# Patient Record
Sex: MALE | Race: WHITE | NOT HISPANIC OR LATINO | Employment: FULL TIME | URBAN - METROPOLITAN AREA
[De-identification: names, ages, dates, MRNs, and addresses within clinical notes are randomized per-mention and may not be internally consistent; named-entity substitution may affect disease eponyms.]

---

## 2018-08-15 DIAGNOSIS — D86.9 SARCOID: Primary | ICD-10-CM

## 2022-09-23 ENCOUNTER — TELEPHONE (OUTPATIENT)
Dept: TRANSPLANT | Facility: CLINIC | Age: 47
End: 2022-09-23
Payer: COMMERCIAL

## 2022-09-23 NOTE — TELEPHONE ENCOUNTER
Referral received from Dr. 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    Referral completed and forwarded to Transplant Financial Services.          Insurance:        Contact #  ----- Message from Nura Liu sent at 9/23/2022 10:42 AM CDT -----    Hepatology referral received and scanned into media; pt chart sent to referral nurse for medical review.       Referring Provider: Alan Chinchilla MD   Phone: [phone]   Fax: [phone]        .

## 2022-09-23 NOTE — LETTER
September 23, 2022    Alan Chinchilla  [address] [place] [address]  Phone: [phone]  Fax: [phone]             Dear Dr. Alan Chinchilla:    Patient: Rosalio Mccarty   MR Number: [id number]   YOB: 1975     Thank you for the referral of Rosalio Mccarty to our transplant program. Your patients demographic and insurance information has been forwarded to our financial counselor for insurance clearance.  Once the insurance company has approved a transplant evaluation for your patient he will be contacted by the transplant coordinator.  An initial appointment will then be scheduled for your patient with one of our transplant hepatologists.      Thank you again for your trust in our program.  If there is anything we can do for you or your staff, please feel free to contact us.      Sincerely,        [facility]   [address] [place] [id number]  [phone]

## 2022-09-23 NOTE — TELEPHONE ENCOUNTER
----- Message from Nura Liu sent at 9/23/2022 10:42 AM CDT -----    Hepatology referral received and scanned into media; pt chart sent to referral nurse for medical review.       Referring Provider: Alan Chinchilla MD   Phone: [phone]   Fax: [phone]        .

## 2022-10-11 ENCOUNTER — TELEPHONE (OUTPATIENT)
Dept: TRANSPLANT | Facility: CLINIC | Age: 47
End: 2022-10-11
Payer: COMMERCIAL

## 2022-10-11 NOTE — TELEPHONE ENCOUNTER
----- Message from Nura Liu sent at 10/11/2022  1:21 PM CDT -----    Called pt to [facility] an appt, but there was no answer. LVM for them to call back to [facility] an appt.  .

## 2022-10-13 ENCOUNTER — TELEPHONE (OUTPATIENT)
Dept: TRANSPLANT | Facility: CLINIC | Age: 47
End: 2022-10-13
Payer: COMMERCIAL

## 2022-10-13 NOTE — TELEPHONE ENCOUNTER
----- Message from Nura Liu sent at 10/13/2022  1:49 PM CDT -----    Called pt to [facility] an appt, but there was no answer. LVM for them to call back to [facility] an appt.  .

## 2022-10-26 ENCOUNTER — TELEPHONE (OUTPATIENT)
Dept: TRANSPLANT | Facility: CLINIC | Age: 47
End: 2022-10-26
Payer: COMMERCIAL

## 2022-10-26 NOTE — TELEPHONE ENCOUNTER
----- Message from Nura Liu sent at 10/26/2022  4:48 PM CDT -----    Called and sp to pt; appt kristian'ed for 11/7.  .

## 2022-11-16 DIAGNOSIS — Z76.82 ORGAN TRANSPLANT CANDIDATE: ICD-10-CM

## 2022-11-16 DIAGNOSIS — K74.60 HEPATIC CIRRHOSIS, UNSPECIFIED HEPATIC CIRRHOSIS TYPE, UNSPECIFIED WHETHER ASCITES PRESENT: Primary | ICD-10-CM

## 2022-11-16 RX ORDER — METRONIDAZOLE 500 MG/1
500 TABLET ORAL 2 TIMES DAILY
COMMUNITY
Start: 2022-08-20 | End: 2022-11-17

## 2022-11-16 RX ORDER — TRAMADOL HYDROCHLORIDE 50 MG/1
50 TABLET ORAL EVERY 6 HOURS PRN
COMMUNITY
Start: 2022-10-01 | End: 2022-11-17

## 2022-11-16 RX ORDER — MONTELUKAST SODIUM 4 MG/1
1 TABLET, CHEWABLE ORAL 2 TIMES DAILY PRN
Status: ON HOLD | COMMUNITY
Start: 2022-11-01 | End: 2023-01-11 | Stop reason: HOSPADM

## 2022-11-16 RX ORDER — FLASH GLUCOSE SENSOR
KIT MISCELLANEOUS
Status: ON HOLD | COMMUNITY
Start: 2022-10-31 | End: 2024-02-09 | Stop reason: HOSPADM

## 2022-11-16 RX ORDER — SEMAGLUTIDE 2.68 MG/ML
2 INJECTION, SOLUTION SUBCUTANEOUS
Status: ON HOLD | COMMUNITY
Start: 2022-10-08 | End: 2023-01-11 | Stop reason: HOSPADM

## 2022-11-16 RX ORDER — INSULIN DEGLUDEC 100 U/ML
40 INJECTION, SOLUTION SUBCUTANEOUS
Status: ON HOLD | COMMUNITY
Start: 2022-10-02 | End: 2023-01-11 | Stop reason: HOSPADM

## 2022-11-16 RX ORDER — OXYCODONE HYDROCHLORIDE 5 MG/1
TABLET ORAL
COMMUNITY
Start: 2022-10-01 | End: 2022-11-17

## 2022-11-16 RX ORDER — HYDROCODONE BITARTRATE AND ACETAMINOPHEN 7.5; 325 MG/1; MG/1
1 TABLET ORAL DAILY
COMMUNITY
Start: 2022-11-13 | End: 2022-12-08 | Stop reason: ALTCHOICE

## 2022-11-16 RX ORDER — ARIPIPRAZOLE 2 MG/1
2 TABLET ORAL DAILY
Status: ON HOLD | COMMUNITY
Start: 2022-09-01 | End: 2023-01-11 | Stop reason: HOSPADM

## 2022-11-16 RX ORDER — CARVEDILOL 6.25 MG/1
6.25 TABLET ORAL 2 TIMES DAILY
Status: ON HOLD | COMMUNITY
Start: 2022-10-31 | End: 2023-01-11 | Stop reason: HOSPADM

## 2022-11-16 RX ORDER — ESCITALOPRAM OXALATE 20 MG/1
20 TABLET ORAL DAILY
Status: ON HOLD | COMMUNITY
Start: 2022-09-23 | End: 2023-01-11 | Stop reason: SDUPTHER

## 2022-11-16 RX ORDER — VANCOMYCIN HYDROCHLORIDE 125 MG/1
125 CAPSULE ORAL EVERY 6 HOURS
COMMUNITY
Start: 2022-09-22 | End: 2022-11-17

## 2022-11-16 RX ORDER — SACUBITRIL AND VALSARTAN 49; 51 MG/1; MG/1
1 TABLET, FILM COATED ORAL 2 TIMES DAILY
Status: ON HOLD | COMMUNITY
Start: 2022-09-23 | End: 2023-01-11 | Stop reason: SDUPTHER

## 2022-11-16 RX ORDER — SULFAMETHOXAZOLE AND TRIMETHOPRIM 800; 160 MG/1; MG/1
TABLET ORAL
COMMUNITY
Start: 2022-10-01 | End: 2022-11-17

## 2022-11-16 RX ORDER — CYCLOBENZAPRINE HCL 10 MG
10 TABLET ORAL 3 TIMES DAILY PRN
COMMUNITY
Start: 2022-10-04 | End: 2022-12-08 | Stop reason: ALTCHOICE

## 2022-11-16 RX ORDER — ADALIMUMAB 40MG/0.8ML
KIT SUBCUTANEOUS
Status: ON HOLD | COMMUNITY
Start: 2022-11-09 | End: 2023-07-28 | Stop reason: HOSPADM

## 2022-11-16 RX ORDER — TRAZODONE HYDROCHLORIDE 50 MG/1
150 TABLET ORAL NIGHTLY
Status: ON HOLD | COMMUNITY
Start: 2022-11-01 | End: 2023-01-11 | Stop reason: HOSPADM

## 2022-11-16 RX ORDER — HYDROXYCHLOROQUINE SULFATE 200 MG/1
200 TABLET, FILM COATED ORAL 2 TIMES DAILY
COMMUNITY
Start: 2022-10-31

## 2022-11-16 RX ORDER — INSULIN LISPRO 100 [IU]/ML
INJECTION, SOLUTION INTRAVENOUS; SUBCUTANEOUS
Status: ON HOLD | COMMUNITY
Start: 2022-11-10 | End: 2023-01-11 | Stop reason: HOSPADM

## 2022-11-16 RX ORDER — MULTIVITAMIN
1 TABLET ORAL DAILY
Status: ON HOLD | COMMUNITY
End: 2023-07-28 | Stop reason: HOSPADM

## 2022-11-17 ENCOUNTER — OFFICE VISIT (OUTPATIENT)
Dept: TRANSPLANT | Facility: CLINIC | Age: 47
End: 2022-11-17
Attending: INTERNAL MEDICINE
Payer: COMMERCIAL

## 2022-11-17 ENCOUNTER — LAB VISIT (OUTPATIENT)
Dept: LAB | Facility: HOSPITAL | Age: 47
End: 2022-11-17
Payer: COMMERCIAL

## 2022-11-17 VITALS
HEIGHT: 68 IN | SYSTOLIC BLOOD PRESSURE: 116 MMHG | HEART RATE: 74 BPM | TEMPERATURE: 97 F | OXYGEN SATURATION: 100 % | RESPIRATION RATE: 16 BRPM | DIASTOLIC BLOOD PRESSURE: 68 MMHG | WEIGHT: 229.5 LBS | BODY MASS INDEX: 34.78 KG/M2

## 2022-11-17 DIAGNOSIS — Z76.82 ORGAN TRANSPLANT CANDIDATE: ICD-10-CM

## 2022-11-17 DIAGNOSIS — I85.10 SECONDARY ESOPHAGEAL VARICES WITHOUT BLEEDING: Chronic | ICD-10-CM

## 2022-11-17 DIAGNOSIS — K74.60 HEPATIC CIRRHOSIS, UNSPECIFIED HEPATIC CIRRHOSIS TYPE, UNSPECIFIED WHETHER ASCITES PRESENT: ICD-10-CM

## 2022-11-17 DIAGNOSIS — R18.8 CIRRHOSIS OF LIVER WITH ASCITES, UNSPECIFIED HEPATIC CIRRHOSIS TYPE: ICD-10-CM

## 2022-11-17 DIAGNOSIS — K74.60 CIRRHOSIS OF LIVER WITH ASCITES, UNSPECIFIED HEPATIC CIRRHOSIS TYPE: ICD-10-CM

## 2022-11-17 DIAGNOSIS — D86.89 HEPATIC GRANULOMA ASSOCIATED WITH SARCOIDOSIS: Chronic | ICD-10-CM

## 2022-11-17 LAB
ABO + RH BLD: NORMAL
AFP SERPL-MCNC: 3 NG/ML (ref 0–8.4)
ALBUMIN SERPL BCP-MCNC: 3.3 G/DL (ref 3.5–5.2)
ALP SERPL-CCNC: 178 U/L (ref 55–135)
ALT SERPL W/O P-5'-P-CCNC: 71 U/L (ref 10–44)
AMPHET+METHAMPHET UR QL: NEGATIVE
ANION GAP SERPL CALC-SCNC: 8 MMOL/L (ref 8–16)
AST SERPL-CCNC: 48 U/L (ref 10–40)
BACTERIA #/AREA URNS AUTO: NORMAL /HPF
BARBITURATES UR QL SCN>200 NG/ML: NEGATIVE
BASOPHILS # BLD AUTO: ABNORMAL K/UL (ref 0–0.2)
BASOPHILS NFR BLD: 0 % (ref 0–1.9)
BENZODIAZ UR QL SCN>200 NG/ML: NEGATIVE
BILIRUB DIRECT SERPL-MCNC: 0.8 MG/DL (ref 0.1–0.3)
BILIRUB SERPL-MCNC: 1.5 MG/DL (ref 0.1–1)
BILIRUB UR QL STRIP: NEGATIVE
BLD GP AB SCN CELLS X3 SERPL QL: NORMAL
BUN SERPL-MCNC: 24 MG/DL (ref 6–20)
BZE UR QL SCN: NEGATIVE
CALCIUM SERPL-MCNC: 9.5 MG/DL (ref 8.7–10.5)
CANNABINOIDS UR QL SCN: ABNORMAL
CHLORIDE SERPL-SCNC: 110 MMOL/L (ref 95–110)
CLARITY UR REFRACT.AUTO: CLEAR
CO2 SERPL-SCNC: 20 MMOL/L (ref 23–29)
COLOR UR AUTO: YELLOW
CREAT SERPL-MCNC: 1.4 MG/DL (ref 0.5–1.4)
CREAT UR-MCNC: 66 MG/DL (ref 23–375)
DIFFERENTIAL METHOD: ABNORMAL
EOSINOPHIL # BLD AUTO: ABNORMAL K/UL (ref 0–0.5)
EOSINOPHIL NFR BLD: 2 % (ref 0–8)
ERYTHROCYTE [DISTWIDTH] IN BLOOD BY AUTOMATED COUNT: 16.3 % (ref 11.5–14.5)
EST. GFR  (NO RACE VARIABLE): >60 ML/MIN/1.73 M^2
ETHANOL UR-MCNC: <10 MG/DL
GGT SERPL-CCNC: 315 U/L (ref 8–55)
GLUCOSE SERPL-MCNC: 226 MG/DL (ref 70–110)
GLUCOSE UR QL STRIP: ABNORMAL
HAV IGG SER QL IA: REACTIVE
HBSAG CONFIRMATION: NORMAL
HBV CORE AB SERPL QL IA: NORMAL
HBV SURFACE AB SER-ACNC: 14.49 MIU/ML
HBV SURFACE AB SER-ACNC: REACTIVE M[IU]/ML
HBV SURFACE AG SERPL QL IA: REACTIVE
HCT VFR BLD AUTO: 34.4 % (ref 40–54)
HCV AB SERPL QL IA: NORMAL
HGB BLD-MCNC: 10.8 G/DL (ref 14–18)
HGB UR QL STRIP: NEGATIVE
HYPOCHROMIA BLD QL SMEAR: ABNORMAL
IMM GRANULOCYTES # BLD AUTO: ABNORMAL K/UL (ref 0–0.04)
IMM GRANULOCYTES NFR BLD AUTO: ABNORMAL % (ref 0–0.5)
INR PPP: 1.3 (ref 0.8–1.2)
KETONES UR QL STRIP: NEGATIVE
LEUKOCYTE ESTERASE UR QL STRIP: NEGATIVE
LYMPHOCYTES # BLD AUTO: ABNORMAL K/UL (ref 1–4.8)
LYMPHOCYTES NFR BLD: 11 % (ref 18–48)
MCH RBC QN AUTO: 28.1 PG (ref 27–31)
MCHC RBC AUTO-ENTMCNC: 31.4 G/DL (ref 32–36)
MCV RBC AUTO: 89 FL (ref 82–98)
METAMYELOCYTES NFR BLD MANUAL: 2 %
METHADONE UR QL SCN>300 NG/ML: NEGATIVE
MICROSCOPIC COMMENT: NORMAL
MONOCYTES # BLD AUTO: ABNORMAL K/UL (ref 0.3–1)
MONOCYTES NFR BLD: 4 % (ref 4–15)
NEUTROPHILS # BLD AUTO: ABNORMAL K/UL (ref 1.8–7.7)
NEUTROPHILS NFR BLD: 81 % (ref 38–73)
NITRITE UR QL STRIP: NEGATIVE
NRBC BLD-RTO: 0 /100 WBC
OPIATES UR QL SCN: NEGATIVE
PCP UR QL SCN>25 NG/ML: NEGATIVE
PH UR STRIP: 5 [PH] (ref 5–8)
PLATELET # BLD AUTO: 48 K/UL (ref 150–450)
PLATELET BLD QL SMEAR: ABNORMAL
PMV BLD AUTO: 12.6 FL (ref 9.2–12.9)
POIKILOCYTOSIS BLD QL SMEAR: SLIGHT
POTASSIUM SERPL-SCNC: 4.5 MMOL/L (ref 3.5–5.1)
PROT SERPL-MCNC: 7.5 G/DL (ref 6–8.4)
PROT UR QL STRIP: NEGATIVE
PROTHROMBIN TIME: 13.1 SEC (ref 9–12.5)
RBC # BLD AUTO: 3.85 M/UL (ref 4.6–6.2)
SODIUM SERPL-SCNC: 138 MMOL/L (ref 136–145)
SP GR UR STRIP: 1.03 (ref 1–1.03)
TOXICOLOGY INFORMATION: ABNORMAL
URN SPEC COLLECT METH UR: ABNORMAL
WBC # BLD AUTO: 1.47 K/UL (ref 3.9–12.7)
WBC #/AREA URNS AUTO: 0 /HPF (ref 0–5)
YEAST UR QL AUTO: NORMAL

## 2022-11-17 PROCEDURE — 86803 HEPATITIS C AB TEST: CPT | Mod: TXP | Performed by: INTERNAL MEDICINE

## 2022-11-17 PROCEDURE — 86790 VIRUS ANTIBODY NOS: CPT | Mod: TXP | Performed by: INTERNAL MEDICINE

## 2022-11-17 PROCEDURE — 82977 ASSAY OF GGT: CPT | Mod: TXP | Performed by: INTERNAL MEDICINE

## 2022-11-17 PROCEDURE — 87340 HEPATITIS B SURFACE AG IA: CPT | Mod: TXP | Performed by: INTERNAL MEDICINE

## 2022-11-17 PROCEDURE — 86706 HEP B SURFACE ANTIBODY: CPT | Mod: 91,TXP | Performed by: INTERNAL MEDICINE

## 2022-11-17 PROCEDURE — 81001 URINALYSIS AUTO W/SCOPE: CPT | Mod: TXP | Performed by: INTERNAL MEDICINE

## 2022-11-17 PROCEDURE — 80053 COMPREHEN METABOLIC PANEL: CPT | Mod: TXP | Performed by: INTERNAL MEDICINE

## 2022-11-17 PROCEDURE — 99999 PR PBB SHADOW E&M-EST. PATIENT-LVL V: ICD-10-PCS | Mod: PBBFAC,TXP,, | Performed by: INTERNAL MEDICINE

## 2022-11-17 PROCEDURE — 99204 OFFICE O/P NEW MOD 45 MIN: CPT | Mod: S$GLB,TXP,, | Performed by: INTERNAL MEDICINE

## 2022-11-17 PROCEDURE — 99204 PR OFFICE/OUTPT VISIT, NEW, LEVL IV, 45-59 MIN: ICD-10-PCS | Mod: S$GLB,TXP,, | Performed by: INTERNAL MEDICINE

## 2022-11-17 PROCEDURE — 86704 HEP B CORE ANTIBODY TOTAL: CPT | Mod: TXP | Performed by: INTERNAL MEDICINE

## 2022-11-17 PROCEDURE — 80321 ALCOHOLS BIOMARKERS 1OR 2: CPT | Mod: TXP | Performed by: INTERNAL MEDICINE

## 2022-11-17 PROCEDURE — 85610 PROTHROMBIN TIME: CPT | Mod: TXP | Performed by: INTERNAL MEDICINE

## 2022-11-17 PROCEDURE — 85027 COMPLETE CBC AUTOMATED: CPT | Mod: TXP | Performed by: INTERNAL MEDICINE

## 2022-11-17 PROCEDURE — 86850 RBC ANTIBODY SCREEN: CPT | Mod: TXP | Performed by: INTERNAL MEDICINE

## 2022-11-17 PROCEDURE — 80307 DRUG TEST PRSMV CHEM ANLYZR: CPT | Mod: TXP | Performed by: INTERNAL MEDICINE

## 2022-11-17 PROCEDURE — 86382 NEUTRALIZATION TEST VIRAL: CPT | Mod: TXP | Performed by: INTERNAL MEDICINE

## 2022-11-17 PROCEDURE — 82248 BILIRUBIN DIRECT: CPT | Mod: TXP | Performed by: INTERNAL MEDICINE

## 2022-11-17 PROCEDURE — 82105 ALPHA-FETOPROTEIN SERUM: CPT | Mod: TXP | Performed by: INTERNAL MEDICINE

## 2022-11-17 PROCEDURE — 99999 PR PBB SHADOW E&M-EST. PATIENT-LVL V: CPT | Mod: PBBFAC,TXP,, | Performed by: INTERNAL MEDICINE

## 2022-11-17 PROCEDURE — 85007 BL SMEAR W/DIFF WBC COUNT: CPT | Mod: TXP | Performed by: INTERNAL MEDICINE

## 2022-11-17 NOTE — PROGRESS NOTES
Transplant Hepatology  Liver Transplant Recipient Evaluation      Consultation started: 11/17/2022 at 9:44 AM     Original Referring Provider: Alan Chinchilla  Current Corresponding Physician: Alan [facility] Liver Diagnosis: Cirrhosis: Other, Specify - granulomatous hepatitis    Reason for Visit: evaluation for liver transplant     Subjective:     Rosalio Mccarty is a 47 y.o. 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  He was diagnosed in 2019.  He is gone on to develop features of portal hypertension including moderate ascites, lower extremity edema and nonbleeding esophageal varices.  There is no history of hepatic encephalopathy.  He does have type 2 diabetes.  There is no history of coronary artery disease but he did undergo a coronary angiogram to rule out pulmonary hypertension a year ago.  He is had no previous upper abdominal surgery.  He works actively as a neurophysiologist.  His most recent MELD scores have been between 13 and 15.  Review of Systems   Constitutional:  Negative for activity change, appetite change, chills, fatigue and unexpected weight change.   HENT:  Negative for congestion, facial swelling and tinnitus.    Eyes:  Negative for visual disturbance.   Respiratory:  Negative for cough, shortness of breath and wheezing.    Cardiovascular:  Negative for chest pain and palpitations.   Gastrointestinal:  Negative for abdominal distention.   Genitourinary:  Negative for dysuria.   Musculoskeletal:  Negative for arthralgias, joint swelling and myalgias.   Neurological:  Negative for syncope and headaches.   Hematological:  Does not bruise/bleed easily.   Psychiatric/Behavioral:  Negative for confusion.    Past Surgical History:   Procedure Laterality Date    ADENOIDECTOMY      CARDIAC CATHETERIZATION Right     COLONOSCOPY  07/11/2018    HIP SURGERY       RIght removed ruptured cyst    UPPER GASTROINTESTINAL ENDOSCOPY  10/09/2019     Current Outpatient Medications   Medication Sig    ARIPiprazole (ABILIFY) 2 MG Tab Take 2 mg by mouth.    carvediloL (COREG) 6.25 MG tablet Take 6.25 mg by mouth 2 (two) times daily.    colestipoL (COLESTID) 1 gram Tab Take 1 g by mouth 2 (two) times daily as needed.    cyclobenzaprine (FLEXERIL) 10 MG tablet Take 10 mg by mouth 3 (three) times daily as needed.    empagliflozin (JARDIANCE) 25 mg tablet Take 25 mg by mouth.    ENTRESTO 49-51 mg per tablet Take 1 tablet by mouth 2 (two) times daily.    EScitalopram oxalate (LEXAPRO) 20 MG tablet Take 20 mg by mouth once daily.    FREESTYLE LUZMA 14 DAY SENSOR Kit Apply topically.    HUMALOG KWIKPEN INSULIN 100 unit/mL pen Sliding Scale    HYDROcodone-acetaminophen (NORCO) 7.5-325 mg per tablet Take 1 tablet by mouth Daily. PRN    hydrOXYchloroQUINE (PLAQUENIL) 200 mg tablet Take 200 mg by mouth 2 (two) times daily.    multivitamin (THERAGRAN) per tablet Take 1 tablet by mouth once daily.    OZEMPIC 2 mg/dose (8 mg/3 mL) PnIj Inject 2 mg into the skin every 7 days.    traZODone (DESYREL) 50 MG tablet Take 50 mg by mouth every evening.    TRESIBA FLEXTOUCH U-100 100 unit/mL (3 mL) insulin pen 40 Units. BID    HUMIRA PEN PnKt injection Inject into the skin.     No current facility-administered medications for this visit.       Objective:   Physical Exam  Constitutional:       Appearance: He is well-developed.   Eyes:      General: No scleral icterus.  Cardiovascular:      Rate and Rhythm: Normal rate and regular rhythm.      Heart sounds: Normal heart sounds.   Pulmonary:      Effort: Pulmonary effort is normal. No respiratory distress.      Breath sounds: Normal breath sounds. No wheezing.   Abdominal:      General: Bowel sounds are normal. There is no distension.      Palpations: Abdomen is soft. There is shifting dullness, fluid wave and splenomegaly. There is no mass.      Tenderness:  There is no abdominal tenderness. There is no rebound.   Musculoskeletal:         General: Normal range of motion.   Lymphadenopathy:      Cervical: No cervical adenopathy.   Skin:     General: Skin is warm and dry.   Neurological:      Mental Status: He is alert and oriented to person, place, and time.     MELD-Na score: 15 at 9/9/2022 10:37 AM  MELD score: 13 at 9/9/2022 10:37 AM  Calculated from:  Serum Creatinine: 1.12 mg/dL at 9/9/2022 10:37 AM  Serum Sodium: 135 mmol/L at 9/9/2022 10:37 AM  Total Bilirubin: 1.7 mg/dL at 9/9/2022 10:37 AM  INR(ratio): 1.36 INR at 9/9/2022 10:37 AM  Age: 47 years  Lab Results   Component Value Date    BUN 21 11/01/2022    CREATININE 1.30 11/01/2022    CALCIUM 9.9 11/01/2022     11/01/2022    K 4.5 11/01/2022     11/01/2022    CO2 25 11/01/2022    WBC 1.8 11/01/2022    WBC 1.8 (LL) 11/01/2022    RBC 4.19 11/01/2022    HGB 11.9 11/01/2022    HCT 37.4 11/01/2022    PLT 34 (LL) 11/01/2022     Lab Results   Component Value Date    BNP 10.9 09/30/2022    CHOL 158 11/01/2022    TRIG 115 11/01/2022    HDL 40 11/01/2022    ALBUMIN 3.9 11/01/2022    AST 66 (H) 11/01/2022    ALT 82 (H) 11/01/2022    ALKPHOS 204 (H) 11/01/2022    LABPROT 16.6 (H) 09/09/2022    INR 1.36 (H) 09/09/2022       Diagnostics:     1. Hepatic cirrhosis, unspecified hepatic cirrhosis type, unspecified whether ascites present    2. Organ transplant candidate    3. Hepatic granuloma associated with sarcoidosis    4. Cirrhosis of liver with ascites, unspecified hepatic cirrhosis type    5. Secondary esophageal varices without bleeding        Transplant Hepatology - Candidacy   Assessment/Plan:     Transplant Candidacy: Rosalio Mccarty is a 47 y.o. male with ESLD secondary to hepatic sarcoid here for evaluation for possible OLT.  In my opinion, he is a good candidate for liver transplant.  He will be presented to selection committee after all tests and evaluations are complete.    Patient advised that it is  recommended that all transplant candidates, and their close contacts and household members receive Covid vaccination.    Ranjith Aiken MD         UNOS Patient Status  Functional Status: 80% - Normal activity with effort: some symptoms of disease  Physical Capacity: No Limitations    Patient on life support: No  Diabetes: Type II  Any previous malignancy: No  Neoadjuvant Therapy: no  Has patient ever had a dx of HCC: no  Previous Abdominal Surgery: no  Spontaneous Bacterial Peritonitis: no  History of Portal Vein Thrombosis: no  Transjugular Intrahepatic Portosystemic Shunt: no

## 2022-11-17 NOTE — LETTER
November 17, 2022        Alan Chinchilla  [address] [place] [address]  Phone: [phone]  Fax: [phone]             [facility]  [address]  [facility] [address]  Phone: [phone]   Patient: Rosalio Mccarty   MR Number: [id number]   YOB: 1975   Date of Visit: 11/17/2022       Dear Dr. Alan Chinchilla    Thank you for referring Rosalio Mccarty to me for evaluation. Attached you will find relevant portions of my assessment and plan of care.    If you have questions, please do not hesitate to call me. I look forward to following Rosalio Mccarty along with you.    Sincerely,    Ranjith Aiken MD    Enclosure    If you would like to receive this communication electronically, please contact [email or web address] or [phone] to request [employer] Link access.    [employer] Link is a tool which provides read-only access to select patient information with whom you have a relationship. Its easy to use and provides real time access to review your patients record including encounter summaries, notes, results, and demographic information.    If you feel you have received this communication in error or would no longer like to receive these types of communications, please e-mail [email or web address]

## 2022-11-23 LAB
CLINICAL BIOCHEMIST REVIEW: NORMAL
PETH 16:0/18.1 (POPETH): <10 NG/ML
PETH 16:0/18.2 (PLPETH): <10 NG/ML

## 2022-11-28 ENCOUNTER — PATIENT MESSAGE (OUTPATIENT)
Dept: TRANSPLANT | Facility: CLINIC | Age: 47
End: 2022-11-28
Payer: COMMERCIAL

## 2022-11-29 DIAGNOSIS — K74.60 CIRRHOSIS OF LIVER WITH ASCITES, UNSPECIFIED HEPATIC CIRRHOSIS TYPE: ICD-10-CM

## 2022-11-29 DIAGNOSIS — R18.8 CIRRHOSIS OF LIVER WITH ASCITES, UNSPECIFIED HEPATIC CIRRHOSIS TYPE: ICD-10-CM

## 2022-11-29 DIAGNOSIS — D86.89 HEPATIC GRANULOMA ASSOCIATED WITH SARCOIDOSIS: Primary | Chronic | ICD-10-CM

## 2022-11-29 DIAGNOSIS — Z76.82 ORGAN TRANSPLANT CANDIDATE: ICD-10-CM

## 2022-11-30 ENCOUNTER — PATIENT MESSAGE (OUTPATIENT)
Dept: TRANSPLANT | Facility: CLINIC | Age: 47
End: 2022-11-30
Payer: COMMERCIAL

## 2022-11-30 ENCOUNTER — TELEPHONE (OUTPATIENT)
Dept: TRANSPLANT | Facility: CLINIC | Age: 47
End: 2022-11-30
Payer: COMMERCIAL

## 2022-11-30 NOTE — TELEPHONE ENCOUNTER
Called pt to discuss the liver transplant evaluation.  Informed patient that evaluation will take approx 2 to 3 days to complete.  Informed him that all testing will be done outpatient.  Patient voiced understanding of the need to have his caregiver present for the  and surgeon consult, as well as for the patient education and reports that his wife and/ or mother will accompany him.  All questions/ concerns regarding liver transplant evaluation answered/ addressed.  Patient requesting to schedule Fast Pass evaluation for first available.   Orders for liver transplant evaluation entered and submitted to  for scheduling.  Per patient's request, will schedule appts 12/8 and 12/9.   Patient reports that he has scheduled BMD locally and has an appt w/ GI MD to schedule repeat EGD/ colonoscopy.  Patient also reports that his wife is interested in being a living donor.  He voiced understanding that his wife cannot be his living donor and his caregiver.  Contact info for live donor provided.

## 2022-12-05 ENCOUNTER — HOSPITAL ENCOUNTER (OUTPATIENT)
Dept: RADIOLOGY | Facility: HOSPITAL | Age: 47
Discharge: HOME | End: 2022-12-05
Attending: INTERNAL MEDICINE
Payer: COMMERCIAL

## 2022-12-05 DIAGNOSIS — R18.8 CIRRHOSIS OF LIVER WITH ASCITES, UNSPECIFIED HEPATIC CIRRHOSIS TYPE: ICD-10-CM

## 2022-12-05 DIAGNOSIS — D86.89 HEPATIC GRANULOMA ASSOCIATED WITH SARCOIDOSIS: ICD-10-CM

## 2022-12-05 DIAGNOSIS — K74.60 CIRRHOSIS OF LIVER WITH ASCITES, UNSPECIFIED HEPATIC CIRRHOSIS TYPE: ICD-10-CM

## 2022-12-05 DIAGNOSIS — Z76.82 ORGAN TRANSPLANT CANDIDATE: ICD-10-CM

## 2022-12-05 PROCEDURE — 77080 DEXA BONE DENSITY SPINE HIP: ICD-10-PCS | Mod: 26,TXP,, | Performed by: RADIOLOGY

## 2022-12-05 PROCEDURE — 77080 DXA BONE DENSITY AXIAL: CPT | Mod: 26,TXP,, | Performed by: RADIOLOGY

## 2022-12-05 PROCEDURE — 77080 DXA BONE DENSITY AXIAL: CPT | Mod: TC,TXP

## 2022-12-06 DIAGNOSIS — Z76.82 ORGAN TRANSPLANT CANDIDATE: Primary | ICD-10-CM

## 2022-12-07 ENCOUNTER — TELEPHONE (OUTPATIENT)
Dept: TRANSPLANT | Facility: CLINIC | Age: 47
End: 2022-12-07
Payer: COMMERCIAL

## 2022-12-07 NOTE — TELEPHONE ENCOUNTER
Patient called to confirm that they will be attending the scheduled appointments for Liver Transplant Fast Pass Evaluation scheduled to start 12/8/2022  at 0730.  Patient confirms that caregivers will be present for the scheduled appointments.  Patient appointments reviewed along with location and special instructions.  Patient questions answered at this time and number provided to call the office if there is any problem.

## 2022-12-08 ENCOUNTER — CLINICAL SUPPORT (OUTPATIENT)
Dept: INFECTIOUS DISEASES | Facility: CLINIC | Age: 47
End: 2022-12-08
Payer: COMMERCIAL

## 2022-12-08 ENCOUNTER — HOSPITAL ENCOUNTER (OUTPATIENT)
Dept: CARDIOLOGY | Facility: HOSPITAL | Age: 47
Discharge: HOME | End: 2022-12-08
Attending: INTERNAL MEDICINE
Payer: COMMERCIAL

## 2022-12-08 ENCOUNTER — OFFICE VISIT (OUTPATIENT)
Dept: INFECTIOUS DISEASES | Facility: CLINIC | Age: 47
End: 2022-12-08
Attending: INTERNAL MEDICINE
Payer: COMMERCIAL

## 2022-12-08 ENCOUNTER — CLINICAL SUPPORT (OUTPATIENT)
Dept: TRANSPLANT | Facility: CLINIC | Age: 47
End: 2022-12-08
Payer: COMMERCIAL

## 2022-12-08 VITALS
SYSTOLIC BLOOD PRESSURE: 121 MMHG | RESPIRATION RATE: 16 BRPM | OXYGEN SATURATION: 99 % | HEART RATE: 82 BPM | OXYGEN SATURATION: 99 % | TEMPERATURE: 97 F | BODY MASS INDEX: 36.02 KG/M2 | BODY MASS INDEX: 36.02 KG/M2 | DIASTOLIC BLOOD PRESSURE: 80 MMHG | HEIGHT: 68 IN | WEIGHT: 237.63 LBS | RESPIRATION RATE: 16 BRPM | HEIGHT: 68 IN | SYSTOLIC BLOOD PRESSURE: 121 MMHG | HEART RATE: 82 BPM | TEMPERATURE: 97 F | WEIGHT: 237.63 LBS | DIASTOLIC BLOOD PRESSURE: 80 MMHG

## 2022-12-08 VITALS
WEIGHT: 237.63 LBS | TEMPERATURE: 99 F | HEART RATE: 79 BPM | DIASTOLIC BLOOD PRESSURE: 73 MMHG | BODY MASS INDEX: 36.02 KG/M2 | SYSTOLIC BLOOD PRESSURE: 104 MMHG | HEIGHT: 68 IN

## 2022-12-08 VITALS
HEART RATE: 84 BPM | HEIGHT: 68 IN | SYSTOLIC BLOOD PRESSURE: 112 MMHG | DIASTOLIC BLOOD PRESSURE: 72 MMHG | BODY MASS INDEX: 35.92 KG/M2 | RESPIRATION RATE: 18 BRPM | WEIGHT: 237 LBS

## 2022-12-08 DIAGNOSIS — D86.89 HEPATIC GRANULOMA ASSOCIATED WITH SARCOIDOSIS: Chronic | ICD-10-CM

## 2022-12-08 DIAGNOSIS — Z23 IMMUNIZATION DUE: ICD-10-CM

## 2022-12-08 DIAGNOSIS — Z76.82 ORGAN TRANSPLANT CANDIDATE: ICD-10-CM

## 2022-12-08 DIAGNOSIS — K74.60 CIRRHOSIS OF LIVER WITH ASCITES, UNSPECIFIED HEPATIC CIRRHOSIS TYPE: ICD-10-CM

## 2022-12-08 DIAGNOSIS — R18.8 CIRRHOSIS OF LIVER WITH ASCITES, UNSPECIFIED HEPATIC CIRRHOSIS TYPE: ICD-10-CM

## 2022-12-08 DIAGNOSIS — D86.89 HEPATIC GRANULOMA ASSOCIATED WITH SARCOIDOSIS: ICD-10-CM

## 2022-12-08 DIAGNOSIS — Z23 IMMUNIZATION DUE: Primary | ICD-10-CM

## 2022-12-08 LAB
AMPHET+METHAMPHET UR QL: NEGATIVE
ASCENDING AORTA: 3.45 CM
AV INDEX (PROSTH): 0.74
AV MEAN GRADIENT: 4 MMHG
AV PEAK GRADIENT: 7 MMHG
AV VALVE AREA: 3.15 CM2
AV VELOCITY RATIO: 0.76
BACTERIA #/AREA URNS AUTO: ABNORMAL /HPF
BARBITURATES UR QL SCN>200 NG/ML: NEGATIVE
BENZODIAZ UR QL SCN>200 NG/ML: NEGATIVE
BILIRUB UR QL STRIP: NEGATIVE
BSA FOR ECHO PROCEDURE: 2.27 M2
BZE UR QL SCN: NEGATIVE
CANNABINOIDS UR QL SCN: NEGATIVE
CLARITY UR REFRACT.AUTO: CLEAR
COLOR UR AUTO: YELLOW
CREAT UR-MCNC: 108 MG/DL (ref 23–375)
CV ECHO LV RWT: 0.37 CM
CV STRESS BASE HR: 72 BPM
DIASTOLIC BLOOD PRESSURE: 64 MMHG
DOP CALC AO PEAK VEL: 1.3 M/S
DOP CALC AO VTI: 23.26 CM
DOP CALC LVOT AREA: 4.3 CM2
DOP CALC LVOT DIAMETER: 2.33 CM
DOP CALC LVOT PEAK VEL: 0.99 M/S
DOP CALC LVOT STROKE VOLUME: 73.22 CM3
DOP CALCLVOT PEAK VEL VTI: 17.18 CM
E WAVE DECELERATION TIME: 192.18 MSEC
E/A RATIO: 1.34
E/E' RATIO: 7.2 M/S
ECHO LV POSTERIOR WALL: 0.82 CM (ref 0.6–1.1)
EJECTION FRACTION: 65 %
ETHANOL UR-MCNC: <10 MG/DL
FRACTIONAL SHORTENING: 35 % (ref 28–44)
GLUCOSE UR QL STRIP: ABNORMAL
HGB UR QL STRIP: ABNORMAL
INTERVENTRICULAR SEPTUM: 0.76 CM (ref 0.6–1.1)
KETONES UR QL STRIP: NEGATIVE
LA MAJOR: 4.75 CM
LA MINOR: 4.36 CM
LA WIDTH: 3.5 CM
LEFT ATRIUM SIZE: 4.45 CM
LEFT ATRIUM VOLUME INDEX MOD: 19.9 ML/M2
LEFT ATRIUM VOLUME INDEX: 27.4 ML/M2
LEFT ATRIUM VOLUME MOD: 43.74 CM3
LEFT ATRIUM VOLUME: 60.19 CM3
LEFT INTERNAL DIMENSION IN SYSTOLE: 2.87 CM (ref 2.1–4)
LEFT VENTRICLE DIASTOLIC VOLUME INDEX: 40.68 ML/M2
LEFT VENTRICLE DIASTOLIC VOLUME: 89.49 ML
LEFT VENTRICLE MASS INDEX: 50 G/M2
LEFT VENTRICLE SYSTOLIC VOLUME INDEX: 14.2 ML/M2
LEFT VENTRICLE SYSTOLIC VOLUME: 31.27 ML
LEFT VENTRICULAR INTERNAL DIMENSION IN DIASTOLE: 4.44 CM (ref 3.5–6)
LEFT VENTRICULAR MASS: 109.29 G
LEUKOCYTE ESTERASE UR QL STRIP: NEGATIVE
LV LATERAL E/E' RATIO: 5.63 M/S
LV SEPTAL E/E' RATIO: 10 M/S
METHADONE UR QL SCN>300 NG/ML: NEGATIVE
MICROSCOPIC COMMENT: ABNORMAL
MV A" WAVE DURATION": 10.85 MSEC
MV PEAK A VEL: 0.67 M/S
MV PEAK E VEL: 0.9 M/S
MV STENOSIS PRESSURE HALF TIME: 55.73 MS
MV VALVE AREA P 1/2 METHOD: 3.95 CM2
NITRITE UR QL STRIP: NEGATIVE
OHS CV CPX 1 MINUTE RECOVERY HEART RATE: 146 BPM
OHS CV CPX 85 PERCENT MAX PREDICTED HEART RATE MALE: 147
OHS CV CPX MAX PREDICTED HEART RATE: 173
OHS CV CPX PATIENT IS FEMALE: 0
OHS CV CPX PATIENT IS MALE: 1
OHS CV CPX PEAK DIASTOLIC BLOOD PRESSURE: 78 MMHG
OHS CV CPX PEAK HEAR RATE: 148 BPM
OHS CV CPX PEAK RATE PRESSURE PRODUCT: NORMAL
OHS CV CPX PEAK SYSTOLIC BLOOD PRESSURE: 152 MMHG
OHS CV CPX PERCENT MAX PREDICTED HEART RATE ACHIEVED: 86
OHS CV CPX RATE PRESSURE PRODUCT PRESENTING: 8496
OPIATES UR QL SCN: NEGATIVE
PCP UR QL SCN>25 NG/ML: NEGATIVE
PH UR STRIP: 6 [PH] (ref 5–8)
PROT UR QL STRIP: ABNORMAL
PULM VEIN S/D RATIO: 0.98
PV PEAK D VEL: 0.4 M/S
PV PEAK S VEL: 0.39 M/S
RA MAJOR: 4.08 CM
RA PRESSURE: 3 MMHG
RA WIDTH: 2.93 CM
RBC #/AREA URNS AUTO: 10 /HPF (ref 0–4)
RIGHT VENTRICULAR END-DIASTOLIC DIMENSION: 3.23 CM
RV TISSUE DOPPLER FREE WALL SYSTOLIC VELOCITY 1 (APICAL 4 CHAMBER VIEW): 15.99 CM/S
SINUS: 3.08 CM
SP GR UR STRIP: 1.02 (ref 1–1.03)
STJ: 3.1 CM
SYSTOLIC BLOOD PRESSURE: 118 MMHG
TDI LATERAL: 0.16 M/S
TDI SEPTAL: 0.09 M/S
TDI: 0.13 M/S
TOXICOLOGY INFORMATION: NORMAL
TRICUSPID ANNULAR PLANE SYSTOLIC EXCURSION: 2.34 CM
URN SPEC COLLECT METH UR: ABNORMAL
WBC #/AREA URNS AUTO: 4 /HPF (ref 0–5)
YEAST UR QL AUTO: ABNORMAL

## 2022-12-08 PROCEDURE — 93320 DOPPLER ECHO COMPLETE: CPT | Mod: 26,TXP,, | Performed by: INTERNAL MEDICINE

## 2022-12-08 PROCEDURE — 93351 STRESS TTE COMPLETE: CPT | Mod: 26,TXP,, | Performed by: INTERNAL MEDICINE

## 2022-12-08 PROCEDURE — 90677 PNEUMOCOCCAL CONJUGATE VACCINE 20-VALENT: ICD-10-PCS | Mod: S$GLB,TXP,, | Performed by: INTERNAL MEDICINE

## 2022-12-08 PROCEDURE — 63600150 PHARM REV CODE 636: Mod: TXP | Performed by: INTERNAL MEDICINE

## 2022-12-08 PROCEDURE — 99999 PR PBB SHADOW E&M-EST. PATIENT-LVL V: CPT | Mod: PBBFAC,TXP,, | Performed by: INTERNAL MEDICINE

## 2022-12-08 PROCEDURE — 99999 PR PBB SHADOW E&M-EST. PATIENT-LVL I: CPT | Mod: PBBFAC,TXP,,

## 2022-12-08 PROCEDURE — 93325 DOPPLER ECHO COLOR FLOW MAPG: CPT | Mod: 26,TXP,, | Performed by: INTERNAL MEDICINE

## 2022-12-08 PROCEDURE — 99999 PR PBB SHADOW E&M-EST. PATIENT-LVL V: ICD-10-PCS | Mod: PBBFAC,TXP,, | Performed by: INTERNAL MEDICINE

## 2022-12-08 PROCEDURE — 93320 STRESS ECHO (CUPID ONLY): ICD-10-PCS | Mod: 26,TXP,, | Performed by: INTERNAL MEDICINE

## 2022-12-08 PROCEDURE — 90471 IMMUNIZATION ADMIN: CPT | Mod: S$GLB,TXP,, | Performed by: INTERNAL MEDICINE

## 2022-12-08 PROCEDURE — 99999 PR PBB SHADOW E&M-EST. PATIENT-LVL I: ICD-10-PCS | Mod: PBBFAC,TXP,,

## 2022-12-08 PROCEDURE — 99204 OFFICE O/P NEW MOD 45 MIN: CPT | Mod: 25,S$GLB,TXP, | Performed by: INTERNAL MEDICINE

## 2022-12-08 PROCEDURE — 99999 PR PBB SHADOW E&M-EST. PATIENT-LVL III: CPT | Mod: PBBFAC,TXP,,

## 2022-12-08 PROCEDURE — 93320 DOPPLER ECHO COMPLETE: CPT | Mod: TXP

## 2022-12-08 PROCEDURE — 99999 PR PBB SHADOW E&M-EST. PATIENT-LVL III: ICD-10-PCS | Mod: PBBFAC,TXP,,

## 2022-12-08 PROCEDURE — 93351 STRESS ECHO (CUPID ONLY): ICD-10-PCS | Mod: 26,TXP,, | Performed by: INTERNAL MEDICINE

## 2022-12-08 PROCEDURE — 90471 PNEUMOCOCCAL CONJUGATE VACCINE 20-VALENT: ICD-10-PCS | Mod: S$GLB,TXP,, | Performed by: INTERNAL MEDICINE

## 2022-12-08 PROCEDURE — 63600175 PHARM REV CODE 636 W HCPCS: Mod: TXP | Performed by: INTERNAL MEDICINE

## 2022-12-08 PROCEDURE — 90677 PCV20 VACCINE IM: CPT | Mod: S$GLB,TXP,, | Performed by: INTERNAL MEDICINE

## 2022-12-08 PROCEDURE — 99999 PR PBB SHADOW E&M-EST. PATIENT-LVL IV: CPT | Mod: PBBFAC,TXP,,

## 2022-12-08 PROCEDURE — 81001 URINALYSIS AUTO W/SCOPE: CPT | Mod: TXP | Performed by: INTERNAL MEDICINE

## 2022-12-08 PROCEDURE — 99204 PR OFFICE/OUTPT VISIT, NEW, LEVL IV, 45-59 MIN: ICD-10-PCS | Mod: 25,S$GLB,TXP, | Performed by: INTERNAL MEDICINE

## 2022-12-08 PROCEDURE — 99999 PR PBB SHADOW E&M-EST. PATIENT-LVL IV: ICD-10-PCS | Mod: PBBFAC,TXP,,

## 2022-12-08 PROCEDURE — 80307 DRUG TEST PRSMV CHEM ANLYZR: CPT | Mod: TXP | Performed by: INTERNAL MEDICINE

## 2022-12-08 PROCEDURE — 93325 STRESS ECHO (CUPID ONLY): ICD-10-PCS | Mod: 26,TXP,, | Performed by: INTERNAL MEDICINE

## 2022-12-08 RX ORDER — DOBUTAMINE HYDROCHLORIDE 100 MG/100ML
50 INJECTION INTRAVENOUS
Status: COMPLETED | OUTPATIENT
Start: 2022-12-08 | End: 2022-12-08

## 2022-12-08 RX ORDER — ATROPINE SULFATE 0.1 MG/ML
1.5 INJECTION INTRAVENOUS
Status: COMPLETED | OUTPATIENT
Start: 2022-12-08 | End: 2022-12-08

## 2022-12-08 RX ADMIN — ATROPINE SULFATE 1.5 MG: 0.1 INJECTION INTRAVENOUS at 04:12

## 2022-12-08 RX ADMIN — DOBUTAMINE IN DEXTROSE 50 MCG/KG/MIN: 100 INJECTION, SOLUTION INTRAVENOUS at 04:12

## 2022-12-08 NOTE — PROGRESS NOTES
Rosalio was  seen in clinic for Fast Pass evaluation.  Handbook on pre-liver transplant information (see outline below) was given to the patient.  Patient's wife , accompanied him to scheduled appointments.   Patient viewed pre-liver transplant education slides via desktop in transplant clinic.  Informed consent signed and written information given on selection criteria.    LIVER TRANSPLANT WORK-UP EDUCATION   I. UNDERSTANDING THE TRANSPLANT PROCESS     A. Transplant team      B. MELD score      C. Balancing urgency and outcome     D. Liver Transplant Options         1.  Donor         2. Living Donor--rationale, benefits     E. Transplant Work-up         1. Medical         2. Psychological and Social--lifetime commitment, life changes, personal plan/ goal         3. Financial--fundraising     F.  Completed work-up and Next Steps    G. Wait Time         1.  Can be listed at more than one center         2.  Can transfer wait time     H. The Call       I. Possible donor options         1. DCD         2. Hep B Core and Hep C Positive         3. Increased Risk     J.  Liver Transplant Surgery         1. Length         2. Transfusions, cell saver         3. Surgical risks         4. What to expect after sugery--Central lines, drains, Banuelos catheter, incision, endotracheal              tube, NG tube, length of stay in ICU/ TSU  II.  HOW TO BEST CARE FOR YOURSELF (Take Five To Thrive)  III. UNDERSTANDING LIFE AFTER TRANSPLANT  A. Medicines after transplant      1. Immunosuppression--infection and rejection  B. Labs   IV. ADULT LIVER SURVIVAL RATES

## 2022-12-08 NOTE — NURSING
Patient verified by 2 identifiers and allergies reviewed.  22g IV placed to Rt AC.  DSE explained to patient, consent obtained & testing completed.  Pt tolerated testing well. IV removed post testing.  Post study discharge instructions reviewed with patient, patient verbalized understanding.  Patient discharged to home in stable condition.

## 2022-12-08 NOTE — PROGRESS NOTES
PHARM.D. PRE-TRANSPLANT NOTE:    This patient's medication therapy was evaluated as part of his pre-transplant evaluation.      The following general pharmacologic concerns were noted: Humira--will need to discuss dosing frequency at time of transplant. May not need with induction immunosuppression perio     The following concerns for post-operative pain management were noted: none    The following pharmacologic concerns related to HCV therapy were noted: none      This patient's medication profile was reviewed for considerations for DAA Hepatitis C therapy:    [x]  No current inducers of CYP 3A4 or PGP  [x]  No amiodarone on this patient's EMR profile in the last 24 months  [x]  No past or current atrial fibrillation on this patient's EMR profile       Current Outpatient Medications   Medication Sig Dispense Refill    ARIPiprazole (ABILIFY) 2 MG Tab Take 2 mg by mouth once daily.      carvediloL (COREG) 6.25 MG tablet Take 6.25 mg by mouth 2 (two) times daily.      colestipoL (COLESTID) 1 gram Tab Take 1 g by mouth 2 (two) times daily as needed.      empagliflozin (JARDIANCE) 25 mg tablet Take 25 mg by mouth once daily.      ENTRESTO 49-51 mg per tablet Take 1 tablet by mouth 2 (two) times daily.      EScitalopram oxalate (LEXAPRO) 20 MG tablet Take 20 mg by mouth once daily.      FREESTYLE LUZMA 14 DAY SENSOR Kit Apply topically.      HUMALOG KWIKPEN INSULIN 100 unit/mL pen Sliding Scale      HUMIRA PEN PnKt injection Inject into the skin.      hydrOXYchloroQUINE (PLAQUENIL) 200 mg tablet Take 200 mg by mouth 2 (two) times daily.      multivitamin (THERAGRAN) per tablet Take 1 tablet by mouth once daily.      OZEMPIC 2 mg/dose (8 mg/3 mL) PnIj Inject 2 mg into the skin every 7 days.      traZODone (DESYREL) 50 MG tablet Take 150 mg by mouth every evening.      TRESIBA FLEXTOUCH U-100 100 unit/mL (3 mL) insulin pen 40 Units. BID       No current facility-administered medications for this visit.           I am  available for consultation and can be contacted, as needed by the other members of the Transplant team.

## 2022-12-09 ENCOUNTER — HOSPITAL ENCOUNTER (OUTPATIENT)
Dept: RADIOLOGY | Facility: HOSPITAL | Age: 47
Discharge: HOME | End: 2022-12-09
Attending: INTERNAL MEDICINE
Payer: COMMERCIAL

## 2022-12-09 DIAGNOSIS — K74.60 CIRRHOSIS OF LIVER WITH ASCITES, UNSPECIFIED HEPATIC CIRRHOSIS TYPE: ICD-10-CM

## 2022-12-09 DIAGNOSIS — D86.89 HEPATIC GRANULOMA ASSOCIATED WITH SARCOIDOSIS: ICD-10-CM

## 2022-12-09 DIAGNOSIS — Z76.82 ORGAN TRANSPLANT CANDIDATE: ICD-10-CM

## 2022-12-09 DIAGNOSIS — R18.8 CIRRHOSIS OF LIVER WITH ASCITES, UNSPECIFIED HEPATIC CIRRHOSIS TYPE: ICD-10-CM

## 2022-12-09 DIAGNOSIS — D72.819 LEUKOPENIA, UNSPECIFIED TYPE: Primary | ICD-10-CM

## 2022-12-09 PROCEDURE — 71046 XR CHEST PA AND LATERAL: ICD-10-PCS | Mod: 26,TXP,, | Performed by: RADIOLOGY

## 2022-12-09 PROCEDURE — 76700 US ABDOMEN COMP WITH DOPPLER (XPD): ICD-10-PCS | Mod: 26,XS,TXP, | Performed by: STUDENT IN AN ORGANIZED HEALTH CARE EDUCATION/TRAINING PROGRAM

## 2022-12-09 PROCEDURE — 71046 X-RAY EXAM CHEST 2 VIEWS: CPT | Mod: 26,TXP,, | Performed by: RADIOLOGY

## 2022-12-09 PROCEDURE — 93975 US ABDOMEN COMP WITH DOPPLER (XPD): ICD-10-PCS | Mod: 26,TXP,, | Performed by: STUDENT IN AN ORGANIZED HEALTH CARE EDUCATION/TRAINING PROGRAM

## 2022-12-09 PROCEDURE — 71046 X-RAY EXAM CHEST 2 VIEWS: CPT | Mod: TC,TXP

## 2022-12-09 PROCEDURE — 76700 US EXAM ABDOM COMPLETE: CPT | Mod: 26,XS,TXP, | Performed by: STUDENT IN AN ORGANIZED HEALTH CARE EDUCATION/TRAINING PROGRAM

## 2022-12-09 PROCEDURE — 93975 VASCULAR STUDY: CPT | Mod: TC,TXP

## 2022-12-09 PROCEDURE — 93975 VASCULAR STUDY: CPT | Mod: 26,TXP,, | Performed by: STUDENT IN AN ORGANIZED HEALTH CARE EDUCATION/TRAINING PROGRAM

## 2022-12-12 ENCOUNTER — DOCUMENTATION ONLY (OUTPATIENT)
Dept: TRANSPLANT | Facility: CLINIC | Age: 47
End: 2022-12-12
Payer: COMMERCIAL

## 2022-12-12 ENCOUNTER — TELEPHONE (OUTPATIENT)
Dept: TRANSPLANT | Facility: CLINIC | Age: 47
End: 2022-12-12
Payer: COMMERCIAL

## 2022-12-12 NOTE — TELEPHONE ENCOUNTER
Phone call returned to [place].  Clarified dates of CTs requested.  Jazzy agrees to [employer].    ================================================    ----- Message from Luan Youngblood MA sent at 12/12/2022  1:53 PM CST -----  Regarding: CT Scan reports  [facility] states that a nurse was requested to send a particular date, but that isn't available (August) but one for April is available and wanting to know if it's okay to send that one? Please call and advise.    Jazzy [phone]

## 2022-12-13 NOTE — PROGRESS NOTES
Pre-liver transplant education provided via slide show in exam room of transplant clinic.  E-consents obtained by Ree Ames LPN and can be found under the Media tab.  DAVID and HIV consent submitted to CYNTHIA Aguilera MA for scanning.

## 2022-12-14 ENCOUNTER — COMMITTEE REVIEW (OUTPATIENT)
Dept: TRANSPLANT | Facility: CLINIC | Age: 47
End: 2022-12-14

## 2022-12-14 ENCOUNTER — PATIENT MESSAGE (OUTPATIENT)
Dept: TRANSPLANT | Facility: CLINIC | Age: 47
End: 2022-12-14
Payer: COMMERCIAL

## 2022-12-14 NOTE — COMMITTEE REVIEW
Rosalio Mccarty's case presented to selection committee.  Patient has been accepted for liver transplant due to Cirrhosis: Other, Specify - granulomatous hepatitis and complications of end stage liver disease including ascites, coagulopathy, encephalopathy, esophageal varices, hyperbilirubinemia, hypoalbuminemia, pancytopenia, portal hypertension, splenomegaly, thrombocytopenia, and edema, fatigue and muscle wasting  with a MELD score of 16.  Patient has no absolute contraindications for liver transplant.  Patient will be listed pending financial approval. MD will discuss living donor with patient.     Patient will accept HBcAb positive livers.  Patient will accept HCVAB positive livers.  Patient will accept DCD livers.  Patient will accept HCV ASAF positive livers  Patient will accept HBV ASAF positive livers  I was present at the committee meeting and attest to the decision of the committee.    Atif Fisherondjarrod  12/16/2022

## 2022-12-19 ENCOUNTER — TELEPHONE (OUTPATIENT)
Dept: TRANSPLANT | Facility: CLINIC | Age: 47
End: 2022-12-19
Payer: COMMERCIAL

## 2022-12-19 ENCOUNTER — PATIENT MESSAGE (OUTPATIENT)
Dept: TRANSPLANT | Facility: CLINIC | Age: 47
End: 2022-12-19
Payer: COMMERCIAL

## 2022-12-19 DIAGNOSIS — D86.89 HEPATIC GRANULOMA ASSOCIATED WITH SARCOIDOSIS: Primary | Chronic | ICD-10-CM

## 2022-12-19 DIAGNOSIS — K74.60 CIRRHOSIS OF LIVER WITH ASCITES, UNSPECIFIED HEPATIC CIRRHOSIS TYPE: ICD-10-CM

## 2022-12-19 DIAGNOSIS — R18.8 CIRRHOSIS OF LIVER WITH ASCITES, UNSPECIFIED HEPATIC CIRRHOSIS TYPE: ICD-10-CM

## 2022-12-19 NOTE — TELEPHONE ENCOUNTER
"  LIVER WAIT LISTING NOTE    **NOTE:   IF ANY EXTERNAL LABS ARE USED FOR LISTING THE VALUES AND DATES MUST BE ENTERED IN EPIC TO GENERATE THIS NOTE**    Date of Financial clearance to list: 2022    [facility]:        Organ: Liver    Last Name: Lul  First Name: Rosalio     : 1975      Gender:     male         MRN#: [id number]                                   State of Permanent Residence:  [address] [place] [address] [place] [id number]    Ethnicity: Not  or /a   Race:      White    CLINICAL INFORMATION       ABO  ABO Blood Group:   A POS     ABO Confirmation: (THESE DATES MUST BE PRIOR TO THE LIST DATE AND SUPPORTED BY SEPARATE LAB REPORTS)    Internal Results    Lab Results   Component Value Date    GROUPTRH A POS 2022    GROUPTRH A POS 2022     No results found for: ABO    External Results    ABO Date 1:  ABO Result 1:  ABO Date 2:  ABO Result 2:     Are either of these ABO results based on External Labs? no  (If Yes, STOP and go to source document in Media Tab for verification).    VITALS  Height:    Ht Readings from Last 1 Encounters:   22 5' 8" (1.727 m)     Weight:    Wt Readings from Last 1 Encounters:   22 107.5 kg (237 lb)       LIVER ORGAN INFORMATION  Candidate Medical Urgency Status: MELD/PELD    Number of Previous Transplants: 0    MELD/PELD Data Collection:  Had dialysis twice, or 24 hours of CVVHD, within 1 week prior to the serum creatinine test: No  Encephalopathy: none Date: 2022  Ascites: slight Date: 2022          MELD Score:  MELD-Na score: 16 at 2022  9:25 AM  MELD score: 14 at 2022  9:25 AM  Calculated from:  Serum Creatinine: 1.5 mg/dL at 2022  9:25 AM  Serum Sodium: 135 mmol/L at 2022  8:45 AM  Total Bilirubin: 1.2 mg/dL at 2022  8:45 AM  INR(ratio): 1.3 at 2022  8:45 AM  Age: 47 years  Lab Results   Component Value Date    ALBUMIN 3.2 (L) 2022     No results found for: EXTINR, EXTCREATININ, " "EXTSODIUM, EXTBILITOTAL, EXTALBUMIN    Additional Organs: none  Kidney: No    If Kidney is "Yes" above, check Diagnosis and enter the medical eligibility below for a simultaneous liver/kidney:    Diagnosis: Chronic kidney disease (CKD) with measured or calculated GFR less than or equal to 60 ml/min for greater than 90 consecutive days. At least one of the following must qualify for CKD:  Date Begun Dialysis CrCl (ml/min)  Must be < or = 30 eGFR (ml/min) Must be < or = 30    Yes/no:                    Diagnosis: Sustained acute kidney injury (must be confirmed at least once every 7 days). Please select at least one of the following criteria:  Date of test or treatment Dialysis received CrCl (ml/min) Must be < or = 30 eGFR (ml/min) Must be < or = 25 Number of days since previous test or treatment (must be less than or equal to 7 days)    Yes/No:                  Diagnosis: Metabolic disease, Check all diagnosis that apply:     Hyperoxaluria    Atypical hemolytic uremic syndrome (HUS) from mutations in factor H or factor I    Familial non-neuropathic systemic amyloidosis    Methylmalonic aciduria     Transplant nephrologist confirming candidate's most recent diagnosis for SLK: N/A               ## Please submit a separate Kidney Listing note for combined Liver/Kidney patients. ##    Will Recipient Accept?   Accept HBcAB Positive Organ:  yes  Accept HBV ASAF Positive Organ:  yes  Accept HCV Antibody Positive Organ: yes   Accept HCV ASAF Positive Organ:  yes                        Local: No                           Import: No  Accept DCD Organ:    yes  Minimum acceptable donor age:  5 years  Maximum acceptable donor age:  99 years  Minimum acceptable donor weight:  40 lbs    Maximum acceptable donor weight:  440 lb  Maximum miles the organ or  Recovery team will travel:   5000 miles    TCR Information    Citizenship: [place] Citizen   Country of permanent residence:   Year of entry to the [place]:   Highest education level: " Post-College Graduate Degree    Patient on Life Support: No  Functional Status: 80% - normal activity with effort: some symptoms of disease   Working for income: yes  If yes, working activity level: Working Full Time  Previous [facility] - No  Source of payment: Private Insurance  Diabetes: Type II  Any previous malignancy: No  Neoadjuvant Therapy: No  Has candidate ever had a diagnosis of HCC: No    Liver Medical Factors  Previous abdominal surgery: No  Spontaneous Bacterial Peritonitis: No  History of Portal Vein Thrombosis: No  Transjugular Intrahepatic Portosystemic Shunt: No    Blood Type x2 was verified by myself and Guadalupe Law RN.  Blood type determination and reporting was completed according to the programs protocols and OPTN requirements.

## 2022-12-20 ENCOUNTER — TELEPHONE (OUTPATIENT)
Dept: TRANSPLANT | Facility: CLINIC | Age: 47
End: 2022-12-20
Payer: COMMERCIAL

## 2022-12-20 ENCOUNTER — PATIENT MESSAGE (OUTPATIENT)
Dept: TRANSPLANT | Facility: CLINIC | Age: 47
End: 2022-12-20

## 2022-12-20 ENCOUNTER — LAB VISIT (OUTPATIENT)
Dept: TRANSPLANT | Facility: CLINIC | Age: 47
End: 2022-12-20
Payer: COMMERCIAL

## 2022-12-20 DIAGNOSIS — Z76.82 ORGAN TRANSPLANT CANDIDATE: ICD-10-CM

## 2022-12-20 DIAGNOSIS — Z76.82 ORGAN TRANSPLANT CANDIDATE: Primary | ICD-10-CM

## 2022-12-20 LAB — SARS-COV-2 RDRP RESP QL NAA+PROBE: NEGATIVE

## 2022-12-20 PROCEDURE — U0002 COVID-19 LAB TEST NON-CDC: HCPCS | Mod: TXP | Performed by: SURGERY

## 2022-12-20 NOTE — TELEPHONE ENCOUNTER
Donor time previously set for 2100 tonight, but times changes to 0900 tomorrow. Pt has been updated & will now be NPO starting at 0300 in the morning. He has been set up with a room in the [facility] in the meantime to avoid having to drive back home & return. Understanding expressed. Pt to eat now so that he may take his meds.    BACK-UP ORGAN OFFER NOTE - [id number]    Notified by Casey Díaz, , of backup liver offer with donor information.  Donor and recipient information read back and verified.  Spoke with Rosalio Mccarty and identified no acute medical issues in telephone assessment.  Patient instructed NPO @ 0250. Patient verbalized understanding that he has been called as a backup.    Patient was asked if they have had a positive COVID-19 test or if they have any signs or symptoms. Informed patient that they will be tested for COVID-19 upon arrival to the hospital, unless have a previous positive result. If tested and result is positive, the transplant will not be able to occur, they will be inactivated on the wait list for 28 days per protocol and required to quarantine.

## 2022-12-20 NOTE — TELEPHONE ENCOUNTER
BACK-UP ORGAN OFFER NOTE - [id number]    Notified by Casey Díaz, , of backup liver offer with donor information.  Donor and recipient information read back and verified.  Spoke with Rosalio Mccarty and identified no acute medical issues in telephone assessment.  Patient instructed NPO for previous case. Patient verbalized understanding that he has been called as a backup.    Patient was asked if they have had a positive COVID-19 test or if they have any signs or symptoms. Informed patient that they will be tested for COVID-19 upon arrival to the hospital, unless have a previous positive result. If tested and result is positive, the transplant will not be able to occur, they will be inactivated on the wait list for 28 days per protocol and required to quarantine.

## 2022-12-21 ENCOUNTER — TELEPHONE (OUTPATIENT)
Dept: TRANSPLANT | Facility: CLINIC | Age: 47
End: 2022-12-21
Payer: COMMERCIAL

## 2022-12-21 NOTE — TELEPHONE ENCOUNTER
Update for case [id number] - Case times have been set. Pt has been updated to be NPO after midnight & await further instruction from on call RN. Understanding expressed. No other questions at this time.    Update for case [id number] - Case times have been rescinded. This case is ON HOLD until further notice. Patient updated, but will follow NPO timeline as stated above & will get updated for this case when new times are set.

## 2022-12-21 NOTE — TELEPHONE ENCOUNTER
Informed pt he will be called in a few hours to an update on the first case he's back up to, Pt is also back up to a case at 1700. Informed pt depending on the time if he is released from case will try to see if he can eat something before fasting for the case at 1700. Pt is in [facility]

## 2022-12-21 NOTE — TELEPHONE ENCOUNTER
Notified by procurement Casey Díaz, to release patient from backup. Pt to remain in [facility] for case later this evening. Pt notified of release, pt instructed to grab something to eat nothing too heavy, and nothing to eat after. Pt aware he may not have any other updates until after the case starts tonight maybe after 10pm. Pt is aware. Asked pt to call transplant with any concerns. Understanding stated.

## 2022-12-22 NOTE — TELEPHONE ENCOUNTER
12/21/22 5:25 PM On Call Note:     Notified by Carmelo (procurement); Liver transplant case being shut down. Patient may be released as back up.    Patient notified and instructed : he is released as back up for this case. He may now eat and resume routine plan of care. He was able to voice understanding.

## 2022-12-30 ENCOUNTER — TELEPHONE (OUTPATIENT)
Dept: TRANSPLANT | Facility: CLINIC | Age: 47
End: 2022-12-30
Payer: COMMERCIAL

## 2022-12-30 ENCOUNTER — EPISODE CHANGES (OUTPATIENT)
Dept: TRANSPLANT | Facility: CLINIC | Age: 47
End: 2022-12-30

## 2022-12-30 DIAGNOSIS — Z76.82 ORGAN TRANSPLANT CANDIDATE: Primary | ICD-10-CM

## 2022-12-30 NOTE — TELEPHONE ENCOUNTER
BACK-UP ORGAN OFFER NOTE  ([id number])    Notified by Casey Díaz, , of backup liver offer with donor information.  Donor and recipient information read back and verified.  Spoke with Rosalio Mccarty and identified no acute medical issues in telephone assessment.  Patient verbalized understanding that he has been called as a backup.  We do not have times for this case.  Patient has been advised to remain home; eat and drink per usual.  Advised that he will need to have a covid test done upon arrival if he becomes primary.     Patient was asked if they have had a positive COVID-19 test or if they have any signs or symptoms. Informed patient that they will be tested for COVID-19 upon arrival to the hospital, unless have a previous positive result. If tested and result is positive, the transplant will not be able to occur, they will be inactivated on the wait list for 28 days per protocol and required to quarantine.

## 2022-12-31 ENCOUNTER — PATIENT MESSAGE (OUTPATIENT)
Dept: TRANSPLANT | Facility: CLINIC | Age: 47
End: 2022-12-31
Payer: COMMERCIAL

## 2022-12-31 ENCOUNTER — TELEPHONE (OUTPATIENT)
Dept: TRANSPLANT | Facility: CLINIC | Age: 47
End: 2022-12-31
Payer: COMMERCIAL

## 2022-12-31 DIAGNOSIS — Z76.82 ORGAN TRANSPLANT CANDIDATE: Primary | ICD-10-CM

## 2022-12-31 NOTE — TELEPHONE ENCOUNTER
BACK-UP ORGAN OFFER NOTE  ([id number])    Notified by Casey Díaz, , of backup liver offer with donor information.  Donor and recipient information read back and verified.  Spoke with Rosalio Mccarty and identified no acute medical issues in telephone assessment.  Patient instructed NPO after 5am on 12/31/22.  Patient verbalized understanding that he has been called as a backup.    Patient instructed to go to [facility] lab at 12:30pm to have CMV drawn.  Then to U waiting area for Covid test.    Patient was asked if they have had a positive COVID-19 test or if they have any signs or symptoms. Informed patient that they will be tested for COVID-19 upon arrival to the hospital, unless have a previous positive result. If tested and result is positive, the transplant will not be able to occur, they will be inactivated on the wait list for 28 days per protocol and required to quarantine.       10:50pm  Patient was released as back up for this case.  He was instructed to remain home.  He can eat/drink per usual.

## 2022-12-31 NOTE — TELEPHONE ENCOUNTER
ON CALL NOTE    @12noon  Informed patient that he remains back-up for liver transplant.  Since no times, ok to continue diet and medications.  Understanding expressed.    @4pm  Requested update from .  Still no times.  Patient notified.    ---------------------------------------------  Received times from SATISH Juarez, .  Donor OR times scheduled for 10am.  Informed patient.  Discussed diabetic medications.  Patient aware of the need to hold.  Instructed him to start fasting at 2 am, report to [facility] 11th floor for covid testing at 7 am and, remain in the [facility] waiting room and available by phone for further instructions.  He voiced understanding and denies any questions/ concerns at this time.  Appt for covid test scheduled.  Will notify [facility] charge nurse.

## 2023-01-01 ENCOUNTER — APPOINTMENT (OUTPATIENT)
Dept: LAB | Facility: HOSPITAL | Age: 48
End: 2023-01-01
Attending: SURGERY
Payer: COMMERCIAL

## 2023-01-01 ENCOUNTER — TELEPHONE (OUTPATIENT)
Dept: TRANSPLANT | Facility: CLINIC | Age: 48
End: 2023-01-01
Payer: COMMERCIAL

## 2023-01-01 DIAGNOSIS — Z76.82 AWAITING ORGAN TRANSPLANT STATUS: Primary | ICD-10-CM

## 2023-01-01 LAB — SARS-COV-2 RDRP RESP QL NAA+PROBE: NEGATIVE

## 2023-01-01 PROCEDURE — U0002 COVID-19 LAB TEST NON-CDC: HCPCS | Mod: TXP | Performed by: SURGERY

## 2023-01-01 NOTE — TELEPHONE ENCOUNTER
Call to patient after notification from , Casey Díaz, notified that patient no longer needed as a back up.   Patient instructed released as back up. Resume eating and drinking and medications.   Denied further questions.

## 2023-01-01 NOTE — TELEPHONE ENCOUNTER
"DONOR IS HEPATITIS C POSITIVE BUT NOT PHS RISK CRITERIA    Notified by Franklin Sinha, , of liver offer with donor information.  Donor and recipient information read back and verified.  Spoke with Rosalio Mccarty and identified no acute medical issues during telephone assessment.      Patient verbalized understanding that he has been called as backup recipient.    The donor is hepatitis c antibody positive and ASAF positive.    The risk of clinical illness from any transmitted infection is much less than this due to the availability of highly effective oral drugs that can cure Hepatitis C. While the patient has the right to decline any organ for any reason, available scientific data do not support turning down an organ based on Hepatitis C exposure or infection as the risks associated with waiting longer for the transplant are greater. Informed patient that Dr. Travis thinks that this is a good liver for the patient.     Patient was informed that if he turned down the offer A transplant doctor will call you after we hang up."  Patient was also informed that if he turned down this donor, he would not be punished or removed from the transplant list, that he  would remain on the list at his current status/MELD score. However, by waiting on the list longer rather than receiving this transplant, he will face a greater risk of death than any risk from the slight possibility of transmitted infection.    Patient was informed that he would have the opportunity to see and talk to the surgeon when he got to the hospital and before he went down to the operating room.    Patient understands risk and agrees to proceed with transplant.  "

## 2023-01-03 ENCOUNTER — TELEPHONE (OUTPATIENT)
Dept: TRANSPLANT | Facility: CLINIC | Age: 48
End: 2023-01-03
Payer: COMMERCIAL

## 2023-01-03 NOTE — TELEPHONE ENCOUNTER
"DONOR IS HEPATITIS C POSITIVE BUT NOT PHS RISK CRITERIA    Notified by Casey Díaz, , of liver offer with donor information.  Donor and recipient information read back and verified.  Spoke with Rosalio Mccarty and identified no acute medical issues during telephone assessment.  Patient instructed NPO.    Patient verbalized understanding that he/she has been called as backup recipient.    The donor is hepatitis c antibody positive and ASAF positive.    The risk of clinical illness from any transmitted infection is much less than this due to the availability of highly effective oral drugs that can cure Hepatitis C. While the patient has the right to decline any organ for any reason, available scientific data do not support turning down an organ based on Hepatitis C exposure or infection as the risks associated with waiting longer for the transplant are greater. Informed patient that Dr. Foreman thinks that this is a good liver for the patient.     Patient was informed that if he turned down the offer A transplant doctor will call you after we hang up."  Patient was also informed that if he turned down this donor, he would not be punished or removed from the transplant list, that he  would remain on the list at his current status/MELD score. However, by waiting on the list longer rather than receiving this transplant, he will face a greater risk of death than any risk from the slight possibility of transmitted infection.    Patient was informed that he would have the opportunity to see and talk to the surgeon when he got to the hospital and before he went down to the operating room.    Patient understands risk and agrees to be backup.   "

## 2023-01-03 NOTE — TELEPHONE ENCOUNTER
"DONOR IS HEPATITIS C POSITIVE BUT NOT PHS RISK CRITERIA    Notified by Casey Díaz, , of liver offer with donor information.  Donor and recipient information read back and verified.  Spoke with Rosalio Mccarty and identified no acute medical issues during telephone assessment.  Patient instructed NPO.    Patient verbalized understanding that he/she has been called as backup recipient.    The donor is hepatitis c antibody positive and ASAF negative.    The risk of clinical illness from any transmitted infection is much less than this due to the availability of highly effective oral drugs that can cure Hepatitis C. While the patient has the right to decline any organ for any reason, available scientific data do not support turning down an organ based on Hepatitis C exposure or infection as the risks associated with waiting longer for the transplant are greater. Informed patient that Dr. Foreman thinks that this is a good liver for the patient.     Patient was informed that if he turned down the offer A transplant doctor will call you after we hang up."  Patient was also informed that if he turned down this donor, he would not be punished or removed from the transplant list, that he  would remain on the list at his current status/MELD score. However, by waiting on the list longer rather than receiving this transplant, he will face a greater risk of death than any risk from the slight possibility of transmitted infection.    Patient was informed that he would have the opportunity to see and talk to the surgeon when he got to the hospital and before he went down to the operating room.    Patient understands risk and agrees to be backup.   "

## 2023-01-03 NOTE — TELEPHONE ENCOUNTER
"Update: Notified by procurement that OPO is stating that patient is PHS increased risk donor.     The donor's reported social history includes PHS risk criteria.       IF DONOR IS DEFINITELY DETERMINED TO BE HEPATITIS C ANTIBODY POSITIVE / ASAF NEGATIVE OR POSITIVE IN ADDITION TO THE PHS RISK CRITERIA:    This donor is hepatitis c antibody positive and ASAF negative.          The risk of getting HIV or other hepatitis viruses from this donor is very small compared to the risk of dying while waiting for another liver. The risk of clinical illness from any transmitted infection is also small due to the availability of highly effective oral drugs for all three of these viruses. The risk of clinical illness from any transmitted infection is much less than this due to the availability of highly effective oral drugs for all three of these viruses. While the patient has the right to decline any organ for any reason, available scientific data do not support turning down an organ based on Hepatitis C or behavioral risk factors as the risks associated with waiting longer for the transplant are many times greater. Informed patient that Dr. Foreman thinks that this is a good liver for the patient.    Patient was informed that if he turned down the offer A transplant doctor will call you after we hang up".  Patient was also informed that if he turned down this donor, he would not be punished or removed from the transplant list, that he would remain on the list at his current status/MELD score. However, by waiting on the list longer rather than receiving this transplant, he will face a greater risk of death than any risk from the slight possibility of transmitted infection.    Patient was also informed that he would have the opportunity to see and talk to the surgeon when he got to the hospital and before he went down to the operating room.    Patient understands risk and agrees to be backup.    "

## 2023-01-04 ENCOUNTER — TELEPHONE (OUTPATIENT)
Dept: TRANSPLANT | Facility: CLINIC | Age: 48
End: 2023-01-04
Payer: COMMERCIAL

## 2023-01-04 ENCOUNTER — LAB VISIT (OUTPATIENT)
Dept: TRANSPLANT | Facility: CLINIC | Age: 48
End: 2023-01-04
Payer: COMMERCIAL

## 2023-01-04 ENCOUNTER — HOSPITAL ENCOUNTER (OUTPATIENT)
Facility: HOSPITAL | Age: 48
LOS: 1 days | Discharge: HOME | End: 2023-01-04
Attending: TRANSPLANT SURGERY | Admitting: TRANSPLANT SURGERY
Payer: COMMERCIAL

## 2023-01-04 ENCOUNTER — TELEPHONE (OUTPATIENT)
Dept: TRANSPLANT | Facility: CLINIC | Age: 48
End: 2023-01-04

## 2023-01-04 ENCOUNTER — TELEPHONE (OUTPATIENT)
Dept: HEMATOLOGY/ONCOLOGY | Facility: CLINIC | Age: 48
End: 2023-01-04
Payer: COMMERCIAL

## 2023-01-04 VITALS
BODY MASS INDEX: 35.18 KG/M2 | WEIGHT: 232.13 LBS | HEIGHT: 68 IN | HEART RATE: 76 BPM | DIASTOLIC BLOOD PRESSURE: 75 MMHG | TEMPERATURE: 98 F | RESPIRATION RATE: 18 BRPM | OXYGEN SATURATION: 98 % | SYSTOLIC BLOOD PRESSURE: 121 MMHG

## 2023-01-04 DIAGNOSIS — Z01.818 PRE-OP EVALUATION: ICD-10-CM

## 2023-01-04 DIAGNOSIS — D86.89 HEPATIC GRANULOMA ASSOCIATED WITH SARCOIDOSIS: Primary | Chronic | ICD-10-CM

## 2023-01-04 DIAGNOSIS — K72.10 END STAGE LIVER DISEASE: ICD-10-CM

## 2023-01-04 DIAGNOSIS — Z01.818 PRE-OP TESTING: Primary | ICD-10-CM

## 2023-01-04 LAB
ABO + RH BLD: NORMAL
ALBUMIN SERPL BCP-MCNC: 3 G/DL (ref 3.5–5.2)
ALP SERPL-CCNC: 157 U/L (ref 55–135)
ALT SERPL W/O P-5'-P-CCNC: 31 U/L (ref 10–44)
ANION GAP SERPL CALC-SCNC: 9 MMOL/L (ref 8–16)
ANISOCYTOSIS BLD QL SMEAR: SLIGHT
APTT BLDCRRT: 28 SEC (ref 21–32)
AST SERPL-CCNC: 31 U/L (ref 10–40)
BACTERIA #/AREA URNS AUTO: NORMAL /HPF
BASOPHILS NFR BLD: 3 % (ref 0–1.9)
BILIRUB DIRECT SERPL-MCNC: 0.7 MG/DL (ref 0.1–0.3)
BILIRUB SERPL-MCNC: 1.6 MG/DL (ref 0.1–1)
BILIRUB UR QL STRIP: NEGATIVE
BLD GP AB SCN CELLS X3 SERPL QL: NORMAL
BLOOD BANK HEPATITIS FREEZE AND HOLD: NORMAL
BUN SERPL-MCNC: 13 MG/DL (ref 6–20)
CALCIUM SERPL-MCNC: 9.2 MG/DL (ref 8.7–10.5)
CHLORIDE SERPL-SCNC: 106 MMOL/L (ref 95–110)
CLARITY UR REFRACT.AUTO: CLEAR
CO2 SERPL-SCNC: 22 MMOL/L (ref 23–29)
COLOR UR AUTO: YELLOW
CREAT SERPL-MCNC: 1.1 MG/DL (ref 0.5–1.4)
DIFFERENTIAL METHOD: ABNORMAL
EOSINOPHIL NFR BLD: 2 % (ref 0–8)
ERYTHROCYTE [DISTWIDTH] IN BLOOD BY AUTOMATED COUNT: 15 % (ref 11.5–14.5)
EST. GFR  (NO RACE VARIABLE): >60 ML/MIN/1.73 M^2
ESTIMATED AVG GLUCOSE: 126 MG/DL (ref 68–131)
FIBRINOGEN PPP-MCNC: 224 MG/DL (ref 182–400)
GLUCOSE SERPL-MCNC: 100 MG/DL (ref 70–110)
GLUCOSE UR QL STRIP: ABNORMAL
HBA1C MFR BLD: 6 % (ref 4–5.6)
HBV CORE AB SERPL QL IA: NORMAL
HBV SURFACE AB SER-ACNC: 50.96 MIU/ML
HBV SURFACE AB SER-ACNC: REACTIVE M[IU]/ML
HBV SURFACE AG SERPL QL IA: NORMAL
HCT VFR BLD AUTO: 31.8 % (ref 40–54)
HCV AB SERPL QL IA: NORMAL
HGB BLD-MCNC: 9.8 G/DL (ref 14–18)
HGB UR QL STRIP: NEGATIVE
HIV 1+2 AB+HIV1 P24 AG SERPL QL IA: NORMAL
IMM GRANULOCYTES # BLD AUTO: ABNORMAL K/UL (ref 0–0.04)
IMM GRANULOCYTES NFR BLD AUTO: ABNORMAL % (ref 0–0.5)
INR PPP: 1.4 (ref 0.8–1.2)
KETONES UR QL STRIP: NEGATIVE
LEUKOCYTE ESTERASE UR QL STRIP: NEGATIVE
LYMPHOCYTES NFR BLD: 20 % (ref 18–48)
MAGNESIUM SERPL-MCNC: 1.8 MG/DL (ref 1.6–2.6)
MCH RBC QN AUTO: 25.3 PG (ref 27–31)
MCHC RBC AUTO-ENTMCNC: 30.8 G/DL (ref 32–36)
MCV RBC AUTO: 82 FL (ref 82–98)
MICROSCOPIC COMMENT: NORMAL
MONOCYTES NFR BLD: 6 % (ref 4–15)
NEUTROPHILS NFR BLD: 69 % (ref 38–73)
NITRITE UR QL STRIP: NEGATIVE
NRBC BLD-RTO: 0 /100 WBC
PH UR STRIP: 6 [PH] (ref 5–8)
PLATELET # BLD AUTO: 59 K/UL (ref 150–450)
PLATELET BLD QL SMEAR: ABNORMAL
PMV BLD AUTO: 11 FL (ref 9.2–12.9)
POTASSIUM SERPL-SCNC: 4 MMOL/L (ref 3.5–5.1)
PROT SERPL-MCNC: 7.6 G/DL (ref 6–8.4)
PROT UR QL STRIP: ABNORMAL
PROTHROMBIN TIME: 14.1 SEC (ref 9–12.5)
RBC # BLD AUTO: 3.87 M/UL (ref 4.6–6.2)
RBC #/AREA URNS AUTO: 1 /HPF (ref 0–4)
SARS-COV-2 RDRP RESP QL NAA+PROBE: NEGATIVE
SODIUM SERPL-SCNC: 137 MMOL/L (ref 136–145)
SP GR UR STRIP: 1.03 (ref 1–1.03)
URN SPEC COLLECT METH UR: ABNORMAL
WBC # BLD AUTO: 1.71 K/UL (ref 3.9–12.7)
WBC #/AREA URNS AUTO: 2 /HPF (ref 0–5)
YEAST UR QL AUTO: NORMAL

## 2023-01-04 PROCEDURE — 99222 PR INITIAL HOSPITAL CARE,LEVL II: ICD-10-PCS | Mod: 57,NTX,, | Performed by: TRANSPLANT SURGERY

## 2023-01-04 PROCEDURE — 86704 HEP B CORE ANTIBODY TOTAL: CPT | Mod: NTX | Performed by: NURSE PRACTITIONER

## 2023-01-04 PROCEDURE — 99214 PR OFFICE/OUTPT VISIT, EST, LEVL IV, 30-39 MIN: ICD-10-PCS | Mod: 57,NTX,, | Performed by: PHYSICIAN ASSISTANT

## 2023-01-04 PROCEDURE — 87389 HIV-1 AG W/HIV-1&-2 AB AG IA: CPT | Mod: NTX | Performed by: NURSE PRACTITIONER

## 2023-01-04 PROCEDURE — 87340 HEPATITIS B SURFACE AG IA: CPT | Mod: NTX | Performed by: NURSE PRACTITIONER

## 2023-01-04 PROCEDURE — 93010 EKG 12-LEAD: ICD-10-PCS | Mod: NTX,,, | Performed by: INTERNAL MEDICINE

## 2023-01-04 PROCEDURE — 99214 OFFICE O/P EST MOD 30 MIN: CPT | Mod: 57,NTX,, | Performed by: PHYSICIAN ASSISTANT

## 2023-01-04 PROCEDURE — 93010 ELECTROCARDIOGRAM REPORT: CPT | Mod: NTX,,, | Performed by: INTERNAL MEDICINE

## 2023-01-04 PROCEDURE — 83036 HEMOGLOBIN GLYCOSYLATED A1C: CPT | Mod: NTX | Performed by: NURSE PRACTITIONER

## 2023-01-04 PROCEDURE — 93005 ELECTROCARDIOGRAM TRACING: CPT | Mod: NTX

## 2023-01-04 PROCEDURE — 99000 SPECIMEN HANDLING OFFICE-LAB: CPT | Mod: NTX | Performed by: NURSE PRACTITIONER

## 2023-01-04 PROCEDURE — 99222 1ST HOSP IP/OBS MODERATE 55: CPT | Mod: 57,NTX,, | Performed by: TRANSPLANT SURGERY

## 2023-01-04 PROCEDURE — 25000003 PHARM REV CODE 250: Mod: NTX | Performed by: PHYSICIAN ASSISTANT

## 2023-01-04 PROCEDURE — 86706 HEP B SURFACE ANTIBODY: CPT | Mod: 91,NTX | Performed by: NURSE PRACTITIONER

## 2023-01-04 PROCEDURE — U0002 COVID-19 LAB TEST NON-CDC: HCPCS | Mod: TXP | Performed by: SURGERY

## 2023-01-04 PROCEDURE — 85007 BL SMEAR W/DIFF WBC COUNT: CPT | Mod: NTX | Performed by: NURSE PRACTITIONER

## 2023-01-04 PROCEDURE — 86644 CMV ANTIBODY: CPT | Mod: NTX | Performed by: NURSE PRACTITIONER

## 2023-01-04 PROCEDURE — 85384 FIBRINOGEN ACTIVITY: CPT | Mod: NTX | Performed by: NURSE PRACTITIONER

## 2023-01-04 PROCEDURE — 85730 THROMBOPLASTIN TIME PARTIAL: CPT | Mod: NTX | Performed by: NURSE PRACTITIONER

## 2023-01-04 PROCEDURE — 85027 COMPLETE CBC AUTOMATED: CPT | Mod: NTX | Performed by: NURSE PRACTITIONER

## 2023-01-04 PROCEDURE — 82248 BILIRUBIN DIRECT: CPT | Mod: NTX | Performed by: NURSE PRACTITIONER

## 2023-01-04 PROCEDURE — G0378 HOSPITAL OBSERVATION PER HR: HCPCS | Mod: NTX

## 2023-01-04 PROCEDURE — 36415 COLL VENOUS BLD VENIPUNCTURE: CPT | Mod: NTX | Performed by: NURSE PRACTITIONER

## 2023-01-04 PROCEDURE — 86900 BLOOD TYPING SEROLOGIC ABO: CPT | Mod: NTX | Performed by: NURSE PRACTITIONER

## 2023-01-04 PROCEDURE — 85610 PROTHROMBIN TIME: CPT | Mod: NTX | Performed by: NURSE PRACTITIONER

## 2023-01-04 PROCEDURE — 81001 URINALYSIS AUTO W/SCOPE: CPT | Mod: NTX | Performed by: NURSE PRACTITIONER

## 2023-01-04 PROCEDURE — 86803 HEPATITIS C AB TEST: CPT | Mod: NTX | Performed by: NURSE PRACTITIONER

## 2023-01-04 PROCEDURE — 80053 COMPREHEN METABOLIC PANEL: CPT | Mod: NTX | Performed by: NURSE PRACTITIONER

## 2023-01-04 PROCEDURE — 83735 ASSAY OF MAGNESIUM: CPT | Mod: NTX | Performed by: NURSE PRACTITIONER

## 2023-01-04 PROCEDURE — 87522 HEPATITIS C REVRS TRNSCRPJ: CPT | Mod: NTX | Performed by: NURSE PRACTITIONER

## 2023-01-04 RX ORDER — METHYLPREDNISOLONE SODIUM SUCCINATE 500 MG/8ML
500 INJECTION INTRAMUSCULAR; INTRAVENOUS
Status: DISCONTINUED | OUTPATIENT
Start: 2023-01-04 | End: 2023-01-05 | Stop reason: HOSPADM

## 2023-01-04 RX ORDER — MUPIROCIN 20 MG/G
OINTMENT TOPICAL
Status: DISCONTINUED | OUTPATIENT
Start: 2023-01-04 | End: 2023-01-05 | Stop reason: HOSPADM

## 2023-01-04 RX ORDER — IBUPROFEN 200 MG
16 TABLET ORAL ONCE
Status: COMPLETED | OUTPATIENT
Start: 2023-01-04 | End: 2023-01-04

## 2023-01-04 RX ADMIN — Medication 16 G: at 08:01

## 2023-01-04 NOTE — H&P
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 ESLD c/b ascites, BLE edema, and nonbleeding esophageal varices. He is listed for a liver transplant with MELD 16. He is being admitted as a back up for liver transplant surgery. He feels well in his usual state of health. He denies any recent illnesses or hospitalizations. Primary OR time 4:30pm with Cut time 6pm with Dr. Foreman as primary surgeon.       Past Medical History:   Diagnosis Date    C. difficile diarrhea     Diabetes mellitus, type II, insulin dependent     Granulomatous colitis     Hepatic granuloma associated with sarcoidosis     Hepatic granuloma associated with sarcoidosis 11/17/2022    Personal history of colonic polyps     Sarcoidosis, unspecified     Thrombocytopenia, unspecified     Unspecified cirrhosis of liver        Past Surgical History:   Procedure Laterality Date    ADENOIDECTOMY      CARDIAC CATHETERIZATION Right     COLONOSCOPY  07/11/2018    HIP SURGERY      RIght removed ruptured cyst    UPPER GASTROINTESTINAL ENDOSCOPY  10/09/2019       Review of patient's allergies indicates:   Allergen Reactions    Adhesive Other (See Comments)     Skin irritation  Paper tape okay to use    Pcn [penicillins] Rash     Happened as a baby       Family History       Problem Relation (Age of Onset)    Cancer Father, Maternal Grandfather    Diabetes Maternal Grandmother, Maternal Grandfather, Paternal Grandmother    Heart disease Father    Hypertension Mother, Father    Rectal cancer Father    Thyroid disease Father          Tobacco Use    Smoking status: Never    Smokeless tobacco: Never   Substance and Sexual Activity     Alcohol use: Never    Drug use: Not on file    Sexual activity: Not on file       PTA Medications   Medication Sig    ARIPiprazole (ABILIFY) 2 MG Tab Take 2 mg by mouth once daily.    carvediloL (COREG) 6.25 MG tablet Take 6.25 mg by mouth 2 (two) times daily.    empagliflozin (JARDIANCE) 25 mg tablet Take 25 mg by mouth once daily.    ENTRESTO 49-51 mg per tablet Take 1 tablet by mouth 2 (two) times daily.    EScitalopram oxalate (LEXAPRO) 20 MG tablet Take 20 mg by mouth once daily.    FREESTYLE LUZMA 14 DAY SENSOR Kit Apply topically.    HUMALOG KWIKPEN INSULIN 100 unit/mL pen Sliding Scale    HUMIRA PEN PnKt injection Inject into the skin.    hydrOXYchloroQUINE (PLAQUENIL) 200 mg tablet Take 200 mg by mouth 2 (two) times daily.    OZEMPIC 2 mg/dose (8 mg/3 mL) PnIj Inject 2 mg into the skin every 7 days.    traZODone (DESYREL) 50 MG tablet Take 150 mg by mouth every evening.    TRESIBA FLEXTOUCH U-100 100 unit/mL (3 mL) insulin pen 40 Units. BID    colestipoL (COLESTID) 1 gram Tab Take 1 g by mouth 2 (two) times daily as needed.    multivitamin (THERAGRAN) per tablet Take 1 tablet by mouth once daily.       Review of Systems   Constitutional:  Positive for fatigue. Negative for appetite change, chills and fever.   Respiratory:  Negative for cough, chest tightness and shortness of breath.    Cardiovascular:  Negative for chest pain, palpitations and leg swelling.   Gastrointestinal:  Negative for abdominal distention, abdominal pain, constipation, diarrhea, nausea and vomiting.   Genitourinary:  Negative for difficulty urinating, dysuria, frequency and urgency.   Musculoskeletal:  Negative for back pain and neck pain.   Skin:  Negative for color change, pallor, rash and wound.   Neurological:  Negative for dizziness, weakness and headaches.   Psychiatric/Behavioral:  Negative for confusion and sleep disturbance.    Objective:     Vital Signs (Most Recent):  Temp: 98.3 °F (36.8 °C) (01/04/23  1443)  Pulse: 80 (01/04/23 1443)  Resp: 18 (01/04/23 1443)  BP: 116/72 (01/04/23 1443)  SpO2: 100 % (01/04/23 1443) Vital Signs (24h Range):  Temp:  [98.3 °F (36.8 °C)] 98.3 °F (36.8 °C)  Pulse:  [80] 80  Resp:  [18] 18  SpO2:  [100 %] 100 %  BP: (116)/(72) 116/72     Weight: 105.3 kg (232 lb 2.3 oz)  Body mass index is 35.3 kg/m².    No intake or output data in the 24 hours ending 01/04/23 1609    Physical Exam  Vitals and nursing note reviewed.   Constitutional:       General: He is awake.      Appearance: Normal appearance.   Eyes:      General: No scleral icterus.  Cardiovascular:      Rate and Rhythm: Normal rate and regular rhythm.      Pulses: Normal pulses.   Pulmonary:      Effort: Pulmonary effort is normal. No respiratory distress.      Breath sounds: Normal breath sounds. No decreased breath sounds, wheezing or rales.   Abdominal:      General: Bowel sounds are normal. There is no distension.      Palpations: Abdomen is soft.      Tenderness: There is no abdominal tenderness. There is no guarding or rebound.   Musculoskeletal:         General: No tenderness. Normal range of motion.   Skin:     General: Skin is warm and dry.      Coloration: Skin is not pale.      Findings: No erythema or rash.   Neurological:      Mental Status: He is alert and oriented to person, place, and time.   Psychiatric:         Speech: Speech normal.         Behavior: Behavior normal. Behavior is cooperative.       Laboratory/Diagnostic Studies: pending    Assessment/Plan:     Hepatic granuloma associated with sarcoidosis  - ESLD 2/2 hepatic sarcoidosis  - Admit as back up for liver transplant surgery  - Primary OR time 4:30pm with Cut time 6pm  - Pre op labs and diagnostic studies pending, to be reviewed before surgery        The patient presents for liver transplant.  There are no apparent contraindications to proceeding with the planned transplant.  The patient understands that the transplant could potentially be cancelled  pending detailed assessment of the donor organ.    MELD-Na score: 16 at 12/9/2022  9:25 AM  MELD score: 14 at 12/9/2022  9:25 AM  Calculated from:  Serum Creatinine: 1.5 mg/dL at 12/9/2022  9:25 AM  Serum Sodium: 135 mmol/L at 12/8/2022  8:45 AM  Total Bilirubin: 1.2 mg/dL at 12/8/2022  8:45 AM  INR(ratio): 1.3 at 12/8/2022  8:45 AM  Age: 47 years    He will receive IV steroids pulse induction.    Covid test pending. If positive, patient instructed that surgery will be canceled and patient will be discharged home to quarantine. Patient would remain inactive on txp list for 28 days and will be assess at that time to be placed back on waitlist.       A complete discussion of the transplant procedure, including risks, complications, and alternatives, as well as any donor-specific risk factors requiring specific disclosure, will be carried out by the responsible staff surgeon prior to the procedure.     Sola Espinoza, INDIRA  Liver Transplant  [facility] - [facility] ([place])

## 2023-01-04 NOTE — ASSESSMENT & PLAN NOTE
- ESLD 2/2 hepatic sarcoidosis  - Admit as back up for liver transplant surgery  - Primary OR time 4:30pm with Cut time 6pm  - Pre op labs and diagnostic studies pending, to be reviewed before surgery

## 2023-01-04 NOTE — PROGRESS NOTES
Patient to clinic for Covid test. Denies symptoms.   Specimen collected, patient tolerated well.   Tubed to lab for processing.

## 2023-01-04 NOTE — HPI
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 ESLD c/b ascites, BLE edema, and nonbleeding esophageal varices. He is listed for a liver transplant with MELD 16. He is being admitted as a back up for liver transplant surgery. He feels well in his usual state of health. He denies any recent illnesses or hospitalizations. Primary OR time 4:30pm with Cut time 6pm with Dr. Foreman as primary surgeon.

## 2023-01-04 NOTE — SUBJECTIVE & OBJECTIVE
Past Medical History:   Diagnosis Date    C. difficile diarrhea     Diabetes mellitus, type II, insulin dependent     Granulomatous colitis     Hepatic granuloma associated with sarcoidosis     Hepatic granuloma associated with sarcoidosis 11/17/2022    Personal history of colonic polyps     Sarcoidosis, unspecified     Thrombocytopenia, unspecified     Unspecified cirrhosis of liver        Past Surgical History:   Procedure Laterality Date    ADENOIDECTOMY      CARDIAC CATHETERIZATION Right     COLONOSCOPY  07/11/2018    HIP SURGERY      RIght removed ruptured cyst    UPPER GASTROINTESTINAL ENDOSCOPY  10/09/2019       Review of patient's allergies indicates:   Allergen Reactions    Adhesive Other (See Comments)     Skin irritation  Paper tape okay to use    Pcn [penicillins] Rash     Happened as a baby       Family History       Problem Relation (Age of Onset)    Cancer Father, Maternal Grandfather    Diabetes Maternal Grandmother, Maternal Grandfather, Paternal Grandmother    Heart disease Father    Hypertension Mother, Father    Rectal cancer Father    Thyroid disease Father          Tobacco Use    Smoking status: Never    Smokeless tobacco: Never   Substance and Sexual Activity    Alcohol use: Never    Drug use: Not on file    Sexual activity: Not on file       PTA Medications   Medication Sig    ARIPiprazole (ABILIFY) 2 MG Tab Take 2 mg by mouth once daily.    carvediloL (COREG) 6.25 MG tablet Take 6.25 mg by mouth 2 (two) times daily.    empagliflozin (JARDIANCE) 25 mg tablet Take 25 mg by mouth once daily.    ENTRESTO 49-51 mg per tablet Take 1 tablet by mouth 2 (two) times daily.    EScitalopram oxalate (LEXAPRO) 20 MG tablet Take 20 mg by mouth once daily.    FREESTYLE LUZMA 14 DAY SENSOR Kit Apply topically.    HUMALOG KWIKPEN INSULIN 100 unit/mL pen Sliding Scale    HUMIRA PEN PnKt injection Inject into the skin.    hydrOXYchloroQUINE (PLAQUENIL) 200 mg tablet Take 200 mg by mouth 2 (two) times daily.     OZEMPIC 2 mg/dose (8 mg/3 mL) PnIj Inject 2 mg into the skin every 7 days.    traZODone (DESYREL) 50 MG tablet Take 150 mg by mouth every evening.    TRESIBA FLEXTOUCH U-100 100 unit/mL (3 mL) insulin pen 40 Units. BID    colestipoL (COLESTID) 1 gram Tab Take 1 g by mouth 2 (two) times daily as needed.    multivitamin (THERAGRAN) per tablet Take 1 tablet by mouth once daily.       Review of Systems   Constitutional:  Positive for fatigue. Negative for appetite change, chills and fever.   Respiratory:  Negative for cough, chest tightness and shortness of breath.    Cardiovascular:  Negative for chest pain, palpitations and leg swelling.   Gastrointestinal:  Negative for abdominal distention, abdominal pain, constipation, diarrhea, nausea and vomiting.   Genitourinary:  Negative for difficulty urinating, dysuria, frequency and urgency.   Musculoskeletal:  Negative for back pain and neck pain.   Skin:  Negative for color change, pallor, rash and wound.   Neurological:  Negative for dizziness, weakness and headaches.   Psychiatric/Behavioral:  Negative for confusion and sleep disturbance.    Objective:     Vital Signs (Most Recent):  Temp: 98.3 °F (36.8 °C) (01/04/23 1443)  Pulse: 80 (01/04/23 1443)  Resp: 18 (01/04/23 1443)  BP: 116/72 (01/04/23 1443)  SpO2: 100 % (01/04/23 1443) Vital Signs (24h Range):  Temp:  [98.3 °F (36.8 °C)] 98.3 °F (36.8 °C)  Pulse:  [80] 80  Resp:  [18] 18  SpO2:  [100 %] 100 %  BP: (116)/(72) 116/72     Weight: 105.3 kg (232 lb 2.3 oz)  Body mass index is 35.3 kg/m².    No intake or output data in the 24 hours ending 01/04/23 1609    Physical Exam  Vitals and nursing note reviewed.   Constitutional:       General: He is awake.      Appearance: Normal appearance.   Eyes:      General: No scleral icterus.  Cardiovascular:      Rate and Rhythm: Normal rate and regular rhythm.      Pulses: Normal pulses.   Pulmonary:      Effort: Pulmonary effort is normal. No respiratory distress.      Breath  sounds: Normal breath sounds. No decreased breath sounds, wheezing or rales.   Abdominal:      General: Bowel sounds are normal. There is no distension.      Palpations: Abdomen is soft.      Tenderness: There is no abdominal tenderness. There is no guarding or rebound.   Musculoskeletal:         General: No tenderness. Normal range of motion.   Skin:     General: Skin is warm and dry.      Coloration: Skin is not pale.      Findings: No erythema or rash.   Neurological:      Mental Status: He is alert and oriented to person, place, and time.   Psychiatric:         Speech: Speech normal.         Behavior: Behavior normal. Behavior is cooperative.       Laboratory/Diagnostic Studies: pending

## 2023-01-04 NOTE — TELEPHONE ENCOUNTER
----- Message from Ewelina Kelley RN sent at 1/4/2023 10:39 AM CST -----    ----- Message -----  From: Radha Roman  Sent: 1/4/2023  10:34 AM CST  To: [facility] Staff    Pt will be on campus today and available for 230 if we can change his appt; please call to inform patient either way

## 2023-01-04 NOTE — TELEPHONE ENCOUNTER
Patient informed still back up for 2 liver offers. Need to come in for 1:30 [facility] to get tested for Covid. Can go to [facility] to wait. Need to fast after 11 am for 1st backup offer.

## 2023-01-05 ENCOUNTER — TELEPHONE (OUTPATIENT)
Dept: TRANSPLANT | Facility: CLINIC | Age: 48
End: 2023-01-05
Payer: COMMERCIAL

## 2023-01-05 ENCOUNTER — ANESTHESIA (OUTPATIENT)
Dept: SURGERY | Facility: HOSPITAL | Age: 48
DRG: 006 | End: 2023-01-05
Payer: COMMERCIAL

## 2023-01-05 ENCOUNTER — ANESTHESIA EVENT (OUTPATIENT)
Dept: SURGERY | Facility: HOSPITAL | Age: 48
DRG: 006 | End: 2023-01-05
Payer: COMMERCIAL

## 2023-01-05 ENCOUNTER — HOSPITAL ENCOUNTER (INPATIENT)
Facility: HOSPITAL | Age: 48
LOS: 6 days | Discharge: HOME | DRG: 006 | End: 2023-01-11
Attending: TRANSPLANT SURGERY | Admitting: TRANSPLANT SURGERY
Payer: COMMERCIAL

## 2023-01-05 DIAGNOSIS — I50.22 CHRONIC SYSTOLIC HEART FAILURE: ICD-10-CM

## 2023-01-05 DIAGNOSIS — Z94.4 LIVER TRANSPLANTED: ICD-10-CM

## 2023-01-05 DIAGNOSIS — D62 ACUTE BLOOD LOSS ANEMIA: ICD-10-CM

## 2023-01-05 DIAGNOSIS — K74.60 CIRRHOSIS OF LIVER WITH ASCITES, UNSPECIFIED HEPATIC CIRRHOSIS TYPE: ICD-10-CM

## 2023-01-05 DIAGNOSIS — E11.65 TYPE 2 DIABETES MELLITUS WITH HYPERGLYCEMIA, UNSPECIFIED WHETHER LONG TERM INSULIN USE: ICD-10-CM

## 2023-01-05 DIAGNOSIS — Z91.89 AT RISK FOR OPPORTUNISTIC INFECTIONS: ICD-10-CM

## 2023-01-05 DIAGNOSIS — Z29.89 PROPHYLACTIC IMMUNOTHERAPY: ICD-10-CM

## 2023-01-05 DIAGNOSIS — Z79.60 LONG-TERM USE OF IMMUNOSUPPRESSANT MEDICATION: ICD-10-CM

## 2023-01-05 DIAGNOSIS — K72.90 DECOMPENSATED HEPATIC CIRRHOSIS: ICD-10-CM

## 2023-01-05 DIAGNOSIS — R18.8 CIRRHOSIS OF LIVER WITH ASCITES, UNSPECIFIED HEPATIC CIRRHOSIS TYPE: ICD-10-CM

## 2023-01-05 DIAGNOSIS — D68.4 ACQUIRED COAGULATION FACTOR DEFICIENCY: ICD-10-CM

## 2023-01-05 DIAGNOSIS — Z94.4 S/P LIVER TRANSPLANT: Primary | ICD-10-CM

## 2023-01-05 DIAGNOSIS — D86.89 HEPATIC GRANULOMA ASSOCIATED WITH SARCOIDOSIS: Primary | ICD-10-CM

## 2023-01-05 DIAGNOSIS — Z99.11 ENCOUNTER FOR WEANING FROM VENTILATOR: ICD-10-CM

## 2023-01-05 DIAGNOSIS — K74.60 DECOMPENSATED HEPATIC CIRRHOSIS: ICD-10-CM

## 2023-01-05 DIAGNOSIS — E44.1 MILD MALNUTRITION: ICD-10-CM

## 2023-01-05 DIAGNOSIS — Z01.818 PRE-OP EVALUATION: ICD-10-CM

## 2023-01-05 DIAGNOSIS — D63.8 ANEMIA OF CHRONIC DISEASE: ICD-10-CM

## 2023-01-05 DIAGNOSIS — R73.9 STEROID-INDUCED HYPERGLYCEMIA: ICD-10-CM

## 2023-01-05 DIAGNOSIS — D69.6 THROMBOCYTOPENIA, UNSPECIFIED: ICD-10-CM

## 2023-01-05 DIAGNOSIS — D86.89 HEPATIC GRANULOMA ASSOCIATED WITH SARCOIDOSIS: Chronic | ICD-10-CM

## 2023-01-05 DIAGNOSIS — T38.0X5A STEROID-INDUCED HYPERGLYCEMIA: ICD-10-CM

## 2023-01-05 LAB
ABO + RH BLD: NORMAL
ABO + RH BLD: NORMAL
ALBUMIN SERPL BCP-MCNC: 2.6 G/DL (ref 3.5–5.2)
ALBUMIN SERPL BCP-MCNC: 3.2 G/DL (ref 3.5–5.2)
ALBUMIN SERPL BCP-MCNC: 3.9 G/DL (ref 3.5–5.2)
ALLENS TEST: ABNORMAL
ALP SERPL-CCNC: 80 U/L (ref 55–135)
ALT SERPL W/O P-5'-P-CCNC: 170 U/L (ref 10–44)
ALT SERPL W/O P-5'-P-CCNC: 175 U/L (ref 10–44)
AMYLASE SERPL-CCNC: 31 U/L (ref 20–110)
ANION GAP SERPL CALC-SCNC: 14 MMOL/L (ref 8–16)
ANION GAP SERPL CALC-SCNC: 14 MMOL/L (ref 8–16)
APPEARANCE FLD: NORMAL
APTT BLDCRRT: 29.3 SEC (ref 21–32)
APTT BLDCRRT: 36.9 SEC (ref 21–32)
APTT BLDCRRT: 74.7 SEC (ref 21–32)
AST SERPL-CCNC: 263 U/L (ref 10–40)
AST SERPL-CCNC: 269 U/L (ref 10–40)
AST SERPL-CCNC: 348 U/L (ref 10–40)
BASOPHILS # BLD AUTO: 0.01 K/UL (ref 0–0.2)
BASOPHILS NFR BLD: 0.1 % (ref 0–1.9)
BILIRUB SERPL-MCNC: 5.4 MG/DL (ref 0.1–1)
BLD GP AB SCN CELLS X3 SERPL QL: NORMAL
BLD GP AB SCN CELLS X3 SERPL QL: NORMAL
BLD PROD TYP BPU: NORMAL
BLOOD UNIT EXPIRATION DATE: NORMAL
BLOOD UNIT TYPE CODE: 5100
BLOOD UNIT TYPE CODE: 5100
BLOOD UNIT TYPE CODE: 6200
BLOOD UNIT TYPE CODE: 6200
BLOOD UNIT TYPE: NORMAL
BODY FLD TYPE: NORMAL
BUN SERPL-MCNC: 13 MG/DL (ref 6–20)
BUN SERPL-MCNC: 13 MG/DL (ref 6–20)
CA-I BLDV-SCNC: 1.11 MMOL/L (ref 1.06–1.42)
CA-I BLDV-SCNC: 1.17 MMOL/L (ref 1.06–1.42)
CALCIUM SERPL-MCNC: 8.4 MG/DL (ref 8.7–10.5)
CALCIUM SERPL-MCNC: 8.9 MG/DL (ref 8.7–10.5)
CHLORIDE SERPL-SCNC: 105 MMOL/L (ref 95–110)
CHLORIDE SERPL-SCNC: 106 MMOL/L (ref 95–110)
CO2 SERPL-SCNC: 23 MMOL/L (ref 23–29)
CO2 SERPL-SCNC: 25 MMOL/L (ref 23–29)
CODING SYSTEM: NORMAL
COLOR FLD: YELLOW
CREAT SERPL-MCNC: 1.1 MG/DL (ref 0.5–1.4)
CREAT SERPL-MCNC: 1.1 MG/DL (ref 0.5–1.4)
DELSYS: ABNORMAL
DIFFERENTIAL METHOD: ABNORMAL
DISPENSE STATUS: NORMAL
EOSINOPHIL # BLD AUTO: 0 K/UL (ref 0–0.5)
EOSINOPHIL NFR BLD: 0 % (ref 0–8)
EOSINOPHIL NFR BLD: 0.1 % (ref 0–8)
EOSINOPHIL NFR BLD: 0.1 % (ref 0–8)
ERYTHROCYTE [DISTWIDTH] IN BLOOD BY AUTOMATED COUNT: 14.6 % (ref 11.5–14.5)
ERYTHROCYTE [DISTWIDTH] IN BLOOD BY AUTOMATED COUNT: 14.7 % (ref 11.5–14.5)
ERYTHROCYTE [DISTWIDTH] IN BLOOD BY AUTOMATED COUNT: 14.9 % (ref 11.5–14.5)
ERYTHROCYTE [SEDIMENTATION RATE] IN BLOOD BY WESTERGREN METHOD: 20 MM/H
EST. GFR  (NO RACE VARIABLE): >60 ML/MIN/1.73 M^2
EST. GFR  (NO RACE VARIABLE): >60 ML/MIN/1.73 M^2
FIBRINOGEN PPP-MCNC: 132 MG/DL (ref 182–400)
FIBRINOGEN PPP-MCNC: 195 MG/DL (ref 182–400)
FIO2: 100
FIO2: 100
FIO2: 80
FIO2: 80
GLUCOSE SERPL-MCNC: 132 MG/DL (ref 70–110)
GLUCOSE SERPL-MCNC: 177 MG/DL (ref 70–110)
GLUCOSE SERPL-MCNC: 201 MG/DL (ref 70–110)
GLUCOSE SERPL-MCNC: 202 MG/DL (ref 70–110)
GRAM STN SPEC: NORMAL
GRAM STN SPEC: NORMAL
HCO3 UR-SCNC: 26.1 MMOL/L (ref 24–28)
HCO3 UR-SCNC: 26.9 MMOL/L (ref 24–28)
HCO3 UR-SCNC: 27 MMOL/L (ref 24–28)
HCT VFR BLD AUTO: 28.4 % (ref 40–54)
HCT VFR BLD AUTO: 30 % (ref 40–54)
HCT VFR BLD AUTO: 31 % (ref 40–54)
HCT VFR BLD AUTO: 31.6 % (ref 40–54)
HCT VFR BLD AUTO: 32.4 % (ref 40–54)
HCT VFR BLD CALC: 33 %PCV (ref 36–54)
HCV RNA SERPL QL NAA+PROBE: NOT DETECTED
HCV RNA SPEC NAA+PROBE-ACNC: <12 IU/ML
HGB BLD-MCNC: 10 G/DL (ref 14–18)
HGB BLD-MCNC: 10.1 G/DL (ref 14–18)
HGB BLD-MCNC: 10.7 G/DL (ref 14–18)
HGB BLD-MCNC: 9 G/DL (ref 14–18)
HGB BLD-MCNC: 9.7 G/DL (ref 14–18)
IMM GRANULOCYTES # BLD AUTO: 0.02 K/UL (ref 0–0.04)
IMM GRANULOCYTES # BLD AUTO: 0.04 K/UL (ref 0–0.04)
IMM GRANULOCYTES # BLD AUTO: 0.04 K/UL (ref 0–0.04)
IMM GRANULOCYTES NFR BLD AUTO: 0.2 % (ref 0–0.5)
IMM GRANULOCYTES NFR BLD AUTO: 0.3 % (ref 0–0.5)
IMM GRANULOCYTES NFR BLD AUTO: 0.4 % (ref 0–0.5)
INR PPP: 1.4 (ref 0.8–1.2)
INR PPP: 1.8 (ref 0.8–1.2)
LDH SERPL L TO P-CCNC: 1136 U/L (ref 110–260)
LDH SERPL L TO P-CCNC: 2.63 MMOL/L (ref 0.36–1.25)
LYMPHOCYTES # BLD AUTO: 0.3 K/UL (ref 1–4.8)
LYMPHOCYTES # BLD AUTO: 0.4 K/UL (ref 1–4.8)
LYMPHOCYTES # BLD AUTO: 0.4 K/UL (ref 1–4.8)
LYMPHOCYTES NFR BLD: 3.2 % (ref 18–48)
LYMPHOCYTES NFR BLD: 3.7 % (ref 18–48)
LYMPHOCYTES NFR BLD: 3.8 % (ref 18–48)
LYMPHOCYTES NFR FLD MANUAL: 67 %
MAGNESIUM SERPL-MCNC: 1.7 MG/DL (ref 1.6–2.6)
MAGNESIUM SERPL-MCNC: 1.8 MG/DL (ref 1.6–2.6)
MAGNESIUM SERPL-MCNC: 1.8 MG/DL (ref 1.6–2.6)
MCH RBC QN AUTO: 25.1 PG (ref 27–31)
MCH RBC QN AUTO: 25.6 PG (ref 27–31)
MCH RBC QN AUTO: 25.8 PG (ref 27–31)
MCHC RBC AUTO-ENTMCNC: 31.6 G/DL (ref 32–36)
MCHC RBC AUTO-ENTMCNC: 32.6 G/DL (ref 32–36)
MCHC RBC AUTO-ENTMCNC: 33 G/DL (ref 32–36)
MCV RBC AUTO: 78 FL (ref 82–98)
MCV RBC AUTO: 79 FL (ref 82–98)
MCV RBC AUTO: 79 FL (ref 82–98)
MESOTHL CELL NFR FLD MANUAL: 4 %
MIN VOL: 9.31
MIN VOL: 9.31
MODE: ABNORMAL
MONOCYTES # BLD AUTO: 0.4 K/UL (ref 0.3–1)
MONOCYTES # BLD AUTO: 0.8 K/UL (ref 0.3–1)
MONOCYTES # BLD AUTO: 1.3 K/UL (ref 0.3–1)
MONOCYTES NFR BLD: 11.5 % (ref 4–15)
MONOCYTES NFR BLD: 4.3 % (ref 4–15)
MONOCYTES NFR BLD: 7.1 % (ref 4–15)
MONOS+MACROS NFR FLD MANUAL: 24 %
NEUTROPHILS # BLD AUTO: 10.3 K/UL (ref 1.8–7.7)
NEUTROPHILS # BLD AUTO: 9.2 K/UL (ref 1.8–7.7)
NEUTROPHILS # BLD AUTO: 9.4 K/UL (ref 1.8–7.7)
NEUTROPHILS NFR BLD: 84.1 % (ref 38–73)
NEUTROPHILS NFR BLD: 88.8 % (ref 38–73)
NEUTROPHILS NFR BLD: 92.1 % (ref 38–73)
NEUTROPHILS NFR FLD MANUAL: 5 %
NRBC BLD-RTO: 0 /100 WBC
PCO2 BLDA: 40.7 MMHG (ref 35–45)
PCO2 BLDA: 45.5 MMHG (ref 35–45)
PCO2 BLDA: 47.6 MMHG (ref 35–45)
PEEP: 5
PH SMN: 7.36 [PH] (ref 7.35–7.45)
PH SMN: 7.38 [PH] (ref 7.35–7.45)
PH SMN: 7.41 [PH] (ref 7.35–7.45)
PHOSPHATE SERPL-MCNC: 2 MG/DL (ref 2.7–4.5)
PIP: 26
PIP: 26
PLATELET # BLD AUTO: 106 K/UL (ref 150–450)
PLATELET # BLD AUTO: 110 K/UL (ref 150–450)
PLATELET # BLD AUTO: 41 K/UL (ref 150–450)
PLATELET # BLD AUTO: 80 K/UL (ref 150–450)
PLATELET # BLD AUTO: 83 K/UL (ref 150–450)
PLATELET BLD QL SMEAR: ABNORMAL
PMV BLD AUTO: 10.2 FL (ref 9.2–12.9)
PMV BLD AUTO: 10.2 FL (ref 9.2–12.9)
PMV BLD AUTO: 10.3 FL (ref 9.2–12.9)
PMV BLD AUTO: 10.8 FL (ref 9.2–12.9)
PMV BLD AUTO: 9.8 FL (ref 9.2–12.9)
PO2 BLDA: 135 MMHG (ref 80–100)
PO2 BLDA: 39 MMHG (ref 40–60)
PO2 BLDA: 68 MMHG (ref 80–100)
POC BE: 1 MMOL/L
POC BE: 2 MMOL/L
POC BE: 2 MMOL/L
POC IONIZED CALCIUM: 1.14 MMOL/L (ref 1.06–1.42)
POC SATURATED O2: 70 % (ref 95–100)
POC SATURATED O2: 93 % (ref 95–100)
POC SATURATED O2: 99 % (ref 95–100)
POC SVO2: 70 %
POC TCO2: 27 MMOL/L (ref 23–27)
POC TCO2: 28 MMOL/L (ref 23–27)
POC TCO2: 28 MMOL/L (ref 24–29)
POCT GLUCOSE: 170 MG/DL (ref 70–110)
POCT GLUCOSE: 194 MG/DL (ref 70–110)
POCT GLUCOSE: 219 MG/DL (ref 70–110)
POCT GLUCOSE: 283 MG/DL (ref 70–110)
POCT GLUCOSE: 286 MG/DL (ref 70–110)
POCT GLUCOSE: 289 MG/DL (ref 70–110)
POCT GLUCOSE: 292 MG/DL (ref 70–110)
POOLED CRYOPPT GVN BPU: NORMAL
POOLED CRYOPPT GVN BPU: NORMAL
POTASSIUM BLD-SCNC: 3.3 MMOL/L (ref 3.5–5.1)
POTASSIUM SERPL-SCNC: 3.4 MMOL/L (ref 3.5–5.1)
POTASSIUM SERPL-SCNC: 3.4 MMOL/L (ref 3.5–5.1)
POTASSIUM SERPL-SCNC: 3.7 MMOL/L (ref 3.5–5.1)
POTASSIUM SERPL-SCNC: 4.4 MMOL/L (ref 3.5–5.1)
PROT SERPL-MCNC: 5.5 G/DL (ref 6–8.4)
PROTHROMBIN TIME: 14.6 SEC (ref 9–12.5)
PROTHROMBIN TIME: 14.7 SEC (ref 9–12.5)
PROTHROMBIN TIME: 14.7 SEC (ref 9–12.5)
PROTHROMBIN TIME: 18 SEC (ref 9–12.5)
RBC # BLD AUTO: 3.94 M/UL (ref 4.6–6.2)
RBC # BLD AUTO: 3.98 M/UL (ref 4.6–6.2)
RBC # BLD AUTO: 4.15 M/UL (ref 4.6–6.2)
SAMPLE: ABNORMAL
SITE: ABNORMAL
SODIUM BLD-SCNC: 143 MMOL/L (ref 136–145)
SODIUM SERPL-SCNC: 136 MMOL/L (ref 136–145)
SODIUM SERPL-SCNC: 139 MMOL/L (ref 136–145)
SODIUM SERPL-SCNC: 143 MMOL/L (ref 136–145)
SODIUM SERPL-SCNC: 144 MMOL/L (ref 136–145)
SP02: 99
SP02: 99
UNIT NUMBER: NORMAL
UNIT NUMBER: NORMAL
VT: 450
WBC # BLD AUTO: 10.22 K/UL (ref 3.9–12.7)
WBC # BLD AUTO: 10.93 K/UL (ref 3.9–12.7)
WBC # BLD AUTO: 11.58 K/UL (ref 3.9–12.7)
WBC # FLD: 302 /CU MM

## 2023-01-05 PROCEDURE — 88313 SPECIAL STAINS GROUP 2: CPT | Performed by: PATHOLOGY

## 2023-01-05 PROCEDURE — 47147 PR TRANSPLANT,PREP DONOR LIVER/ARTERIAL: ICD-10-PCS | Mod: 51,,, | Performed by: TRANSPLANT SURGERY

## 2023-01-05 PROCEDURE — 88313 SPECIAL STAINS GROUP 2: CPT | Mod: 26,,, | Performed by: PATHOLOGY

## 2023-01-05 PROCEDURE — 83605 ASSAY OF LACTIC ACID: CPT

## 2023-01-05 PROCEDURE — 25000003 PHARM REV CODE 250: Performed by: STUDENT IN AN ORGANIZED HEALTH CARE EDUCATION/TRAINING PROGRAM

## 2023-01-05 PROCEDURE — 87070 CULTURE OTHR SPECIMN AEROBIC: CPT | Performed by: TRANSPLANT SURGERY

## 2023-01-05 PROCEDURE — 37000008 HC ANESTHESIA 1ST 15 MINUTES: Performed by: TRANSPLANT SURGERY

## 2023-01-05 PROCEDURE — P9016 RBC LEUKOCYTES REDUCED: HCPCS | Performed by: NURSE PRACTITIONER

## 2023-01-05 PROCEDURE — 88309 TISSUE EXAM BY PATHOLOGIST: CPT | Mod: 26,,, | Performed by: PATHOLOGY

## 2023-01-05 PROCEDURE — D9220A PRA ANESTHESIA: ICD-10-PCS | Mod: ANES,,, | Performed by: ANESTHESIOLOGY

## 2023-01-05 PROCEDURE — 82962 GLUCOSE BLOOD TEST: CPT | Performed by: TRANSPLANT SURGERY

## 2023-01-05 PROCEDURE — 84460 ALANINE AMINO (ALT) (SGPT): CPT | Performed by: TRANSPLANT SURGERY

## 2023-01-05 PROCEDURE — 88313 PR  SPECIAL STAINS,GROUP II: ICD-10-PCS | Mod: 26,,, | Performed by: PATHOLOGY

## 2023-01-05 PROCEDURE — 84450 TRANSFERASE (AST) (SGOT): CPT | Mod: 91 | Performed by: STUDENT IN AN ORGANIZED HEALTH CARE EDUCATION/TRAINING PROGRAM

## 2023-01-05 PROCEDURE — 85014 HEMATOCRIT: CPT

## 2023-01-05 PROCEDURE — 63600175 PHARM REV CODE 636 W HCPCS: Mod: JG

## 2023-01-05 PROCEDURE — 88307 PR  SURG PATH,LEVEL V: ICD-10-PCS | Mod: 26,,, | Performed by: PATHOLOGY

## 2023-01-05 PROCEDURE — 25000242 PHARM REV CODE 250 ALT 637 W/ HCPCS: Mod: NTX | Performed by: ANESTHESIOLOGY

## 2023-01-05 PROCEDURE — 83615 LACTATE (LD) (LDH) ENZYME: CPT | Performed by: STUDENT IN AN ORGANIZED HEALTH CARE EDUCATION/TRAINING PROGRAM

## 2023-01-05 PROCEDURE — 25000003 PHARM REV CODE 250: Performed by: NURSE ANESTHETIST, CERTIFIED REGISTERED

## 2023-01-05 PROCEDURE — 99222 1ST HOSP IP/OBS MODERATE 55: CPT | Mod: 57,NTX,, | Performed by: TRANSPLANT SURGERY

## 2023-01-05 PROCEDURE — 82040 ASSAY OF SERUM ALBUMIN: CPT | Mod: 91 | Performed by: TRANSPLANT SURGERY

## 2023-01-05 PROCEDURE — 99900035 HC TECH TIME PER 15 MIN (STAT)

## 2023-01-05 PROCEDURE — 84295 ASSAY OF SERUM SODIUM: CPT

## 2023-01-05 PROCEDURE — 87102 FUNGUS ISOLATION CULTURE: CPT | Performed by: TRANSPLANT SURGERY

## 2023-01-05 PROCEDURE — 47135 TRANSPLANTATION OF LIVER: CPT | Mod: 82,,, | Performed by: TRANSPLANT SURGERY

## 2023-01-05 PROCEDURE — 94761 N-INVAS EAR/PLS OXIMETRY MLT: CPT

## 2023-01-05 PROCEDURE — P9035 PLATELET PHERES LEUKOREDUCED: HCPCS | Performed by: ANESTHESIOLOGY

## 2023-01-05 PROCEDURE — 47135 TRANSPLANTATION OF LIVER: CPT | Mod: ,,, | Performed by: TRANSPLANT SURGERY

## 2023-01-05 PROCEDURE — 81300000 HC LIVER ACQUISITION CHARGE

## 2023-01-05 PROCEDURE — 84450 TRANSFERASE (AST) (SGOT): CPT | Performed by: TRANSPLANT SURGERY

## 2023-01-05 PROCEDURE — 99222 PR INITIAL HOSPITAL CARE,LEVL II: ICD-10-PCS | Mod: 57,NTX,, | Performed by: TRANSPLANT SURGERY

## 2023-01-05 PROCEDURE — 85730 THROMBOPLASTIN TIME PARTIAL: CPT | Mod: 91 | Performed by: STUDENT IN AN ORGANIZED HEALTH CARE EDUCATION/TRAINING PROGRAM

## 2023-01-05 PROCEDURE — 83735 ASSAY OF MAGNESIUM: CPT | Mod: 91 | Performed by: STUDENT IN AN ORGANIZED HEALTH CARE EDUCATION/TRAINING PROGRAM

## 2023-01-05 PROCEDURE — 47147 PREP DONOR LIVER/ARTERIAL: CPT | Mod: 51,,, | Performed by: TRANSPLANT SURGERY

## 2023-01-05 PROCEDURE — 37000009 HC ANESTHESIA EA ADD 15 MINS: Performed by: TRANSPLANT SURGERY

## 2023-01-05 PROCEDURE — 88307 TISSUE EXAM BY PATHOLOGIST: CPT | Performed by: PATHOLOGY

## 2023-01-05 PROCEDURE — 86965 POOLING BLOOD PLATELETS: CPT | Performed by: ANESTHESIOLOGY

## 2023-01-05 PROCEDURE — 81300005 HC LIVER TRANSPORT, GROUND 4-5 HOURS

## 2023-01-05 PROCEDURE — 85610 PROTHROMBIN TIME: CPT | Mod: 91 | Performed by: STUDENT IN AN ORGANIZED HEALTH CARE EDUCATION/TRAINING PROGRAM

## 2023-01-05 PROCEDURE — 85520 HEPARIN ASSAY: CPT

## 2023-01-05 PROCEDURE — 27800506 HC CATH, RAPID INFUSION (7.5&8.5): Mod: NTX | Performed by: ANESTHESIOLOGY

## 2023-01-05 PROCEDURE — 63600175 PHARM REV CODE 636 W HCPCS: Performed by: NURSE ANESTHETIST, CERTIFIED REGISTERED

## 2023-01-05 PROCEDURE — 27000191 HC C-V MONITORING

## 2023-01-05 PROCEDURE — P9045 ALBUMIN (HUMAN), 5%, 250 ML: HCPCS | Mod: JG

## 2023-01-05 PROCEDURE — P9045 ALBUMIN (HUMAN), 5%, 250 ML: HCPCS | Mod: JG | Performed by: NURSE ANESTHETIST, CERTIFIED REGISTERED

## 2023-01-05 PROCEDURE — 27200678 HC TRANSDUCER MONITOR KIT TRIPLE: Mod: NTX | Performed by: ANESTHESIOLOGY

## 2023-01-05 PROCEDURE — 27201423 OPTIME MED/SURG SUP & DEVICES STERILE SUPPLY: Performed by: TRANSPLANT SURGERY

## 2023-01-05 PROCEDURE — 27200656 HC CATH, FEMORAL ARTERY: Mod: NTX | Performed by: ANESTHESIOLOGY

## 2023-01-05 PROCEDURE — 80053 COMPREHEN METABOLIC PANEL: CPT | Performed by: STUDENT IN AN ORGANIZED HEALTH CARE EDUCATION/TRAINING PROGRAM

## 2023-01-05 PROCEDURE — 87075 CULTR BACTERIA EXCEPT BLOOD: CPT | Performed by: TRANSPLANT SURGERY

## 2023-01-05 PROCEDURE — 27800505 HC CATH, RADIAL ARTERY KIT: Mod: NTX | Performed by: ANESTHESIOLOGY

## 2023-01-05 PROCEDURE — 85610 PROTHROMBIN TIME: CPT | Performed by: TRANSPLANT SURGERY

## 2023-01-05 PROCEDURE — D9220A PRA ANESTHESIA: Mod: ANES,,, | Performed by: ANESTHESIOLOGY

## 2023-01-05 PROCEDURE — 86900 BLOOD TYPING SEROLOGIC ABO: CPT | Performed by: NURSE PRACTITIONER

## 2023-01-05 PROCEDURE — 82947 ASSAY GLUCOSE BLOOD QUANT: CPT | Performed by: TRANSPLANT SURGERY

## 2023-01-05 PROCEDURE — P9045 ALBUMIN (HUMAN), 5%, 250 ML: HCPCS | Mod: JG | Performed by: TRANSPLANT SURGERY

## 2023-01-05 PROCEDURE — 84100 ASSAY OF PHOSPHORUS: CPT | Mod: 91 | Performed by: TRANSPLANT SURGERY

## 2023-01-05 PROCEDURE — 85025 COMPLETE CBC W/AUTO DIFF WBC: CPT | Mod: 91 | Performed by: TRANSPLANT SURGERY

## 2023-01-05 PROCEDURE — 93503 INSERT/PLACE HEART CATHETER: CPT | Mod: 59,,, | Performed by: ANESTHESIOLOGY

## 2023-01-05 PROCEDURE — 47135 PR TRANSPLANT LIVER,ALLOTRANSPLANT: ICD-10-PCS | Mod: 82,,, | Performed by: TRANSPLANT SURGERY

## 2023-01-05 PROCEDURE — D9220A PRA ANESTHESIA: ICD-10-PCS | Mod: CRNA,,, | Performed by: NURSE ANESTHETIST, CERTIFIED REGISTERED

## 2023-01-05 PROCEDURE — 27000221 HC OXYGEN, UP TO 24 HOURS

## 2023-01-05 PROCEDURE — 25000242 PHARM REV CODE 250 ALT 637 W/ HCPCS: Performed by: NURSE ANESTHETIST, CERTIFIED REGISTERED

## 2023-01-05 PROCEDURE — 36620 INSERTION CATHETER ARTERY: CPT | Mod: 59,,, | Performed by: ANESTHESIOLOGY

## 2023-01-05 PROCEDURE — 36620 ARTERIAL: ICD-10-PCS | Mod: 59,,, | Performed by: ANESTHESIOLOGY

## 2023-01-05 PROCEDURE — 88307 TISSUE EXAM BY PATHOLOGIST: CPT | Mod: 26,,, | Performed by: PATHOLOGY

## 2023-01-05 PROCEDURE — 36415 COLL VENOUS BLD VENIPUNCTURE: CPT | Performed by: TRANSPLANT SURGERY

## 2023-01-05 PROCEDURE — 94002 VENT MGMT INPAT INIT DAY: CPT

## 2023-01-05 PROCEDURE — 47135 PR TRANSPLANT LIVER,ALLOTRANSPLANT: ICD-10-PCS | Mod: ,,, | Performed by: TRANSPLANT SURGERY

## 2023-01-05 PROCEDURE — 93503 SWAN GANZ LINE: ICD-10-PCS | Mod: 59,,, | Performed by: ANESTHESIOLOGY

## 2023-01-05 PROCEDURE — 36000931 HC OR TIME LEV VII EA ADD 15 MIN: Performed by: TRANSPLANT SURGERY

## 2023-01-05 PROCEDURE — 85730 THROMBOPLASTIN TIME PARTIAL: CPT | Mod: 91 | Performed by: TRANSPLANT SURGERY

## 2023-01-05 PROCEDURE — C1729 CATH, DRAINAGE: HCPCS | Performed by: TRANSPLANT SURGERY

## 2023-01-05 PROCEDURE — 85014 HEMATOCRIT: CPT | Performed by: TRANSPLANT SURGERY

## 2023-01-05 PROCEDURE — 82803 BLOOD GASES ANY COMBINATION: CPT

## 2023-01-05 PROCEDURE — 84100 ASSAY OF PHOSPHORUS: CPT | Performed by: STUDENT IN AN ORGANIZED HEALTH CARE EDUCATION/TRAINING PROGRAM

## 2023-01-05 PROCEDURE — 27201041 HC RESERVOIR, CARDIOTOMY

## 2023-01-05 PROCEDURE — 87205 SMEAR GRAM STAIN: CPT | Performed by: TRANSPLANT SURGERY

## 2023-01-05 PROCEDURE — 87116 MYCOBACTERIA CULTURE: CPT | Performed by: TRANSPLANT SURGERY

## 2023-01-05 PROCEDURE — 87206 SMEAR FLUORESCENT/ACID STAI: CPT | Performed by: TRANSPLANT SURGERY

## 2023-01-05 PROCEDURE — 85018 HEMOGLOBIN: CPT | Performed by: TRANSPLANT SURGERY

## 2023-01-05 PROCEDURE — C1751 CATH, INF, PER/CENT/MIDLINE: HCPCS | Mod: NTX | Performed by: ANESTHESIOLOGY

## 2023-01-05 PROCEDURE — P9047 ALBUMIN (HUMAN), 25%, 50ML: HCPCS | Mod: JG | Performed by: NURSE ANESTHETIST, CERTIFIED REGISTERED

## 2023-01-05 PROCEDURE — 85049 AUTOMATED PLATELET COUNT: CPT | Mod: 91 | Performed by: TRANSPLANT SURGERY

## 2023-01-05 PROCEDURE — 63600175 PHARM REV CODE 636 W HCPCS: Performed by: STUDENT IN AN ORGANIZED HEALTH CARE EDUCATION/TRAINING PROGRAM

## 2023-01-05 PROCEDURE — 83735 ASSAY OF MAGNESIUM: CPT | Mod: 91 | Performed by: TRANSPLANT SURGERY

## 2023-01-05 PROCEDURE — 25000003 PHARM REV CODE 250

## 2023-01-05 PROCEDURE — P9017 PLASMA 1 DONOR FRZ W/IN 8 HR: HCPCS | Performed by: NURSE PRACTITIONER

## 2023-01-05 PROCEDURE — 63600175 PHARM REV CODE 636 W HCPCS: Performed by: TRANSPLANT SURGERY

## 2023-01-05 PROCEDURE — 85025 COMPLETE CBC W/AUTO DIFF WBC: CPT | Performed by: STUDENT IN AN ORGANIZED HEALTH CARE EDUCATION/TRAINING PROGRAM

## 2023-01-05 PROCEDURE — 86900 BLOOD TYPING SEROLOGIC ABO: CPT | Mod: 91 | Performed by: STUDENT IN AN ORGANIZED HEALTH CARE EDUCATION/TRAINING PROGRAM

## 2023-01-05 PROCEDURE — 88309 TISSUE EXAM BY PATHOLOGIST: CPT | Performed by: PATHOLOGY

## 2023-01-05 PROCEDURE — 94640 AIRWAY INHALATION TREATMENT: CPT | Mod: NTX

## 2023-01-05 PROCEDURE — 20000000 HC ICU ROOM

## 2023-01-05 PROCEDURE — 36000930 HC OR TIME LEV VII 1ST 15 MIN: Performed by: TRANSPLANT SURGERY

## 2023-01-05 PROCEDURE — P9012 CRYOPRECIPITATE EACH UNIT: HCPCS | Performed by: ANESTHESIOLOGY

## 2023-01-05 PROCEDURE — 84132 ASSAY OF SERUM POTASSIUM: CPT

## 2023-01-05 PROCEDURE — 80048 BASIC METABOLIC PNL TOTAL CA: CPT | Mod: XB | Performed by: STUDENT IN AN ORGANIZED HEALTH CARE EDUCATION/TRAINING PROGRAM

## 2023-01-05 PROCEDURE — 88309 PR  SURG PATH,LEVEL VI: ICD-10-PCS | Mod: 26,,, | Performed by: PATHOLOGY

## 2023-01-05 PROCEDURE — 27100021 HC MULTIPORT INFUSION MANIFOLD: Mod: NTX | Performed by: ANESTHESIOLOGY

## 2023-01-05 PROCEDURE — 89051 BODY FLUID CELL COUNT: CPT | Performed by: TRANSPLANT SURGERY

## 2023-01-05 PROCEDURE — 84295 ASSAY OF SERUM SODIUM: CPT | Mod: 91 | Performed by: TRANSPLANT SURGERY

## 2023-01-05 PROCEDURE — 27100025 HC TUBING, SET FLUID WARMER: Mod: NTX | Performed by: ANESTHESIOLOGY

## 2023-01-05 PROCEDURE — 63600175 PHARM REV CODE 636 W HCPCS: Mod: NTX | Performed by: NURSE PRACTITIONER

## 2023-01-05 PROCEDURE — 85002 BLEEDING TIME TEST: CPT

## 2023-01-05 PROCEDURE — 82330 ASSAY OF CALCIUM: CPT

## 2023-01-05 PROCEDURE — 99900026 HC AIRWAY MAINTENANCE (STAT)

## 2023-01-05 PROCEDURE — 86920 COMPATIBILITY TEST SPIN: CPT | Performed by: NURSE PRACTITIONER

## 2023-01-05 PROCEDURE — 82150 ASSAY OF AMYLASE: CPT | Performed by: STUDENT IN AN ORGANIZED HEALTH CARE EDUCATION/TRAINING PROGRAM

## 2023-01-05 PROCEDURE — 84132 ASSAY OF SERUM POTASSIUM: CPT | Performed by: TRANSPLANT SURGERY

## 2023-01-05 PROCEDURE — 85384 FIBRINOGEN ACTIVITY: CPT | Mod: 91 | Performed by: TRANSPLANT SURGERY

## 2023-01-05 PROCEDURE — 27100088 HC CELL SAVER

## 2023-01-05 PROCEDURE — 82330 ASSAY OF CALCIUM: CPT | Performed by: TRANSPLANT SURGERY

## 2023-01-05 PROCEDURE — D9220A PRA ANESTHESIA: Mod: CRNA,,, | Performed by: NURSE ANESTHETIST, CERTIFIED REGISTERED

## 2023-01-05 PROCEDURE — 25000003 PHARM REV CODE 250: Mod: NTX | Performed by: NURSE PRACTITIONER

## 2023-01-05 PROCEDURE — 37799 UNLISTED PX VASCULAR SURGERY: CPT

## 2023-01-05 PROCEDURE — 47143 PR TRANSPLANT,PREP DONOR LIVER, WHOLE: ICD-10-PCS | Mod: 51,,, | Performed by: TRANSPLANT SURGERY

## 2023-01-05 RX ORDER — MUPIROCIN 20 MG/G
1 OINTMENT TOPICAL 2 TIMES DAILY
Status: DISPENSED | OUTPATIENT
Start: 2023-01-05 | End: 2023-01-10

## 2023-01-05 RX ORDER — HALOPERIDOL 5 MG/ML
INJECTION INTRAMUSCULAR
Status: DISCONTINUED | OUTPATIENT
Start: 2023-01-05 | End: 2023-01-05

## 2023-01-05 RX ORDER — CALCIUM GLUCONATE 20 MG/ML
3 INJECTION, SOLUTION INTRAVENOUS
Status: DISCONTINUED | OUTPATIENT
Start: 2023-01-05 | End: 2023-01-08

## 2023-01-05 RX ORDER — KETAMINE HCL IN 0.9 % NACL 50 MG/5 ML
SYRINGE (ML) INTRAVENOUS
Status: DISCONTINUED | OUTPATIENT
Start: 2023-01-05 | End: 2023-01-05

## 2023-01-05 RX ORDER — PROPOFOL 10 MG/ML
VIAL (ML) INTRAVENOUS
Status: DISCONTINUED | OUTPATIENT
Start: 2023-01-05 | End: 2023-01-05

## 2023-01-05 RX ORDER — PROPOFOL 10 MG/ML
0-50 INJECTION, EMULSION INTRAVENOUS CONTINUOUS
Status: DISCONTINUED | OUTPATIENT
Start: 2023-01-05 | End: 2023-01-06

## 2023-01-05 RX ORDER — NOREPINEPHRINE BITARTRATE/D5W 4MG/250ML
0-3 PLASTIC BAG, INJECTION (ML) INTRAVENOUS CONTINUOUS
Status: DISCONTINUED | OUTPATIENT
Start: 2023-01-05 | End: 2023-01-06

## 2023-01-05 RX ORDER — SODIUM CHLORIDE 0.9 % (FLUSH) 0.9 %
10 SYRINGE (ML) INJECTION
Status: DISCONTINUED | OUTPATIENT
Start: 2023-01-05 | End: 2023-01-11 | Stop reason: HOSPADM

## 2023-01-05 RX ORDER — MYCOPHENOLATE MOFETIL 200 MG/ML
1000 POWDER, FOR SUSPENSION ORAL 2 TIMES DAILY
Status: DISCONTINUED | OUTPATIENT
Start: 2023-01-05 | End: 2023-01-07

## 2023-01-05 RX ORDER — ALBUMIN HUMAN 50 G/1000ML
25 SOLUTION INTRAVENOUS ONCE
Status: COMPLETED | OUTPATIENT
Start: 2023-01-05 | End: 2023-01-05

## 2023-01-05 RX ORDER — MAGNESIUM SULFATE HEPTAHYDRATE 40 MG/ML
2 INJECTION, SOLUTION INTRAVENOUS
Status: DISCONTINUED | OUTPATIENT
Start: 2023-01-05 | End: 2023-01-08

## 2023-01-05 RX ORDER — ALBUMIN HUMAN 50 G/1000ML
SOLUTION INTRAVENOUS CONTINUOUS PRN
Status: DISCONTINUED | OUTPATIENT
Start: 2023-01-05 | End: 2023-01-05

## 2023-01-05 RX ORDER — NOREPINEPHRINE BITARTRATE 1 MG/ML
INJECTION, SOLUTION INTRAVENOUS
Status: DISCONTINUED | OUTPATIENT
Start: 2023-01-05 | End: 2023-01-05

## 2023-01-05 RX ORDER — CARVEDILOL 3.12 MG/1
6.25 TABLET ORAL 2 TIMES DAILY
Status: DISCONTINUED | OUTPATIENT
Start: 2023-01-05 | End: 2023-01-06

## 2023-01-05 RX ORDER — FAMOTIDINE 10 MG/ML
INJECTION INTRAVENOUS
Status: DISCONTINUED | OUTPATIENT
Start: 2023-01-05 | End: 2023-01-05

## 2023-01-05 RX ORDER — PREDNISONE 20 MG/1
20 TABLET ORAL DAILY
Status: DISCONTINUED | OUTPATIENT
Start: 2023-01-11 | End: 2023-01-11 | Stop reason: HOSPADM

## 2023-01-05 RX ORDER — FAMOTIDINE 10 MG/ML
20 INJECTION INTRAVENOUS EVERY 12 HOURS
Status: DISCONTINUED | OUTPATIENT
Start: 2023-01-05 | End: 2023-01-07

## 2023-01-05 RX ORDER — POTASSIUM CHLORIDE 29.8 MG/ML
40 INJECTION INTRAVENOUS
Status: DISCONTINUED | OUTPATIENT
Start: 2023-01-05 | End: 2023-01-08

## 2023-01-05 RX ORDER — ALBUMIN HUMAN 250 G/1000ML
SOLUTION INTRAVENOUS CONTINUOUS PRN
Status: DISCONTINUED | OUTPATIENT
Start: 2023-01-05 | End: 2023-01-05

## 2023-01-05 RX ORDER — ONDANSETRON 2 MG/ML
INJECTION INTRAMUSCULAR; INTRAVENOUS
Status: DISCONTINUED | OUTPATIENT
Start: 2023-01-05 | End: 2023-01-05

## 2023-01-05 RX ORDER — METHYLPREDNISOLONE SOD SUCC 125 MG
200 VIAL (EA) INJECTION DAILY
Status: COMPLETED | OUTPATIENT
Start: 2023-01-06 | End: 2023-01-06

## 2023-01-05 RX ORDER — ALBUMIN HUMAN 50 G/1000ML
SOLUTION INTRAVENOUS
Status: COMPLETED
Start: 2023-01-05 | End: 2023-01-05

## 2023-01-05 RX ORDER — ALBUTEROL SULFATE 90 UG/1
AEROSOL, METERED RESPIRATORY (INHALATION)
Status: DISCONTINUED | OUTPATIENT
Start: 2023-01-05 | End: 2023-01-05

## 2023-01-05 RX ORDER — VALGANCICLOVIR 450 MG/1
450 TABLET, FILM COATED ORAL DAILY
Status: DISCONTINUED | OUTPATIENT
Start: 2023-01-15 | End: 2023-01-11 | Stop reason: HOSPADM

## 2023-01-05 RX ORDER — SULFAMETHOXAZOLE AND TRIMETHOPRIM 400; 80 MG/1; MG/1
1 TABLET ORAL EVERY MORNING
Status: DISCONTINUED | OUTPATIENT
Start: 2023-01-12 | End: 2023-01-11 | Stop reason: HOSPADM

## 2023-01-05 RX ORDER — FUROSEMIDE 10 MG/ML
INJECTION INTRAMUSCULAR; INTRAVENOUS
Status: DISCONTINUED | OUTPATIENT
Start: 2023-01-05 | End: 2023-01-05

## 2023-01-05 RX ORDER — HEPARIN SODIUM 5000 [USP'U]/ML
5000 INJECTION, SOLUTION INTRAVENOUS; SUBCUTANEOUS EVERY 8 HOURS
Status: DISCONTINUED | OUTPATIENT
Start: 2023-01-05 | End: 2023-01-11 | Stop reason: HOSPADM

## 2023-01-05 RX ORDER — MIDAZOLAM HYDROCHLORIDE 1 MG/ML
INJECTION INTRAMUSCULAR; INTRAVENOUS
Status: DISCONTINUED | OUTPATIENT
Start: 2023-01-05 | End: 2023-01-05

## 2023-01-05 RX ORDER — NYSTATIN 100000 [USP'U]/ML
500000 SUSPENSION ORAL
Status: DISCONTINUED | OUTPATIENT
Start: 2023-01-05 | End: 2023-01-11 | Stop reason: HOSPADM

## 2023-01-05 RX ORDER — METHYLPREDNISOLONE SODIUM SUCCINATE 500 MG/8ML
500 INJECTION INTRAMUSCULAR; INTRAVENOUS
Status: DISCONTINUED | OUTPATIENT
Start: 2023-01-05 | End: 2023-01-05 | Stop reason: HOSPADM

## 2023-01-05 RX ORDER — CALCIUM GLUCONATE 20 MG/ML
2 INJECTION, SOLUTION INTRAVENOUS
Status: DISCONTINUED | OUTPATIENT
Start: 2023-01-05 | End: 2023-01-08

## 2023-01-05 RX ORDER — NOREPINEPHRINE BITARTRATE/D5W 4MG/250ML
PLASTIC BAG, INJECTION (ML) INTRAVENOUS
Status: DISPENSED
Start: 2023-01-05 | End: 2023-01-06

## 2023-01-05 RX ORDER — ALBUMIN HUMAN 50 G/1000ML
SOLUTION INTRAVENOUS
Status: COMPLETED | OUTPATIENT
Start: 2023-01-05 | End: 2023-01-05

## 2023-01-05 RX ORDER — POTASSIUM CHLORIDE 29.8 MG/ML
80 INJECTION INTRAVENOUS
Status: DISCONTINUED | OUTPATIENT
Start: 2023-01-05 | End: 2023-01-08

## 2023-01-05 RX ORDER — MANNITOL 250 MG/ML
INJECTION, SOLUTION INTRAVENOUS
Status: DISCONTINUED | OUTPATIENT
Start: 2023-01-05 | End: 2023-01-05

## 2023-01-05 RX ORDER — INDOMETHACIN 25 MG/1
CAPSULE ORAL
Status: DISCONTINUED | OUTPATIENT
Start: 2023-01-05 | End: 2023-01-05

## 2023-01-05 RX ORDER — IPRATROPIUM BROMIDE AND ALBUTEROL SULFATE 2.5; .5 MG/3ML; MG/3ML
3 SOLUTION RESPIRATORY (INHALATION) ONCE
Status: COMPLETED | OUTPATIENT
Start: 2023-01-05 | End: 2023-01-05

## 2023-01-05 RX ORDER — MUPIROCIN 20 MG/G
OINTMENT TOPICAL
Status: DISCONTINUED | OUTPATIENT
Start: 2023-01-05 | End: 2023-01-05 | Stop reason: HOSPADM

## 2023-01-05 RX ORDER — PHENYLEPHRINE HYDROCHLORIDE 10 MG/ML
INJECTION INTRAVENOUS
Status: DISCONTINUED | OUTPATIENT
Start: 2023-01-05 | End: 2023-01-05

## 2023-01-05 RX ORDER — POTASSIUM CHLORIDE 20 MEQ/1
40 TABLET, EXTENDED RELEASE ORAL ONCE
Status: DISCONTINUED | OUTPATIENT
Start: 2023-01-05 | End: 2023-01-05

## 2023-01-05 RX ORDER — SODIUM CHLORIDE 9 MG/ML
INJECTION, SOLUTION INTRAVENOUS CONTINUOUS
Status: DISCONTINUED | OUTPATIENT
Start: 2023-01-05 | End: 2023-01-11 | Stop reason: HOSPADM

## 2023-01-05 RX ORDER — VASOPRESSIN 20 [USP'U]/ML
INJECTION, SOLUTION INTRAMUSCULAR; SUBCUTANEOUS
Status: DISCONTINUED | OUTPATIENT
Start: 2023-01-05 | End: 2023-01-05

## 2023-01-05 RX ORDER — DEXTROSE MONOHYDRATE AND SODIUM CHLORIDE 5; .9 G/100ML; G/100ML
INJECTION, SOLUTION INTRAVENOUS CONTINUOUS
Status: ACTIVE | OUTPATIENT
Start: 2023-01-05 | End: 2023-01-07

## 2023-01-05 RX ORDER — ALBUMIN HUMAN 50 G/1000ML
50 SOLUTION INTRAVENOUS ONCE
Status: COMPLETED | OUTPATIENT
Start: 2023-01-05 | End: 2023-01-05

## 2023-01-05 RX ORDER — ROCURONIUM BROMIDE 10 MG/ML
INJECTION, SOLUTION INTRAVENOUS
Status: DISCONTINUED | OUTPATIENT
Start: 2023-01-05 | End: 2023-01-05

## 2023-01-05 RX ORDER — METHYLPREDNISOLONE SOD SUCC 125 MG
120 VIAL (EA) INJECTION DAILY
Status: COMPLETED | OUTPATIENT
Start: 2023-01-08 | End: 2023-01-08

## 2023-01-05 RX ORDER — HYDROMORPHONE HYDROCHLORIDE 1 MG/ML
0.2 INJECTION, SOLUTION INTRAMUSCULAR; INTRAVENOUS; SUBCUTANEOUS
Status: DISCONTINUED | OUTPATIENT
Start: 2023-01-05 | End: 2023-01-06

## 2023-01-05 RX ORDER — CALCIUM GLUCONATE 20 MG/ML
1 INJECTION, SOLUTION INTRAVENOUS
Status: DISCONTINUED | OUTPATIENT
Start: 2023-01-05 | End: 2023-01-08

## 2023-01-05 RX ORDER — DEXMEDETOMIDINE HYDROCHLORIDE 100 UG/ML
INJECTION, SOLUTION INTRAVENOUS
Status: DISCONTINUED | OUTPATIENT
Start: 2023-01-05 | End: 2023-01-05

## 2023-01-05 RX ORDER — CIPROFLOXACIN 2 MG/ML
400 INJECTION, SOLUTION INTRAVENOUS
Status: COMPLETED | OUTPATIENT
Start: 2023-01-05 | End: 2023-01-05

## 2023-01-05 RX ORDER — SODIUM,POTASSIUM PHOSPHATES 280-250MG
2 POWDER IN PACKET (EA) ORAL ONCE
Status: COMPLETED | OUTPATIENT
Start: 2023-01-05 | End: 2023-01-05

## 2023-01-05 RX ORDER — FENTANYL CITRATE 50 UG/ML
INJECTION, SOLUTION INTRAMUSCULAR; INTRAVENOUS
Status: DISCONTINUED | OUTPATIENT
Start: 2023-01-05 | End: 2023-01-05

## 2023-01-05 RX ORDER — METHYLPREDNISOLONE SOD SUCC 125 MG
160 VIAL (EA) INJECTION DAILY
Status: COMPLETED | OUTPATIENT
Start: 2023-01-07 | End: 2023-01-07

## 2023-01-05 RX ORDER — HEPARIN SODIUM 1000 [USP'U]/ML
INJECTION, SOLUTION INTRAVENOUS; SUBCUTANEOUS
Status: DISCONTINUED | OUTPATIENT
Start: 2023-01-05 | End: 2023-01-05

## 2023-01-05 RX ORDER — POTASSIUM CHLORIDE 14.9 MG/ML
60 INJECTION INTRAVENOUS
Status: DISCONTINUED | OUTPATIENT
Start: 2023-01-05 | End: 2023-01-08

## 2023-01-05 RX ORDER — MAGNESIUM SULFATE HEPTAHYDRATE 40 MG/ML
4 INJECTION, SOLUTION INTRAVENOUS
Status: DISCONTINUED | OUTPATIENT
Start: 2023-01-05 | End: 2023-01-08

## 2023-01-05 RX ORDER — DEXTROSE 50 % IN WATER (D50W) INTRAVENOUS SYRINGE
CONTINUOUS PRN
Status: DISCONTINUED | OUTPATIENT
Start: 2023-01-05 | End: 2023-01-05

## 2023-01-05 RX ORDER — LIDOCAINE HCL/PF 100 MG/5ML
SYRINGE (ML) INTRAVENOUS
Status: DISCONTINUED | OUTPATIENT
Start: 2023-01-05 | End: 2023-01-05

## 2023-01-05 RX ORDER — HEPARIN SODIUM 1000 [USP'U]/ML
INJECTION, SOLUTION INTRAVENOUS; SUBCUTANEOUS
Status: DISCONTINUED | OUTPATIENT
Start: 2023-01-05 | End: 2023-01-05 | Stop reason: HOSPADM

## 2023-01-05 RX ADMIN — NOREPINEPHRINE BITARTRATE 16 MCG: 1 INJECTION, SOLUTION, CONCENTRATE INTRAVENOUS at 02:01

## 2023-01-05 RX ADMIN — Medication 10 MG: at 02:01

## 2023-01-05 RX ADMIN — VANCOMYCIN HYDROCHLORIDE 1500 MG: 1.5 INJECTION, POWDER, LYOPHILIZED, FOR SOLUTION INTRAVENOUS at 08:01

## 2023-01-05 RX ADMIN — SODIUM CHLORIDE 3 UNITS/HR: 9 INJECTION, SOLUTION INTRAVENOUS at 04:01

## 2023-01-05 RX ADMIN — MIDAZOLAM HYDROCHLORIDE 2 MG: 1 INJECTION, SOLUTION INTRAMUSCULAR; INTRAVENOUS at 11:01

## 2023-01-05 RX ADMIN — PHENYLEPHRINE HYDROCHLORIDE 100 MCG: 10 INJECTION INTRAVENOUS at 12:01

## 2023-01-05 RX ADMIN — VASOPRESSIN 0.04 UNITS/MIN: 20 INJECTION INTRAVENOUS at 01:01

## 2023-01-05 RX ADMIN — AMPICILLIN SODIUM AND SULBACTAM SODIUM 3 G: 2; 1 INJECTION, POWDER, FOR SOLUTION INTRAMUSCULAR; INTRAVENOUS at 12:01

## 2023-01-05 RX ADMIN — ALBUMIN HUMAN 50 G: 50 SOLUTION INTRAVENOUS at 09:01

## 2023-01-05 RX ADMIN — NOREPINEPHRINE BITARTRATE 16 MCG: 1 INJECTION, SOLUTION, CONCENTRATE INTRAVENOUS at 04:01

## 2023-01-05 RX ADMIN — SODIUM BICARBONATE 50 ML: 84 INJECTION, SOLUTION INTRAVENOUS at 02:01

## 2023-01-05 RX ADMIN — ALBUMIN HUMAN 25 G: 50 SOLUTION INTRAVENOUS at 06:01

## 2023-01-05 RX ADMIN — ALBUMIN (HUMAN) 25 G: 12.5 SOLUTION INTRAVENOUS at 06:01

## 2023-01-05 RX ADMIN — NOREPINEPHRINE BITARTRATE 32 MCG: 1 INJECTION, SOLUTION, CONCENTRATE INTRAVENOUS at 03:01

## 2023-01-05 RX ADMIN — MUPIROCIN: 20 OINTMENT TOPICAL at 11:01

## 2023-01-05 RX ADMIN — ALBUMIN (HUMAN): 12.5 SOLUTION INTRAVENOUS at 05:01

## 2023-01-05 RX ADMIN — ROCURONIUM BROMIDE 25 MG: 10 INJECTION INTRAVENOUS at 04:01

## 2023-01-05 RX ADMIN — FENTANYL CITRATE 50 MCG: 50 INJECTION, SOLUTION INTRAMUSCULAR; INTRAVENOUS at 11:01

## 2023-01-05 RX ADMIN — POTASSIUM & SODIUM PHOSPHATES POWDER PACK 280-160-250 MG 2 PACKET: 280-160-250 PACK at 10:01

## 2023-01-05 RX ADMIN — INSULIN HUMAN 10 UNITS: 100 INJECTION, SOLUTION PARENTERAL at 02:01

## 2023-01-05 RX ADMIN — VASOPRESSIN 2 UNITS: 20 INJECTION INTRAVENOUS at 01:01

## 2023-01-05 RX ADMIN — SODIUM BICARBONATE 50 ML: 84 INJECTION, SOLUTION INTRAVENOUS at 03:01

## 2023-01-05 RX ADMIN — Medication 10 MG: at 01:01

## 2023-01-05 RX ADMIN — SODIUM BICARBONATE 50 MEQ: 84 INJECTION, SOLUTION INTRAVENOUS at 03:01

## 2023-01-05 RX ADMIN — FENTANYL CITRATE 50 MCG: 50 INJECTION, SOLUTION INTRAMUSCULAR; INTRAVENOUS at 12:01

## 2023-01-05 RX ADMIN — AMPICILLIN SODIUM AND SULBACTAM SODIUM 3 G: 2; 1 INJECTION, POWDER, FOR SOLUTION INTRAMUSCULAR; INTRAVENOUS at 02:01

## 2023-01-05 RX ADMIN — ALBUMIN (HUMAN): 12.5 SOLUTION INTRAVENOUS at 12:01

## 2023-01-05 RX ADMIN — FAMOTIDINE 20 MG: 10 INJECTION INTRAVENOUS at 10:01

## 2023-01-05 RX ADMIN — FAMOTIDINE 20 MG: 10 INJECTION INTRAVENOUS at 01:01

## 2023-01-05 RX ADMIN — AMPICILLIN SODIUM AND SULBACTAM SODIUM 3 G: 2; 1 INJECTION, POWDER, FOR SOLUTION INTRAMUSCULAR; INTRAVENOUS at 04:01

## 2023-01-05 RX ADMIN — ROCURONIUM BROMIDE 30 MG: 10 INJECTION INTRAVENOUS at 01:01

## 2023-01-05 RX ADMIN — ROCURONIUM BROMIDE 50 MG: 10 INJECTION INTRAVENOUS at 02:01

## 2023-01-05 RX ADMIN — PROPOFOL 40 MCG/KG/MIN: 10 INJECTION, EMULSION INTRAVENOUS at 08:01

## 2023-01-05 RX ADMIN — Medication 30 MG: at 03:01

## 2023-01-05 RX ADMIN — FENTANYL CITRATE 50 MCG: 50 INJECTION, SOLUTION INTRAMUSCULAR; INTRAVENOUS at 04:01

## 2023-01-05 RX ADMIN — CIPROFLOXACIN 400 MG: 2 INJECTION, SOLUTION INTRAVENOUS at 08:01

## 2023-01-05 RX ADMIN — DEXTROSE 50 % IN WATER (D50W) INTRAVENOUS SYRINGE: at 02:01

## 2023-01-05 RX ADMIN — METHYLPREDNISOLONE SODIUM SUCCINATE 500 MG: 500 INJECTION, POWDER, FOR SOLUTION INTRAMUSCULAR; INTRAVENOUS at 12:01

## 2023-01-05 RX ADMIN — ALBUTEROL SULFATE 20 PUFF: 108 INHALANT RESPIRATORY (INHALATION) at 02:01

## 2023-01-05 RX ADMIN — ALBUMIN (HUMAN) 50 G: 12.5 SOLUTION INTRAVENOUS at 07:01

## 2023-01-05 RX ADMIN — PHENYLEPHRINE HYDROCHLORIDE 200 MCG: 10 INJECTION INTRAVENOUS at 12:01

## 2023-01-05 RX ADMIN — ROCURONIUM BROMIDE 10 MG: 10 INJECTION INTRAVENOUS at 01:01

## 2023-01-05 RX ADMIN — DEXMEDETOMIDINE HYDROCHLORIDE 12 MCG: 100 INJECTION, SOLUTION INTRAVENOUS at 04:01

## 2023-01-05 RX ADMIN — IPRATROPIUM BROMIDE AND ALBUTEROL SULFATE 3 ML: 2.5; .5 SOLUTION RESPIRATORY (INHALATION) at 11:01

## 2023-01-05 RX ADMIN — ROCURONIUM BROMIDE 10 MG: 10 INJECTION INTRAVENOUS at 12:01

## 2023-01-05 RX ADMIN — ALBUMIN (HUMAN) 50 G: 12.5 SOLUTION INTRAVENOUS at 09:01

## 2023-01-05 RX ADMIN — FUROSEMIDE 100 MG: 10 INJECTION, SOLUTION INTRAMUSCULAR; INTRAVENOUS at 12:01

## 2023-01-05 RX ADMIN — CALCIUM CHLORIDE 1000 MG: 100 INJECTION, SOLUTION INTRAVENOUS at 03:01

## 2023-01-05 RX ADMIN — VASOPRESSIN 1 UNITS: 20 INJECTION INTRAVENOUS at 01:01

## 2023-01-05 RX ADMIN — NOREPINEPHRINE BITARTRATE 32 MCG: 1 INJECTION, SOLUTION, CONCENTRATE INTRAVENOUS at 02:01

## 2023-01-05 RX ADMIN — MANNITOL 100 ML: 12.5 INJECTION, SOLUTION INTRAVENOUS at 12:01

## 2023-01-05 RX ADMIN — NOREPINEPHRINE BITARTRATE 8 MCG: 1 INJECTION, SOLUTION, CONCENTRATE INTRAVENOUS at 01:01

## 2023-01-05 RX ADMIN — MAGNESIUM SULFATE 2 G: 2 INJECTION INTRAVENOUS at 10:01

## 2023-01-05 RX ADMIN — MANNITOL 100 ML: 12.5 INJECTION, SOLUTION INTRAVENOUS at 03:01

## 2023-01-05 RX ADMIN — ALBUMIN (HUMAN): 12.5 SOLUTION INTRAVENOUS at 01:01

## 2023-01-05 RX ADMIN — TACROLIMUS 1 MG: 1 CAPSULE ORAL at 07:01

## 2023-01-05 RX ADMIN — FENTANYL CITRATE 100 MCG: 50 INJECTION, SOLUTION INTRAMUSCULAR; INTRAVENOUS at 11:01

## 2023-01-05 RX ADMIN — SODIUM CHLORIDE: 9 INJECTION, SOLUTION INTRAVENOUS at 11:01

## 2023-01-05 RX ADMIN — INSULIN HUMAN 15 UNITS: 100 INJECTION, SOLUTION PARENTERAL at 02:01

## 2023-01-05 RX ADMIN — MUPIROCIN 1 G: 20 OINTMENT TOPICAL at 10:01

## 2023-01-05 RX ADMIN — POTASSIUM BICARBONATE 40 MEQ: 391 TABLET, EFFERVESCENT ORAL at 10:01

## 2023-01-05 RX ADMIN — DEXTROSE AND SODIUM CHLORIDE: 5; 900 INJECTION, SOLUTION INTRAVENOUS at 07:01

## 2023-01-05 RX ADMIN — HEPARIN SODIUM 5000 UNITS: 5000 INJECTION INTRAVENOUS; SUBCUTANEOUS at 10:01

## 2023-01-05 RX ADMIN — POTASSIUM CHLORIDE 60 MEQ: 14.9 INJECTION, SOLUTION INTRAVENOUS at 08:01

## 2023-01-05 RX ADMIN — FUROSEMIDE 100 MG: 10 INJECTION, SOLUTION INTRAMUSCULAR; INTRAVENOUS at 03:01

## 2023-01-05 RX ADMIN — HEPARIN SODIUM 2000 UNITS: 1000 INJECTION, SOLUTION INTRAVENOUS; SUBCUTANEOUS at 02:01

## 2023-01-05 RX ADMIN — MYCOPHENOLATE MOFETIL 1000 MG: 200 POWDER, FOR SUSPENSION ORAL at 10:01

## 2023-01-05 RX ADMIN — HALOPERIDOL LACTATE 1 MG: 5 INJECTION, SOLUTION INTRAMUSCULAR at 01:01

## 2023-01-05 RX ADMIN — ALBUMIN (HUMAN): 12.5 SOLUTION INTRAVENOUS at 03:01

## 2023-01-05 RX ADMIN — ONDANSETRON 4 MG: 2 INJECTION INTRAMUSCULAR; INTRAVENOUS at 05:01

## 2023-01-05 RX ADMIN — SODIUM CHLORIDE: 9 INJECTION, SOLUTION INTRAVENOUS at 08:01

## 2023-01-05 RX ADMIN — ROCURONIUM BROMIDE 50 MG: 10 INJECTION INTRAVENOUS at 11:01

## 2023-01-05 RX ADMIN — NOREPINEPHRINE BITARTRATE 0.04 MCG/KG/MIN: 1 INJECTION, SOLUTION, CONCENTRATE INTRAVENOUS at 01:01

## 2023-01-05 RX ADMIN — PHYTONADIONE 10 MG: 10 INJECTION, EMULSION INTRAMUSCULAR; INTRAVENOUS; SUBCUTANEOUS at 10:01

## 2023-01-05 RX ADMIN — CALCIUM CHLORIDE 1000 MG: 100 INJECTION, SOLUTION INTRAVENOUS at 02:01

## 2023-01-05 RX ADMIN — PROPOFOL 150 MG: 10 INJECTION, EMULSION INTRAVENOUS at 11:01

## 2023-01-05 RX ADMIN — FENTANYL CITRATE 50 MCG: 50 INJECTION, SOLUTION INTRAMUSCULAR; INTRAVENOUS at 01:01

## 2023-01-05 RX ADMIN — ROCURONIUM BROMIDE 25 MG: 10 INJECTION INTRAVENOUS at 05:01

## 2023-01-05 RX ADMIN — LIDOCAINE HYDROCHLORIDE 100 MG: 20 INJECTION, SOLUTION INTRAVENOUS at 11:01

## 2023-01-05 NOTE — PROGRESS NOTES
Autotransfusion/Rapid Infusion Record:      01/05/2023  Autotransfusionist:  Jasvir Holley    Surgeon(s) and Role:     * Rosa Foreman MD - Primary     * Aldo Hdz Jr., MD - Assisting     * Pineda Ga MD - Fellow     * Fab Gentile MD - Fellow  Anesthesiologist:  Osorio Gramajo Jr., MD    Past Medical History:   Diagnosis Date    C. difficile diarrhea     Diabetes mellitus, type II, insulin dependent     Granulomatous colitis     Hepatic granuloma associated with sarcoidosis     Hepatic granuloma associated with sarcoidosis 11/17/2022    Personal history of colonic polyps     Sarcoidosis, unspecified     Thrombocytopenia, unspecified     Unspecified cirrhosis of liver        Procedure(s) (LRB):  TRANSPLANT, LIVER (N/A)     5:39 PM    Equipment:    Cell Saver     R.I.S.  : [employer] Model: CATSmart or CATSplus : Ever[employer]   Model: [id number]     Serial number: [id number]   Serial number:    Disposable lot #:    Disposable lot #:      Were extra cardiotomies used for cell saver?  yes   if yes, #:  1    Solutions:  Anticoagulant: ACD-A   Expiration date Oct/23 Volume used: 5000ml   Wash solution: 0.9% NaCl   Expiration date: 01/25 Volume used: [id number]     Cell saver checklist  Time completed:           []   Circuit assembled correctly     []   Cell saver powered and operational     []   Vacuum connected, functional, adjust to max -150mmHg     []   Anticoagulant drip rate adjusted     []   Transfer bag properly labeled with patient name, expiration time, volume,       anticoagulant, OR number, and initials     []   Cell saver disinfected after use (completed at end of case)       Cell Saver volumes:    Total volume processed:     18198 mL     Total volume pRBCs recovered     3195 mL     Volume pRBCs infused     3195 mL         RIS checklist   Time completed:  []   RIS circuit assembled correctly     []   RIS power and operational     []   RIS disinfected after use (completed at  end of case)       RIS volumes:    Total volume infused:    (see anesthesia record for blood       product information)    mL       Additional comments:

## 2023-01-05 NOTE — ANESTHESIA PROCEDURE NOTES
Arterial    Diagnosis: esld    Patient location during procedure: done in OR    Staffing  Authorizing Provider: Osorio Gramajo Jr., MD  Performing Provider: Osorio Gramjao Jr., MD    Anesthesiologist was present at the time of the procedure.    Preanesthetic Checklist  Completed: patient identified, IV checked, site marked, risks and benefits discussed, surgical consent, monitors and equipment checked, pre-op evaluation, timeout performed and anesthesia consent givenArterial  Skin Prep: chlorhexidine gluconate  Local Infiltration: none  Orientation: left  Location: femoral    Catheter Size: 4 Fr Cook  Catheter placement by Ultrasound guidance. Heme positive aspiration all ports.   Vessel Caliber: medium, patent, compressibility normal  Needle advanced into vessel with real time Ultrasound guidance.  Guidewire confirmed in vessel.  Sterile sheath used.Insertion Attempts: 2  Assessment  Dressing: secured with tape and tegaderm  Patient: Tolerated well

## 2023-01-05 NOTE — OP NOTE
Certification of Assistant at Surgery       Surgery Date: 1/5/2023     Participating Surgeons:  Surgeon(s) and Role:     * Rosa Foreman MD - Primary     * Aldo Hdz Jr., MD - Assisting     * Fab Gentile MD - Fellow    Procedures:  Procedure(s) (LRB):  TRANSPLANT, LIVER (N/A)    Assistant Surgeon's Certification of Necessity:  I understand that section 1842 (b) (6) (d) of the Social Security Act generally prohibits Medicare Part B reasonable charge payment for the services of assistants at surgery in teaching hospitals when qualified residents are available to furnish such services. I certify that the services for which payment is claimed were medically necessary, and that no qualified resident was available to perform the services. I further understand that these services are subject to post-payment review by the Medicare carrier.      Aldo Hdz Jr, MD    01/05/2023  4:00 PM

## 2023-01-05 NOTE — OP NOTE
Operative Report    Date of Procedure: 1/5/2023    Surgeon: Aldo Hdz Jr, MD  First Assistant: Pineda Ga MD    Pre-operative Diagnosis: Allograft liver for transplantation  Post-operative Diagnosis: Same    Procedure(s) Performed:   Back Table Preparation of Liver with needle biopsy and vascular reconstruction of replaced right hepatic artery.    Anesthesia: Not applicable  Estimated Blood Loss: Not applicable  Fluids Administered: Not applicable    Findings: Estimated steatosis greater than 50%, replaced right hepatic artery.  Drains: Not applicable  Specimens: Core biopsy of allograft liver      Preamble  Indications: This report describes only the backbench preparation of the liver prior to transplantation.  The transplant operation itself is described in a separate report.    ABO Confirmation: Immediately following arrival of the donor organ and prior to implantation, a formal ABO confirmation was done according to hospital and [facility] policies.  I confirmed the [facility] ID number of the donor organ and the donor and recipient ABO types, directly verifying these data by comparison with the [facility] report.  This confirmation was personally done by an attending surgeon and circulating nurse, and is officially documented elsewhere.    Time-Out: A complete time out was carried out prior to the procedure, with confirmation of patient identity, correct procedure, correct operative site, appropriate antibiotic prophylaxis, review of any known allergies, and presence of all needed equipment.    Procedure in Detail  The liver was recovered from the transport cooler and inspected for vascular anatomy and overall suitability for transplantation, with findings as noted above.  The remnant of the diaphragm was dissected away.  The phrenic veins and adrenal vein were identified and ligated or sutured as appropriate.  The portal vein and hepatic artery were mobilized toward the hilum of the liver, and extrahepatic  branches were ligated.  A replaced right hepatic artery was reconstructed by implantation to the gastroduodenal artery using 7-0 polypropylene.  The vessels were tested for leaks.  A needle biopsy was obtained for permanent section histology using a spring-loaded biopsy device. The liver was kept in ice temperature organ preservation solution until the time of implantation.

## 2023-01-05 NOTE — ANESTHESIA PROCEDURE NOTES
Intubation    Date/Time: 1/5/2023 12:03 PM  Performed by: Melodie Flynn CRNA  Authorized by: Osorio Graamjo Jr., MD     Intubation:     Induction:  Intravenous    Intubated:  Postinduction    Mask Ventilation:  Moderately difficult with oral airway    Attempts:  1    Attempted By:  CRNA    Method of Intubation:  Video laryngoscopy    Blade:  Perry 3    Laryngeal View Grade: Grade I - full view of cords      Difficult Airway Encountered?: No      Complications:  None    Airway Device:  Oral endotracheal tube    Airway Device Size:  7.5    Style/Cuff Inflation:  Cuffed (inflated to minimal occlusive pressure)    Tube secured:  22    Secured at:  The teeth    Placement Verified By:  Capnometry    Complicating Factors:  None    Findings Post-Intubation:  BS equal bilateral and atraumatic/condition of teeth unchanged

## 2023-01-05 NOTE — NURSING
Pt discharged to the [facility]. Pt departed [address] with personal belongings in hand. Discharge education provided, pt verbalized understanding. No acute issues present at this time.

## 2023-01-05 NOTE — ANESTHESIA PROCEDURE NOTES
[place]    Diagnosis: esld  Patient location during procedure: done in OR  Procedure start time: 1/5/2023 12:40 PM  Procedure end time: 1/5/2023 12:55 PM    Staffing  Authorizing Provider: Osorio Gramajo Jr., MD  Performing Provider: Osorio Gramajo Jr., MD    Anesthesiologist was present at the time of the procedure.  Preanesthetic Checklist  Completed: patient identified, IV checked, site marked, risks and benefits discussed, surgical consent, monitors and equipment checked, pre-op evaluation, timeout performed and anesthesia consent given  [place]  Skin Prep: chlorhexidine gluconate  Local Infiltration: none  Location: right,  internal jugular vein  Vessel Caliber: medium, patent, compressibility normal  Introducer: 14 Fr double lumen,   Device: CCO/Oximetric Catheter   Catheter Size: 8.5 Fr  Catheter placement by yes. Heme positive aspiration all ports. PAC floated with balloon up not wedgedSterile sheath used  Locked at: 42 cm.Insertion Attempts: 1  Indication: intravenous therapy, hemodynamic monitoring  Ultrasound Guidance  Needle advanced into vessel with real time Ultrasound guidance.  Guidewire confirmed in vessel.  Sterile sheath used.  Assessment  Central Line Bundle Protocol followed. Hand hygiene before procedure, surgical cap worn, surgical mask worn, sterile surgical gloves worn, large sterile drape used.  Verification: blood return  Dressing: secured with tape and tegaderm, sutured in place and taped and tegaderm  Patient: Tolerated Well

## 2023-01-05 NOTE — SUBJECTIVE & OBJECTIVE
Past Medical History:   Diagnosis Date    C. difficile diarrhea     Diabetes mellitus, type II, insulin dependent     Granulomatous colitis     Hepatic granuloma associated with sarcoidosis     Hepatic granuloma associated with sarcoidosis 11/17/2022    Personal history of colonic polyps     Sarcoidosis, unspecified     Thrombocytopenia, unspecified     Unspecified cirrhosis of liver        Past Surgical History:   Procedure Laterality Date    ADENOIDECTOMY      CARDIAC CATHETERIZATION Right     COLONOSCOPY  07/11/2018    HIP SURGERY      RIght removed ruptured cyst    UPPER GASTROINTESTINAL ENDOSCOPY  10/09/2019       Review of patient's allergies indicates:   Allergen Reactions    Adhesive Other (See Comments)     Skin irritation  Paper tape okay to use    Pcn [penicillins] Rash     Happened as a baby       Family History       Problem Relation (Age of Onset)    Cancer Father, Maternal Grandfather    Diabetes Maternal Grandmother, Maternal Grandfather, Paternal Grandmother    Heart disease Father    Hypertension Mother, Father    Rectal cancer Father    Thyroid disease Father          Tobacco Use    Smoking status: Never    Smokeless tobacco: Never   Substance and Sexual Activity    Alcohol use: Never    Drug use: Not on file    Sexual activity: Not on file       No medications prior to admission.       Review of Systems   Constitutional:  Positive for fatigue. Negative for appetite change, chills and fever.   Respiratory:  Negative for cough, chest tightness and shortness of breath.    Cardiovascular:  Negative for chest pain, palpitations and leg swelling.   Gastrointestinal:  Negative for abdominal distention, abdominal pain, constipation, diarrhea, nausea and vomiting.   Genitourinary:  Negative for difficulty urinating, dysuria, frequency and urgency.   Musculoskeletal:  Negative for back pain and neck pain.   Skin:  Negative for color change, pallor, rash and wound.   Neurological:  Negative for dizziness,  weakness and headaches.   Psychiatric/Behavioral:  Negative for confusion and sleep disturbance.    Objective:     Vital Signs (Most Recent):    Vital Signs (24h Range):  Temp:  [98.1 °F (36.7 °C)-98.3 °F (36.8 °C)] 98.1 °F (36.7 °C)  Pulse:  [76-80] 76  Resp:  [18] 18  SpO2:  [98 %-100 %] 98 %  BP: (116-121)/(72-75) 121/75        There is no height or weight on file to calculate BMI.    No intake or output data in the 24 hours ending 01/05/23 1036    Physical Exam  Vitals and nursing note reviewed.   Constitutional:       General: He is awake.      Appearance: Normal appearance.   Eyes:      General: No scleral icterus.  Cardiovascular:      Rate and Rhythm: Normal rate and regular rhythm.      Pulses: Normal pulses.   Pulmonary:      Effort: Pulmonary effort is normal. No respiratory distress.      Breath sounds: Normal breath sounds. No decreased breath sounds, wheezing or rales.   Abdominal:      General: Bowel sounds are normal. There is no distension.      Palpations: Abdomen is soft.      Tenderness: There is no abdominal tenderness. There is no guarding or rebound.   Musculoskeletal:         General: No tenderness. Normal range of motion.   Skin:     General: Skin is warm and dry.      Coloration: Skin is not pale.      Findings: No erythema or rash.   Neurological:      Mental Status: He is alert and oriented to person, place, and time.   Psychiatric:         Speech: Speech normal.         Behavior: Behavior normal. Behavior is cooperative.       Laboratory/Diagnostic Studies: pending

## 2023-01-05 NOTE — H&P
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 ESLD c/b ascites, BLE edema, and nonbleeding esophageal varices. He is listed for a liver transplant with MELD 16. He is being admitted for liver transplant surgery. He feels well in his usual state of health. He denies any recent illnesses or hospitalizations. Primary surgeon Dr. Foreman.       Past Medical History:   Diagnosis Date    C. difficile diarrhea     Diabetes mellitus, type II, insulin dependent     Granulomatous colitis     Hepatic granuloma associated with sarcoidosis     Hepatic granuloma associated with sarcoidosis 11/17/2022    Personal history of colonic polyps     Sarcoidosis, unspecified     Thrombocytopenia, unspecified     Unspecified cirrhosis of liver        Past Surgical History:   Procedure Laterality Date    ADENOIDECTOMY      CARDIAC CATHETERIZATION Right     COLONOSCOPY  07/11/2018    HIP SURGERY      RIght removed ruptured cyst    UPPER GASTROINTESTINAL ENDOSCOPY  10/09/2019       Review of patient's allergies indicates:   Allergen Reactions    Adhesive Other (See Comments)     Skin irritation  Paper tape okay to use    Pcn [penicillins] Rash     Happened as a baby       Family History       Problem Relation (Age of Onset)    Cancer Father, Maternal Grandfather    Diabetes Maternal Grandmother, Maternal Grandfather, Paternal Grandmother    Heart disease Father    Hypertension Mother, Father    Rectal cancer Father    Thyroid disease Father          Tobacco Use    Smoking status: Never    Smokeless tobacco: Never   Substance and Sexual Activity    Alcohol use: Never    Drug use: Not on file    Sexual activity: Not on  file       No medications prior to admission.       Review of Systems   Constitutional:  Positive for fatigue. Negative for appetite change, chills and fever.   Respiratory:  Negative for cough, chest tightness and shortness of breath.    Cardiovascular:  Negative for chest pain, palpitations and leg swelling.   Gastrointestinal:  Negative for abdominal distention, abdominal pain, constipation, diarrhea, nausea and vomiting.   Genitourinary:  Negative for difficulty urinating, dysuria, frequency and urgency.   Musculoskeletal:  Negative for back pain and neck pain.   Skin:  Negative for color change, pallor, rash and wound.   Neurological:  Negative for dizziness, weakness and headaches.   Psychiatric/Behavioral:  Negative for confusion and sleep disturbance.    Objective:     Vital Signs (Most Recent):    Vital Signs (24h Range):  Temp:  [98.1 °F (36.7 °C)-98.3 °F (36.8 °C)] 98.1 °F (36.7 °C)  Pulse:  [76-80] 76  Resp:  [18] 18  SpO2:  [98 %-100 %] 98 %  BP: (116-121)/(72-75) 121/75        There is no height or weight on file to calculate BMI.    No intake or output data in the 24 hours ending 01/05/23 1036    Physical Exam  Vitals and nursing note reviewed.   Constitutional:       General: He is awake.      Appearance: Normal appearance.   Eyes:      General: No scleral icterus.  Cardiovascular:      Rate and Rhythm: Normal rate and regular rhythm.      Pulses: Normal pulses.   Pulmonary:      Effort: Pulmonary effort is normal. No respiratory distress.      Breath sounds: Normal breath sounds. No decreased breath sounds, wheezing or rales.   Abdominal:      General: Bowel sounds are normal. There is no distension.      Palpations: Abdomen is soft.      Tenderness: There is no abdominal tenderness. There is no guarding or rebound.   Musculoskeletal:         General: No tenderness. Normal range of motion.   Skin:     General: Skin is warm and dry.      Coloration: Skin is not pale.      Findings: No erythema or  rash.   Neurological:      Mental Status: He is alert and oriented to person, place, and time.   Psychiatric:         Speech: Speech normal.         Behavior: Behavior normal. Behavior is cooperative.       Laboratory/Diagnostic Studies: pending    Assessment/Plan:     Hepatic granuloma associated with sarcoidosis  - ESLD 2/2 hepatic sarcoidosis  - Admit for liver transplant surgery  - Pre op labs and diagnostic studies pending, to be reviewed before surgery          The patient presents for liver transplant.  There are no apparent contraindications to proceeding with the planned transplant.  The patient understands that the transplant could potentially be cancelled pending detailed assessment of the donor organ.    MELD-Na score: 13 at 1/4/2023  4:07 PM  MELD score: 13 at 1/4/2023  4:07 PM  Calculated from:  Serum Creatinine: 1.1 mg/dL at 1/4/2023  4:07 PM  Serum Sodium: 137 mmol/L at 1/4/2023  4:07 PM  Total Bilirubin: 1.6 mg/dL at 1/4/2023  4:07 PM  INR(ratio): 1.4 at 1/4/2023  4:07 PM  Age: 47 years    He will receive IV steroids pulse induction.    Covid test pending. If positive, patient instructed that surgery will be canceled and patient will be discharged home to quarantine. Patient would remain inactive on txp list for 28 days and will be assess at that time to be placed back on waitlist.     A complete discussion of the transplant procedure, including risks, complications, and alternatives, as well as any donor-specific risk factors requiring specific disclosure, will be carried out by the responsible staff surgeon prior to the procedure.     Sola Espinoza, INDIRA  Liver Transplant  [facility] - Surgery ([facility])

## 2023-01-05 NOTE — HPI
Mr. 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 Mr. Mccarty presents to the SICU from the OR following orthotopic DBD liver transplant with end to end biliary anastomosis and replaced right hepatic artery reconstruction. In the OR he received 8000 Crystalloid and 3195 of Cell Saver input, EBL was 9600. Urine output during the case recorded as 4500L. 2 units of pRBCs, 4 FFP, 2 plt, 2 cryo, 2000 (5%) and 100 (25%) albumin were administered during the case. 4500 mL of ascites was removed. Mr. Mccarty arrives intubated and under mechanical ventilation. Medications on admission include Vaso @ 0.06, Levo @ 0.1, and an insulin gtt.     Upon admission labs were immediately drawn and sent off including CBC, CMP, Mg, Phos, and coags. An arterial blood gas was drawn showing satisfactory oxygenation and ventilation values. (His) ventilator was weaned to 80% FiO2 and 5 Peep. Goals are to monitor respiratory status w/ possible extubation tomorrow. Wean vasopressors as tolerated. -160.

## 2023-01-05 NOTE — PROGRESS NOTES
PreOp complete. Patient's wife, Triston, set up for text messaging. Type and screen sent to blood bank.

## 2023-01-05 NOTE — ANESTHESIA PREPROCEDURE EVALUATION
01/05/2023  Rosalio Mccarty is a 47 y.o., male.        Pre-operative evaluation for Procedure(s) (LRB):  TRANSPLANT, LIVER (N/A)    @[id number]@@    Encounter Diagnoses   Name Primary?    Decompensated hepatic cirrhosis     Pre-op evaluation        Review of patient's allergies indicates:   Allergen Reactions    Adhesive Other (See Comments)     Skin irritation  Paper tape okay to use    Pcn [penicillins] Rash     Happened as a baby       Medications Prior to Admission   Medication Sig Dispense Refill Last Dose    ARIPiprazole (ABILIFY) 2 MG Tab Take 2 mg by mouth once daily.       carvediloL (COREG) 6.25 MG tablet Take 6.25 mg by mouth 2 (two) times daily.       colestipoL (COLESTID) 1 gram Tab Take 1 g by mouth 2 (two) times daily as needed.       empagliflozin (JARDIANCE) 25 mg tablet Take 25 mg by mouth once daily.       ENTRESTO 49-51 mg per tablet Take 1 tablet by mouth 2 (two) times daily.       EScitalopram oxalate (LEXAPRO) 20 MG tablet Take 20 mg by mouth once daily.       FREESTYLE LUZMA 14 DAY SENSOR Kit Apply topically.       HUMALOG KWIKPEN INSULIN 100 unit/mL pen Sliding Scale       HUMIRA PEN PnKt injection Inject into the skin.       hydrOXYchloroQUINE (PLAQUENIL) 200 mg tablet Take 200 mg by mouth 2 (two) times daily.       multivitamin (THERAGRAN) per tablet Take 1 tablet by mouth once daily.       OZEMPIC 2 mg/dose (8 mg/3 mL) PnIj Inject 2 mg into the skin every 7 days.       traZODone (DESYREL) 50 MG tablet Take 150 mg by mouth every evening.       TRESIBA FLEXTOUCH U-100 100 unit/mL (3 mL) insulin pen 40 Units. BID               No current facility-administered medications on file prior to encounter.     Current Outpatient Medications on File Prior to Encounter   Medication Sig Dispense Refill    ARIPiprazole (ABILIFY) 2 MG Tab Take 2 mg by mouth once daily.       carvediloL (COREG) 6.25 MG tablet Take 6.25 mg by mouth 2 (two) times daily.      colestipoL (COLESTID) 1 gram Tab Take 1 g by mouth 2 (two) times daily as needed.      empagliflozin (JARDIANCE) 25 mg tablet Take 25 mg by mouth once daily.      ENTRESTO 49-51 mg per tablet Take 1 tablet by mouth 2 (two) times daily.      EScitalopram oxalate (LEXAPRO) 20 MG tablet Take 20 mg by mouth once daily.      FREESTYLE LUZMA 14 DAY SENSOR Kit Apply topically.      HUMALOG KWIKPEN INSULIN 100 unit/mL pen Sliding Scale      HUMIRA PEN PnKt injection Inject into the skin.      hydrOXYchloroQUINE (PLAQUENIL) 200 mg tablet Take 200 mg by mouth 2 (two) times daily.      multivitamin (THERAGRAN) per tablet Take 1 tablet by mouth once daily.      OZEMPIC 2 mg/dose (8 mg/3 mL) PnIj Inject 2 mg into the skin every 7 days.      traZODone (DESYREL) 50 MG tablet Take 150 mg by mouth every evening.      TRESIBA FLEXTOUCH U-100 100 unit/mL (3 mL) insulin pen 40 Units. BID         Past Medical History:  No date: C. difficile diarrhea  No date: Diabetes mellitus, type II, insulin dependent  No date: Granulomatous colitis  No date: Hepatic granuloma associated with sarcoidosis  11/17/2022: Hepatic granuloma associated with sarcoidosis  No date: Personal history of colonic polyps  No date: Sarcoidosis, unspecified  No date: Thrombocytopenia, unspecified  No date: Unspecified cirrhosis of liver    Past Surgical History:   Procedure Laterality Date    ADENOIDECTOMY      CARDIAC CATHETERIZATION Right     COLONOSCOPY  07/11/2018    HIP SURGERY      RIght removed ruptured cyst    UPPER GASTROINTESTINAL ENDOSCOPY  10/09/2019       Social History     Tobacco Use   Smoking Status Never   Smokeless Tobacco Never       Social History     Substance and Sexual Activity   Alcohol Use Never       Physical Activity: Not on file         Recent Labs     01/04/23  1607   HCT 31.8*     Recent Labs     01/04/23  1607   PLT 59*     Recent Labs      01/04/23  1607   K 4.0     Recent Labs     01/04/23  1607   CREATININE 1.1     Recent Labs     01/04/23  1607        No results for input(s): PT in the last 72 hours.                    Pre-op Assessment          Review of Systems  Anesthesia Hx:  No problems with previous Anesthesia    Hematology/Oncology:     Oncology Normal   Hematology Comments: Low platelets with liver failure   Cardiovascular:   Hypertension (low with anesthesia sometimes), well controlled Denies MI.    Denies Angina.    Pulmonary:   Denies COPD. Asthma (last inhaler use one year ago) mild  Denies Shortness of breath. Sleep Apnea, CPAP    Renal/:   Denies Chronic Renal Disease.     Hepatic/GI:   Liver Disease, (ESLD due to granulomas from sarcoidosis) Denies Hepatitis.    Neurological:   Denies TIA. Denies CVA. Denies Seizures.    Endocrine:   Diabetes, type 2, using insulin traciba 40u bid, none this am, 40 last night       Physical Exam  General: Well nourished, Cooperative, Alert and Oriented    Airway:  Mallampati: II   Mouth Opening: Normal  TM Distance: Normal  Tongue: Normal  Neck ROM: Normal ROM        Anesthesia Plan  Type of Anesthesia, risks & benefits discussed:    Anesthesia Type: Gen ETT  Intra-op Monitoring Plan: Standard ASA Monitors, Central Line, Art Line and PA  Post Op Pain Control Plan: multimodal analgesia and IV/PO Opioids PRN  Induction:  IV  Informed Consent: Informed consent signed with the Patient and all parties understand the risks and agree with anesthesia plan.  All questions answered.   ASA Score: 4  Day of Surgery Review of History & Physical: H&P Update referred to the surgeon/provider.    Ready For Surgery From Anesthesia Perspective.     .

## 2023-01-05 NOTE — TELEPHONE ENCOUNTER
PRIMARY ORGAN OFFER NOTE    Notified by Casey Díaz, , of liver offer with donor information.  Donor and recipient information read back and verified.  Spoke with Rosalio Mccarty and identified no acute medical issues during telephone assessment.  Patient instructed NPO. Patient currently at [facility].  Patient verbalized understanding and willingness to come in for transplant.  Asked patient to check in at [facility] desk. Patient being admitted directly to [facility].  ETA: 15 minutes.    Patient was asked if they have had a positive COVID-19 test or if they have any signs or symptoms. Informed patient that they will be tested for COVID-19 upon arrival to the hospital, unless have a previous positive result. If tested and result is positive, the transplant will not be able to occur, they will be inactivated on the wait list for 28 days per protocol and required to quarantine.     Patient was admitted for a back-up organ offer on 1/4/23 and serologies were drawn on that date. Serologies not repeated because it has been less than 24 hours.

## 2023-01-05 NOTE — CHAPLAIN
Received unit request to have House  visit pt before surgery. Contacted House  to complete request.  remains available.      Kai,   Spiritual Care  *[phone]

## 2023-01-05 NOTE — ASSESSMENT & PLAN NOTE
- ESLD 2/2 hepatic sarcoidosis  - Admit for liver transplant surgery  - Pre op labs and diagnostic studies pending, to be reviewed before surgery

## 2023-01-05 NOTE — ANESTHESIA PROCEDURE NOTES
Arterial    Diagnosis: ESLD    Patient location during procedure: done in OR    Staffing  Authorizing Provider: Osorio Gramajo Jr., MD  Performing Provider: Melodie Flynn CRNA    Anesthesiologist was present at the time of the procedure.    Preanesthetic Checklist  Completed: patient identified, IV checked, site marked, risks and benefits discussed, surgical consent, monitors and equipment checked, pre-op evaluation, timeout performed and anesthesia consent givenArterial  Skin Prep: chlorhexidine gluconate  Local Infiltration: none  Orientation: left  Location: radial    Catheter Size: 20 G Insertion Attempts: 1  Assessment  Dressing: secured with tape and tegaderm

## 2023-01-05 NOTE — DISCHARGE SUMMARY
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 ESLD c/b ascites, BLE edema, and nonbleeding esophageal varices. He is listed for a liver transplant with MELD 16. He is being admitted as a back up for liver transplant surgery. He feels well in his usual state of health. He denies any recent illnesses or hospitalizations. Primary OR time 4:30pm with Cut time 6pm with Dr. Foreman as primary surgeon.       Hospital Course:      Patient admitted as a backup for liver transplant, but he was released. He is stable to discharge to the [facility]. He is to resume home medications and follow up as previously scheduled. Patient is in agreement with discharge from the hospital and follow up plan.    Goals of Care Treatment Preferences:  Code Status: Full Code      Final Active Diagnoses:    Diagnosis Date Noted POA    PRINCIPAL PROBLEM:  Hepatic granuloma associated with sarcoidosis [D86.89] 11/17/2022 Yes     Chronic      Problems Resolved During this Admission:           Pending Diagnostic Studies:     Procedure Component Value Units Date/Time    Cytomegalovirus antibody, IgG [[id number]] Collected: 01/04/23 1607    Order Status: Sent Lab Status: In process Updated: 01/04/23 1625    Specimen: Blood     Hepatitis C RNA, quantitative, PCR [[id number]] Collected: 01/04/23 1607    Order Status: Sent Lab Status: In process Updated: 01/04/23 1625    Specimen: Blood     Urinalysis, Reflex to Urine Culture Urine, Clean Catch [[id number]] Collected: 01/04/23 1819    Order Status: Sent Lab Status: In process  Updated: 01/04/23 2026    Specimen: Urine         Significant Diagnostic Studies: Labs:   CMP   Recent Labs   Lab 01/04/23  1607      K 4.0      CO2 22*      BUN 13   CREATININE 1.1   CALCIUM 9.2   PROT 7.6   ALBUMIN 3.0*   BILITOT 1.6*   ALKPHOS 157*   AST 31   ALT 31   ANIONGAP 9    and CBC   Recent Labs   Lab 01/04/23  1607   WBC 1.71*   HGB 9.8*   HCT 31.8*   PLT 59*       The patients clinical status was discussed at multidisplinary rounds, involving transplant surgery, transplant medicine, pharmacy, nursing, nutrition, and social work    Discharged Condition: stable    Disposition: Home or Self Care    Follow Up: As scheduled    Patient Instructions:      Diet diabetic     Notify your health care provider if you experience any of the following:  increased confusion or weakness     Notify your health care provider if you experience any of the following:  persistent dizziness, light-headedness, or visual disturbances     Notify your health care provider if you experience any of the following:  worsening rash     Notify your health care provider if you experience any of the following:  severe persistent headache     Notify your health care provider if you experience any of the following:  difficulty breathing or increased cough     Notify your health care provider if you experience any of the following:  redness, tenderness, or signs of infection (pain, swelling, redness, odor or green/yellow discharge around incision site)     Notify your health care provider if you experience any of the following:  severe uncontrolled pain     Notify your health care provider if you experience any of the following:  persistent nausea and vomiting or diarrhea     Notify your health care provider if you experience any of the following:  temperature >100.4     Activity as tolerated     Medications:  Reconciled Home Medications:      Medication List      CONTINUE taking these medications    ARIPiprazole 2 MG  Tab  Commonly known as: ABILIFY  Take 2 mg by mouth once daily.     carvediloL 6.25 MG tablet  Commonly known as: COREG  Take 6.25 mg by mouth 2 (two) times daily.     colestipoL 1 gram Tab  Commonly known as: COLESTID  Take 1 g by mouth 2 (two) times daily as needed.     empagliflozin 25 mg tablet  Commonly known as: JARDIANCE  Take 25 mg by mouth once daily.     ENTRESTO 49-51 mg per tablet  Generic drug: sacubitriL-valsartan  Take 1 tablet by mouth 2 (two) times daily.     EScitalopram oxalate 20 MG tablet  Commonly known as: LEXAPRO  Take 20 mg by mouth once daily.     FREESTYLE LUZMA 14 DAY SENSOR Kit  Generic drug: flash glucose sensor  Apply topically.     HumaLOG KwikPen Insulin 100 unit/mL pen  Generic drug: insulin lispro  Sliding Scale     HUMIRA PEN Pnkt injection  Generic drug: adalimumab  Inject into the skin.     hydrOXYchloroQUINE 200 mg tablet  Commonly known as: PLAQUENIL  Take 200 mg by mouth 2 (two) times daily.     multivitamin per tablet  Commonly known as: THERAGRAN  Take 1 tablet by mouth once daily.     OZEMPIC 2 mg/dose (8 mg/3 mL) Pnij  Generic drug: semaglutide  Inject 2 mg into the skin every 7 days.     traZODone 50 MG tablet  Commonly known as: DESYREL  Take 150 mg by mouth every evening.     TRESIBA FLEXTOUCH U-100 100 unit/mL (3 mL) insulin pen  Generic drug: insulin degludec  40 Units. BID          Time spent caring for patient (Greater than 1/2 spent in direct face-to-face contact): > 30 minutes    Sheryl Wolf PA-C  Liver Transplant  [facility] ([place])

## 2023-01-05 NOTE — ANESTHESIA PROCEDURE NOTES
Central Line    Diagnosis: esld  Patient location during procedure: done in OR  Procedure end time: 1/5/2023 12:40 PM    Staffing  Authorizing Provider: Osorio Gramajo Jr., MD  Performing Provider: Osorio Gramajo Jr., MD    Staffing  Performed: anesthesiologist   Anesthesiologist: Osorio Gramajo Jr., MD  Anesthesiologist was present at the time of the procedure.  Preanesthetic Checklist  Completed: patient identified, IV checked, site marked, risks and benefits discussed, surgical consent, monitors and equipment checked, pre-op evaluation, timeout performed and anesthesia consent given  Indication   Indication: vascular access, med administration     Anesthesia   general anesthesia    Central Line   Skin Prep: skin prepped with ChloraPrep, skin prep agent completely dried prior to procedure  Sterile Barriers Followed: Yes    All five maximal barriers used- gloves, gown, cap, mask, and large sterile sheet    hand hygiene performed prior to central venous catheter insertion  Location: right internal jugular.   Catheter type: triple lumen  Catheter Size: 12 Fr  Inserted Catheter Length: 16 cm  Ultrasound: vascular probe with ultrasound   Vessel Caliber: medium, patent, compressibility normal  Needle advanced into vessel with real time Ultrasound guidance.  Guidewire confirmed in vessel.  sterile gel and probe cover used in ultrasound-guided central venous catheter insertion   Manometry: Venous cannualation confirmed by visual estimation of blood vessel pressure using manometry.  Insertion Attempts: 1   Securement:line sutured, chlorhexidine patch, sterile dressing applied and blood return through all ports    Post-Procedure    Adverse Events:none      Guidewire Guidewire removed intact.

## 2023-01-05 NOTE — PLAN OF CARE
Problem: Adult Inpatient Plan of Care  Goal: Optimal Comfort and Wellbeing  Outcome: Ongoing, Progressing  Goal: Readiness for Transition of Care  Outcome: Ongoing, Progressing   Admission completed. NPO. Free from pain. Bed locked and in lowest position. Call light in reach.

## 2023-01-05 NOTE — ASSESSMENT & PLAN NOTE
  Neuro/Psych:   -- Sedation: Propofol    -- Pain: PRN dilaudid             Cards:   -- Pressors: vaso 0.06, levo 0.1  -- CVP: 5 upon arrival, albumin bolus given  -- Wean pressors as tolerated      Pulm:   -- Goal O2 sat > 90%  -- Wean as able with plans for SBT tomorrow      Renal:  -- Keep kat for strict I/O  -- Trend BUN/Cr  -- BMP Q12H for 24 hours then daily      FEN / GI:   -- Replace lytes as needed  -- Nutrition: NPO, will re-evaluate once extubated  -- Anti-rejection methylpred, mycophenolate, tacro; daily tacro levels  -- AST Q4H for 24 hr  -- Liver US in AM      ID:   -- Tm: afebrile  -- Abx Amp, Bactrim, Valganciclovir, Nystatin suspension      Heme/Onc:   -- Post op H/H pending  -- Q4 Hr CBC and PT for 1 day, then daily  -- Vitamin K Q8H for 3 doses      Endo:   -- Gluc goal 140-180  -- Insulin gtt as needed; Endo consulted appreciate recommendations      PPx:   Feeding: NPO  Analgesia/Sedation: PRNs / Prop and Precedex  Thromboembolic prevention: SQH  HOB >30: Ordered  Stress Ulcer ppx: Famotidine Q12H  Glucose control: Critical care goal 140-180 g/dl, insulin gtt    Lines/Drains/Airway: RIJ CVC x2, A line x2 / BRITTANIE x2 / ETT / OGT / kat      Dispo/Code Status/Palliative:   -- SICU / Full Code

## 2023-01-05 NOTE — HPI
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 ESLD c/b ascites, BLE edema, and nonbleeding esophageal varices. He is listed for a liver transplant with MELD 16. He is being admitted for liver transplant surgery. He feels well in his usual state of health. He denies any recent illnesses or hospitalizations. Primary surgeon Dr. Foreman.

## 2023-01-05 NOTE — TELEPHONE ENCOUNTER
Notified by procurement Carmelo; patient may be released as back up on [facility] [id number] case.    Patient notified and instructed. He may eat now. And will be discharged to [facility] .  Notified DeepthiLuciano MARIAMA , who will discharge patient.     Patient remains a back up for [facility] [id number] case.  Patient instructed to be NPO after 4am on 1/5/23.  He will need to be available on [facility] premises for 10:30 AM on 1/5/23. He was able to repeat instructions and voiced understanding.

## 2023-01-05 NOTE — PROGRESS NOTES
Notified per Dr. Espinoza DNP that preOp labs, EKG, and chest xray do not need to be done today. Patient had labs done on yesterday.

## 2023-01-06 PROBLEM — R73.9 STEROID-INDUCED HYPERGLYCEMIA: Status: ACTIVE | Noted: 2023-01-06

## 2023-01-06 PROBLEM — T38.0X5A STEROID-INDUCED HYPERGLYCEMIA: Status: ACTIVE | Noted: 2023-01-06

## 2023-01-06 PROBLEM — E11.65 TYPE 2 DIABETES MELLITUS WITH HYPERGLYCEMIA: Status: ACTIVE | Noted: 2023-01-06

## 2023-01-06 LAB
ALBUMIN SERPL BCP-MCNC: 4.4 G/DL (ref 3.5–5.2)
ALLENS TEST: ABNORMAL
ALP SERPL-CCNC: 48 U/L (ref 55–135)
ALT SERPL W/O P-5'-P-CCNC: 78 U/L (ref 10–44)
ANION GAP SERPL CALC-SCNC: 10 MMOL/L (ref 8–16)
ANION GAP SERPL CALC-SCNC: 11 MMOL/L (ref 8–16)
ANION GAP SERPL CALC-SCNC: 11 MMOL/L (ref 8–16)
ANION GAP SERPL CALC-SCNC: 12 MMOL/L (ref 8–16)
ANISOCYTOSIS BLD QL SMEAR: SLIGHT
APTT BLDCRRT: 56.4 SEC (ref 21–32)
AST SERPL-CCNC: 112 U/L (ref 10–40)
AST SERPL-CCNC: 135 U/L (ref 10–40)
AST SERPL-CCNC: 136 U/L (ref 10–40)
AST SERPL-CCNC: 178 U/L (ref 10–40)
AST SERPL-CCNC: 78 U/L (ref 10–40)
AST SERPL-CCNC: 81 U/L (ref 10–40)
BASOPHILS # BLD AUTO: 0 K/UL (ref 0–0.2)
BASOPHILS # BLD AUTO: 0 K/UL (ref 0–0.2)
BASOPHILS # BLD AUTO: 0.01 K/UL (ref 0–0.2)
BASOPHILS # BLD AUTO: 0.02 K/UL (ref 0–0.2)
BASOPHILS NFR BLD: 0 % (ref 0–1.9)
BASOPHILS NFR BLD: 0 % (ref 0–1.9)
BASOPHILS NFR BLD: 0.1 % (ref 0–1.9)
BASOPHILS NFR BLD: 0.2 % (ref 0–1.9)
BILIRUB DIRECT SERPL-MCNC: 3.2 MG/DL (ref 0.1–0.3)
BILIRUB SERPL-MCNC: 8.8 MG/DL (ref 0.1–1)
BLD PROD TYP BPU: NORMAL
BLOOD UNIT EXPIRATION DATE: NORMAL
BLOOD UNIT TYPE CODE: 6200
BLOOD UNIT TYPE: NORMAL
BUN SERPL-MCNC: 14 MG/DL (ref 6–20)
BUN SERPL-MCNC: 16 MG/DL (ref 6–20)
BUN SERPL-MCNC: 17 MG/DL (ref 6–20)
BUN SERPL-MCNC: 19 MG/DL (ref 6–20)
CA-I BLDV-SCNC: 1.07 MMOL/L (ref 1.06–1.42)
CALCIUM SERPL-MCNC: 7.8 MG/DL (ref 8.7–10.5)
CALCIUM SERPL-MCNC: 8 MG/DL (ref 8.7–10.5)
CALCIUM SERPL-MCNC: 8 MG/DL (ref 8.7–10.5)
CALCIUM SERPL-MCNC: 8.1 MG/DL (ref 8.7–10.5)
CHLORIDE SERPL-SCNC: 104 MMOL/L (ref 95–110)
CHLORIDE SERPL-SCNC: 107 MMOL/L (ref 95–110)
CHLORIDE SERPL-SCNC: 109 MMOL/L (ref 95–110)
CHLORIDE SERPL-SCNC: 109 MMOL/L (ref 95–110)
CMV IGG SERPL QL IA: NORMAL
CO2 SERPL-SCNC: 20 MMOL/L (ref 23–29)
CO2 SERPL-SCNC: 20 MMOL/L (ref 23–29)
CO2 SERPL-SCNC: 23 MMOL/L (ref 23–29)
CO2 SERPL-SCNC: 23 MMOL/L (ref 23–29)
CODING SYSTEM: NORMAL
CREAT SERPL-MCNC: 1.2 MG/DL (ref 0.5–1.4)
CREAT SERPL-MCNC: 1.2 MG/DL (ref 0.5–1.4)
CREAT SERPL-MCNC: 1.3 MG/DL (ref 0.5–1.4)
CREAT SERPL-MCNC: 1.4 MG/DL (ref 0.5–1.4)
DELSYS: ABNORMAL
DIFFERENTIAL METHOD: ABNORMAL
DISPENSE STATUS: NORMAL
EOSINOPHIL # BLD AUTO: 0 K/UL (ref 0–0.5)
EOSINOPHIL NFR BLD: 0 % (ref 0–8)
EOSINOPHIL NFR BLD: 0.3 % (ref 0–8)
ERYTHROCYTE [DISTWIDTH] IN BLOOD BY AUTOMATED COUNT: 15.1 % (ref 11.5–14.5)
ERYTHROCYTE [DISTWIDTH] IN BLOOD BY AUTOMATED COUNT: 15.4 % (ref 11.5–14.5)
ERYTHROCYTE [DISTWIDTH] IN BLOOD BY AUTOMATED COUNT: 15.6 % (ref 11.5–14.5)
ERYTHROCYTE [DISTWIDTH] IN BLOOD BY AUTOMATED COUNT: 15.8 % (ref 11.5–14.5)
ERYTHROCYTE [SEDIMENTATION RATE] IN BLOOD BY WESTERGREN METHOD: 16 MM/H
ERYTHROCYTE [SEDIMENTATION RATE] IN BLOOD BY WESTERGREN METHOD: 20 MM/H
EST. GFR  (NO RACE VARIABLE): >60 ML/MIN/1.73 M^2
FIO2: 100
FIO2: 40
FIO2: 50
FIO2: 80
GLUCOSE SERPL-MCNC: 123 MG/DL (ref 70–110)
GLUCOSE SERPL-MCNC: 133 MG/DL (ref 70–110)
GLUCOSE SERPL-MCNC: 161 MG/DL (ref 70–110)
GLUCOSE SERPL-MCNC: 162 MG/DL (ref 70–110)
GLUCOSE SERPL-MCNC: 171 MG/DL (ref 70–110)
GLUCOSE SERPL-MCNC: 187 MG/DL (ref 70–110)
GLUCOSE SERPL-MCNC: 189 MG/DL (ref 70–110)
GLUCOSE SERPL-MCNC: 202 MG/DL (ref 70–110)
GLUCOSE SERPL-MCNC: 239 MG/DL (ref 70–110)
GLUCOSE SERPL-MCNC: 243 MG/DL (ref 70–110)
GLUCOSE SERPL-MCNC: 307 MG/DL (ref 70–110)
HCO3 UR-SCNC: 18.9 MMOL/L (ref 24–28)
HCO3 UR-SCNC: 19.1 MMOL/L (ref 24–28)
HCO3 UR-SCNC: 19.6 MMOL/L (ref 24–28)
HCO3 UR-SCNC: 20.2 MMOL/L (ref 24–28)
HCO3 UR-SCNC: 20.5 MMOL/L (ref 24–28)
HCO3 UR-SCNC: 22.8 MMOL/L (ref 24–28)
HCO3 UR-SCNC: 23.4 MMOL/L (ref 24–28)
HCO3 UR-SCNC: 24 MMOL/L (ref 24–28)
HCO3 UR-SCNC: 25.1 MMOL/L (ref 24–28)
HCO3 UR-SCNC: 25.2 MMOL/L (ref 24–28)
HCO3 UR-SCNC: 27 MMOL/L (ref 24–28)
HCT VFR BLD AUTO: 23.6 % (ref 40–54)
HCT VFR BLD AUTO: 25.5 % (ref 40–54)
HCT VFR BLD AUTO: 29.3 % (ref 40–54)
HCT VFR BLD AUTO: 29.4 % (ref 40–54)
HCT VFR BLD CALC: 23 %PCV (ref 36–54)
HCT VFR BLD CALC: 27 %PCV (ref 36–54)
HCT VFR BLD CALC: 27 %PCV (ref 36–54)
HCT VFR BLD CALC: 28 %PCV (ref 36–54)
HCT VFR BLD CALC: 29 %PCV (ref 36–54)
HCT VFR BLD CALC: 29 %PCV (ref 36–54)
HCT VFR BLD CALC: 30 %PCV (ref 36–54)
HGB BLD-MCNC: 7.9 G/DL (ref 14–18)
HGB BLD-MCNC: 8.3 G/DL (ref 14–18)
HGB BLD-MCNC: 9.6 G/DL (ref 14–18)
HGB BLD-MCNC: 9.7 G/DL (ref 14–18)
IMM GRANULOCYTES # BLD AUTO: 0.02 K/UL (ref 0–0.04)
IMM GRANULOCYTES # BLD AUTO: 0.05 K/UL (ref 0–0.04)
IMM GRANULOCYTES NFR BLD AUTO: 0.2 % (ref 0–0.5)
IMM GRANULOCYTES NFR BLD AUTO: 0.3 % (ref 0–0.5)
IMM GRANULOCYTES NFR BLD AUTO: 0.4 % (ref 0–0.5)
IMM GRANULOCYTES NFR BLD AUTO: 0.6 % (ref 0–0.5)
INR PPP: 1.4 (ref 0.8–1.2)
INR PPP: 1.5 (ref 0.8–1.2)
INR PPP: 1.5 (ref 0.8–1.2)
INR PPP: 1.6 (ref 0.8–1.2)
INR PPP: 1.7 (ref 0.8–1.2)
LYMPHOCYTES # BLD AUTO: 0.2 K/UL (ref 1–4.8)
LYMPHOCYTES # BLD AUTO: 0.3 K/UL (ref 1–4.8)
LYMPHOCYTES # BLD AUTO: 0.3 K/UL (ref 1–4.8)
LYMPHOCYTES # BLD AUTO: 0.4 K/UL (ref 1–4.8)
LYMPHOCYTES NFR BLD: 2.9 % (ref 18–48)
LYMPHOCYTES NFR BLD: 3.2 % (ref 18–48)
LYMPHOCYTES NFR BLD: 4.4 % (ref 18–48)
LYMPHOCYTES NFR BLD: 5.3 % (ref 18–48)
MAGNESIUM SERPL-MCNC: 1.9 MG/DL (ref 1.6–2.6)
MAGNESIUM SERPL-MCNC: 2 MG/DL (ref 1.6–2.6)
MCH RBC QN AUTO: 25.2 PG (ref 27–31)
MCH RBC QN AUTO: 25.2 PG (ref 27–31)
MCH RBC QN AUTO: 25.5 PG (ref 27–31)
MCH RBC QN AUTO: 25.7 PG (ref 27–31)
MCHC RBC AUTO-ENTMCNC: 32.5 G/DL (ref 32–36)
MCHC RBC AUTO-ENTMCNC: 32.8 G/DL (ref 32–36)
MCHC RBC AUTO-ENTMCNC: 33 G/DL (ref 32–36)
MCHC RBC AUTO-ENTMCNC: 33.5 G/DL (ref 32–36)
MCV RBC AUTO: 77 FL (ref 82–98)
MCV RBC AUTO: 78 FL (ref 82–98)
MIN VOL: 10.2
MIN VOL: 9.32
MODE: ABNORMAL
MONOCYTES # BLD AUTO: 0.2 K/UL (ref 0.3–1)
MONOCYTES # BLD AUTO: 0.3 K/UL (ref 0.3–1)
MONOCYTES # BLD AUTO: 0.6 K/UL (ref 0.3–1)
MONOCYTES # BLD AUTO: 0.6 K/UL (ref 0.3–1)
MONOCYTES NFR BLD: 4.2 % (ref 4–15)
MONOCYTES NFR BLD: 4.4 % (ref 4–15)
MONOCYTES NFR BLD: 4.9 % (ref 4–15)
MONOCYTES NFR BLD: 5.7 % (ref 4–15)
NEUTROPHILS # BLD AUTO: 10.3 K/UL (ref 1.8–7.7)
NEUTROPHILS # BLD AUTO: 3 K/UL (ref 1.8–7.7)
NEUTROPHILS # BLD AUTO: 6.4 K/UL (ref 1.8–7.7)
NEUTROPHILS # BLD AUTO: 9.1 K/UL (ref 1.8–7.7)
NEUTROPHILS NFR BLD: 89.4 % (ref 38–73)
NEUTROPHILS NFR BLD: 91.1 % (ref 38–73)
NEUTROPHILS NFR BLD: 91.1 % (ref 38–73)
NEUTROPHILS NFR BLD: 91.3 % (ref 38–73)
NRBC BLD-RTO: 0 /100 WBC
PCO2 BLDA: 33.2 MMHG (ref 35–45)
PCO2 BLDA: 34.3 MMHG (ref 35–45)
PCO2 BLDA: 35 MMHG (ref 35–45)
PCO2 BLDA: 36.1 MMHG (ref 35–45)
PCO2 BLDA: 36.1 MMHG (ref 35–45)
PCO2 BLDA: 39.3 MMHG (ref 35–45)
PCO2 BLDA: 39.5 MMHG (ref 35–45)
PCO2 BLDA: 41.8 MMHG (ref 35–45)
PCO2 BLDA: 43.8 MMHG (ref 35–45)
PCO2 BLDA: 45.5 MMHG (ref 35–45)
PCO2 BLDA: 48.7 MMHG (ref 35–45)
PEEP: 8
PH SMN: 7.26 [PH] (ref 7.35–7.45)
PH SMN: 7.31 [PH] (ref 7.35–7.45)
PH SMN: 7.32 [PH] (ref 7.35–7.45)
PH SMN: 7.33 [PH] (ref 7.35–7.45)
PH SMN: 7.36 [PH] (ref 7.35–7.45)
PH SMN: 7.38 [PH] (ref 7.35–7.45)
PH SMN: 7.38 [PH] (ref 7.35–7.45)
PH SMN: 7.39 [PH] (ref 7.35–7.45)
PH SMN: 7.4 [PH] (ref 7.35–7.45)
PH SMN: 7.41 [PH] (ref 7.35–7.45)
PH SMN: 7.43 [PH] (ref 7.35–7.45)
PHOSPHATE SERPL-MCNC: 3 MG/DL (ref 2.7–4.5)
PHOSPHATE SERPL-MCNC: 3.1 MG/DL (ref 2.7–4.5)
PIP: 20
PIP: 23
PLATELET # BLD AUTO: 34 K/UL (ref 150–450)
PLATELET # BLD AUTO: 57 K/UL (ref 150–450)
PLATELET # BLD AUTO: 66 K/UL (ref 150–450)
PLATELET # BLD AUTO: 73 K/UL (ref 150–450)
PLATELET BLD QL SMEAR: ABNORMAL
PMV BLD AUTO: 10.2 FL (ref 9.2–12.9)
PMV BLD AUTO: 10.5 FL (ref 9.2–12.9)
PMV BLD AUTO: 10.7 FL (ref 9.2–12.9)
PMV BLD AUTO: 10.8 FL (ref 9.2–12.9)
PO2 BLDA: 121 MMHG (ref 80–100)
PO2 BLDA: 136 MMHG (ref 80–100)
PO2 BLDA: 149 MMHG (ref 80–100)
PO2 BLDA: 184 MMHG (ref 80–100)
PO2 BLDA: 200 MMHG (ref 80–100)
PO2 BLDA: 200 MMHG (ref 80–100)
PO2 BLDA: 213 MMHG (ref 80–100)
PO2 BLDA: 219 MMHG (ref 80–100)
PO2 BLDA: 349 MMHG (ref 80–100)
PO2 BLDA: 75 MMHG (ref 80–100)
PO2 BLDA: 86 MMHG (ref 80–100)
POC BE: -1 MMOL/L
POC BE: -3 MMOL/L
POC BE: -5 MMOL/L
POC BE: -5 MMOL/L
POC BE: -6 MMOL/L
POC BE: -7 MMOL/L
POC BE: -8 MMOL/L
POC BE: 0 MMOL/L
POC BE: 2 MMOL/L
POC IONIZED CALCIUM: 0.72 MMOL/L (ref 1.06–1.42)
POC IONIZED CALCIUM: 0.85 MMOL/L (ref 1.06–1.42)
POC IONIZED CALCIUM: 0.99 MMOL/L (ref 1.06–1.42)
POC IONIZED CALCIUM: 1.05 MMOL/L (ref 1.06–1.42)
POC IONIZED CALCIUM: 1.1 MMOL/L (ref 1.06–1.42)
POC IONIZED CALCIUM: 1.17 MMOL/L (ref 1.06–1.42)
POC IONIZED CALCIUM: 1.24 MMOL/L (ref 1.06–1.42)
POC SATURATED O2: 100 % (ref 95–100)
POC SATURATED O2: 95 % (ref 95–100)
POC SATURATED O2: 95 % (ref 95–100)
POC SATURATED O2: 99 % (ref 95–100)
POC TCO2: 20 MMOL/L (ref 23–27)
POC TCO2: 20 MMOL/L (ref 23–27)
POC TCO2: 21 MMOL/L (ref 23–27)
POC TCO2: 21 MMOL/L (ref 23–27)
POC TCO2: 22 MMOL/L (ref 23–27)
POC TCO2: 24 MMOL/L (ref 23–27)
POC TCO2: 24 MMOL/L (ref 23–27)
POC TCO2: 25 MMOL/L (ref 23–27)
POC TCO2: 26 MMOL/L (ref 23–27)
POC TCO2: 27 MMOL/L (ref 23–27)
POC TCO2: 28 MMOL/L (ref 23–27)
POCT GLUCOSE: 126 MG/DL (ref 70–110)
POCT GLUCOSE: 132 MG/DL (ref 70–110)
POCT GLUCOSE: 140 MG/DL (ref 70–110)
POCT GLUCOSE: 140 MG/DL (ref 70–110)
POCT GLUCOSE: 144 MG/DL (ref 70–110)
POCT GLUCOSE: 147 MG/DL (ref 70–110)
POCT GLUCOSE: 154 MG/DL (ref 70–110)
POCT GLUCOSE: 155 MG/DL (ref 70–110)
POCT GLUCOSE: 157 MG/DL (ref 70–110)
POCT GLUCOSE: 160 MG/DL (ref 70–110)
POCT GLUCOSE: 162 MG/DL (ref 70–110)
POCT GLUCOSE: 163 MG/DL (ref 70–110)
POCT GLUCOSE: 173 MG/DL (ref 70–110)
POCT GLUCOSE: 174 MG/DL (ref 70–110)
POCT GLUCOSE: 186 MG/DL (ref 70–110)
POCT GLUCOSE: 192 MG/DL (ref 70–110)
POCT GLUCOSE: 202 MG/DL (ref 70–110)
POCT GLUCOSE: 229 MG/DL (ref 70–110)
POCT GLUCOSE: 229 MG/DL (ref 70–110)
POCT GLUCOSE: 236 MG/DL (ref 70–110)
POCT GLUCOSE: 264 MG/DL (ref 70–110)
POCT GLUCOSE: 272 MG/DL (ref 70–110)
POLYCHROMASIA BLD QL SMEAR: ABNORMAL
POTASSIUM BLD-SCNC: 2.9 MMOL/L (ref 3.5–5.1)
POTASSIUM BLD-SCNC: 3.1 MMOL/L (ref 3.5–5.1)
POTASSIUM BLD-SCNC: 3.6 MMOL/L (ref 3.5–5.1)
POTASSIUM BLD-SCNC: 3.7 MMOL/L (ref 3.5–5.1)
POTASSIUM BLD-SCNC: 4.3 MMOL/L (ref 3.5–5.1)
POTASSIUM BLD-SCNC: 4.5 MMOL/L (ref 3.5–5.1)
POTASSIUM BLD-SCNC: 4.5 MMOL/L (ref 3.5–5.1)
POTASSIUM SERPL-SCNC: 3.9 MMOL/L (ref 3.5–5.1)
POTASSIUM SERPL-SCNC: 4 MMOL/L (ref 3.5–5.1)
POTASSIUM SERPL-SCNC: 4.5 MMOL/L (ref 3.5–5.1)
POTASSIUM SERPL-SCNC: 5.3 MMOL/L (ref 3.5–5.1)
PROT SERPL-MCNC: 5.2 G/DL (ref 6–8.4)
PROTHROMBIN TIME: 14.4 SEC (ref 9–12.5)
PROTHROMBIN TIME: 15.2 SEC (ref 9–12.5)
PROTHROMBIN TIME: 15.3 SEC (ref 9–12.5)
PROTHROMBIN TIME: 15.7 SEC (ref 9–12.5)
PROTHROMBIN TIME: 16.9 SEC (ref 9–12.5)
RBC # BLD AUTO: 3.07 M/UL (ref 4.6–6.2)
RBC # BLD AUTO: 3.29 M/UL (ref 4.6–6.2)
RBC # BLD AUTO: 3.81 M/UL (ref 4.6–6.2)
RBC # BLD AUTO: 3.81 M/UL (ref 4.6–6.2)
SAMPLE: ABNORMAL
SCHISTOCYTES BLD QL SMEAR: ABNORMAL
SITE: ABNORMAL
SODIUM BLD-SCNC: 137 MMOL/L (ref 136–145)
SODIUM BLD-SCNC: 137 MMOL/L (ref 136–145)
SODIUM BLD-SCNC: 138 MMOL/L (ref 136–145)
SODIUM BLD-SCNC: 138 MMOL/L (ref 136–145)
SODIUM BLD-SCNC: 141 MMOL/L (ref 136–145)
SODIUM BLD-SCNC: 141 MMOL/L (ref 136–145)
SODIUM BLD-SCNC: 142 MMOL/L (ref 136–145)
SODIUM SERPL-SCNC: 137 MMOL/L (ref 136–145)
SODIUM SERPL-SCNC: 140 MMOL/L (ref 136–145)
SODIUM SERPL-SCNC: 141 MMOL/L (ref 136–145)
SODIUM SERPL-SCNC: 141 MMOL/L (ref 136–145)
SP02: 97
SP02: 98
SPHEROCYTES BLD QL SMEAR: ABNORMAL
TACROLIMUS BLD-MCNC: <2 NG/ML (ref 5–15)
UNIT NUMBER: NORMAL
VT: 450
WBC # BLD AUTO: 11.32 K/UL (ref 3.9–12.7)
WBC # BLD AUTO: 3.38 K/UL (ref 3.9–12.7)
WBC # BLD AUTO: 7.06 K/UL (ref 3.9–12.7)
WBC # BLD AUTO: 9.98 K/UL (ref 3.9–12.7)

## 2023-01-06 PROCEDURE — 99900035 HC TECH TIME PER 15 MIN (STAT)

## 2023-01-06 PROCEDURE — 25000003 PHARM REV CODE 250

## 2023-01-06 PROCEDURE — 63600175 PHARM REV CODE 636 W HCPCS: Mod: JG

## 2023-01-06 PROCEDURE — P9016 RBC LEUKOCYTES REDUCED: HCPCS | Performed by: NURSE PRACTITIONER

## 2023-01-06 PROCEDURE — 85730 THROMBOPLASTIN TIME PARTIAL: CPT | Performed by: STUDENT IN AN ORGANIZED HEALTH CARE EDUCATION/TRAINING PROGRAM

## 2023-01-06 PROCEDURE — 20000000 HC ICU ROOM

## 2023-01-06 PROCEDURE — 82803 BLOOD GASES ANY COMBINATION: CPT

## 2023-01-06 PROCEDURE — 63600175 PHARM REV CODE 636 W HCPCS

## 2023-01-06 PROCEDURE — 82248 BILIRUBIN DIRECT: CPT | Performed by: STUDENT IN AN ORGANIZED HEALTH CARE EDUCATION/TRAINING PROGRAM

## 2023-01-06 PROCEDURE — P9045 ALBUMIN (HUMAN), 5%, 250 ML: HCPCS | Mod: JG

## 2023-01-06 PROCEDURE — 99291 CRITICAL CARE FIRST HOUR: CPT | Mod: 24,25,, | Performed by: SURGERY

## 2023-01-06 PROCEDURE — 82330 ASSAY OF CALCIUM: CPT

## 2023-01-06 PROCEDURE — 99900026 HC AIRWAY MAINTENANCE (STAT)

## 2023-01-06 PROCEDURE — 99223 PR INITIAL HOSPITAL CARE,LEVL III: ICD-10-PCS | Mod: ,,, | Performed by: PHYSICIAN ASSISTANT

## 2023-01-06 PROCEDURE — P9035 PLATELET PHERES LEUKOREDUCED: HCPCS | Performed by: STUDENT IN AN ORGANIZED HEALTH CARE EDUCATION/TRAINING PROGRAM

## 2023-01-06 PROCEDURE — 80197 ASSAY OF TACROLIMUS: CPT | Performed by: STUDENT IN AN ORGANIZED HEALTH CARE EDUCATION/TRAINING PROGRAM

## 2023-01-06 PROCEDURE — 63600175 PHARM REV CODE 636 W HCPCS: Mod: JG | Performed by: STUDENT IN AN ORGANIZED HEALTH CARE EDUCATION/TRAINING PROGRAM

## 2023-01-06 PROCEDURE — 84100 ASSAY OF PHOSPHORUS: CPT

## 2023-01-06 PROCEDURE — 83735 ASSAY OF MAGNESIUM: CPT

## 2023-01-06 PROCEDURE — 80048 BASIC METABOLIC PNL TOTAL CA: CPT | Mod: 91,XB | Performed by: STUDENT IN AN ORGANIZED HEALTH CARE EDUCATION/TRAINING PROGRAM

## 2023-01-06 PROCEDURE — 99223 1ST HOSP IP/OBS HIGH 75: CPT | Mod: ,,, | Performed by: PHYSICIAN ASSISTANT

## 2023-01-06 PROCEDURE — 94761 N-INVAS EAR/PLS OXIMETRY MLT: CPT

## 2023-01-06 PROCEDURE — 27000221 HC OXYGEN, UP TO 24 HOURS

## 2023-01-06 PROCEDURE — 94003 VENT MGMT INPAT SUBQ DAY: CPT

## 2023-01-06 PROCEDURE — 85610 PROTHROMBIN TIME: CPT | Mod: 91 | Performed by: TRANSPLANT SURGERY

## 2023-01-06 PROCEDURE — 80048 BASIC METABOLIC PNL TOTAL CA: CPT | Mod: XB | Performed by: TRANSPLANT SURGERY

## 2023-01-06 PROCEDURE — 84450 TRANSFERASE (AST) (SGOT): CPT | Mod: 91 | Performed by: STUDENT IN AN ORGANIZED HEALTH CARE EDUCATION/TRAINING PROGRAM

## 2023-01-06 PROCEDURE — 80053 COMPREHEN METABOLIC PANEL: CPT | Performed by: STUDENT IN AN ORGANIZED HEALTH CARE EDUCATION/TRAINING PROGRAM

## 2023-01-06 PROCEDURE — 25000003 PHARM REV CODE 250: Performed by: STUDENT IN AN ORGANIZED HEALTH CARE EDUCATION/TRAINING PROGRAM

## 2023-01-06 PROCEDURE — 63600175 PHARM REV CODE 636 W HCPCS: Performed by: STUDENT IN AN ORGANIZED HEALTH CARE EDUCATION/TRAINING PROGRAM

## 2023-01-06 PROCEDURE — 85025 COMPLETE CBC W/AUTO DIFF WBC: CPT | Mod: 91 | Performed by: STUDENT IN AN ORGANIZED HEALTH CARE EDUCATION/TRAINING PROGRAM

## 2023-01-06 PROCEDURE — 37799 UNLISTED PX VASCULAR SURGERY: CPT

## 2023-01-06 PROCEDURE — P9045 ALBUMIN (HUMAN), 5%, 250 ML: HCPCS | Mod: JG | Performed by: STUDENT IN AN ORGANIZED HEALTH CARE EDUCATION/TRAINING PROGRAM

## 2023-01-06 PROCEDURE — 99291 PR CRITICAL CARE, E/M 30-74 MINUTES: ICD-10-PCS | Mod: 24,25,, | Performed by: SURGERY

## 2023-01-06 PROCEDURE — 85610 PROTHROMBIN TIME: CPT | Performed by: STUDENT IN AN ORGANIZED HEALTH CARE EDUCATION/TRAINING PROGRAM

## 2023-01-06 RX ORDER — ESCITALOPRAM OXALATE 10 MG/1
20 TABLET ORAL DAILY
Status: DISCONTINUED | OUTPATIENT
Start: 2023-01-07 | End: 2023-01-11 | Stop reason: HOSPADM

## 2023-01-06 RX ORDER — DIPHENHYDRAMINE HYDROCHLORIDE 50 MG/ML
25 INJECTION INTRAMUSCULAR; INTRAVENOUS EVERY 6 HOURS PRN
Status: DISCONTINUED | OUTPATIENT
Start: 2023-01-06 | End: 2023-01-06

## 2023-01-06 RX ORDER — HYDROMORPHONE HYDROCHLORIDE 1 MG/ML
0.2 INJECTION, SOLUTION INTRAMUSCULAR; INTRAVENOUS; SUBCUTANEOUS
Status: DISCONTINUED | OUTPATIENT
Start: 2023-01-06 | End: 2023-01-09

## 2023-01-06 RX ORDER — OXYCODONE HYDROCHLORIDE 10 MG/1
10 TABLET ORAL EVERY 4 HOURS PRN
Status: DISCONTINUED | OUTPATIENT
Start: 2023-01-06 | End: 2023-01-11 | Stop reason: HOSPADM

## 2023-01-06 RX ORDER — DIPHENHYDRAMINE HCL 25 MG
25 CAPSULE ORAL EVERY 6 HOURS PRN
Status: DISCONTINUED | OUTPATIENT
Start: 2023-01-06 | End: 2023-01-07

## 2023-01-06 RX ORDER — HYDROCODONE BITARTRATE AND ACETAMINOPHEN 500; 5 MG/1; MG/1
TABLET ORAL
Status: DISCONTINUED | OUTPATIENT
Start: 2023-01-06 | End: 2023-01-07

## 2023-01-06 RX ORDER — ALBUMIN HUMAN 50 G/1000ML
SOLUTION INTRAVENOUS
Status: COMPLETED
Start: 2023-01-06 | End: 2023-01-06

## 2023-01-06 RX ORDER — OXYCODONE HYDROCHLORIDE 5 MG/1
5 TABLET ORAL EVERY 4 HOURS PRN
Status: DISCONTINUED | OUTPATIENT
Start: 2023-01-06 | End: 2023-01-11 | Stop reason: HOSPADM

## 2023-01-06 RX ORDER — DEXMEDETOMIDINE HYDROCHLORIDE 4 UG/ML
INJECTION, SOLUTION INTRAVENOUS
Status: COMPLETED
Start: 2023-01-06 | End: 2023-01-06

## 2023-01-06 RX ORDER — ALBUMIN HUMAN 50 G/1000ML
25 SOLUTION INTRAVENOUS ONCE
Status: COMPLETED | OUTPATIENT
Start: 2023-01-06 | End: 2023-01-06

## 2023-01-06 RX ORDER — VALGANCICLOVIR 450 MG/1
450 TABLET, FILM COATED ORAL DAILY
Qty: 30 TABLET | Refills: 5 | Status: SHIPPED | OUTPATIENT
Start: 2023-01-15 | End: 2023-03-06 | Stop reason: SDUPTHER

## 2023-01-06 RX ORDER — DIPHENHYDRAMINE HYDROCHLORIDE 50 MG/ML
INJECTION INTRAMUSCULAR; INTRAVENOUS
Status: COMPLETED
Start: 2023-01-06 | End: 2023-01-06

## 2023-01-06 RX ORDER — DEXMEDETOMIDINE HYDROCHLORIDE 4 UG/ML
0-1.4 INJECTION, SOLUTION INTRAVENOUS CONTINUOUS
Status: DISCONTINUED | OUTPATIENT
Start: 2023-01-06 | End: 2023-01-06

## 2023-01-06 RX ORDER — ARIPIPRAZOLE 2 MG/1
2 TABLET ORAL DAILY
Status: DISCONTINUED | OUTPATIENT
Start: 2023-01-07 | End: 2023-01-11 | Stop reason: HOSPADM

## 2023-01-06 RX ORDER — ALBUMIN HUMAN 50 G/1000ML
25 SOLUTION INTRAVENOUS ONCE
Status: COMPLETED | OUTPATIENT
Start: 2023-01-06 | End: 2023-01-07

## 2023-01-06 RX ORDER — TACROLIMUS 1 MG/1
6 CAPSULE ORAL EVERY 12 HOURS
Qty: 360 CAPSULE | Refills: 11 | Status: SHIPPED | OUTPATIENT
Start: 2023-01-06 | End: 2023-01-12

## 2023-01-06 RX ORDER — PREDNISONE 5 MG/1
TABLET ORAL
Qty: 70 TABLET | Refills: 0 | Status: SHIPPED | OUTPATIENT
Start: 2023-01-06 | End: 2023-02-09 | Stop reason: ALTCHOICE

## 2023-01-06 RX ORDER — NYSTATIN 100000 [USP'U]/ML
500000 SUSPENSION ORAL
Qty: 250 ML | Refills: 0 | Status: SHIPPED | OUTPATIENT
Start: 2023-01-06 | End: 2023-01-31 | Stop reason: ALTCHOICE

## 2023-01-06 RX ORDER — SULFAMETHOXAZOLE AND TRIMETHOPRIM 400; 80 MG/1; MG/1
1 TABLET ORAL EVERY MORNING
Qty: 30 TABLET | Refills: 5 | Status: SHIPPED | OUTPATIENT
Start: 2023-01-12 | End: 2023-02-03 | Stop reason: SINTOL

## 2023-01-06 RX ORDER — NOREPINEPHRINE BITARTRATE/D5W 4MG/250ML
0-3 PLASTIC BAG, INJECTION (ML) INTRAVENOUS CONTINUOUS
Status: DISCONTINUED | OUTPATIENT
Start: 2023-01-06 | End: 2023-01-07

## 2023-01-06 RX ORDER — MYCOPHENOLATE MOFETIL 250 MG/1
1000 CAPSULE ORAL 2 TIMES DAILY
Qty: 240 CAPSULE | Refills: 2 | Status: SHIPPED | OUTPATIENT
Start: 2023-01-06 | End: 2023-02-01 | Stop reason: SDUPTHER

## 2023-01-06 RX ADMIN — DEXMEDETOMIDINE HYDROCHLORIDE 1.4 MCG/KG/HR: 4 INJECTION, SOLUTION INTRAVENOUS at 09:01

## 2023-01-06 RX ADMIN — ALBUMIN (HUMAN) 25 G: 12.5 SOLUTION INTRAVENOUS at 10:01

## 2023-01-06 RX ADMIN — INSULIN HUMAN 12.2 UNITS/HR: 1 INJECTION, SOLUTION INTRAVENOUS at 08:01

## 2023-01-06 RX ADMIN — HEPARIN SODIUM 5000 UNITS: 5000 INJECTION INTRAVENOUS; SUBCUTANEOUS at 05:01

## 2023-01-06 RX ADMIN — DIPHENHYDRAMINE HYDROCHLORIDE 25 MG: 25 CAPSULE ORAL at 08:01

## 2023-01-06 RX ADMIN — HEPARIN SODIUM 5000 UNITS: 5000 INJECTION INTRAVENOUS; SUBCUTANEOUS at 01:01

## 2023-01-06 RX ADMIN — FAMOTIDINE 20 MG: 10 INJECTION INTRAVENOUS at 08:01

## 2023-01-06 RX ADMIN — PHYTONADIONE 10 MG: 10 INJECTION, EMULSION INTRAMUSCULAR; INTRAVENOUS; SUBCUTANEOUS at 06:01

## 2023-01-06 RX ADMIN — HYDROMORPHONE HYDROCHLORIDE 0.2 MG: 1 INJECTION, SOLUTION INTRAMUSCULAR; INTRAVENOUS; SUBCUTANEOUS at 07:01

## 2023-01-06 RX ADMIN — ALBUMIN (HUMAN) 25 G: 12.5 SOLUTION INTRAVENOUS at 12:01

## 2023-01-06 RX ADMIN — PHYTONADIONE 10 MG: 10 INJECTION, EMULSION INTRAMUSCULAR; INTRAVENOUS; SUBCUTANEOUS at 02:01

## 2023-01-06 RX ADMIN — TRAZODONE HYDROCHLORIDE 75 MG: 50 TABLET ORAL at 11:01

## 2023-01-06 RX ADMIN — NOREPINEPHRINE BITARTRATE 0.06 MCG/KG/MIN: 4 INJECTION, SOLUTION INTRAVENOUS at 04:01

## 2023-01-06 RX ADMIN — VASOPRESSIN 0.04 UNITS/MIN: 20 INJECTION INTRAVENOUS at 08:01

## 2023-01-06 RX ADMIN — DEXTROSE AND SODIUM CHLORIDE: 5; 900 INJECTION, SOLUTION INTRAVENOUS at 03:01

## 2023-01-06 RX ADMIN — ALBUMIN (HUMAN) 25 G: 12.5 SOLUTION INTRAVENOUS at 09:01

## 2023-01-06 RX ADMIN — PROPOFOL 25 MCG/KG/MIN: 10 INJECTION, EMULSION INTRAVENOUS at 03:01

## 2023-01-06 RX ADMIN — DIPHENHYDRAMINE HYDROCHLORIDE 25 MG: 25 CAPSULE ORAL at 02:01

## 2023-01-06 RX ADMIN — ALBUMIN (HUMAN) 25 G: 12.5 SOLUTION INTRAVENOUS at 01:01

## 2023-01-06 RX ADMIN — MUPIROCIN 1 G: 20 OINTMENT TOPICAL at 08:01

## 2023-01-06 RX ADMIN — NYSTATIN 500000 UNITS: 500000 SUSPENSION ORAL at 08:01

## 2023-01-06 RX ADMIN — TACROLIMUS 1 MG: 1 CAPSULE ORAL at 07:01

## 2023-01-06 RX ADMIN — VASOPRESSIN 0.04 UNITS/MIN: 20 INJECTION INTRAVENOUS at 11:01

## 2023-01-06 RX ADMIN — ALBUMIN (HUMAN) 25 G: 12.5 SOLUTION INTRAVENOUS at 02:01

## 2023-01-06 RX ADMIN — MYCOPHENOLATE MOFETIL 1000 MG: 200 POWDER, FOR SUSPENSION ORAL at 09:01

## 2023-01-06 RX ADMIN — VASOPRESSIN 0.04 UNITS/MIN: 20 INJECTION INTRAVENOUS at 02:01

## 2023-01-06 RX ADMIN — TACROLIMUS 1 MG: 1 CAPSULE ORAL at 05:01

## 2023-01-06 RX ADMIN — ALBUMIN (HUMAN): 12.5 SOLUTION INTRAVENOUS at 01:01

## 2023-01-06 RX ADMIN — INSULIN HUMAN 11.2 UNITS/HR: 1 INJECTION, SOLUTION INTRAVENOUS at 03:01

## 2023-01-06 RX ADMIN — DIPHENHYDRAMINE HYDROCHLORIDE 25 MG: 50 INJECTION, SOLUTION INTRAMUSCULAR; INTRAVENOUS at 09:01

## 2023-01-06 RX ADMIN — METHYLPREDNISOLONE SODIUM SUCCINATE 200 MG: 125 INJECTION, POWDER, FOR SOLUTION INTRAMUSCULAR; INTRAVENOUS at 08:01

## 2023-01-06 RX ADMIN — NYSTATIN 500000 UNITS: 500000 SUSPENSION ORAL at 09:01

## 2023-01-06 RX ADMIN — OXYCODONE 5 MG: 5 TABLET ORAL at 10:01

## 2023-01-06 RX ADMIN — HEPARIN SODIUM 5000 UNITS: 5000 INJECTION INTRAVENOUS; SUBCUTANEOUS at 09:01

## 2023-01-06 RX ADMIN — NYSTATIN 500000 UNITS: 500000 SUSPENSION ORAL at 01:01

## 2023-01-06 RX ADMIN — CALCIUM GLUCONATE 1 G: 20 INJECTION, SOLUTION INTRAVENOUS at 06:01

## 2023-01-06 NOTE — PROGRESS NOTES
TRANSPLANT NOTE:    Admit Date: 1/5/2023    ORGAN:   LIVER  Disease Etiology: Cirrhosis: Other, Specify - granulomatous hepatitis  Donor CMV Status: Positive  Donor HCV Status: Positive  Donor HBcAb: Negative  Donor HBV ASAF: Negative  Donor HCV ASAF: Negative  Whole or Partial: Whole Liver  Biliary Anastomosis: End to End  Arterial Anatomy: Replaced Right Hepatic from SMA    Rosalio Mccarty is a 47 y.o. male s/p    Donation after Brain Death liver transplant on 1/5/2023 (Liver) for Cirrhosis: Other, Specify - granulomatous hepatitis.  This patient will follow the Steroid Induction protocol.  This patients immunosuppression will include a steroid taper over 5 weeks, Cellcept for 3 months and Prograf maintenance.  Opportunistic infection prophylaxis will include Valcyte for 6 months (CMV D+ R?), Bactrim for 6 months, and nystatin.  Osteoporosis risk assessment identifies as normal, therapy will include daily ca/vit d.  I have reviewed the pre-op medications and those have been restarted those, as appropriate.

## 2023-01-06 NOTE — PLAN OF CARE
Recommendations    1) When medically able ADAT to low sodium diet   2) If intake <50% added Boost TID   3) RD following    Goals: meets % een/epn by next rd f/u  Nutrition Goal Status: new  Communication of RD Recs: other (comment) (POC)

## 2023-01-06 NOTE — PT/OT/SLP PROGRESS
Physical Therapy      Patient Name:  Rosalio Mccarty   MRN:  [id number]    Patient not seen today secondary to  (pt not seen due to being intubated and sedated.). Will follow-up at a later date.    1/6/2023  .

## 2023-01-06 NOTE — PROGRESS NOTES
Admit / Transplant Note     SW met with wife to assess patients needs due to patient being intubated and sedated.  Patient is a 47 y.o.  male, admitted for liver transplant. Patient was transplanted on 1/5/2023.    Patient admitted from home on 1/5/2023 .  At this time, patient presents as intubated.  At this time, patients caregiver presents as alert and oriented x 4 and asking and answering questions appropriately.      Household/Family Systems (as reported by patients caregiver)     Patient resides with patient's wife and child(david), age(s) 11, 14 and 17 , at [address] [place] [address] [place] [id number].  Support system includes wife, mother and adult son.  Patient does have dependents that are in need of being cared for. Patient's 3 minor children are being cared for by the patient's mother.     Patients primary caregiver is Triston Burns, patients wife, phone number [phone].  Confirmed patients contact information is [phone] (home);   Telephone Information:   Mobile [phone]   .    During admission, patient's caregiver plans to stay in patient's room.  Confirmed patient and patients caregivers do have access to reliable transportation.    Cognitive Status/Learning     Patients caregiver reports patients reading ability as college and states patient does have difficulty with seeing and wears glasses.  Patients caregiver reports patient learns best by reading and hands on learning.   Needed: No.   Highest education level: Post-College Graduate Degree    Vocation/Disability (as reported by patients caregiver)    Working for Income: yes  If yes, working activity level: Working Full Time  Patient is employed as a Neuro-physiologist.    Adherence     Patients caregiver reports patient has a high level of adherence to patients health care regimen.  Adherence counseling and education provided.  Patient's caregiver verbalizes understanding.    Substance Use    Patients caregiver  reports patients substance usage as the following:    Tobacco: none, patient denies any use.  Alcohol: none, patient denies any use.  Illicit Drugs/Non-prescribed Medications: none, patient denies any use.  Patients caregiver states clear understanding of the potential impact of substance use.  Substance abstinence/cessation counseling, education and resources provided and reviewed.     Services Utilizing/ADLS (as reported by patients caregiver)    Infusion Service: Prior to admission, patient utilizing? no  Home Health: Prior to admission, patient utilizing? no  DME: Prior to admission, no  Pulmonary/Cardiac Rehab: Prior to admission, no  Dialysis:  Prior to admission, no  Transplant Specialty Pharmacy:  Prior to admission, no.    Prior to admission, patients caregiver reports patient was independent with ADLS and was driving.  Patients caregiver reports patient is not able to care for self at this time due to compromised medical condition (as documented in medical record) and physical weakness..  Patients caregiver reports patient indicates a willingness to care for self once medically cleared to do so.    Insurance/Medications    Insured by   Payer/Plan Subscr  Sex Relation Sub. Ins. ID Effective Group Num   1. CIGNA - CIGNA* ERICA ODELL 1975 Male Self [id number]                                    [address], [place] [address]   2. ALLIED BENEFI* ERICA ODELL 1975 Male Self [id number]                                   [address]      Primary Insurance (for UNOS reporting): Private Insurance, Cigna.  Secondary Insurance (for UNOS reporting): Private Insurance, [employer] Benefit.    Patients caregiver reports patient is able to obtain and afford medications at this time and at time of discharge.    Living Will/Healthcare Power of     Patients caregiver reports patient does not have a LW and/or HCPA.   provided education regarding LW and HCPA  and the completion of forms.    Coping/Mental Health (as reported by patients caregiver)    Patient is coping adequately with the aid of  family members.  Patients caregiver is coping adequately with the aid of  family members.      Discharge Planning (as reported by patients caregiver)    At time of discharge, patient plans to stay with patient's aunt, Inez Elliott at [address] [place], [address] under the care of Triston Burns, patient's wife.  Patients wife will transport patient.  Per rounds today, expected discharge date has not been medically determined at this time. Patients caretaker verbalizes understanding and is involved in treatment planning and discharge process.    Additional Concerns    Patient's caretaker denies additional needs and/or concerns at this time. Patient is being followed for needs, education, resources, information, emotional support, supportive counseling, and for supportive and skilled discharge plan of care.  providing ongoing psychosocial support, education, resources and d/c planning as needed.  SW remains available.  remains available.

## 2023-01-06 NOTE — SUBJECTIVE & OBJECTIVE
Follow-up For: Procedure(s) (LRB):  TRANSPLANT, LIVER (N/A)    Post-Operative Day: Day of Surgery     Past Medical History:   Diagnosis Date    C. difficile diarrhea     Diabetes mellitus, type II, insulin dependent     Granulomatous colitis     Hepatic granuloma associated with sarcoidosis     Hepatic granuloma associated with sarcoidosis 11/17/2022    Personal history of colonic polyps     Sarcoidosis, unspecified     Thrombocytopenia, unspecified     Unspecified cirrhosis of liver        Past Surgical History:   Procedure Laterality Date    ADENOIDECTOMY      CARDIAC CATHETERIZATION Right     COLONOSCOPY  07/11/2018    HIP SURGERY      RIght removed ruptured cyst    UPPER GASTROINTESTINAL ENDOSCOPY  10/09/2019       Review of patient's allergies indicates:   Allergen Reactions    Adhesive Other (See Comments)     Skin irritation  Paper tape okay to use    Pcn [penicillins] Rash     Happened as a baby       Family History       Problem Relation (Age of Onset)    Cancer Father, Maternal Grandfather    Diabetes Maternal Grandmother, Maternal Grandfather, Paternal Grandmother    Heart disease Father    Hypertension Mother, Father    Rectal cancer Father    Thyroid disease Father          Tobacco Use    Smoking status: Never    Smokeless tobacco: Never   Substance and Sexual Activity    Alcohol use: Never    Drug use: Not on file    Sexual activity: Not on file      Review of Systems   Unable to perform ROS: Intubated   Objective:     Vital Signs (Most Recent):  Temp: 98.2 °F (36.8 °C) (01/05/23 1128)  Pulse: 90 (01/05/23 1836)  Resp: 20 (01/05/23 1836)  BP: 119/78 (01/05/23 1128)  SpO2: 99 % (01/05/23 1836) Vital Signs (24h Range):  Temp:  [98.1 °F (36.7 °C)-98.2 °F (36.8 °C)] 98.2 °F (36.8 °C)  Pulse:  [76-90] 90  Resp:  [16-20] 20  SpO2:  [98 %-100 %] 99 %  BP: (119-121)/(75-78) 119/78     Weight: 103 kg (227 lb 1.2 oz)  Body mass index is 34.53 kg/m².      Intake/Output Summary (Last 24 hours) at 1/5/2023  1850  Last data filed at 1/5/2023 1733  Gross per 24 hour   Intake 34548 ml   Output 53001 ml   Net -3311 ml       Physical Exam  Vitals reviewed.   Constitutional:       Comments: Intubated and sedated   Cardiovascular:      Rate and Rhythm: Normal rate.      Pulses: Normal pulses.   Pulmonary:      Comments: Mechanically ventilated, ACVC. Right-sided hugo catheter to suction with small expiratory air leak.  Abdominal:      General: There is no distension.      Palpations: Abdomen is soft.      Comments: 2x BRITTANIE drains with SS output. Chevron incisional dressing CDI   Skin:     General: Skin is warm and dry.       Vents:  Vent Mode: A/C (01/05/23 1836)  Set Rate: 20 BPM (01/05/23 1836)  Vt Set: 450 mL (01/05/23 1836)  PEEP/CPAP: 5 cmH20 (01/05/23 1836)  Oxygen Concentration (%): 100 (01/05/23 1836)  Peak Airway Pressure: 25 cmH20 (01/05/23 1836)  Plateau Pressure: 0 cmH20 (01/05/23 1836)  Total Ve: 9.49 L/m (01/05/23 1836)  Negative Inspiratory Force (cm H2O): 0 (01/05/23 1836)  F/VT Ratio<105 (RSBI): (!) 42.02 (01/05/23 1836)    Lines/Drains/Airways       Central Venous Catheter Line  Duration             Percutaneous Central Line Insertion/Assessment - Triple Lumen  01/05/23 1344 right internal jugular <1 day    Pulmonary Artery Catheter Assessment  01/05/23 1240 <1 day              Drain  Duration                  Chest Tube 01/05/23 1624 Tube - 1 Right Other (Comment) 8.5 Fr. <1 day         Closed/Suction Drain 01/05/23 1736 Right RLQ Bulb 19 Fr. <1 day         Closed/Suction Drain 01/05/23 1737 Lateral;Right RLQ Bulb 19 Fr. <1 day         Urethral Catheter 01/05/23 1225 Non-latex;Silicone 16 Fr. <1 day              Airway  Duration                  Airway - Non-Surgical 01/05/23 1203 <1 day              Arterial Line  Duration             Arterial Line 01/05/23 1238 Left Radial <1 day    Arterial Line 01/05/23 1329 Left Femoral <1 day              Peripheral Intravenous Line  Duration                   Peripheral IV - Single Lumen 01/05/23 1137 20 G Anterior;Distal;Right Forearm <1 day         SANDI 01/05/23 1227 Left Antecubital <1 day                    Significant Labs:    CBC/Anemia Profile:  Recent Labs   Lab 01/04/23  1607 01/05/23  1221 01/05/23  1555   WBC 1.71*  --   --    HGB 9.8* 9.0* 9.7*   HCT 31.8* 28.4* 30.0*   PLT 59* 41* 80*   MCV 82  --   --    RDW 15.0*  --   --         Chemistries:  Recent Labs   Lab 01/04/23  1607 01/05/23  1221 01/05/23  1555    136 139   K 4.0 4.4 3.7     --   --    CO2 22*  --   --    BUN 13  --   --    CREATININE 1.1  --   --    CALCIUM 9.2  --   --    ALBUMIN 3.0* 3.2* 2.6*   PROT 7.6  --   --    BILITOT 1.6*  --   --    ALKPHOS 157*  --   --    ALT 31  --  170*   AST 31  --  263*   MG 1.8 1.7 1.8       ABGs: No results for input(s): PH, PCO2, HCO3, POCSATURATED, BE in the last 48 hours.  CMP:   Recent Labs   Lab 01/04/23  1607 01/05/23  1221 01/05/23  1555    136 139   K 4.0 4.4 3.7     --   --    CO2 22*  --   --     132* 201*   BUN 13  --   --    CREATININE 1.1  --   --    CALCIUM 9.2  --   --    PROT 7.6  --   --    ALBUMIN 3.0* 3.2* 2.6*   BILITOT 1.6*  --   --    ALKPHOS 157*  --   --    AST 31  --  263*   ALT 31  --  170*   ANIONGAP 9  --   --      Coagulation:   Recent Labs   Lab 01/05/23  1555   INR 1.8*   APTT 74.7*       Significant Imaging: I have reviewed all pertinent imaging results/findings within the past 24 hours.

## 2023-01-06 NOTE — HOSPITAL COURSE
Mr. 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 Mr. Mccarty presented to the SICU from the OR following orthotopic DBD liver transplant with end to end biliary anastomosis and replaced right hepatic artery reconstruction.

## 2023-01-06 NOTE — HPI
Reason for Consult: Management of T2DM, Hyperglycemia     Surgical Procedure and Date: Liver transplant 1/5/2023    Diabetes diagnosis year: Over 5 years ago    Home Diabetes Medications:  Tresiba 40 units BID, Humalog (pt estimates dose based on intake), Jardiance 25 mg, Ozempic 2 mg      Diabetes Complications include:     Hyperglycemia    Complicating diabetes co morbidities:   Glucocorticoid use       HPI:   Patient is a 47 y.o. 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  Pt s/p  Mr. orthotopic DBD liver transplant with end to end biliary anastomosis and replaced right hepatic artery reconstruction.  Endocrine consulted for management of BG

## 2023-01-06 NOTE — ASSESSMENT & PLAN NOTE
  Neuro/Psych:   -- Sedation: Propofol    -- Pain: PRN dilaudid             Cards:   -- Pressors: vaso 0.04, levo 0.06  -- Received 4L albumin  Overnight  -- CVP 5-8  -- Wean pressors as tolerated      Pulm:   -- Goal O2 sat > 90%  -- FiO2 weaned to 50% this morning  -- Wean as able with plans for SBT today      Renal:  -- Keep kat for strict I/O  -- Trend BUN/Cr  -- BMP Q12H for 24 hours then daily    Lab Results   Component Value Date    CREATININE 1.3 01/06/2023    BUN 17 01/06/2023     01/06/2023    K 4.5 01/06/2023     01/06/2023    CO2 20 (L) 01/06/2023         FEN / GI:   -- mIVF 100cc/hr  -- Replace lytes as needed  -- Nutrition: NPO, will re-evaluate once extubated  -- Anti-rejection methylpred, mycophenolate, tacro; daily tacro levels  -- AST Q4H for 24 hr, downtrending  -- T-bili 8.8 from 5.4, direct 3.2; trend daily  -- Liver US this AM      ID:   -- Tm: afebrile  -- Abx Amp, Bactrim, Valganciclovir, Nystatin suspension    Recent Labs   Lab 01/06/23  0407   WBC 9.98   RBC 3.81*   HGB 9.6*   HCT 29.3*   PLT 66*   MCV 77*   MCH 25.2*   MCHC 32.8           Heme/Onc:   -- Hgb 9.6 from 10.1  -- No indication for transfusion  -- Q4 Hr CBC and PT for 1 day, then daily  -- Vitamin K Q8H for 3 doses      Endo:   -- Gluc goal 140-180  -- Insulin gtt; Endo consulted appreciate recommendations      PPx:   Feeding: NPO  Analgesia/Sedation: PRNs / Prop  Thromboembolic prevention: SQH  HOB >30: Ordered  Stress Ulcer ppx: Famotidine Q12H  Glucose control: Critical care goal 140-180 g/dl, insulin gtt    Lines/Drains/Airway: RIJ CVC x2, A line x2 / BRITTANIE x2 / ETT / OGT / kat / R CT      Dispo/Code Status/Palliative:   -- SICU / Full Code

## 2023-01-06 NOTE — RESPIRATORY THERAPY
Pt arrived from OR at 1829, intubated with a 7.5 ET tube measuring 23 cm at the teeth. Will continue to monitor.

## 2023-01-06 NOTE — PLAN OF CARE
Admitted from OR s/p liver transplant.   4L albumin given overnight.     Afebrile. NSR 70-80s. Maintaining -160 and MAP > 65 with pressor requirements. CVP 3-8. SvO2 70%. PA pressures 20s/10s. Sats 97% on ventilator.     Ventilator: AC/VC+ 60% fiO2/ 8 PEEP / rate 20 / Vt 450.     Neuro: follows commands, moves all extremities, PERRLA.     Gtts:   Vasopressin 0.04 units/min   Levophed 0.06 mcg/kg/min   Propofol 40 mcg/kg/min   Insulin 21.2 units/hr  D5%  cc/hr     OGT to LIWS with 100 cc output.     Banuelos in place with adequate UOP with 200-400 cc/hr     BRITTANIE 1 with serosanguineous 743 cc output.   BRITTANIE 2 with serosanguineous 630 cc output.     R pleural Chest tube to wall suction with 0 cc.     Skin: Chevron incision with island/border dressing. Linen changed. Sacral foam and heel boots on. SCDs on. Turned to prevent breakdown. No new breakdown noted.     All labs followed up with provider. Electrolytes replaced prn.

## 2023-01-06 NOTE — ANESTHESIA POSTPROCEDURE EVALUATION
Anesthesia Post Evaluation    Patient: Rosalio Mccarty    Procedure(s) Performed: Procedure(s) (LRB):  TRANSPLANT, LIVER (N/A)    Final Anesthesia Type: general      Patient location during evaluation: ICU  Patient participation: No - Unable to Participate, Intubation  Level of consciousness: sedated  Post-procedure vital signs: reviewed and stable  Pain management: adequate  Airway patency: patent    PONV status at discharge: No PONV  Anesthetic complications: no      Cardiovascular status: stable  Respiratory status: ETT and ventilator  Hydration status: euvolemic  Follow-up not needed.  Comments: Chart review and interview with ICU nurse          Vitals Value Taken Time   /72 01/06/23 0801   Temp 37.2 °C (99 °F) 01/06/23 0600   Pulse 67 01/06/23 0817   Resp 20 01/06/23 0817   SpO2 96 % 01/06/23 0817   Vitals shown include unvalidated device data.      No case tracking events are documented in the log.      Pain/Jessie Score: Pain Rating Prior to Med Admin: 7 (1/6/2023  7:57 AM)

## 2023-01-06 NOTE — PROGRESS NOTES
Dr. Sanchez informed while attempting to wean propofol for an SBT pt became very agitated, attempting to get OOB, pulling at drains, scratching his body. Order received for precedex gtt and PRN benadryl.

## 2023-01-06 NOTE — H&P
[facility]  Critical Care - Surgery  History & Physical    Patient Name: Rosalio Mccarty  MRN: [id number]  Admission Date: 1/5/2023  Code Status: Full Code  Attending Physician: Rosa Foreman MD   Primary Care Provider: Primary Doctor No   Principal Problem: <principal problem not specified>    Subjective:     HPI:  Mr. 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 Mr. Mccarty presents to the SICU from the OR following orthotopic DBD liver transplant with end to end biliary anastomosis and replaced right hepatic artery reconstruction. In the OR he received 8000 Crystalloid and 3195 of Cell Saver input, EBL was 9600. Urine output during the case recorded as 4500L. 2 units of pRBCs, 4 FFP, 2 plt, 2 cryo, 2000 (5%) and 100 (25%) albumin were administered during the case. 4500 mL of ascites was removed. Mr. Mccarty arrives intubated and under mechanical ventilation. Medications on admission include Vaso @ 0.06, Levo @ 0.1, and an insulin gtt.     Upon admission labs were immediately drawn and sent off including CBC, CMP, Mg, Phos, and coags. An arterial blood gas was drawn showing satisfactory oxygenation and ventilation values. (His) ventilator was weaned to 80% FiO2 and 5 Peep. Goals are to monitor respiratory status w/ possible extubation tomorrow. Wean vasopressors as tolerated. -160.         Hospital/ICU Course:  No notes on file    Follow-up For: Procedure(s) (LRB):  TRANSPLANT, LIVER (N/A)    Post-Operative Day: Day of Surgery     Past Medical History:   Diagnosis Date    C. difficile diarrhea     Diabetes mellitus, type II, insulin dependent     Granulomatous colitis     Hepatic granuloma associated with sarcoidosis     Hepatic granuloma associated with sarcoidosis 11/17/2022    Personal history of colonic polyps     Sarcoidosis, unspecified     Thrombocytopenia,  unspecified     Unspecified cirrhosis of liver        Past Surgical History:   Procedure Laterality Date    ADENOIDECTOMY      CARDIAC CATHETERIZATION Right     COLONOSCOPY  07/11/2018    HIP SURGERY      RIght removed ruptured cyst    UPPER GASTROINTESTINAL ENDOSCOPY  10/09/2019       Review of patient's allergies indicates:   Allergen Reactions    Adhesive Other (See Comments)     Skin irritation  Paper tape okay to use    Pcn [penicillins] Rash     Happened as a baby       Family History       Problem Relation (Age of Onset)    Cancer Father, Maternal Grandfather    Diabetes Maternal Grandmother, Maternal Grandfather, Paternal Grandmother    Heart disease Father    Hypertension Mother, Father    Rectal cancer Father    Thyroid disease Father          Tobacco Use    Smoking status: Never    Smokeless tobacco: Never   Substance and Sexual Activity    Alcohol use: Never    Drug use: Not on file    Sexual activity: Not on file      Review of Systems   Unable to perform ROS: Intubated   Objective:     Vital Signs (Most Recent):  Temp: 98.2 °F (36.8 °C) (01/05/23 1128)  Pulse: 90 (01/05/23 1836)  Resp: 20 (01/05/23 1836)  BP: 119/78 (01/05/23 1128)  SpO2: 99 % (01/05/23 1836) Vital Signs (24h Range):  Temp:  [98.1 °F (36.7 °C)-98.2 °F (36.8 °C)] 98.2 °F (36.8 °C)  Pulse:  [76-90] 90  Resp:  [16-20] 20  SpO2:  [98 %-100 %] 99 %  BP: (119-121)/(75-78) 119/78     Weight: 103 kg (227 lb 1.2 oz)  Body mass index is 34.53 kg/m².      Intake/Output Summary (Last 24 hours) at 1/5/2023 1850  Last data filed at 1/5/2023 1733  Gross per 24 hour   Intake 23501 ml   Output 63484 ml   Net -3311 ml       Physical Exam  Vitals reviewed.   Constitutional:       Comments: Intubated and sedated   Cardiovascular:      Rate and Rhythm: Normal rate.      Pulses: Normal pulses.   Pulmonary:      Comments: Mechanically ventilated, ACVC. Right-sided hugo catheter to suction with small expiratory air leak.  Abdominal:       General: There is no distension.      Palpations: Abdomen is soft.      Comments: 2x BRITTANIE drains with SS output. Chevron incisional dressing CDI   Skin:     General: Skin is warm and dry.       Vents:  Vent Mode: A/C (01/05/23 1836)  Set Rate: 20 BPM (01/05/23 1836)  Vt Set: 450 mL (01/05/23 1836)  PEEP/CPAP: 5 cmH20 (01/05/23 1836)  Oxygen Concentration (%): 100 (01/05/23 1836)  Peak Airway Pressure: 25 cmH20 (01/05/23 1836)  Plateau Pressure: 0 cmH20 (01/05/23 1836)  Total Ve: 9.49 L/m (01/05/23 1836)  Negative Inspiratory Force (cm H2O): 0 (01/05/23 1836)  F/VT Ratio<105 (RSBI): (!) 42.02 (01/05/23 1836)    Lines/Drains/Airways       Central Venous Catheter Line  Duration             Percutaneous Central Line Insertion/Assessment - Triple Lumen  01/05/23 1344 right internal jugular <1 day    Pulmonary Artery Catheter Assessment  01/05/23 1240 <1 day              Drain  Duration                  Chest Tube 01/05/23 1624 Tube - 1 Right Other (Comment) 8.5 Fr. <1 day         Closed/Suction Drain 01/05/23 1736 Right RLQ Bulb 19 Fr. <1 day         Closed/Suction Drain 01/05/23 1737 Lateral;Right RLQ Bulb 19 Fr. <1 day         Urethral Catheter 01/05/23 1225 Non-latex;Silicone 16 Fr. <1 day              Airway  Duration                  Airway - Non-Surgical 01/05/23 1203 <1 day              Arterial Line  Duration             Arterial Line 01/05/23 1238 Left Radial <1 day    Arterial Line 01/05/23 1329 Left Femoral <1 day              Peripheral Intravenous Line  Duration                  Peripheral IV - Single Lumen 01/05/23 1137 20 G Anterior;Distal;Right Forearm <1 day         SANDI 01/05/23 1227 Left Antecubital <1 day                    Significant Labs:    CBC/Anemia Profile:  Recent Labs   Lab 01/04/23  1607 01/05/23  1221 01/05/23  1555   WBC 1.71*  --   --    HGB 9.8* 9.0* 9.7*   HCT 31.8* 28.4* 30.0*   PLT 59* 41* 80*   MCV 82  --   --    RDW 15.0*  --   --         Chemistries:  Recent Labs   Lab 01/04/23  3488  01/05/23  1221 01/05/23  1555    136 139   K 4.0 4.4 3.7     --   --    CO2 22*  --   --    BUN 13  --   --    CREATININE 1.1  --   --    CALCIUM 9.2  --   --    ALBUMIN 3.0* 3.2* 2.6*   PROT 7.6  --   --    BILITOT 1.6*  --   --    ALKPHOS 157*  --   --    ALT 31  --  170*   AST 31  --  263*   MG 1.8 1.7 1.8       ABGs: No results for input(s): PH, PCO2, HCO3, POCSATURATED, BE in the last 48 hours.  CMP:   Recent Labs   Lab 01/04/23  1607 01/05/23  1221 01/05/23  1555    136 139   K 4.0 4.4 3.7     --   --    CO2 22*  --   --     132* 201*   BUN 13  --   --    CREATININE 1.1  --   --    CALCIUM 9.2  --   --    PROT 7.6  --   --    ALBUMIN 3.0* 3.2* 2.6*   BILITOT 1.6*  --   --    ALKPHOS 157*  --   --    AST 31  --  263*   ALT 31  --  170*   ANIONGAP 9  --   --      Coagulation:   Recent Labs   Lab 01/05/23  1555   INR 1.8*   APTT 74.7*       Significant Imaging: I have reviewed all pertinent imaging results/findings within the past 24 hours.    Assessment/Plan:     Hepatic granuloma associated with sarcoidosis    Neuro/Psych:   -- Sedation: Propofol    -- Pain: PRN dilaudid             Cards:   -- Pressors: vaso 0.06, levo 0.1  -- CVP: 5 upon arrival, albumin bolus given  -- Wean pressors as tolerated      Pulm:   -- Goal O2 sat > 90%  -- Wean as able with plans for SBT tomorrow      Renal:  -- Keep kat for strict I/O  -- Trend BUN/Cr  -- BMP Q12H for 24 hours then daily      FEN / GI:   -- Replace lytes as needed  -- Nutrition: NPO, will re-evaluate once extubated  -- Anti-rejection methylpred, mycophenolate, tacro; daily tacro levels  -- AST Q4H for 24 hr  -- Liver US in AM      ID:   -- Tm: afebrile  -- Abx Amp, Bactrim, Valganciclovir, Nystatin suspension      Heme/Onc:   -- Post op H/H pending  -- Q4 Hr CBC and PT for 1 day, then daily  -- Vitamin K Q8H for 3 doses      Endo:   -- Gluc goal 140-180  -- Insulin gtt as needed; Endo consulted appreciate recommendations       PPx:   Feeding: NPO  Analgesia/Sedation: PRNs / Prop and Precedex  Thromboembolic prevention: SQH  HOB >30: Ordered  Stress Ulcer ppx: Famotidine Q12H  Glucose control: Critical care goal 140-180 g/dl, insulin gtt    Lines/Drains/Airway: RIJ CVC x2, A line x2 / BRITTANIE x2 / ETT / OGT / kat      Dispo/Code Status/Palliative:   -- SICU / Full Code              Unruly Gallegos MD  Critical Care - Surgery  [facility] - [facility]

## 2023-01-06 NOTE — NURSING
Nurses Note -- 4 Eyes      1/6/2023   12:34 AM      Skin assessed during: Admit from OR      [x] No Pressure Injuries Present    [x]Prevention Measures Documented      [] Yes- Altered Skin Integrity Present or Discovered   [] LDA Added if Not in Epic (Describe Wound)   [] New Altered Skin Integrity was Present on Admit and Documented in LDA   [] Wound Image Taken    Wound Care Consulted? No    Attending Nurse:  DARRELL BAUM RN     Second RN/Staff Member:  Cecilio Hanks RN

## 2023-01-06 NOTE — SUBJECTIVE & OBJECTIVE
Interval History/Significant Events: Afebrile, remains on .06 levo .04 vaso. Received 4L albumin due to large amount of UOP and suspected fluid shifts. FiO2 able to be weaned to 50%. Wakes up and follows commands.    Follow-up For: Procedure(s) (LRB):  TRANSPLANT, LIVER (N/A)    Post-Operative Day: 1 Day Post-Op    Objective:     Vital Signs (Most Recent):  Temp: 99 °F (37.2 °C) (01/06/23 0600)  Pulse: 65 (01/06/23 0600)  Resp: 20 (01/06/23 0600)  BP: 107/68 (01/06/23 0600)  SpO2: 98 % (01/06/23 0600) Vital Signs (24h Range):  Temp:  [96.1 °F (35.6 °C)-99 °F (37.2 °C)] 99 °F (37.2 °C)  Pulse:  [] 65  Resp:  [16-25] 20  SpO2:  [87 %-100 %] 98 %  BP: ()/(51-78) 107/68  Arterial Line BP: ()/(47-78) 106/57     Weight: 130.9 kg (288 lb 9.3 oz)  Body mass index is 43.88 kg/m².      Intake/Output Summary (Last 24 hours) at 1/6/2023 0716  Last data filed at 1/6/2023 0600  Gross per 24 hour   Intake 09651.18 ml   Output 00946 ml   Net -4439.82 ml       Physical Exam  Vitals and nursing note reviewed.   HENT:      Head: Normocephalic and atraumatic.      Right Ear: External ear normal.      Left Ear: External ear normal.      Nose: Nose normal.      Mouth/Throat:      Pharynx: No oropharyngeal exudate.      Comments: ETT, OGT present  Eyes:      Pupils: Pupils are equal, round, and reactive to light.   Cardiovascular:      Rate and Rhythm: Normal rate and regular rhythm.   Pulmonary:      Comments: Intubated, mechanically ventilated; R CT without air leak, no output  Abdominal:      Palpations: Abdomen is soft.      Comments: Incision dressed with minimal  drainage, BRITTANIE x 2 with serosanguineous output   Genitourinary:     Comments: Banuelos in place with clear yellow urine output  Skin:     General: Skin is warm and dry.   Neurological:      Comments: Sedated, moves all extremities spontaneously       Vents:  Vent Mode: A/C (01/06/23 0413)  Ventilator Initiated: Yes (01/05/23 3889)  Set Rate: 20 BPM (01/06/23  0411)  Vt Set: 450 mL (01/06/23 0411)  PEEP/CPAP: 8 cmH20 (01/06/23 0411)  Oxygen Concentration (%): 60 (01/06/23 0600)  Peak Airway Pressure: 22 cmH20 (01/06/23 0411)  Plateau Pressure: 22 cmH20 (01/06/23 0411)  Total Ve: 9.43 L/m (01/06/23 0411)  Negative Inspiratory Force (cm H2O): 0 (01/06/23 0411)  F/VT Ratio<105 (RSBI): (!) 42.46 (01/06/23 0411)    Lines/Drains/Airways       Central Venous Catheter Line  Duration              Introducer with Double Lumen 01/05/23 1200 right internal jugular <1 day    Pulmonary Artery Catheter Assessment  01/05/23 1240 <1 day    Trialysis (Dialysis) Catheter 01/05/23 1344 right internal jugular <1 day              Drain  Duration                  Chest Tube 01/05/23 1624 Tube - 1 Right Other (Comment) 8.5 Fr. <1 day         Closed/Suction Drain 01/05/23 1736 Right RLQ Bulb 19 Fr. <1 day         Closed/Suction Drain 01/05/23 1737 Lateral;Right RLQ Bulb 19 Fr. <1 day         NG/OG Tube 01/05/23 1300 orogastric Right mouth <1 day         Urethral Catheter 01/05/23 1225 Non-latex;Silicone 16 Fr. <1 day              Airway  Duration                  Airway - Non-Surgical 01/05/23 1203 <1 day              Arterial Line  Duration             Arterial Line 01/05/23 1238 Left Radial <1 day    Arterial Line 01/05/23 1329 Right Femoral <1 day              Peripheral Intravenous Line  Duration                  Peripheral IV - Single Lumen 01/05/23 1137 20 G Anterior;Distal;Right Forearm <1 day         SANDI 01/05/23 1227 Left Antecubital <1 day                    Significant Labs:    CBC/Anemia Profile:  Recent Labs   Lab 01/05/23  1953 01/05/23  2344 01/06/23  0407   WBC 11.58 10.22 9.98   HGB 10.0* 10.1* 9.6*   HCT 31.6* 31.0* 29.3*   * 83* 66*   MCV 79* 79* 77*   RDW 14.7* 14.9* 15.1*        Chemistries:  Recent Labs   Lab 01/04/23  1607 01/05/23  1221 01/05/23  1555 01/05/23  1838 01/05/23  1841 01/05/23  1953 01/05/23  2344 01/06/23  0407      < > 139  --  144 143 137 140    K 4.0   < > 3.7  --  3.4* 3.4* 5.3* 4.5     --   --   --  105 106 104 109   CO2 22*  --   --   --  25 23 23 20*   BUN 13  --   --   --  13 13 14 17   CREATININE 1.1  --   --   --  1.1 1.1 1.2 1.3   CALCIUM 9.2  --   --   --  8.9 8.4* 7.8* 8.0*   ALBUMIN 3.0*   < > 2.6*  --  3.9  --   --  4.4   PROT 7.6  --   --   --  5.5*  --   --  5.2*   BILITOT 1.6*  --   --   --  5.4*  --   --  8.8*   ALKPHOS 157*  --   --   --  80  --   --  48*   ALT 31  --  170*  --  175*  --   --  78*   AST 31  --  263*  --  348* 269* 178* 135*  136*   MG 1.8   < > 1.8 1.8  --   --  1.9 2.0   PHOS  --   --   --  2.0*  --   --  3.1 3.0    < > = values in this interval not displayed.       All pertinent labs within the past 24 hours have been reviewed.    Significant Imaging:  CXR: I have reviewed all pertinent results/findings within the past 24 hours and my personal findings are:  no pneumothorax

## 2023-01-06 NOTE — PROGRESS NOTES
"Dr. Sanchez informed pt c/o itching. Hives noted to BUE. Pt states he "has broken out in hives before and does not seem to be related to any one thing" PRN benadryl ordered.  "

## 2023-01-06 NOTE — PROGRESS NOTES
Saw patient in the ICU.  His wife, Tirston, was at his bedside.  Gave Triston My New Journey: Living Smart After My Liver Transplant.  Asked them to read the book in preparation for education.  Allowed time for questions and answers.

## 2023-01-06 NOTE — PROGRESS NOTES
Dr. Sanchez informed hives now noted on chest, neck, and face. Levo gtt restarted for SBP in the 80s, UOP 35mL, temp 96.2, HR dropped to the 40s. BRITTANIE drain output thin, serosanguinous. Orders received for 1unit PRBCs and 1pk plt.

## 2023-01-06 NOTE — PROGRESS NOTES
[facility]  Critical Care - Surgery  Progress Note    Patient Name: Rosalio Mccarty  MRN: [id number]  Admission Date: 1/5/2023  Hospital Length of Stay: 1 days  Code Status: Full Code  Attending Provider: Rosa Foreman MD  Primary Care Provider: Primary Doctor No   Principal Problem: <principal problem not specified>    Subjective:     Hospital/ICU Course:  Mr. 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 Mr. Mccarty presented to the SICU from the OR following orthotopic DBD liver transplant with end to end biliary anastomosis and replaced right hepatic artery reconstruction.      Interval History/Significant Events: Afebrile, remains on .06 levo .04 vaso. Received 4L albumin due to large amount of UOP and suspected fluid shifts. FiO2 able to be weaned to 50%. Wakes up and follows commands.    Follow-up For: Procedure(s) (LRB):  TRANSPLANT, LIVER (N/A)    Post-Operative Day: 1 Day Post-Op    Objective:     Vital Signs (Most Recent):  Temp: 99 °F (37.2 °C) (01/06/23 0600)  Pulse: 65 (01/06/23 0600)  Resp: 20 (01/06/23 0600)  BP: 107/68 (01/06/23 0600)  SpO2: 98 % (01/06/23 0600) Vital Signs (24h Range):  Temp:  [96.1 °F (35.6 °C)-99 °F (37.2 °C)] 99 °F (37.2 °C)  Pulse:  [] 65  Resp:  [16-25] 20  SpO2:  [87 %-100 %] 98 %  BP: ()/(51-78) 107/68  Arterial Line BP: ()/(47-78) 106/57     Weight: 130.9 kg (288 lb 9.3 oz)  Body mass index is 43.88 kg/m².      Intake/Output Summary (Last 24 hours) at 1/6/2023 0716  Last data filed at 1/6/2023 0600  Gross per 24 hour   Intake 64161.18 ml   Output 66004 ml   Net -4439.82 ml       Physical Exam  Vitals and nursing note reviewed.   HENT:      Head: Normocephalic and atraumatic.      Right Ear: External ear normal.      Left Ear: External ear normal.      Nose: Nose normal.      Mouth/Throat:      Pharynx: No oropharyngeal  exudate.      Comments: ETT, OGT present  Eyes:      Pupils: Pupils are equal, round, and reactive to light.   Cardiovascular:      Rate and Rhythm: Normal rate and regular rhythm.   Pulmonary:      Comments: Intubated, mechanically ventilated; R CT without air leak, no output  Abdominal:      Palpations: Abdomen is soft.      Comments: Incision dressed with minimal  drainage, BRITTANIE x 2 with serosanguineous output   Genitourinary:     Comments: Banuelos in place with clear yellow urine output  Skin:     General: Skin is warm and dry.   Neurological:      Comments: Sedated, moves all extremities spontaneously       Vents:  Vent Mode: A/C (01/06/23 0411)  Ventilator Initiated: Yes (01/05/23 1829)  Set Rate: 20 BPM (01/06/23 0411)  Vt Set: 450 mL (01/06/23 0411)  PEEP/CPAP: 8 cmH20 (01/06/23 0411)  Oxygen Concentration (%): 60 (01/06/23 0600)  Peak Airway Pressure: 22 cmH20 (01/06/23 0411)  Plateau Pressure: 22 cmH20 (01/06/23 0411)  Total Ve: 9.43 L/m (01/06/23 0411)  Negative Inspiratory Force (cm H2O): 0 (01/06/23 0411)  F/VT Ratio<105 (RSBI): (!) 42.46 (01/06/23 0411)    Lines/Drains/Airways       Central Venous Catheter Line  Duration              Introducer with Double Lumen 01/05/23 1200 right internal jugular <1 day    Pulmonary Artery Catheter Assessment  01/05/23 1240 <1 day    Trialysis (Dialysis) Catheter 01/05/23 1344 right internal jugular <1 day              Drain  Duration                  Chest Tube 01/05/23 1624 Tube - 1 Right Other (Comment) 8.5 Fr. <1 day         Closed/Suction Drain 01/05/23 1736 Right RLQ Bulb 19 Fr. <1 day         Closed/Suction Drain 01/05/23 1737 Lateral;Right RLQ Bulb 19 Fr. <1 day         NG/OG Tube 01/05/23 1300 orogastric Right mouth <1 day         Urethral Catheter 01/05/23 1225 Non-latex;Silicone 16 Fr. <1 day              Airway  Duration                  Airway - Non-Surgical 01/05/23 1203 <1 day              Arterial Line  Duration             Arterial Line 01/05/23 1238  Left Radial <1 day    Arterial Line 01/05/23 1329 Right Femoral <1 day              Peripheral Intravenous Line  Duration                  Peripheral IV - Single Lumen 01/05/23 1137 20 G Anterior;Distal;Right Forearm <1 day         SANDI 01/05/23 1227 Left Antecubital <1 day                    Significant Labs:    CBC/Anemia Profile:  Recent Labs   Lab 01/05/23 1953 01/05/23 2344 01/06/23  0407   WBC 11.58 10.22 9.98   HGB 10.0* 10.1* 9.6*   HCT 31.6* 31.0* 29.3*   * 83* 66*   MCV 79* 79* 77*   RDW 14.7* 14.9* 15.1*        Chemistries:  Recent Labs   Lab 01/04/23  1607 01/05/23  1221 01/05/23  1555 01/05/23  1838 01/05/23  1841 01/05/23 1953 01/05/23 2344 01/06/23  0407      < > 139  --  144 143 137 140   K 4.0   < > 3.7  --  3.4* 3.4* 5.3* 4.5     --   --   --  105 106 104 109   CO2 22*  --   --   --  25 23 23 20*   BUN 13  --   --   --  13 13 14 17   CREATININE 1.1  --   --   --  1.1 1.1 1.2 1.3   CALCIUM 9.2  --   --   --  8.9 8.4* 7.8* 8.0*   ALBUMIN 3.0*   < > 2.6*  --  3.9  --   --  4.4   PROT 7.6  --   --   --  5.5*  --   --  5.2*   BILITOT 1.6*  --   --   --  5.4*  --   --  8.8*   ALKPHOS 157*  --   --   --  80  --   --  48*   ALT 31  --  170*  --  175*  --   --  78*   AST 31  --  263*  --  348* 269* 178* 135*  136*   MG 1.8   < > 1.8 1.8  --   --  1.9 2.0   PHOS  --   --   --  2.0*  --   --  3.1 3.0    < > = values in this interval not displayed.       All pertinent labs within the past 24 hours have been reviewed.    Significant Imaging:  CXR: I have reviewed all pertinent results/findings within the past 24 hours and my personal findings are:  no pneumothorax    Assessment/Plan:     Hepatic granuloma associated with sarcoidosis    Neuro/Psych:   -- Sedation: Propofol    -- Pain: PRN dilaudid             Cards:   -- Pressors: vaso 0.04, levo 0.06  -- Received 4L albumin  Overnight  -- CVP 5-8  -- Wean pressors as tolerated      Pulm:   -- Goal O2 sat > 90%  -- FiO2 weaned to 50%  this morning  -- Wean as able with plans for SBT today      Renal:  -- Keep kat for strict I/O  -- Trend BUN/Cr  -- BMP Q12H for 24 hours then daily    Lab Results   Component Value Date    CREATININE 1.3 01/06/2023    BUN 17 01/06/2023     01/06/2023    K 4.5 01/06/2023     01/06/2023    CO2 20 (L) 01/06/2023         FEN / GI:   -- mIVF 100cc/hr  -- Replace lytes as needed  -- Nutrition: NPO, will re-evaluate once extubated  -- Anti-rejection methylpred, mycophenolate, tacro; daily tacro levels  -- AST Q4H for 24 hr, downtrending  -- T-bili 8.8 from 5.4, direct 3.2; trend daily  -- Liver US this AM      ID:   -- Tm: afebrile  -- Abx Amp, Bactrim, Valganciclovir, Nystatin suspension    Recent Labs   Lab 01/06/23  0407   WBC 9.98   RBC 3.81*   HGB 9.6*   HCT 29.3*   PLT 66*   MCV 77*   MCH 25.2*   MCHC 32.8           Heme/Onc:   -- Hgb 9.6 from 10.1  -- No indication for transfusion  -- Q4 Hr CBC and PT for 1 day, then daily  -- Vitamin K Q8H for 3 doses      Endo:   -- Gluc goal 140-180  -- Insulin gtt; Endo consulted appreciate recommendations      PPx:   Feeding: NPO  Analgesia/Sedation: PRNs / Prop  Thromboembolic prevention: SQH  HOB >30: Ordered  Stress Ulcer ppx: Famotidine Q12H  Glucose control: Critical care goal 140-180 g/dl, insulin gtt    Lines/Drains/Airway: RIJ CVC x2, A line x2 / BRITTANIE x2 / ETT / OGT / kat / R CT      Dispo/Code Status/Palliative:   -- SICU / Full Code        Critical care was time spent personally by me on the following activities: development of treatment plan with patient or surrogate and bedside caregivers, discussions with consultants, evaluation of patient's response to treatment, examination of patient, ordering and performing treatments and interventions, ordering and review of laboratory studies, ordering and review of radiographic studies, pulse oximetry, re-evaluation of patient's condition.  This critical care time did not overlap with that of any other  provider or involve time for any procedures.     Rosa Sanchez MD  Critical Care - Surgery  [facility]

## 2023-01-06 NOTE — CONSULTS
[facility]  Adult Nutrition  Consult Note    SUMMARY     Recommendations    1) When medically able ADAT to low sodium diet   2) If intake <50% added Boost TID   3) RD following    Goals: meets % een/epn by next rd f/u  Nutrition Goal Status: new  Communication of RD Recs: other (comment) (POC)    Assessment and Plan    Nutrition Problem  Inadequate oral intake     Related to (etiology):   Inability to consume sufficient energy     Signs and Symptoms (as evidenced by):   NPO    Interventions/Recommendations (treatment strategy):  Collaboration with other providers     Nutrition Diagnosis Status:   New      Reason for Assessment    Reason For Assessment: consult  Diagnosis: transplant/postoperative complications  Relevant Medical History: 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 Pt extubated, w/ propofol weaned at time of visit. Pt talking to wife when eyes closed but not fully coherent. Pt following LS diet PTA with good intake. Pt appears nourished.   2gm Na education provided on . Discussed foods to limit or avoid and benefits of diet adherence. Post-transplant expectations/guidelines also introduced. Appropriate education material with RD contact information provided. No other needs identified. RD following  Nutrition Discharge Planningm Na education provided on . Discussed foods to limit or avoid and benefits of diet adherence. Post-transplant expectations/guidelines also introduced.    Nutrition Risk Screen    Nutrition Risk Screen: no indicators present    Nutrition/Diet History    Patient Reported Diet/Restrictions/Preferences: low salt  Spiritual, Cultural Beliefs, Worship Practices, Values that Affect Care: no  Food Allergies: NKFA    Anthropometrics    Temp: 99 °F (37.2 °C)  Height Method:  "Stated  Height: 5' 8" (172.7 cm)  Height (inches): 68 in  Weight Method: Bed Scale  Weight: 130.9 kg (288 lb 9.3 oz)  Weight (lb): 288.59 lb  Ideal Body Weight (IBW), Male: 154 lb  % Ideal Body Weight, Male (lb): 187.4 %  BMI (Calculated): 43.9  BMI Grade: greater than 40 - morbid obesity       Lab/Procedures/Meds    Pertinent Labs Reviewed: reviewed  Pertinent Labs Comments: H/H: 7.9/23.6, MCH: 25.7, AST: 81  Pertinent Medications Reviewed: reviewed  Pertinent Medications Comments: famotidine, heparin, vitamin K, prograf, valganciclovir, insulin, D5, vasopressin    Estimated/Assessed Needs    Weight Used For Calorie Calculations: 130.9 kg (288 lb 9.3 oz)  Energy Calorie Requirements (kcal): 2158 (MSJ)  Energy Need Method: Robertson-St Jeor  Protein Requirements: 157-170 (1.2-1.3 g/kg)  Weight Used For Protein Calculations: 130.9 kg (288 lb 9.3 oz)        RDA Method (mL): 2158         Nutrition Prescription Ordered    Current Diet Order: NPO sips of liquids    Evaluation of Received Nutrient/Fluid Intake    I/O: +85.4  Energy Calories Required: not meeting needs  Protein Required: not meeting needs  Comments: LBM 1/4  % Intake of Estimated Energy Needs: 0 - 25 %  % Meal Intake: NPO    Nutrition Risk    Level of Risk/Frequency of Follow-up: low       Monitor and Evaluation    Food and Nutrient Intake: energy intake, food and beverage intake  Food and Nutrient Adminstration: diet order  Knowledge/Beliefs/Attitudes: beliefs and attitudes, food and nutrition knowledge/skill  Physical Activity and Function: nutrition-related ADLs and IADLs  Anthropometric Measurements: height/length, weight, weight change, body mass index  Biochemical Data, Medical Tests and Procedures: electrolyte and renal panel, gastrointestinal profile, glucose/endocrine profile, inflammatory profile, lipid profile  Nutrition-Focused Physical Findings: overall appearance       Nutrition Follow-Up    RD Follow-up?: Yes    Lina Johnson, " Registration Eligible, Provisional LDN

## 2023-01-06 NOTE — TRANSFER OF CARE
"Anesthesia Transfer of Care Note    Patient: Rosalio Mccarty    Procedure(s) Performed: Procedure(s) (LRB):  TRANSPLANT, LIVER (N/A)    Patient location: ICU    Anesthesia Type: general    Transport from OR: Continuous ECG monitoring in transport. Continuous SpO2 monitoring in transport. Continuos invasive BP monitoring in transport. Upon arrival to PACU/ICU, patient attached to ventilator and auscultated to confirm bilateral breath sounds and adequate TV    Post pain: adequate analgesia    Post assessment: no apparent anesthetic complications    Post vital signs: stable    Level of consciousness: sedated    Nausea/Vomiting: no nausea/vomiting    Complications: none    Transfer of care protocol was followed      Last vitals:   Visit Vitals  /78 (BP Location: Right arm, Patient Position: Lying)   Pulse 76   Temp 36.8 °C (98.2 °F) (Temporal)   Resp 18   Ht 5' 8" (1.727 m)   Wt 103 kg (227 lb 1.2 oz)   SpO2 100%   BMI 34.53 kg/m²     "

## 2023-01-06 NOTE — ASSESSMENT & PLAN NOTE
BG goal: 140-180     Pt w/ T2DM.  S/p Liver transplant.  On IIP.  Will monitor on IIP and transition to transition drip once sugars stable and insulin drip rate without large variability.    -Continue Intensive insulin drip    - POCT Glucose every hour  - Hypoglycemia protocol in place      ** Please notify Endocrine for any change and/or advance in diet**  ** Please call Endocrine for any BG related issues **     Discharge Planning:   TBD. Please notify endocrinology prior to discharge.

## 2023-01-06 NOTE — OP NOTE
Operative Report    Date of Procedure: 1/5/2023  Date of Transplant (UNOS): 1/5/23    Surgeons:  Surgeon(s) and Role:     * Rosa Foreman MD - Primary     * Aldo Hdz Jr., MD - Assisting     * Pineda Ga MD - Fellow     * Fab Gentile MD - Fellow    First Assistant Attestation:  The presence of an additional attending surgeon functioning as first assistant was required due to the complexity of the procedure relative to any available residents. I certify that no resident was available who was qualified to serve as first assistant. Duties performed by the assistant included assisting the primary surgeon.    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 year-old male with Cirrhosis: Other, Specify - granulomatous hepatitis;  (UNOS terminology) who has been evaluated for a liver transplant.  The procedure was thoroughly discussed with the patient, including potential risks, complications, and alternatives. Specific complications mentioned included death, graft non-function, bleeding, infection, rejection, renal failure, respiratory failure, and neurologic deficits, as well as the possibility of other complications not specifically mentioned.    Donor Risk Factors:  Prior to the operation, the patient was advised of any donor-specific risk factors requiring specific disclosure. Factors in this case included PHS  risk criteria  or donor HCV+.      Specific [facility] Donor Risk criteria for the organ donor include:  Incarceration (confinement in half-way, prison, or juvenile correction facility) for greater than or equal to 72 consecutive hours    All questions were answered, the patient voiced appropriate understanding, and he agreed to proceed with the planned procedure.    ABO Confirmation: Immediately following arrival of the donor organ and prior to implantation, a formal ABO confirmation was done according to hospital and UNOS policies.  I confirmed the UNOS ID number of the donor organ ([id number]) and the donor and recipient ABO types, directly verifying these data by comparison with the UNOS Match Run report ([id number].  This confirmation was personally done by an attending surgeon and circulating nurse, and is officially documented elsewhere.    Time-Out: A complete time out was carried out prior to incision, with confirmation of patient identity, correct procedure, correct operative site, appropriate antibiotic prophylaxis, review of any known allergies, and presence of all needed equipment.    Procedure in Detail  Following the induction of general endotracheal anesthesia, appropriate arterial and venous lines were placed by the anesthesia team.  Care was taken to pad all pressure points and avoid any potential traction injuries from positioning. The urinary bladder was catheterized.  Sequential compression boots and Denisse Huggers were used. The chest, abdomen, and upper thighs were sterilely prepped and draped.     The abdomen was entered via a wide bilateral subcostal incision. 4,500 mL of ascites was removed. The round ligament was ligated and divided. The falciform, left triangular, and gastrohepatic ligaments were divided using the electrocautery, with 2-0 silk ties as needed. The Weston self-retaining retractor was placed to provide exposure. Adhesions between the abdominal viscera and the abdominal wall and the  undersurface of the liver were divided as needed using cautery dissection and silk ties or suture ligatures. Hilar dissection was then carried out, with ligation of the right and left hepatic arteries as well as the cystic duct and the common hepatic duct. The recipient arterial anatomy was found to be: standard. The portal vein was exposed circumferentially from its bifurcation down to the superior border of the pancreas. The portal vein was found to be patent.  Attention was next directed to the right side of the liver where the right triangular ligament was taken down, exposing the bare area of the liver, and the IVC. The infrahepatic IVC was isolated, followed by mobilization of the retrohepatic cava, division of the right adrenal vein, and isolation of the suprahepatic IVC. The patient was given 2000 units of heparin prior to caval cross-clamping. . After assuring adequate stability after cross-clamping, the native liver was excised and the allograft liver was brought to the operative field.  The liver was perfused with cold 5% albumin during implantation to displace the organ preservation solution.  IVC reconstruction technique consisted of end to end ivc using 3-0 and 4-0 polypropylene as appropriate.  The donor portal vein was then anastomosed to the recipient portal inflow site (end portal vein) with 5-0 polypropylene. Once this was completed, anesthesia was notified and the liver was re-perfused. Copious irrigation with warm saline and temporary finger occlusion of portal inflow were used as needed to avoid any problems with hypothermia. Temporary packing, electrocautery, and polypropylene suture ligatures were used as appropriate to provide hemostasis. Once this was satisfactory, attention was directed to the arterial reconstruction. This liver did not come from a DCD donor. Arterial inflow was provided to the graft by anastomosing the recipient proper hepatic artery to the donor  common hepatic artery  using 6-0 polypropylene. The liver was assessed for adequacy of perfusion, which was satisfactory. The gallbladder was then removed from the allograft liver, and a temporary packing period was carried out to help assure hemostatis. A percutaneous Wendy drain was placed in the  right space using the Seldinger technique and connected to an appropriate drainage system.  Attention was next directed toward the bile duct. The donor bile duct was prepared and assessed for adequate vascular supply. Biliary reconstruction was then performed by anastomosing the recipient common bile duct to the donor duct using 6-0 PDS sutures.  The biliary stent used was: none.  A final check for hemostasis was made. 2 19f Warren drains were placed through separate incisions below the transverse abdominal incision and placed in the usual positions. The cavity was irrigated with saline. The abdomen was closed in layers using running #1 PDS suture. The incision was irrigated and the skin was closed with staples. At the end of the case all instrument, needle, and sponge counts were correct. The patient was taken to the ICU in good condition.     Fluids Administered:   Crystalloid (mL) 8,000   Colloid (mL) 2,100   RBC (Units) 2   FFP (Units) 4   Cryoprecipitate (Units)  2   Platelets (Units) 2   Cell Saver (CCs) 3,195      Specimens: Explant liver  Drains: 19f Warren drains x 2    Additional Findings:    Estimated Blood Loss: 9,600 mL  Ascites Evacuated: 4,500 mL    Ischemic Times:  Time of portal reperfusion: 1/5/2023  3:24 PM  Time of arterial reperfusion: 1/5/2023  3:51 PM  Anastomosis (warm ischemia) time: 30 minutes  Cold ischemia time: 270 minutes    Surgical Data:  Graft type (whole vs. partial): whole liver  IVC reconstruction: end to end ivc  Portal vein status: patent  Portal graft performed? no  Donor arterial anatomy: replaced right hepatic from sma  Donor arterial inflow: common hepatic artery  Recipient arterial anatomy:  standard  Recipient arterial inflow: proper hepatic artery  Arterial graft performed? no  Biliary reconstruction: end to end (donor common bile duct to recipient common bile duct)  Biliary stent: none  Disposition of Vessels from donor of transplanted organ: discarded  Damage during procurement: no  Procurement damage comments: none    Donor Factors:  UNOS ID: [id number]  UNOS Match Run: [id number]  Donor type: donation after brain death  Donor with reported PHS risk criteria: yes  Donor CMV serologic status: Positive  Donor with known cancer: no  Donor HCV serologic status: Positive  Donor HBcAb: Negative  Donor HBV ASAF: Negative  Donor HCV ASAF: Negative  Liver weight: 1420 grams

## 2023-01-06 NOTE — SUBJECTIVE & OBJECTIVE
Interval HPI:   S/p liver transplant last night. Patient in room 64790/64550 A. Blood glucose variable. BG at and above goal on current insulin regimen (IIP). Steroid use- Methylprednisolone  200 mg .   1 Day Post-Op  Renal function- Normal   Vasopressors-  Norepinephrine     Diet NPO Except for: Sips with Medication     Eating:   NPO  Nausea: Yes  Hypoglycemia and intervention: No  Fever: No  TPN and/or TF: No    PMH, PSH, FH, SH updated and reviewed     ROS:      Review of Systems   Unable to perform ROS: Intubated     Current Medications and/or Treatments Impacting Glycemic Control  Immunotherapy:    Immunosuppressants           Stop Route Frequency     mycophenolate mofetil 200 mg/mL suspension 1,000 mg         -- Oral 2 times daily     tacrolimus (PROGRAF) 1 mg/mL oral syringe         -- Oral 2 times daily          Steroids:   Hormones (From admission, onward)      Start     Stop Route Frequency Ordered    01/11/23 0900  predniSONE tablet 20 mg  (methylprednisolone taper panel)        See Hyperspace for full Linked Orders Report.    -- Oral Daily 01/05/23 1824    01/10/23 0900  methylPREDNISolone sodium succinate injection 40 mg  (methylprednisolone taper panel)        See Hyperspace for full Linked Orders Report.    01/11 0859 IV Daily 01/05/23 1824    01/09/23 0900  methylPREDNISolone sodium succinate injection 80 mg  (methylprednisolone taper panel)        See Hyperspace for full Linked Orders Report.    01/10 0859 IV Daily 01/05/23 1824    01/08/23 0900  methylPREDNISolone sodium succinate injection 120 mg  (methylprednisolone taper panel)        See Hyperspace for full Linked Orders Report.    01/09 0859 IV Daily 01/05/23 1824    01/07/23 0900  methylPREDNISolone sodium succinate injection 160 mg  (methylprednisolone taper panel)        See Hyperspace for full Linked Orders Report.    01/08 0859 IV Daily 01/05/23 1824    01/05/23 2000  vasopressin (PITRESSIN) 0.2 Units/mL in dextrose 5 % 100 mL infusion          -- IV Continuous 01/05/23 1855          Pressors:    Autonomic Drugs (From admission, onward)      Start     Stop Route Frequency Ordered    01/05/23 2000  NORepinephrine 4 mg in dextrose 5% 250 mL infusion (premix) (titrating)        Question Answer Comment   Begin at (in mcg/kg/min): 0.02    Titrate by: (in mcg/kg/min) 0.02    Titrate interval: (in minutes) 5    Titrate to maintain: (MAP or SBP) SBP    Greater than: (in mmHg) 100    Maximum dose: (in mcg/kg/min) 3        -- IV Continuous 01/05/23 1855          Hyperglycemia/Diabetes Medications:   Antihyperglycemics (From admission, onward)      Start     Stop Route Frequency Ordered    01/05/23 1930  insulin regular in 0.9 % NaCl 100 unit/100 mL (1 unit/mL) infusion        Question:  Enter initial dose from Infusion Protocol Chart (Units/hr):  Answer:  1.5    -- IV Continuous 01/05/23 1824             PHYSICAL EXAMINATION:  Vitals:    01/06/23 0845   BP:    Pulse: 66   Resp: 20   Temp:      Body mass index is 43.88 kg/m².    Physical Exam  HENT:      Head: Normocephalic and atraumatic.      Mouth/Throat:      Comments: OGT  Cardiovascular:      Rate and Rhythm: Normal rate and regular rhythm.   Pulmonary:      Comments: Intubated  Abdominal:      General: There is no distension.      Palpations: There is no mass.   Musculoskeletal:         General: No swelling.      Right lower leg: No edema.      Left lower leg: No edema.   Skin:     Findings: No lesion or rash.   Neurological:      Comments: Sedated

## 2023-01-06 NOTE — CONSULTS
[facility]  Endocrinology  Diabetes Consult Note    Consult Requested by: Rosa Foreman MD   Reason for admit: <principal problem not specified>    HISTORY OF PRESENT ILLNESS:  Reason for Consult: Management of T2DM, Hyperglycemia     Surgical Procedure and Date: Liver transplant 1/5/2023    Diabetes diagnosis year: MARILEE    Home Diabetes Medications:  Tresiba, Humalog, Jardiance 25 mg, Ozempic 2 mg    How often checking glucose at home? MARILEE    Diabetes Complications include:     Hyperglycemia    Complicating diabetes co morbidities:   Glucocorticoid use       HPI:   Patient is a 47 y.o. 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  Pt s/p  Mr. orthotopic DBD liver transplant with end to end biliary anastomosis and replaced right hepatic artery reconstruction.  Endocrine consulted for management of BG          Interval HPI:   S/p liver transplant last night. Patient in room 26746/60373 A. Blood glucose variable. BG at and above goal on current insulin regimen (IIP). Steroid use- Methylprednisolone  200 mg .   1 Day Post-Op  Renal function- Normal   Vasopressors-  Norepinephrine     Diet NPO Except for: Sips with Medication     Eating:   NPO  Nausea: Yes  Hypoglycemia and intervention: No  Fever: No  TPN and/or TF: No    PMH, PSH, FH, SH updated and reviewed     ROS:      Review of Systems   Unable to perform ROS: Intubated     Current Medications and/or Treatments Impacting Glycemic Control  Immunotherapy:    Immunosuppressants           Stop Route Frequency     mycophenolate mofetil 200 mg/mL suspension 1,000 mg         -- Oral 2 times daily     tacrolimus (PROGRAF) 1 mg/mL oral syringe         -- Oral 2 times daily          Steroids:   Hormones (From admission, onward)      Start     Stop Route Frequency Ordered    01/11/23 0900  predniSONE tablet 20 mg   (methylprednisolone taper panel)        See Hyperspace for full Linked Orders Report.    -- Oral Daily 01/05/23 1824    01/10/23 0900  methylPREDNISolone sodium succinate injection 40 mg  (methylprednisolone taper panel)        See Hyperspace for full Linked Orders Report.    01/11 0859 IV Daily 01/05/23 1824    01/09/23 0900  methylPREDNISolone sodium succinate injection 80 mg  (methylprednisolone taper panel)        See Hyperspace for full Linked Orders Report.    01/10 0859 IV Daily 01/05/23 1824    01/08/23 0900  methylPREDNISolone sodium succinate injection 120 mg  (methylprednisolone taper panel)        See Hyperspace for full Linked Orders Report.    01/09 0859 IV Daily 01/05/23 1824    01/07/23 0900  methylPREDNISolone sodium succinate injection 160 mg  (methylprednisolone taper panel)        See Hyperspace for full Linked Orders Report.    01/08 0859 IV Daily 01/05/23 1824    01/05/23 2000  vasopressin (PITRESSIN) 0.2 Units/mL in dextrose 5 % 100 mL infusion         -- IV Continuous 01/05/23 1855          Pressors:    Autonomic Drugs (From admission, onward)      Start     Stop Route Frequency Ordered    01/05/23 2000  NORepinephrine 4 mg in dextrose 5% 250 mL infusion (premix) (titrating)        Question Answer Comment   Begin at (in mcg/kg/min): 0.02    Titrate by: (in mcg/kg/min) 0.02    Titrate interval: (in minutes) 5    Titrate to maintain: (MAP or SBP) SBP    Greater than: (in mmHg) 100    Maximum dose: (in mcg/kg/min) 3        -- IV Continuous 01/05/23 1855          Hyperglycemia/Diabetes Medications:   Antihyperglycemics (From admission, onward)      Start     Stop Route Frequency Ordered    01/05/23 1930  insulin regular in 0.9 % NaCl 100 unit/100 mL (1 unit/mL) infusion        Question:  Enter initial dose from Infusion Protocol Chart (Units/hr):  Answer:  1.5    -- IV Continuous 01/05/23 1824             PHYSICAL EXAMINATION:  Vitals:    01/06/23 0845   BP:    Pulse: 66   Resp: 20   Temp:       Body mass index is 43.88 kg/m².    Physical Exam  HENT:      Head: Normocephalic and atraumatic.      Mouth/Throat:      Comments: OGT  Cardiovascular:      Rate and Rhythm: Normal rate and regular rhythm.   Pulmonary:      Comments: Intubated  Abdominal:      General: There is no distension.      Palpations: There is no mass.   Musculoskeletal:         General: No swelling.      Right lower leg: No edema.      Left lower leg: No edema.   Skin:     Findings: No lesion or rash.   Neurological:      Comments: Sedated         Labs Reviewed and Include   Recent Labs   Lab 01/06/23  0407 01/06/23  0800 01/06/23  1217   * 171*  --    CALCIUM 8.0* 8.1*  --    ALBUMIN 4.4  --   --    PROT 5.2*  --   --     141  --    K 4.5 3.9  --    CO2 20* 23  --     107  --    BUN 17 16  --    CREATININE 1.3 1.4  --    ALKPHOS 48*  --   --    ALT 78*  --   --    *  136* 112* 81*   BILITOT 8.8*  --   --      Lab Results   Component Value Date    WBC 11.32 01/06/2023    HGB 9.7 (L) 01/06/2023    HCT 29.4 (L) 01/06/2023    MCV 77 (L) 01/06/2023    PLT 73 (L) 01/06/2023     No results for input(s): TSH, FREET4 in the last 168 hours.  Lab Results   Component Value Date    HGBA1C 6.0 (H) 01/04/2023       Nutritional status:   Body mass index is 43.88 kg/m².  Lab Results   Component Value Date    ALBUMIN 4.4 01/06/2023    ALBUMIN 3.9 01/05/2023    ALBUMIN 2.6 (L) 01/05/2023     No results found for: PREALBUMIN    Estimated Creatinine Clearance: 86.2 mL/min (based on SCr of 1.4 mg/dL).    Accu-Checks  Recent Labs     01/06/23  0406 01/06/23  0507 01/06/23  0601 01/06/23  0724 01/06/23  0803 01/06/23  0904 01/06/23  1010 01/06/23  1103 01/06/23  1220 01/06/23  1306   POCTGLUCOSE 229* 236* 202* 173* 160* 155* 154* 144* 132* 126*        ASSESSMENT and PLAN    Type 2 diabetes mellitus with hyperglycemia  BG goal: 140-180     Pt w/ T2DM.  S/p Liver transplant.  On IIP.  Will monitor on IIP and transition to transition  drip once sugars stable and insulin drip rate without large variability.    -Continue Intensive insulin drip    - POCT Glucose every hour  - Hypoglycemia protocol in place      ** Please notify Endocrine for any change and/or advance in diet**  ** Please call Endocrine for any BG related issues **     Discharge Planning:   TBD. Please notify endocrinology prior to discharge.        Steroid-induced hyperglycemia  On steroids post transplant.  Will continue to monitor      Hepatic granuloma associated with sarcoidosis    Managed by primary team.  S/p liver transplant        Plan discussed with family, and RN at bedside.     Unruly Jose PA-C  Endocrinology  [facility]

## 2023-01-07 PROBLEM — Z94.4 LIVER TRANSPLANTED: Status: ACTIVE | Noted: 2023-01-07

## 2023-01-07 LAB
ALBUMIN SERPL BCP-MCNC: 3.8 G/DL (ref 3.5–5.2)
ALP SERPL-CCNC: 40 U/L (ref 55–135)
ALT SERPL W/O P-5'-P-CCNC: 72 U/L (ref 10–44)
ANION GAP SERPL CALC-SCNC: 11 MMOL/L (ref 8–16)
ANISOCYTOSIS BLD QL SMEAR: SLIGHT
APTT BLDCRRT: 43.4 SEC (ref 21–32)
AST SERPL-CCNC: 65 U/L (ref 10–40)
BASOPHILS # BLD AUTO: 0 K/UL (ref 0–0.2)
BASOPHILS # BLD AUTO: 0 K/UL (ref 0–0.2)
BASOPHILS NFR BLD: 0 % (ref 0–1.9)
BASOPHILS NFR BLD: 0 % (ref 0–1.9)
BILIRUB DIRECT SERPL-MCNC: 0.9 MG/DL (ref 0.1–0.3)
BILIRUB SERPL-MCNC: 3.8 MG/DL (ref 0.1–1)
BLD PROD TYP BPU: NORMAL
BLOOD UNIT EXPIRATION DATE: NORMAL
BLOOD UNIT TYPE CODE: 6200
BLOOD UNIT TYPE: NORMAL
BUN SERPL-MCNC: 21 MG/DL (ref 6–20)
CA-I BLDV-SCNC: 1.1 MMOL/L (ref 1.06–1.42)
CALCIUM SERPL-MCNC: 8 MG/DL (ref 8.7–10.5)
CHLORIDE SERPL-SCNC: 108 MMOL/L (ref 95–110)
CO2 SERPL-SCNC: 18 MMOL/L (ref 23–29)
CODING SYSTEM: NORMAL
CREAT SERPL-MCNC: 1.2 MG/DL (ref 0.5–1.4)
DIFFERENTIAL METHOD: ABNORMAL
DIFFERENTIAL METHOD: ABNORMAL
DISPENSE STATUS: NORMAL
EOSINOPHIL # BLD AUTO: 0 K/UL (ref 0–0.5)
EOSINOPHIL # BLD AUTO: 0 K/UL (ref 0–0.5)
EOSINOPHIL NFR BLD: 0 % (ref 0–8)
EOSINOPHIL NFR BLD: 0 % (ref 0–8)
ERYTHROCYTE [DISTWIDTH] IN BLOOD BY AUTOMATED COUNT: 15.7 % (ref 11.5–14.5)
ERYTHROCYTE [DISTWIDTH] IN BLOOD BY AUTOMATED COUNT: 16.1 % (ref 11.5–14.5)
EST. GFR  (NO RACE VARIABLE): >60 ML/MIN/1.73 M^2
GLUCOSE SERPL-MCNC: 180 MG/DL (ref 70–110)
HCT VFR BLD AUTO: 21.9 % (ref 40–54)
HCT VFR BLD AUTO: 26.4 % (ref 40–54)
HGB BLD-MCNC: 7 G/DL (ref 14–18)
HGB BLD-MCNC: 8.6 G/DL (ref 14–18)
HYPOCHROMIA BLD QL SMEAR: ABNORMAL
IMM GRANULOCYTES # BLD AUTO: 0.01 K/UL (ref 0–0.04)
IMM GRANULOCYTES # BLD AUTO: 0.02 K/UL (ref 0–0.04)
IMM GRANULOCYTES NFR BLD AUTO: 0.4 % (ref 0–0.5)
IMM GRANULOCYTES NFR BLD AUTO: 0.4 % (ref 0–0.5)
INR PPP: 1.4 (ref 0.8–1.2)
LYMPHOCYTES # BLD AUTO: 0.2 K/UL (ref 1–4.8)
LYMPHOCYTES # BLD AUTO: 0.2 K/UL (ref 1–4.8)
LYMPHOCYTES NFR BLD: 4.1 % (ref 18–48)
LYMPHOCYTES NFR BLD: 6 % (ref 18–48)
MAGNESIUM SERPL-MCNC: 1.9 MG/DL (ref 1.6–2.6)
MCH RBC QN AUTO: 25.5 PG (ref 27–31)
MCH RBC QN AUTO: 25.9 PG (ref 27–31)
MCHC RBC AUTO-ENTMCNC: 32 G/DL (ref 32–36)
MCHC RBC AUTO-ENTMCNC: 32.6 G/DL (ref 32–36)
MCV RBC AUTO: 80 FL (ref 82–98)
MCV RBC AUTO: 80 FL (ref 82–98)
MONOCYTES # BLD AUTO: 0.2 K/UL (ref 0.3–1)
MONOCYTES # BLD AUTO: 0.2 K/UL (ref 0.3–1)
MONOCYTES NFR BLD: 4.3 % (ref 4–15)
MONOCYTES NFR BLD: 5.6 % (ref 4–15)
NEUTROPHILS # BLD AUTO: 2.4 K/UL (ref 1.8–7.7)
NEUTROPHILS # BLD AUTO: 4.5 K/UL (ref 1.8–7.7)
NEUTROPHILS NFR BLD: 88 % (ref 38–73)
NEUTROPHILS NFR BLD: 91.2 % (ref 38–73)
NRBC BLD-RTO: 0 /100 WBC
NRBC BLD-RTO: 0 /100 WBC
NUM UNITS TRANS FFP: NORMAL
OVALOCYTES BLD QL SMEAR: ABNORMAL
PHOSPHATE SERPL-MCNC: 3.7 MG/DL (ref 2.7–4.5)
PLATELET # BLD AUTO: 38 K/UL (ref 150–450)
PLATELET # BLD AUTO: 48 K/UL (ref 150–450)
PLATELET BLD QL SMEAR: ABNORMAL
PMV BLD AUTO: 10 FL (ref 9.2–12.9)
PMV BLD AUTO: 10.8 FL (ref 9.2–12.9)
POCT GLUCOSE: 123 MG/DL (ref 70–110)
POCT GLUCOSE: 131 MG/DL (ref 70–110)
POCT GLUCOSE: 137 MG/DL (ref 70–110)
POCT GLUCOSE: 144 MG/DL (ref 70–110)
POCT GLUCOSE: 167 MG/DL (ref 70–110)
POCT GLUCOSE: 170 MG/DL (ref 70–110)
POCT GLUCOSE: 173 MG/DL (ref 70–110)
POCT GLUCOSE: 178 MG/DL (ref 70–110)
POCT GLUCOSE: 193 MG/DL (ref 70–110)
POCT GLUCOSE: 194 MG/DL (ref 70–110)
POCT GLUCOSE: 206 MG/DL (ref 70–110)
POCT GLUCOSE: 212 MG/DL (ref 70–110)
POCT GLUCOSE: 212 MG/DL (ref 70–110)
POCT GLUCOSE: 240 MG/DL (ref 70–110)
POCT GLUCOSE: 267 MG/DL (ref 70–110)
POCT GLUCOSE: 279 MG/DL (ref 70–110)
POIKILOCYTOSIS BLD QL SMEAR: SLIGHT
POTASSIUM SERPL-SCNC: 4.1 MMOL/L (ref 3.5–5.1)
PROT SERPL-MCNC: 4.8 G/DL (ref 6–8.4)
PROTHROMBIN TIME: 14.2 SEC (ref 9–12.5)
RBC # BLD AUTO: 2.74 M/UL (ref 4.6–6.2)
RBC # BLD AUTO: 3.32 M/UL (ref 4.6–6.2)
SODIUM SERPL-SCNC: 137 MMOL/L (ref 136–145)
TACROLIMUS BLD-MCNC: <2 NG/ML (ref 5–15)
UNIT NUMBER: NORMAL
WBC # BLD AUTO: 2.67 K/UL (ref 3.9–12.7)
WBC # BLD AUTO: 4.92 K/UL (ref 3.9–12.7)

## 2023-01-07 PROCEDURE — 99233 PR SUBSEQUENT HOSPITAL CARE,LEVL III: ICD-10-PCS | Mod: ,,, | Performed by: PHYSICIAN ASSISTANT

## 2023-01-07 PROCEDURE — 99291 CRITICAL CARE FIRST HOUR: CPT | Mod: ,,, | Performed by: ANESTHESIOLOGY

## 2023-01-07 PROCEDURE — 94761 N-INVAS EAR/PLS OXIMETRY MLT: CPT

## 2023-01-07 PROCEDURE — 63600175 PHARM REV CODE 636 W HCPCS: Performed by: STUDENT IN AN ORGANIZED HEALTH CARE EDUCATION/TRAINING PROGRAM

## 2023-01-07 PROCEDURE — 25000003 PHARM REV CODE 250: Performed by: STUDENT IN AN ORGANIZED HEALTH CARE EDUCATION/TRAINING PROGRAM

## 2023-01-07 PROCEDURE — 83735 ASSAY OF MAGNESIUM: CPT | Performed by: STUDENT IN AN ORGANIZED HEALTH CARE EDUCATION/TRAINING PROGRAM

## 2023-01-07 PROCEDURE — 63600175 PHARM REV CODE 636 W HCPCS: Performed by: TRANSPLANT SURGERY

## 2023-01-07 PROCEDURE — 63600175 PHARM REV CODE 636 W HCPCS: Performed by: SURGERY

## 2023-01-07 PROCEDURE — 84100 ASSAY OF PHOSPHORUS: CPT | Performed by: STUDENT IN AN ORGANIZED HEALTH CARE EDUCATION/TRAINING PROGRAM

## 2023-01-07 PROCEDURE — 97161 PT EVAL LOW COMPLEX 20 MIN: CPT

## 2023-01-07 PROCEDURE — 99900035 HC TECH TIME PER 15 MIN (STAT)

## 2023-01-07 PROCEDURE — 80053 COMPREHEN METABOLIC PANEL: CPT | Performed by: STUDENT IN AN ORGANIZED HEALTH CARE EDUCATION/TRAINING PROGRAM

## 2023-01-07 PROCEDURE — P9016 RBC LEUKOCYTES REDUCED: HCPCS | Performed by: NURSE PRACTITIONER

## 2023-01-07 PROCEDURE — 25000003 PHARM REV CODE 250

## 2023-01-07 PROCEDURE — 97116 GAIT TRAINING THERAPY: CPT

## 2023-01-07 PROCEDURE — 85025 COMPLETE CBC W/AUTO DIFF WBC: CPT | Mod: 91 | Performed by: TRANSPLANT SURGERY

## 2023-01-07 PROCEDURE — 86644 CMV ANTIBODY: CPT | Performed by: STUDENT IN AN ORGANIZED HEALTH CARE EDUCATION/TRAINING PROGRAM

## 2023-01-07 PROCEDURE — 27000221 HC OXYGEN, UP TO 24 HOURS

## 2023-01-07 PROCEDURE — 82330 ASSAY OF CALCIUM: CPT

## 2023-01-07 PROCEDURE — 20000000 HC ICU ROOM

## 2023-01-07 PROCEDURE — 82248 BILIRUBIN DIRECT: CPT | Performed by: STUDENT IN AN ORGANIZED HEALTH CARE EDUCATION/TRAINING PROGRAM

## 2023-01-07 PROCEDURE — 85610 PROTHROMBIN TIME: CPT | Performed by: STUDENT IN AN ORGANIZED HEALTH CARE EDUCATION/TRAINING PROGRAM

## 2023-01-07 PROCEDURE — 99291 PR CRITICAL CARE, E/M 30-74 MINUTES: ICD-10-PCS | Mod: ,,, | Performed by: ANESTHESIOLOGY

## 2023-01-07 PROCEDURE — 97165 OT EVAL LOW COMPLEX 30 MIN: CPT

## 2023-01-07 PROCEDURE — 85730 THROMBOPLASTIN TIME PARTIAL: CPT | Performed by: STUDENT IN AN ORGANIZED HEALTH CARE EDUCATION/TRAINING PROGRAM

## 2023-01-07 PROCEDURE — P9035 PLATELET PHERES LEUKOREDUCED: HCPCS | Performed by: STUDENT IN AN ORGANIZED HEALTH CARE EDUCATION/TRAINING PROGRAM

## 2023-01-07 PROCEDURE — 99233 SBSQ HOSP IP/OBS HIGH 50: CPT | Mod: ,,, | Performed by: PHYSICIAN ASSISTANT

## 2023-01-07 PROCEDURE — 25000003 PHARM REV CODE 250: Performed by: PHYSICIAN ASSISTANT

## 2023-01-07 PROCEDURE — 94799 UNLISTED PULMONARY SVC/PX: CPT

## 2023-01-07 PROCEDURE — 63600175 PHARM REV CODE 636 W HCPCS: Performed by: PHYSICIAN ASSISTANT

## 2023-01-07 PROCEDURE — 80197 ASSAY OF TACROLIMUS: CPT | Performed by: STUDENT IN AN ORGANIZED HEALTH CARE EDUCATION/TRAINING PROGRAM

## 2023-01-07 PROCEDURE — 97110 THERAPEUTIC EXERCISES: CPT

## 2023-01-07 PROCEDURE — 97530 THERAPEUTIC ACTIVITIES: CPT

## 2023-01-07 PROCEDURE — 36430 TRANSFUSION BLD/BLD COMPNT: CPT

## 2023-01-07 PROCEDURE — 85025 COMPLETE CBC W/AUTO DIFF WBC: CPT | Performed by: STUDENT IN AN ORGANIZED HEALTH CARE EDUCATION/TRAINING PROGRAM

## 2023-01-07 PROCEDURE — 25000003 PHARM REV CODE 250: Performed by: TRANSPLANT SURGERY

## 2023-01-07 RX ORDER — MYCOPHENOLATE MOFETIL 250 MG/1
1000 CAPSULE ORAL 2 TIMES DAILY
Status: DISCONTINUED | OUTPATIENT
Start: 2023-01-07 | End: 2023-01-11 | Stop reason: HOSPADM

## 2023-01-07 RX ORDER — TACROLIMUS 1 MG/1
2 CAPSULE ORAL 2 TIMES DAILY
Status: DISCONTINUED | OUTPATIENT
Start: 2023-01-07 | End: 2023-01-09

## 2023-01-07 RX ORDER — INSULIN ASPART 100 [IU]/ML
6-18 INJECTION, SOLUTION INTRAVENOUS; SUBCUTANEOUS
Status: DISCONTINUED | OUTPATIENT
Start: 2023-01-07 | End: 2023-01-09

## 2023-01-07 RX ORDER — ARIPIPRAZOLE 2 MG/1
2 TABLET ORAL DAILY
Status: DISCONTINUED | OUTPATIENT
Start: 2023-01-07 | End: 2023-01-07

## 2023-01-07 RX ORDER — DIPHENHYDRAMINE HCL 25 MG
50 CAPSULE ORAL EVERY 6 HOURS PRN
Status: DISCONTINUED | OUTPATIENT
Start: 2023-01-07 | End: 2023-01-11 | Stop reason: HOSPADM

## 2023-01-07 RX ORDER — IBUPROFEN 200 MG
24 TABLET ORAL
Status: DISCONTINUED | OUTPATIENT
Start: 2023-01-07 | End: 2023-01-09

## 2023-01-07 RX ORDER — IBUPROFEN 200 MG
16 TABLET ORAL
Status: DISCONTINUED | OUTPATIENT
Start: 2023-01-07 | End: 2023-01-09

## 2023-01-07 RX ORDER — TRAZODONE HYDROCHLORIDE 50 MG/1
50 TABLET ORAL NIGHTLY PRN
Status: DISCONTINUED | OUTPATIENT
Start: 2023-01-07 | End: 2023-01-07

## 2023-01-07 RX ORDER — FAMOTIDINE 20 MG/1
20 TABLET, FILM COATED ORAL NIGHTLY
Status: DISCONTINUED | OUTPATIENT
Start: 2023-01-07 | End: 2023-01-11

## 2023-01-07 RX ORDER — HYDROCODONE BITARTRATE AND ACETAMINOPHEN 500; 5 MG/1; MG/1
TABLET ORAL
Status: DISCONTINUED | OUTPATIENT
Start: 2023-01-07 | End: 2023-01-07

## 2023-01-07 RX ORDER — GLUCAGON 1 MG
1 KIT INJECTION
Status: DISCONTINUED | OUTPATIENT
Start: 2023-01-07 | End: 2023-01-09

## 2023-01-07 RX ORDER — INSULIN ASPART 100 [IU]/ML
0-10 INJECTION, SOLUTION INTRAVENOUS; SUBCUTANEOUS
Status: DISCONTINUED | OUTPATIENT
Start: 2023-01-07 | End: 2023-01-11 | Stop reason: HOSPADM

## 2023-01-07 RX ORDER — DIPHENHYDRAMINE HCL 25 MG
50 CAPSULE ORAL ONCE
Status: COMPLETED | OUTPATIENT
Start: 2023-01-07 | End: 2023-01-07

## 2023-01-07 RX ADMIN — DEXTROSE AND SODIUM CHLORIDE: 5; 900 INJECTION, SOLUTION INTRAVENOUS at 12:01

## 2023-01-07 RX ADMIN — OXYCODONE 5 MG: 5 TABLET ORAL at 07:01

## 2023-01-07 RX ADMIN — TRAZODONE HYDROCHLORIDE 75 MG: 50 TABLET ORAL at 08:01

## 2023-01-07 RX ADMIN — FAMOTIDINE 20 MG: 20 TABLET ORAL at 08:01

## 2023-01-07 RX ADMIN — NYSTATIN 500000 UNITS: 500000 SUSPENSION ORAL at 08:01

## 2023-01-07 RX ADMIN — INSULIN ASPART 2 UNITS: 100 INJECTION, SOLUTION INTRAVENOUS; SUBCUTANEOUS at 09:01

## 2023-01-07 RX ADMIN — INSULIN ASPART 4 UNITS: 100 INJECTION, SOLUTION INTRAVENOUS; SUBCUTANEOUS at 03:01

## 2023-01-07 RX ADMIN — INSULIN ASPART 18 UNITS: 100 INJECTION, SOLUTION INTRAVENOUS; SUBCUTANEOUS at 03:01

## 2023-01-07 RX ADMIN — HYDROMORPHONE HYDROCHLORIDE 0.2 MG: 1 INJECTION, SOLUTION INTRAMUSCULAR; INTRAVENOUS; SUBCUTANEOUS at 10:01

## 2023-01-07 RX ADMIN — DIPHENHYDRAMINE HYDROCHLORIDE 25 MG: 25 CAPSULE ORAL at 06:01

## 2023-01-07 RX ADMIN — CALCIUM GLUCONATE 1 G: 20 INJECTION, SOLUTION INTRAVENOUS at 05:01

## 2023-01-07 RX ADMIN — DIPHENHYDRAMINE HYDROCHLORIDE 50 MG: 25 CAPSULE ORAL at 01:01

## 2023-01-07 RX ADMIN — OXYCODONE HYDROCHLORIDE 10 MG: 10 TABLET ORAL at 08:01

## 2023-01-07 RX ADMIN — HEPARIN SODIUM 5000 UNITS: 5000 INJECTION INTRAVENOUS; SUBCUTANEOUS at 05:01

## 2023-01-07 RX ADMIN — HEPARIN SODIUM 5000 UNITS: 5000 INJECTION INTRAVENOUS; SUBCUTANEOUS at 03:01

## 2023-01-07 RX ADMIN — NYSTATIN 500000 UNITS: 500000 SUSPENSION ORAL at 10:01

## 2023-01-07 RX ADMIN — INSULIN ASPART 15 UNITS: 100 INJECTION, SOLUTION INTRAVENOUS; SUBCUTANEOUS at 11:01

## 2023-01-07 RX ADMIN — TACROLIMUS 2 MG: 1 CAPSULE ORAL at 11:01

## 2023-01-07 RX ADMIN — HYDROMORPHONE HYDROCHLORIDE 0.2 MG: 1 INJECTION, SOLUTION INTRAMUSCULAR; INTRAVENOUS; SUBCUTANEOUS at 05:01

## 2023-01-07 RX ADMIN — DEXTROSE AND SODIUM CHLORIDE: 5; 900 INJECTION, SOLUTION INTRAVENOUS at 01:01

## 2023-01-07 RX ADMIN — TACROLIMUS 1 MG: 1 CAPSULE ORAL at 08:01

## 2023-01-07 RX ADMIN — ESCITALOPRAM OXALATE 20 MG: 5 TABLET, FILM COATED ORAL at 09:01

## 2023-01-07 RX ADMIN — HEPARIN SODIUM 5000 UNITS: 5000 INJECTION INTRAVENOUS; SUBCUTANEOUS at 09:01

## 2023-01-07 RX ADMIN — INSULIN ASPART 18 UNITS: 100 INJECTION, SOLUTION INTRAVENOUS; SUBCUTANEOUS at 06:01

## 2023-01-07 RX ADMIN — ARIPIPRAZOLE 2 MG: 2 TABLET ORAL at 09:01

## 2023-01-07 RX ADMIN — MUPIROCIN 1 G: 20 OINTMENT TOPICAL at 08:01

## 2023-01-07 RX ADMIN — NYSTATIN 500000 UNITS: 500000 SUSPENSION ORAL at 03:01

## 2023-01-07 RX ADMIN — HYDROMORPHONE HYDROCHLORIDE 0.2 MG: 1 INJECTION, SOLUTION INTRAMUSCULAR; INTRAVENOUS; SUBCUTANEOUS at 01:01

## 2023-01-07 RX ADMIN — TACROLIMUS 2 MG: 1 CAPSULE ORAL at 06:01

## 2023-01-07 RX ADMIN — FAMOTIDINE 20 MG: 10 INJECTION INTRAVENOUS at 09:01

## 2023-01-07 RX ADMIN — OXYCODONE HYDROCHLORIDE 10 MG: 10 TABLET ORAL at 02:01

## 2023-01-07 RX ADMIN — MYCOPHENOLATE MOFETIL 1000 MG: 250 CAPSULE ORAL at 08:01

## 2023-01-07 RX ADMIN — METHYLPREDNISOLONE SODIUM SUCCINATE 160 MG: 125 INJECTION, POWDER, FOR SOLUTION INTRAMUSCULAR; INTRAVENOUS at 09:01

## 2023-01-07 RX ADMIN — INSULIN HUMAN 3 UNITS/HR: 1 INJECTION, SOLUTION INTRAVENOUS at 11:01

## 2023-01-07 RX ADMIN — OXYCODONE 5 MG: 5 TABLET ORAL at 02:01

## 2023-01-07 RX ADMIN — MUPIROCIN 1 G: 20 OINTMENT TOPICAL at 10:01

## 2023-01-07 NOTE — PROGRESS NOTES
"[facility]  Endocrinology  Progress Note    Admit Date: 1/5/2023     Reason for Consult: Management of T2DM, Hyperglycemia     Surgical Procedure and Date: Liver transplant 1/5/2023    Diabetes diagnosis year: Over 5 years ago    Home Diabetes Medications:  Tresiba 40 units BID, Humalog (pt estimates dose based on intake), Jardiance 25 mg, Ozempic 2 mg      Diabetes Complications include:     Hyperglycemia    Complicating diabetes co morbidities:   Glucocorticoid use       HPI:   Patient is a 47 y.o. 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  Pt s/p  Mr. orthotopic DBD liver transplant with end to end biliary anastomosis and replaced right hepatic artery reconstruction.  Endocrine consulted for management of BG          Interval HPI:   Overnight events: No acute events overnight. Patient in room 47431/79297 A. Blood glucose stable. BG at goal on current insulin regimen (IIP). Steroid use- Methylpred.  2 Days Post-Op  Renal function- Normal   Vasopressors-  None     Diet diabetic [facility]; 2000 Calorie; Isolation Tray - Regular [place]     Eating:   Diet advanced today  Nausea: No  Hypoglycemia and intervention: No  Fever: No  TPN and/or TF: No    BP (!) 97/57 (BP Location: Right arm, Patient Position: Lying)   Pulse 79   Temp 97.3 °F (36.3 °C) (Core (McDavid-Chuck))   Resp 16   Ht 5' 8" (1.727 m)   Wt 133.6 kg (294 lb 8.6 oz)   SpO2 97%   BMI 44.78 kg/m²     Labs Reviewed and Include    Recent Labs   Lab 01/07/23  0322   *   CALCIUM 8.0*   ALBUMIN 3.8   PROT 4.8*      K 4.1   CO2 18*      BUN 21*   CREATININE 1.2   ALKPHOS 40*   ALT 72*   AST 65*   BILITOT 3.8*     Lab Results   Component Value Date    WBC 2.67 (L) 01/07/2023    HGB 7.0 (L) 01/07/2023    HCT 21.9 (L) 01/07/2023    MCV 80 (L) 01/07/2023    PLT 38 (LL) 01/07/2023 "     No results for input(s): TSH, FREET4 in the last 168 hours.  Lab Results   Component Value Date    HGBA1C 6.0 (H) 01/04/2023       Nutritional status:   Body mass index is 44.78 kg/m².  Lab Results   Component Value Date    ALBUMIN 3.8 01/07/2023    ALBUMIN 4.4 01/06/2023    ALBUMIN 3.9 01/05/2023     No results found for: PREALBUMIN    Estimated Creatinine Clearance: 101.7 mL/min (based on SCr of 1.2 mg/dL).    Accu-Checks  Recent Labs     01/07/23  0014 01/07/23  0111 01/07/23  0208 01/07/23  0320 01/07/23  0412 01/07/23  0506 01/07/23  0607 01/07/23  0718 01/07/23  0815 01/07/23  0906   POCTGLUCOSE 173* 193* 212* 194* 178* 167* 137* 123* 131* 144*       Current Medications and/or Treatments Impacting Glycemic Control  Immunotherapy:    Immunosuppressants           Stop Route Frequency     mycophenolate mofetil 200 mg/mL suspension 1,000 mg         -- Oral 2 times daily     tacrolimus (PROGRAF) 1 mg/mL oral syringe         -- Oral 2 times daily          Steroids:   Hormones (From admission, onward)      Start     Stop Route Frequency Ordered    01/11/23 0900  predniSONE tablet 20 mg  (methylprednisolone taper panel)        See Hyperspace for full Linked Orders Report.    -- Oral Daily 01/05/23 1824    01/10/23 0900  methylPREDNISolone sodium succinate injection 40 mg  (methylprednisolone taper panel)        See Hyperspace for full Linked Orders Report.    01/11 0859 IV Daily 01/05/23 1824 01/09/23 0900  methylPREDNISolone sodium succinate injection 80 mg  (methylprednisolone taper panel)        See Hyperspace for full Linked Orders Report.    01/10 0859 IV Daily 01/05/23 1824    01/08/23 0900  methylPREDNISolone sodium succinate injection 120 mg  (methylprednisolone taper panel)        See Hyperspace for full Linked Orders Report.    01/09 0859 IV Daily 01/05/23 1824    01/07/23 0900  methylPREDNISolone sodium succinate injection 160 mg  (methylprednisolone taper panel)        See Hyperspace for full Linked  Orders Report.    01/08 0859 IV Daily 01/05/23 6664          Pressors:    Autonomic Drugs (From admission, onward)      None          Hyperglycemia/Diabetes Medications:   Antihyperglycemics (From admission, onward)      Start     Stop Route Frequency Ordered    01/07/23 1130  insulin aspart U-100 pen 6-18 Units         -- SubQ 3 times daily with meals 01/07/23 0923 01/07/23 1030  insulin regular in 0.9 % NaCl 100 unit/100 mL (1 unit/mL) infusion        Question:  Enter initial dose (Units/hr):  Answer:  5.5    -- IV Continuous 01/07/23 0923 01/07/23 1021  insulin aspart U-100 pen 0-10 Units         -- SubQ As needed (PRN) 01/07/23 0923            ASSESSMENT and PLAN    Type 2 diabetes mellitus with hyperglycemia  BG goal: 140-180     Pt w/ T2DM.  S/p Liver transplant.  On IIP.  Diet being advanced today.  Will transition to transition drip w/ addition of sliding scale and mealtime insulin (based on intake).  Orders based on ~0.8 u/hr given pt on steroids and has required high insulin drip rates as well as insulin dosing at home.    - Discontinue IIP  - Start Transition insulin drip  at 3.0 u/ hr w/ stepdown parameters  - Start Novolog 6-18 units based on intake. Administer 6 units if patient eats 25% -  50% and 18 units if patient eats 50% or more.  - Start Novolog Moderate dose correction with ISF 25 starting at 180    - POCT Glucose every 2 hours for now  - Hypoglycemia protocol in place      ** Please notify Endocrine for any change and/or advance in diet**  ** Please call Endocrine for any BG related issues **     Discharge Planning:   TBD. Please notify endocrinology prior to discharge.        Steroid-induced hyperglycemia  On steroids post transplant.  Will continue to monitor      Hepatic granuloma associated with sarcoidosis    Managed by primary team.  S/p liver transplant    Addendum* BG remain above goal.  Will increase pt's transition drip by 10%    Unruly Jose  MICHELLE  Endocrinology  [facility]

## 2023-01-07 NOTE — PT/OT/SLP EVAL
Occupational Therapy   Evaluation    Name: Rosalio Mccarty  MRN: [id number]  Admitting Diagnosis: <principal problem not specified>  Recent Surgery: Procedure(s) (LRB):  TRANSPLANT, LIVER (N/A) 2 Days Post-Op    Recommendations:     Discharge Recommendations: home  Discharge Equipment Recommendations:  none  Barriers to discharge:  None    Assessment:     Rosalio Mccarty is a 47 y.o. male with a medical diagnosis of <principal problem not specified>.  He presents s/p liver transplant on 1/05. Took a few steps from the bed>chair with Min A / no AD. Performance deficits affecting function: impaired endurance, impaired self care skills, impaired functional mobility, gait instability, impaired balance, decreased coordination.      Rehab Prognosis: Good; patient would benefit from acute skilled OT services to address these deficits and reach maximum level of function.       Plan:     Patient to be seen 4 x/week to address the above listed problems via self-care/home management, therapeutic activities, therapeutic exercises  Plan of Care Expires: 02/06/23  Plan of Care Reviewed with: patient, spouse    Subjective     Occupational Profile:  Living Environment: Pt lives with his wife / kids. Will be staying locally at a relative's house which is a [facility] with 1 step in.  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  Equipment Used at Home: none  Assistance upon Discharge: family    Pain/Comfort:  Pain Rating 1: 0/10    Patients cultural, spiritual, Holiness conflicts given the current situation:      Objective:     Communicated with: rn prior to session.  Patient found supine with chest tube, kat catheter, BRITTANIE drain, oxygen, pulse ox (continuous), telemetry upon OT entry to room.    General Precautions: Standard, fall  Orthopedic Precautions:    Braces:    Respiratory Status: Nasal cannula, flow 2 L/min    Occupational Performance:    Bed Mobility:    Patient completed Rolling/Turning to  Left with  stand by assistance  Patient completed Scooting/Bridging with stand by assistance  Patient completed Supine to Sit with minimum assistance    Functional Mobility/Transfers:  Patient completed Sit <> Stand Transfer with minimum assistance  with  no assistive device   Patient completed Bed <> Chair Transfer using Step Transfer technique with minimum assistance with no assistive device  Functional Mobility: Pt took a few steps from the bed>chair with Min A / no AD.     Activities of Daily Living:  Feeding:  independence   Upper Body Dressing: minimum assistance   Declined all other ADLs.    Cognitive/Visual Perceptual:  Cognitive/Psychosocial Skills:     -       Oriented to: Person, Place, Time, and Situation   -       Safety awareness/insight to disability: intact     Physical Exam:  BUE AROM/MMT: WNL    AMPAC 6 Click ADL:  AMPAC Total Score: 19    Treatment & Education:  UE ROM/MMT  Bed mobility training / assessment  Functional mobility assessment  Sit/standing balance assessment  Educated on importance of sitting OOB in bedside chair to promote increased strength, endurance & breathing.  Discussed OT POC / Post-acute plan    Patient left up in chair with all lines intact and call button in reach    GOALS:   Multidisciplinary Problems       Occupational Therapy Goals          Problem: Occupational Therapy    Goal Priority Disciplines Outcome Interventions   Occupational Therapy Goal     OT, PT/OT Ongoing, Progressing    Description: Goals to be met by: 1/14/23    Patient will increase functional independence with ADLs by performing:    LE Dressing with Stand-by Assistance.  Grooming while standing at sink with Supervision.  Toileting from toilet with Supervision for hygiene and clothing management.   Supine to sit with Park.  Step transfer with Supervision  Toilet transfer to toilet with Supervision.                         History:     Past Medical History:   Diagnosis Date    C. difficile  diarrhea     Diabetes mellitus, type II, insulin dependent     Granulomatous colitis     Hepatic granuloma associated with sarcoidosis     Hepatic granuloma associated with sarcoidosis 11/17/2022    Personal history of colonic polyps     Sarcoidosis, unspecified     Thrombocytopenia, unspecified     Unspecified cirrhosis of liver          Past Surgical History:   Procedure Laterality Date    ADENOIDECTOMY      CARDIAC CATHETERIZATION Right     COLONOSCOPY  07/11/2018    HIP SURGERY      RIght removed ruptured cyst    LIVER TRANSPLANT N/A 1/5/2023    Procedure: TRANSPLANT, LIVER;  Surgeon: Rosa Foreman MD;  Location: [facility] OR [address] [place] [address];  Service: Transplant;  Laterality: N/A;    UPPER GASTROINTESTINAL ENDOSCOPY  10/09/2019       Time Tracking:     OT Date of Treatment: 01/07/23  OT Start Time: 1303  OT Stop Time: 1320  OT Total Time (min): 17 min    Billable Minutes:Evaluation 7  Therapeutic Activity 10    1/7/2023

## 2023-01-07 NOTE — PLAN OF CARE
Problem: Physical Therapy  Goal: Physical Therapy Goal  Description: Goals to be met by: 2023     Patient will increase functional independence with mobility by performin. Supine to sit with Set-up Temple  2. Sit to supine with Set-up [place]  3. Sit to stand transfer with Supervision  4. Bed to chair transfer with Supervision using LRAD  5. Gait  x 150 feet with Supervision using LRAD.   6. Lower extremity exercise program x15 reps per handout, with supervision    Outcome: Ongoing, Progressing

## 2023-01-07 NOTE — ASSESSMENT & PLAN NOTE
BG goal: 140-180     Pt w/ T2DM.  S/p Liver transplant.  On IIP.  Diet being advanced today.  Will transition to transition drip w/ addition of sliding scale and mealtime insulin (based on intake).  Orders based on ~0.8 u/hr given pt on steroids and has required high insulin drip rates as well as insulin dosing at home.    - Discontinue IIP  - Start Transition insulin drip  at 3.0 u/ hr w/ stepdown parameters  - Start Novolog 6-18 units based on intake. Administer 6 units if patient eats 25% -  50% and 18 units if patient eats 50% or more.  - Start Novolog Moderate dose correction with ISF 25 starting at 180    - POCT Glucose every 2 hours for now  - Hypoglycemia protocol in place      ** Please notify Endocrine for any change and/or advance in diet**  ** Please call Endocrine for any BG related issues **     Discharge Planning:   TBD. Please notify endocrinology prior to discharge.

## 2023-01-07 NOTE — ASSESSMENT & PLAN NOTE
  Neuro/Psych:   -- Sedation: none  -- Pain: multimodals w/ PRN oxy, dilaudid  -- home lexapro, abilify restarted  -- Home trazodone, PRN nightly              Cards:   -- Pressors: none  -- Received 500 cc albumin overnight  -- CVP 5-8  -- home coreg, entresto held for now; restart when appropriate      Pulm:   -- Goal O2 sat > 90%  -- Doing well on nasal cannula       Renal:  -- Keep kat for strict I/O  -- Trend BUN/Cr  -- BMP daily    Lab Results   Component Value Date    CREATININE 1.2 01/07/2023    BUN 21 (H) 01/07/2023     01/07/2023    K 4.1 01/07/2023     01/07/2023    CO2 18 (L) 01/07/2023         FEN / GI:   -- mIVF 100cc/hr  -- Replace lytes as needed  -- Nutrition: diabetic diet  -- Anti-rejection methylpred, mycophenolate, tacro; daily tacro levels  -- AST downtrending  -- T-bili 8.8 from 5.4, direct 3.2; trend daily  -- Liver US 1/6 largely wnl      ID:   -- Tm: afebrile  -- Abx Amp, Bactrim, Valganciclovir, Nystatin suspension    Recent Labs   Lab 01/07/23  1219   WBC 4.92   RBC 3.32*   HGB 8.6*   HCT 26.4*   PLT 48*   MCV 80*   MCH 25.9*   MCHC 32.6           Heme/Onc:   -- Hgb 1uPRBC this am, 1 uPlt prior to removing lines  -- No indication for transfusion  -- Daily labs      Endo:   -- Gluc goal 140-180  -- Insulin gtt; Endo consulted appreciate recommendations      PPx:   Feeding: Diabetic diet   Analgesia/Sedation: PRNs / none  Thromboembolic prevention: SQH  HOB >30: Ordered  Stress Ulcer ppx: Famotidine Q12H  Glucose control: Critical care goal 140-180 g/dl, insulin gtt    Lines/Drains/Airway: RIJ CVC x2, A line x2 / BRITTANIE x2 / kat / R CT; DC femoral lines       Dispo/Code Status/Palliative:   -- SICU / Full Code

## 2023-01-07 NOTE — PROGRESS NOTES
[facility]  Critical Care - Surgery  Progress Note    Patient Name: Rosalio Mccarty  MRN: [id number]  Admission Date: 1/5/2023  Hospital Length of Stay: 2 days  Code Status: Full Code  Attending Provider: Rosa Foreman MD  Primary Care Provider: Primary Doctor No   Principal Problem: <principal problem not specified>    Subjective:     Hospital/ICU Course:  Mr. 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 Mr. Mccarty presented to the SICU from the OR following orthotopic DBD liver transplant with end to end biliary anastomosis and replaced right hepatic artery reconstruction.      Interval History/Significant Events: extubated yesterday, satting well on NC  HDS off pressors; 500 albumin overnight   1uPlt, 1upRBC this am   Having issues with itching     Follow-up For: Procedure(s) (LRB):  TRANSPLANT, LIVER (N/A)    Post-Operative Day: 2 Day Post-Op    Objective:     Vital Signs (Most Recent):  Temp: 97.5 °F (36.4 °C) (01/07/23 1100)  Pulse: 84 (01/07/23 1230)  Resp: 16 (01/07/23 1230)  BP: (!) 100/59 (01/07/23 1200)  SpO2: (!) 94 % (01/07/23 1230) Vital Signs (24h Range):  Temp:  [96.2 °F (35.7 °C)-98.1 °F (36.7 °C)] 97.5 °F (36.4 °C)  Pulse:  [46-94] 84  Resp:  [12-51] 16  SpO2:  [9 %-99 %] 94 %  BP: ()/(55-76) 100/59  Arterial Line BP: ()/() 115/54     Weight: 133.6 kg (294 lb 8.6 oz)  Body mass index is 44.78 kg/m².      Intake/Output Summary (Last 24 hours) at 1/7/2023 1238  Last data filed at 1/7/2023 1200  Gross per 24 hour   Intake 5766.34 ml   Output 2880 ml   Net 2886.34 ml         Physical Exam  Vitals and nursing note reviewed.   HENT:      Head: Normocephalic and atraumatic.      Right Ear: External ear normal.      Left Ear: External ear normal.      Nose: Nose normal.      Mouth/Throat:      Pharynx: No oropharyngeal exudate.   Eyes:      Pupils:  Pupils are equal, round, and reactive to light.   Cardiovascular:      Rate and Rhythm: Normal rate and regular rhythm.   Pulmonary:      Effort: Pulmonary effort is normal. No respiratory distress.   Abdominal:      Palpations: Abdomen is soft.      Comments: Incision dressed with minimal  drainage, BRITTANIE x 2 with serosanguineous output   Genitourinary:     Comments: Banuelos in place with clear yellow urine output  Skin:     General: Skin is warm and dry.   Neurological:      General: No focal deficit present.         Lines/Drains/Airways       Central Venous Catheter Line  Duration             Trialysis (Dialysis) Catheter 01/05/23 1344 right internal jugular 1 day              Drain  Duration                  Urethral Catheter 01/05/23 1225 Non-latex;Silicone 16 Fr. 2 days         Chest Tube 01/05/23 1624 Tube - 1 Right Other (Comment) 8.5 Fr. 1 day         Closed/Suction Drain 01/05/23 1736 Right RLQ Bulb 19 Fr. 1 day         Closed/Suction Drain 01/05/23 1737 Lateral;Right RLQ Bulb 19 Fr. 1 day              Arterial Line  Duration             Arterial Line 01/05/23 1238 Left Radial 2 days              Peripheral Intravenous Line  Duration                  Peripheral IV - Single Lumen 01/05/23 1137 20 G Anterior;Distal;Right Forearm 2 days         SANDI 01/05/23 1227 Left Antecubital 2 days                    Significant Labs:    CBC/Anemia Profile:  Recent Labs   Lab 01/06/23  1608 01/07/23  0322 01/07/23  1219   WBC 7.06 2.67* 4.92   HGB 8.3* 7.0* 8.6*   HCT 25.5* 21.9* 26.4*   PLT 57* 38* 48*   MCV 78* 80* 80*   RDW 15.8* 15.7* 16.1*          Chemistries:  Recent Labs   Lab 01/05/23  1841 01/05/23  1953 01/05/23  2344 01/06/23  0407 01/06/23  0800 01/06/23  1217 01/06/23  1608 01/07/23  0322      < > 137 140 141  --  141 137   K 3.4*   < > 5.3* 4.5 3.9  --  4.0 4.1      < > 104 109 107  --  109 108   CO2 25   < > 23 20* 23  --  20* 18*   BUN 13   < > 14 17 16  --  19 21*   CREATININE 1.1   < > 1.2 1.3  1.4  --  1.2 1.2   CALCIUM 8.9   < > 7.8* 8.0* 8.1*  --  8.0* 8.0*   ALBUMIN 3.9  --   --  4.4  --   --   --  3.8   PROT 5.5*  --   --  5.2*  --   --   --  4.8*   BILITOT 5.4*  --   --  8.8*  --   --   --  3.8*   ALKPHOS 80  --   --  48*  --   --   --  40*   *  --   --  78*  --   --   --  72*   *   < > 178* 135*  136* 112* 81* 78* 65*   MG  --   --  1.9 2.0  --   --   --  1.9   PHOS  --   --  3.1 3.0  --   --   --  3.7    < > = values in this interval not displayed.         All pertinent labs within the past 24 hours have been reviewed.    Significant Imaging:  CXR: I have reviewed all pertinent results/findings within the past 24 hours and my personal findings are:  no pneumothorax    Assessment/Plan:     Hepatic granuloma associated with sarcoidosis    Neuro/Psych:   -- Sedation: none  -- Pain: multimodals w/ PRN oxy, dilaudid  -- home lexapro, abilify restarted  -- Home trazodone, PRN nightly              Cards:   -- Pressors: none  -- Received 500 cc albumin overnight  -- CVP 5-8  -- home coreg, entresto held for now; restart when appropriate      Pulm:   -- Goal O2 sat > 90%  -- Doing well on nasal cannula       Renal:  -- Keep kat for strict I/O  -- Trend BUN/Cr  -- BMP daily    Lab Results   Component Value Date    CREATININE 1.2 01/07/2023    BUN 21 (H) 01/07/2023     01/07/2023    K 4.1 01/07/2023     01/07/2023    CO2 18 (L) 01/07/2023         FEN / GI:   -- mIVF 100cc/hr  -- Replace lytes as needed  -- Nutrition: diabetic diet  -- Anti-rejection methylpred, mycophenolate, tacro; daily tacro levels  -- AST downtrending  -- T-bili 8.8 from 5.4, direct 3.2; trend daily  -- Liver US 1/6 largely wnl      ID:   -- Tm: afebrile  -- Abx Amp, Bactrim, Valganciclovir, Nystatin suspension    Recent Labs   Lab 01/07/23  1219   WBC 4.92   RBC 3.32*   HGB 8.6*   HCT 26.4*   PLT 48*   MCV 80*   MCH 25.9*   MCHC 32.6           Heme/Onc:   -- Hgb 1uPRBC this am, 1 uPlt prior to removing  lines  -- No indication for transfusion  -- Daily labs      Endo:   -- Gluc goal 140-180  -- Insulin gtt; Endo consulted appreciate recommendations      PPx:   Feeding: Diabetic diet   Analgesia/Sedation: PRNs / none  Thromboembolic prevention: SQH  HOB >30: Ordered  Stress Ulcer ppx: Famotidine Q12H  Glucose control: Critical care goal 140-180 g/dl, insulin gtt    Lines/Drains/Airway: RIJ CVC x2, A line x2 / BRITTANIE x2 / kat / R CT; DC femoral lines       Dispo/Code Status/Palliative:   -- SICU / Full Code           Critical care was time spent personally by me on the following activities: development of treatment plan with patient or surrogate and bedside caregivers, discussions with consultants, evaluation of patient's response to treatment, examination of patient, ordering and performing treatments and interventions, ordering and review of laboratory studies, ordering and review of radiographic studies, pulse oximetry, re-evaluation of patient's condition.  This critical care time did not overlap with that of any other provider or involve time for any procedures.     Pennie Glaser MD  Critical Care - Surgery  [facility]

## 2023-01-07 NOTE — PT/OT/SLP EVAL
Physical Therapy Co-Evaluation    Patient Name:  Rosalio Mccarty   MRN:  [id number]    Co-evaluation and co-treatment performed for this visit due to suspected patient need for two skilled therapists to ensure patient and staff safety and to accommodate for patient activity tolerance/pain management     Recommendations:     Discharge Recommendations: home   Discharge Equipment Recommendations:  (TBD)   Barriers to discharge: None    Assessment:     Rosalio Mccarty is a 47 y.o. male admitted with a medical diagnosis of <principal problem not specified>.  He presents with the following impairments/functional limitations: weakness, impaired endurance, impaired self care skills, impaired functional mobility, gait instability, impaired balance, pain Pt. cooperative and tolerated treatment well. Pt. progressing with mobility despite abd. discomfort.    Rehab Prognosis: Good; patient would benefit from acute skilled PT services to address these deficits and reach maximum level of function.    Recent Surgery: Procedure(s) (LRB):  TRANSPLANT, LIVER (N/A) 2 Days Post-Op    Plan:     During this hospitalization, patient to be seen 4 x/week to address the identified rehab impairments via gait training, therapeutic activities, therapeutic exercises and progress toward the following goals:    Plan of Care Expires:  02/06/23    Subjective     Chief Complaint: abd. discomfort  Patient/Family Comments/goals: pt. Agreeable to PT  Pain/Comfort:  Pain Rating 1: 0/10  Pain Rating Post-Intervention 1: 0/10    Patients cultural, spiritual, Nondenominational conflicts given the current situation: no    Living Environment:  Pt. Lives with spouse in 2-story home, but bedroom/bathroom is on 1st floor. Upon discharge, pt. Will be staying at his aunt's house, which is [facility] and no [facility].  Prior to admission, patients level of function was indep.  Equipment used at home: none.  Upon discharge, patient will have assistance from spouse.    Objective:     Communicated  with nursing prior to session.  Patient found supine with chest tube, kat catheter, BRITTANIE drain, oxygen, pulse ox (continuous), telemetry, peripheral IV  upon PT entry to room.    General Precautions: Standard, fall  Orthopedic Precautions:N/A   Braces: N/A  Respiratory Status: Nasal cannula, flow 2 L/min    Exams:  RLE ROM: WFL  RLE Strength: WFL  LLE ROM: WFL  LLE Strength: WFL    Functional Mobility:  Bed Mobility:     Rolling Left:  minimum assistance  Scooting: minimum assistance  Supine to Sit: minimum assistance  Transfers:     Sit to Stand:  minimum assistance with hand-held assist  Gait: 6 small steps with HHA-Min A from bed to bedside chair. Pt. amb. with decreased steps length/sam.  Balance: fair      AM-PAC 6 CLICK MOBILITY  Total Score:18       Treatment & Education:  Discussed therapy needs, goals, safety with mobility, LE therex, and POC.    Patient left up in chair with all lines intact and call button in reach.    GOALS:   Multidisciplinary Problems       Physical Therapy Goals          Problem: Physical Therapy    Goal Priority Disciplines Outcome Goal Variances Interventions   Physical Therapy Goal     PT, PT/OT Ongoing, Progressing     Description: Goals to be met by: 2023     Patient will increase functional independence with mobility by performin. Supine to sit with Set-up Sebastian  2. Sit to supine with Set-up Sebastian  3. Sit to stand transfer with Supervision  4. Bed to chair transfer with Supervision using LRAD  5. Gait  x 150 feet with Supervision using LRAD.   6. Lower extremity exercise program x15 reps per handout, with supervision                         History:     Past Medical History:   Diagnosis Date    C. difficile diarrhea     Diabetes mellitus, type II, insulin dependent     Granulomatous colitis     Hepatic granuloma associated with sarcoidosis     Hepatic granuloma associated with sarcoidosis 2022    Personal history of colonic polyps      Sarcoidosis, unspecified     Thrombocytopenia, unspecified     Unspecified cirrhosis of liver        Past Surgical History:   Procedure Laterality Date    ADENOIDECTOMY      CARDIAC CATHETERIZATION Right     COLONOSCOPY  07/11/2018    HIP SURGERY      RIght removed ruptured cyst    LIVER TRANSPLANT N/A 1/5/2023    Procedure: TRANSPLANT, LIVER;  Surgeon: Rosa Foreman MD;  Location: [facility] OR [address] [place] [address];  Service: Transplant;  Laterality: N/A;    UPPER GASTROINTESTINAL ENDOSCOPY  10/09/2019       Time Tracking:     PT Received On: 01/07/23  PT Start Time: 1302     PT Stop Time: 1319  PT Total Time (min): 17 min     Billable Minutes: Evaluation 9 and Therapeutic Exercise 8      01/07/2023

## 2023-01-07 NOTE — SUBJECTIVE & OBJECTIVE
"Interval HPI:   Overnight events: No acute events overnight. Patient in room 78664/60241 A. Blood glucose stable. BG at goal on current insulin regimen (IIP). Steroid use- Methylpred.  2 Days Post-Op  Renal function- Normal   Vasopressors-  None     Diet diabetic [facility]; 2000 Calorie; Isolation Tray - Regular [place]     Eating:   Diet advanced today  Nausea: No  Hypoglycemia and intervention: No  Fever: No  TPN and/or TF: No    BP (!) 97/57 (BP Location: Right arm, Patient Position: Lying)   Pulse 79   Temp 97.3 °F (36.3 °C) (Core ([place]))   Resp 16   Ht 5' 8" (1.727 m)   Wt 133.6 kg (294 lb 8.6 oz)   SpO2 97%   BMI 44.78 kg/m²     Labs Reviewed and Include    Recent Labs   Lab 01/07/23 0322   *   CALCIUM 8.0*   ALBUMIN 3.8   PROT 4.8*      K 4.1   CO2 18*      BUN 21*   CREATININE 1.2   ALKPHOS 40*   ALT 72*   AST 65*   BILITOT 3.8*     Lab Results   Component Value Date    WBC 2.67 (L) 01/07/2023    HGB 7.0 (L) 01/07/2023    HCT 21.9 (L) 01/07/2023    MCV 80 (L) 01/07/2023    PLT 38 (LL) 01/07/2023     No results for input(s): TSH, FREET4 in the last 168 hours.  Lab Results   Component Value Date    HGBA1C 6.0 (H) 01/04/2023       Nutritional status:   Body mass index is 44.78 kg/m².  Lab Results   Component Value Date    ALBUMIN 3.8 01/07/2023    ALBUMIN 4.4 01/06/2023    ALBUMIN 3.9 01/05/2023     No results found for: PREALBUMIN    Estimated Creatinine Clearance: 101.7 mL/min (based on SCr of 1.2 mg/dL).    Accu-Checks  Recent Labs     01/07/23  0014 01/07/23  0111 01/07/23  0208 01/07/23  0320 01/07/23  0412 01/07/23  0506 01/07/23  0607 01/07/23  0718 01/07/23  0815 01/07/23  0906   POCTGLUCOSE 173* 193* 212* 194* 178* 167* 137* 123* 131* 144*       Current Medications and/or Treatments Impacting Glycemic Control  Immunotherapy:    Immunosuppressants           Stop Route Frequency     mycophenolate mofetil 200 mg/mL suspension 1,000 mg         -- Oral 2 times daily     " tacrolimus (PROGRAF) 1 mg/mL oral syringe         -- Oral 2 times daily          Steroids:   Hormones (From admission, onward)      Start     Stop Route Frequency Ordered    01/11/23 0900  predniSONE tablet 20 mg  (methylprednisolone taper panel)        See Hyperspace for full Linked Orders Report.    -- Oral Daily 01/05/23 1824    01/10/23 0900  methylPREDNISolone sodium succinate injection 40 mg  (methylprednisolone taper panel)        See Hyperspace for full Linked Orders Report.    01/11 0859 IV Daily 01/05/23 1824    01/09/23 0900  methylPREDNISolone sodium succinate injection 80 mg  (methylprednisolone taper panel)        See Hyperspace for full Linked Orders Report.    01/10 0859 IV Daily 01/05/23 1824    01/08/23 0900  methylPREDNISolone sodium succinate injection 120 mg  (methylprednisolone taper panel)        See Hyperspace for full Linked Orders Report.    01/09 0859 IV Daily 01/05/23 1824    01/07/23 0900  methylPREDNISolone sodium succinate injection 160 mg  (methylprednisolone taper panel)        See Hyperspace for full Linked Orders Report.    01/08 0859 IV Daily 01/05/23 1824          Pressors:    Autonomic Drugs (From admission, onward)      None          Hyperglycemia/Diabetes Medications:   Antihyperglycemics (From admission, onward)      Start     Stop Route Frequency Ordered    01/07/23 1130  insulin aspart U-100 pen 6-18 Units         -- SubQ 3 times daily with meals 01/07/23 0923 01/07/23 1030  insulin regular in 0.9 % NaCl 100 unit/100 mL (1 unit/mL) infusion        Question:  Enter initial dose (Units/hr):  Answer:  5.5    -- IV Continuous 01/07/23 0923 01/07/23 1021  insulin aspart U-100 pen 0-10 Units         -- SubQ As needed (PRN) 01/07/23 0923

## 2023-01-07 NOTE — PLAN OF CARE
500 cc Albumin given overnight.    Afebrile. NSR 60-80s. Maintaining -160 and MAP > 65 with pressor requirements. CVP 7-10. PA pressures 20s/10s. Sats 95-97% on 4L NC.      Neuro: oriented x 4, follows commands, moves all extremities, PERRLA.      Gtts:   Vasopressin 0.02 units/min    Insulin 14.1 units/hr  D5%  cc/hr      Banuelos in place with adequate UOP with 40-60 cc/hr      BRITTANIE 1 with serosanguineous 475 cc output.   BRITTANIE 2 with serosanguineous 440 cc output.      R pleural Chest tube to wall suction with 5 cc serosanguineous output.      Skin: Chevron incision with island/border dressing. Linen/gown changed. CHG wipes given for bath. Sacral foam on. SCDs on. Turned to prevent breakdown. No new breakdown noted.      All labs followed up with provider. Electrolytes replaced prn.

## 2023-01-07 NOTE — SUBJECTIVE & OBJECTIVE
Interval History/Significant Events: extubated yesterday, satting well on NC  HDS off pressors; 500 albumin overnight   1uPlt, 1upRBC this am   Having issues with itching     Follow-up For: Procedure(s) (LRB):  TRANSPLANT, LIVER (N/A)    Post-Operative Day: 2 Day Post-Op    Objective:     Vital Signs (Most Recent):  Temp: 97.5 °F (36.4 °C) (01/07/23 1100)  Pulse: 84 (01/07/23 1230)  Resp: 16 (01/07/23 1230)  BP: (!) 100/59 (01/07/23 1200)  SpO2: (!) 94 % (01/07/23 1230) Vital Signs (24h Range):  Temp:  [96.2 °F (35.7 °C)-98.1 °F (36.7 °C)] 97.5 °F (36.4 °C)  Pulse:  [46-94] 84  Resp:  [12-51] 16  SpO2:  [9 %-99 %] 94 %  BP: ()/(55-76) 100/59  Arterial Line BP: ()/() 115/54     Weight: 133.6 kg (294 lb 8.6 oz)  Body mass index is 44.78 kg/m².      Intake/Output Summary (Last 24 hours) at 1/7/2023 1238  Last data filed at 1/7/2023 1200  Gross per 24 hour   Intake 5766.34 ml   Output 2880 ml   Net 2886.34 ml         Physical Exam  Vitals and nursing note reviewed.   HENT:      Head: Normocephalic and atraumatic.      Right Ear: External ear normal.      Left Ear: External ear normal.      Nose: Nose normal.      Mouth/Throat:      Pharynx: No oropharyngeal exudate.   Eyes:      Pupils: Pupils are equal, round, and reactive to light.   Cardiovascular:      Rate and Rhythm: Normal rate and regular rhythm.   Pulmonary:      Effort: Pulmonary effort is normal. No respiratory distress.   Abdominal:      Palpations: Abdomen is soft.      Comments: Incision dressed with minimal  drainage, BRITTANIE x 2 with serosanguineous output   Genitourinary:     Comments: Banuelos in place with clear yellow urine output  Skin:     General: Skin is warm and dry.   Neurological:      General: No focal deficit present.         Lines/Drains/Airways       Central Venous Catheter Line  Duration             Trialysis (Dialysis) Catheter 01/05/23 1344 right internal jugular 1 day              Drain  Duration                  Urethral  Catheter 01/05/23 1225 Non-latex;Silicone 16 Fr. 2 days         Chest Tube 01/05/23 1624 Tube - 1 Right Other (Comment) 8.5 Fr. 1 day         Closed/Suction Drain 01/05/23 1736 Right RLQ Bulb 19 Fr. 1 day         Closed/Suction Drain 01/05/23 1737 Lateral;Right RLQ Bulb 19 Fr. 1 day              Arterial Line  Duration             Arterial Line 01/05/23 1238 Left Radial 2 days              Peripheral Intravenous Line  Duration                  Peripheral IV - Single Lumen 01/05/23 1137 20 G Anterior;Distal;Right Forearm 2 days         SANDI 01/05/23 1227 Left Antecubital 2 days                    Significant Labs:    CBC/Anemia Profile:  Recent Labs   Lab 01/06/23  1608 01/07/23  0322 01/07/23  1219   WBC 7.06 2.67* 4.92   HGB 8.3* 7.0* 8.6*   HCT 25.5* 21.9* 26.4*   PLT 57* 38* 48*   MCV 78* 80* 80*   RDW 15.8* 15.7* 16.1*          Chemistries:  Recent Labs   Lab 01/05/23  1841 01/05/23  1953 01/05/23  2344 01/06/23  0407 01/06/23  0800 01/06/23  1217 01/06/23  1608 01/07/23  0322      < > 137 140 141  --  141 137   K 3.4*   < > 5.3* 4.5 3.9  --  4.0 4.1      < > 104 109 107  --  109 108   CO2 25   < > 23 20* 23  --  20* 18*   BUN 13   < > 14 17 16  --  19 21*   CREATININE 1.1   < > 1.2 1.3 1.4  --  1.2 1.2   CALCIUM 8.9   < > 7.8* 8.0* 8.1*  --  8.0* 8.0*   ALBUMIN 3.9  --   --  4.4  --   --   --  3.8   PROT 5.5*  --   --  5.2*  --   --   --  4.8*   BILITOT 5.4*  --   --  8.8*  --   --   --  3.8*   ALKPHOS 80  --   --  48*  --   --   --  40*   *  --   --  78*  --   --   --  72*   *   < > 178* 135*  136* 112* 81* 78* 65*   MG  --   --  1.9 2.0  --   --   --  1.9   PHOS  --   --  3.1 3.0  --   --   --  3.7    < > = values in this interval not displayed.         All pertinent labs within the past 24 hours have been reviewed.    Significant Imaging:  CXR: I have reviewed all pertinent results/findings within the past 24 hours and my personal findings are:  no pneumothorax

## 2023-01-08 LAB
ALBUMIN SERPL BCP-MCNC: 3.3 G/DL (ref 3.5–5.2)
ALP SERPL-CCNC: 70 U/L (ref 55–135)
ALT SERPL W/O P-5'-P-CCNC: 57 U/L (ref 10–44)
ANION GAP SERPL CALC-SCNC: 7 MMOL/L (ref 8–16)
APTT BLDCRRT: 35 SEC (ref 21–32)
AST SERPL-CCNC: 31 U/L (ref 10–40)
BASOPHILS # BLD AUTO: 0 K/UL (ref 0–0.2)
BASOPHILS NFR BLD: 0 % (ref 0–1.9)
BILIRUB DIRECT SERPL-MCNC: 0.9 MG/DL (ref 0.1–0.3)
BILIRUB SERPL-MCNC: 2.1 MG/DL (ref 0.1–1)
BUN SERPL-MCNC: 25 MG/DL (ref 6–20)
CA-I BLDV-SCNC: 1.25 MMOL/L (ref 1.06–1.42)
CALCIUM SERPL-MCNC: 8.2 MG/DL (ref 8.7–10.5)
CHLORIDE SERPL-SCNC: 110 MMOL/L (ref 95–110)
CO2 SERPL-SCNC: 20 MMOL/L (ref 23–29)
CREAT SERPL-MCNC: 1.1 MG/DL (ref 0.5–1.4)
DIFFERENTIAL METHOD: ABNORMAL
EOSINOPHIL # BLD AUTO: 0 K/UL (ref 0–0.5)
EOSINOPHIL NFR BLD: 0 % (ref 0–8)
ERYTHROCYTE [DISTWIDTH] IN BLOOD BY AUTOMATED COUNT: 16.3 % (ref 11.5–14.5)
EST. GFR  (NO RACE VARIABLE): >60 ML/MIN/1.73 M^2
GLUCOSE SERPL-MCNC: 185 MG/DL (ref 70–110)
HCT VFR BLD AUTO: 26.9 % (ref 40–54)
HGB BLD-MCNC: 8.8 G/DL (ref 14–18)
IMM GRANULOCYTES # BLD AUTO: 0.04 K/UL (ref 0–0.04)
IMM GRANULOCYTES NFR BLD AUTO: 0.9 % (ref 0–0.5)
INR PPP: 1.3 (ref 0.8–1.2)
LYMPHOCYTES # BLD AUTO: 0.3 K/UL (ref 1–4.8)
LYMPHOCYTES NFR BLD: 5.4 % (ref 18–48)
MAGNESIUM SERPL-MCNC: 2 MG/DL (ref 1.6–2.6)
MCH RBC QN AUTO: 26.4 PG (ref 27–31)
MCHC RBC AUTO-ENTMCNC: 32.7 G/DL (ref 32–36)
MCV RBC AUTO: 81 FL (ref 82–98)
MONOCYTES # BLD AUTO: 0.4 K/UL (ref 0.3–1)
MONOCYTES NFR BLD: 8 % (ref 4–15)
NEUTROPHILS # BLD AUTO: 4 K/UL (ref 1.8–7.7)
NEUTROPHILS NFR BLD: 85.7 % (ref 38–73)
NRBC BLD-RTO: 0 /100 WBC
PHOSPHATE SERPL-MCNC: 3.4 MG/DL (ref 2.7–4.5)
PLATELET # BLD AUTO: 45 K/UL (ref 150–450)
PMV BLD AUTO: 10.9 FL (ref 9.2–12.9)
POCT GLUCOSE: 144 MG/DL (ref 70–110)
POCT GLUCOSE: 151 MG/DL (ref 70–110)
POCT GLUCOSE: 158 MG/DL (ref 70–110)
POCT GLUCOSE: 169 MG/DL (ref 70–110)
POCT GLUCOSE: 170 MG/DL (ref 70–110)
POCT GLUCOSE: 173 MG/DL (ref 70–110)
POCT GLUCOSE: 176 MG/DL (ref 70–110)
POCT GLUCOSE: 182 MG/DL (ref 70–110)
POCT GLUCOSE: 188 MG/DL (ref 70–110)
POCT GLUCOSE: 193 MG/DL (ref 70–110)
POCT GLUCOSE: 196 MG/DL (ref 70–110)
POCT GLUCOSE: 213 MG/DL (ref 70–110)
POTASSIUM SERPL-SCNC: 4.6 MMOL/L (ref 3.5–5.1)
PROT SERPL-MCNC: 4.6 G/DL (ref 6–8.4)
PROTHROMBIN TIME: 13.1 SEC (ref 9–12.5)
RBC # BLD AUTO: 3.33 M/UL (ref 4.6–6.2)
SODIUM SERPL-SCNC: 137 MMOL/L (ref 136–145)
TACROLIMUS BLD-MCNC: 4 NG/ML (ref 5–15)
WBC # BLD AUTO: 4.62 K/UL (ref 3.9–12.7)

## 2023-01-08 PROCEDURE — 99900035 HC TECH TIME PER 15 MIN (STAT)

## 2023-01-08 PROCEDURE — 84100 ASSAY OF PHOSPHORUS: CPT

## 2023-01-08 PROCEDURE — 25000003 PHARM REV CODE 250: Performed by: STUDENT IN AN ORGANIZED HEALTH CARE EDUCATION/TRAINING PROGRAM

## 2023-01-08 PROCEDURE — 63600175 PHARM REV CODE 636 W HCPCS: Performed by: STUDENT IN AN ORGANIZED HEALTH CARE EDUCATION/TRAINING PROGRAM

## 2023-01-08 PROCEDURE — 20600001 HC STEP DOWN PRIVATE ROOM

## 2023-01-08 PROCEDURE — 25000003 PHARM REV CODE 250: Performed by: SURGERY

## 2023-01-08 PROCEDURE — 63600175 PHARM REV CODE 636 W HCPCS: Performed by: TRANSPLANT SURGERY

## 2023-01-08 PROCEDURE — 80197 ASSAY OF TACROLIMUS: CPT | Performed by: STUDENT IN AN ORGANIZED HEALTH CARE EDUCATION/TRAINING PROGRAM

## 2023-01-08 PROCEDURE — 94761 N-INVAS EAR/PLS OXIMETRY MLT: CPT

## 2023-01-08 PROCEDURE — 25000003 PHARM REV CODE 250

## 2023-01-08 PROCEDURE — 99291 CRITICAL CARE FIRST HOUR: CPT | Mod: ,,, | Performed by: ANESTHESIOLOGY

## 2023-01-08 PROCEDURE — 27000221 HC OXYGEN, UP TO 24 HOURS

## 2023-01-08 PROCEDURE — 85025 COMPLETE CBC W/AUTO DIFF WBC: CPT | Performed by: STUDENT IN AN ORGANIZED HEALTH CARE EDUCATION/TRAINING PROGRAM

## 2023-01-08 PROCEDURE — 82330 ASSAY OF CALCIUM: CPT

## 2023-01-08 PROCEDURE — 99233 PR SUBSEQUENT HOSPITAL CARE,LEVL III: ICD-10-PCS | Mod: ,,, | Performed by: PHYSICIAN ASSISTANT

## 2023-01-08 PROCEDURE — 94799 UNLISTED PULMONARY SVC/PX: CPT

## 2023-01-08 PROCEDURE — 25000003 PHARM REV CODE 250: Performed by: PHYSICIAN ASSISTANT

## 2023-01-08 PROCEDURE — 83735 ASSAY OF MAGNESIUM: CPT

## 2023-01-08 PROCEDURE — 80053 COMPREHEN METABOLIC PANEL: CPT | Performed by: STUDENT IN AN ORGANIZED HEALTH CARE EDUCATION/TRAINING PROGRAM

## 2023-01-08 PROCEDURE — 85610 PROTHROMBIN TIME: CPT | Performed by: STUDENT IN AN ORGANIZED HEALTH CARE EDUCATION/TRAINING PROGRAM

## 2023-01-08 PROCEDURE — 99233 SBSQ HOSP IP/OBS HIGH 50: CPT | Mod: ,,, | Performed by: PHYSICIAN ASSISTANT

## 2023-01-08 PROCEDURE — 82248 BILIRUBIN DIRECT: CPT | Performed by: STUDENT IN AN ORGANIZED HEALTH CARE EDUCATION/TRAINING PROGRAM

## 2023-01-08 PROCEDURE — 25000003 PHARM REV CODE 250: Performed by: TRANSPLANT SURGERY

## 2023-01-08 PROCEDURE — 85730 THROMBOPLASTIN TIME PARTIAL: CPT | Performed by: STUDENT IN AN ORGANIZED HEALTH CARE EDUCATION/TRAINING PROGRAM

## 2023-01-08 PROCEDURE — 99291 PR CRITICAL CARE, E/M 30-74 MINUTES: ICD-10-PCS | Mod: ,,, | Performed by: ANESTHESIOLOGY

## 2023-01-08 RX ORDER — METHOCARBAMOL 500 MG/1
500 TABLET, FILM COATED ORAL 4 TIMES DAILY
Status: DISCONTINUED | OUTPATIENT
Start: 2023-01-08 | End: 2023-01-11 | Stop reason: HOSPADM

## 2023-01-08 RX ADMIN — HYDROMORPHONE HYDROCHLORIDE 0.2 MG: 1 INJECTION, SOLUTION INTRAMUSCULAR; INTRAVENOUS; SUBCUTANEOUS at 10:01

## 2023-01-08 RX ADMIN — HEPARIN SODIUM 5000 UNITS: 5000 INJECTION INTRAVENOUS; SUBCUTANEOUS at 06:01

## 2023-01-08 RX ADMIN — METHOCARBAMOL 500 MG: 500 TABLET ORAL at 01:01

## 2023-01-08 RX ADMIN — TRAZODONE HYDROCHLORIDE 75 MG: 50 TABLET ORAL at 08:01

## 2023-01-08 RX ADMIN — NYSTATIN 500000 UNITS: 500000 SUSPENSION ORAL at 02:01

## 2023-01-08 RX ADMIN — FAMOTIDINE 20 MG: 20 TABLET ORAL at 08:01

## 2023-01-08 RX ADMIN — ARIPIPRAZOLE 2 MG: 2 TABLET ORAL at 08:01

## 2023-01-08 RX ADMIN — HEPARIN SODIUM 5000 UNITS: 5000 INJECTION INTRAVENOUS; SUBCUTANEOUS at 02:01

## 2023-01-08 RX ADMIN — INSULIN ASPART 2 UNITS: 100 INJECTION, SOLUTION INTRAVENOUS; SUBCUTANEOUS at 01:01

## 2023-01-08 RX ADMIN — INSULIN ASPART 6 UNITS: 100 INJECTION, SOLUTION INTRAVENOUS; SUBCUTANEOUS at 04:01

## 2023-01-08 RX ADMIN — NYSTATIN 500000 UNITS: 500000 SUSPENSION ORAL at 09:01

## 2023-01-08 RX ADMIN — METHOCARBAMOL 500 MG: 500 TABLET ORAL at 04:01

## 2023-01-08 RX ADMIN — OXYCODONE HYDROCHLORIDE 10 MG: 10 TABLET ORAL at 08:01

## 2023-01-08 RX ADMIN — OXYCODONE HYDROCHLORIDE 10 MG: 10 TABLET ORAL at 04:01

## 2023-01-08 RX ADMIN — HYDROMORPHONE HYDROCHLORIDE 0.2 MG: 1 INJECTION, SOLUTION INTRAMUSCULAR; INTRAVENOUS; SUBCUTANEOUS at 09:01

## 2023-01-08 RX ADMIN — INSULIN ASPART 15 UNITS: 100 INJECTION, SOLUTION INTRAVENOUS; SUBCUTANEOUS at 01:01

## 2023-01-08 RX ADMIN — MYCOPHENOLATE MOFETIL 1000 MG: 250 CAPSULE ORAL at 08:01

## 2023-01-08 RX ADMIN — HEPARIN SODIUM 5000 UNITS: 5000 INJECTION INTRAVENOUS; SUBCUTANEOUS at 08:01

## 2023-01-08 RX ADMIN — ESCITALOPRAM OXALATE 20 MG: 5 TABLET, FILM COATED ORAL at 08:01

## 2023-01-08 RX ADMIN — HYDROMORPHONE HYDROCHLORIDE 0.2 MG: 1 INJECTION, SOLUTION INTRAMUSCULAR; INTRAVENOUS; SUBCUTANEOUS at 04:01

## 2023-01-08 RX ADMIN — OXYCODONE HYDROCHLORIDE 10 MG: 10 TABLET ORAL at 01:01

## 2023-01-08 RX ADMIN — METHYLPREDNISOLONE SODIUM SUCCINATE 120 MG: 125 INJECTION, POWDER, FOR SOLUTION INTRAMUSCULAR; INTRAVENOUS at 08:01

## 2023-01-08 RX ADMIN — INSULIN ASPART 6 UNITS: 100 INJECTION, SOLUTION INTRAVENOUS; SUBCUTANEOUS at 09:01

## 2023-01-08 RX ADMIN — OXYCODONE HYDROCHLORIDE 10 MG: 10 TABLET ORAL at 10:01

## 2023-01-08 RX ADMIN — MUPIROCIN 1 G: 20 OINTMENT TOPICAL at 08:01

## 2023-01-08 RX ADMIN — TACROLIMUS 2 MG: 1 CAPSULE ORAL at 05:01

## 2023-01-08 RX ADMIN — INSULIN HUMAN 3.3 UNITS/HR: 1 INJECTION, SOLUTION INTRAVENOUS at 04:01

## 2023-01-08 RX ADMIN — OXYCODONE HYDROCHLORIDE 10 MG: 10 TABLET ORAL at 06:01

## 2023-01-08 RX ADMIN — METHOCARBAMOL 500 MG: 500 TABLET ORAL at 08:01

## 2023-01-08 RX ADMIN — INSULIN ASPART 2 UNITS: 100 INJECTION, SOLUTION INTRAVENOUS; SUBCUTANEOUS at 04:01

## 2023-01-08 RX ADMIN — TACROLIMUS 2 MG: 1 CAPSULE ORAL at 08:01

## 2023-01-08 RX ADMIN — NYSTATIN 500000 UNITS: 500000 SUSPENSION ORAL at 07:01

## 2023-01-08 RX ADMIN — MUPIROCIN 1 G: 20 OINTMENT TOPICAL at 09:01

## 2023-01-08 NOTE — PLAN OF CARE
Plan of care reviewed with pt. Pt OOB to chair x5hrs. Pain controlled with prn medication. Diet advanced. Tolerating well. Uop 45-100cc/hr. 1 unit PRBC given. Continue q2 accu checks

## 2023-01-08 NOTE — SUBJECTIVE & OBJECTIVE
Interval History/Significant Events:   NAEON. AF, HDS  1uRBC, 1uPLT prior to line removal   Some anxiety     Follow-up For: Procedure(s) (LRB):  TRANSPLANT, LIVER (N/A)    Post-Operative Day: 2 Day Post-Op    Objective:     Vital Signs (Most Recent):  Temp: 98.1 °F (36.7 °C) (01/07/23 1900)  Pulse: 85 (01/07/23 2200)  Resp: 13 (01/07/23 2200)  BP: (!) 125/59 (01/07/23 2200)  SpO2: 98 % (01/07/23 2200) Vital Signs (24h Range):  Temp:  [97.3 °F (36.3 °C)-98.3 °F (36.8 °C)] 98.1 °F (36.7 °C)  Pulse:  [60-97] 85  Resp:  [12-51] 13  SpO2:  [89 %-100 %] 98 %  BP: ()/(57-75) 125/59  Arterial Line BP: ()/(47-74) 112/53     Weight: 133.6 kg (294 lb 8.6 oz)  Body mass index is 44.78 kg/m².      Intake/Output Summary (Last 24 hours) at 1/7/2023 2207  Last data filed at 1/7/2023 2200  Gross per 24 hour   Intake 3476.48 ml   Output 3145 ml   Net 331.48 ml         Physical Exam  Vitals and nursing note reviewed.   HENT:      Head: Normocephalic and atraumatic.      Right Ear: External ear normal.      Left Ear: External ear normal.      Nose: Nose normal.      Mouth/Throat:      Pharynx: No oropharyngeal exudate.   Eyes:      Pupils: Pupils are equal, round, and reactive to light.   Cardiovascular:      Rate and Rhythm: Normal rate and regular rhythm.   Pulmonary:      Effort: Pulmonary effort is normal. No respiratory distress.   Abdominal:      Palpations: Abdomen is soft.      Comments: Incision dressed with minimal  drainage, BRITTANIE x 2 with serosanguineous output   Genitourinary:     Comments: Banuelos in place with clear yellow urine output  Skin:     General: Skin is warm and dry.   Neurological:      General: No focal deficit present.         Lines/Drains/Airways       Central Venous Catheter Line  Duration             Trialysis (Dialysis) Catheter 01/05/23 1344 right internal jugular 2 days              Drain  Duration                  Chest Tube 01/05/23 1624 Tube - 1 Right Other (Comment) 8.5 Fr. 2 days          Closed/Suction Drain 01/05/23 1736 Right RLQ Bulb 19 Fr. 2 days         Closed/Suction Drain 01/05/23 1737 Lateral;Right RLQ Bulb 19 Fr. 2 days         Urethral Catheter 01/05/23 1225 Non-latex;Silicone 16 Fr. 2 days              Arterial Line  Duration             Arterial Line 01/05/23 1238 Left Radial 2 days              Peripheral Intravenous Line  Duration                  Peripheral IV - Single Lumen 01/05/23 1137 20 G Anterior;Distal;Right Forearm 2 days                    Significant Labs:    CBC/Anemia Profile:  Recent Labs   Lab 01/06/23  1608 01/07/23  0322 01/07/23  1219   WBC 7.06 2.67* 4.92   HGB 8.3* 7.0* 8.6*   HCT 25.5* 21.9* 26.4*   PLT 57* 38* 48*   MCV 78* 80* 80*   RDW 15.8* 15.7* 16.1*          Chemistries:  Recent Labs   Lab 01/05/23  2344 01/06/23  0407 01/06/23  0800 01/06/23  1217 01/06/23  1608 01/07/23  0322    140 141  --  141 137   K 5.3* 4.5 3.9  --  4.0 4.1    109 107  --  109 108   CO2 23 20* 23  --  20* 18*   BUN 14 17 16  --  19 21*   CREATININE 1.2 1.3 1.4  --  1.2 1.2   CALCIUM 7.8* 8.0* 8.1*  --  8.0* 8.0*   ALBUMIN  --  4.4  --   --   --  3.8   PROT  --  5.2*  --   --   --  4.8*   BILITOT  --  8.8*  --   --   --  3.8*   ALKPHOS  --  48*  --   --   --  40*   ALT  --  78*  --   --   --  72*   * 135*  136* 112* 81* 78* 65*   MG 1.9 2.0  --   --   --  1.9   PHOS 3.1 3.0  --   --   --  3.7         All pertinent labs within the past 24 hours have been reviewed.    Significant Imaging:  CXR: I have reviewed all pertinent results/findings within the past 24 hours and my personal findings are:  no pneumothorax

## 2023-01-08 NOTE — PROGRESS NOTES
"[facility]  Endocrinology  Progress Note    Admit Date: 1/5/2023     Reason for Consult: Management of T2DM, Hyperglycemia     Surgical Procedure and Date: Liver transplant 1/5/2023    Diabetes diagnosis year: Over 5 years ago    Home Diabetes Medications:  Tresiba 40 units BID, Humalog (pt estimates dose based on intake), Jardiance 25 mg, Ozempic 2 mg      Diabetes Complications include:     Hyperglycemia    Complicating diabetes co morbidities:   Glucocorticoid use       HPI:   Patient is a 47 y.o. 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  Pt s/p  Mr. orthotopic DBD liver transplant with end to end biliary anastomosis and replaced right hepatic artery reconstruction.  Endocrine consulted for management of BG          Interval HPI:   No acute events overnight. Patient in room 69069 CVICU/06029 CVICU A. Blood glucose variable. BG at and above goal on current insulin regimen (Transition Insulin Drip +SSI+prandial). Steroid use- Methylprednisolone  120 mg .   3 Days Post-Op  Renal function- Normal   Vasopressors-  None     Diet diabetic [facility]; 2000 Calorie; Isolation Tray - Regular [place]     Eating:    > 50%  Nausea: No  Hypoglycemia and intervention: No  Fever: No  TPN and/or TF: No    /75   Pulse 74   Temp 98 °F (36.7 °C)   Resp 20   Ht 5' 8" (1.727 m)   Wt 135.1 kg (297 lb 13.5 oz)   SpO2 100%   BMI 45.29 kg/m²     Labs Reviewed and Include    Recent Labs   Lab 01/08/23  0321   *   CALCIUM 8.2*   ALBUMIN 3.3*   PROT 4.6*      K 4.6   CO2 20*      BUN 25*   CREATININE 1.1   ALKPHOS 70   ALT 57*   AST 31   BILITOT 2.1*     Lab Results   Component Value Date    WBC 4.62 01/08/2023    HGB 8.8 (L) 01/08/2023    HCT 26.9 (L) 01/08/2023    MCV 81 (L) 01/08/2023    PLT 45 (L) 01/08/2023     No results for input(s): TSH, " FREET4 in the last 168 hours.  Lab Results   Component Value Date    HGBA1C 6.0 (H) 01/04/2023       Nutritional status:   Body mass index is 45.29 kg/m².  Lab Results   Component Value Date    ALBUMIN 3.3 (L) 01/08/2023    ALBUMIN 3.8 01/07/2023    ALBUMIN 4.4 01/06/2023     No results found for: PREALBUMIN    Estimated Creatinine Clearance: 111.7 mL/min (based on SCr of 1.1 mg/dL).    Accu-Checks  Recent Labs     01/07/23  1414 01/07/23  1520 01/07/23  1710 01/07/23  2026 01/07/23  2112 01/07/23  2319 01/08/23  0103 01/08/23  0319 01/08/23  0606 01/08/23  0714   POCTGLUCOSE 240* 279* 267* 212* 213* 176* 182* 188* 173* 144*       Current Medications and/or Treatments Impacting Glycemic Control  Immunotherapy:    Immunosuppressants           Stop Route Frequency     mycophenolate capsule 1,000 mg         -- Oral 2 times daily     tacrolimus capsule 2 mg         -- Oral 2 times daily          Steroids:   Hormones (From admission, onward)      Start     Stop Route Frequency Ordered    01/11/23 0900  predniSONE tablet 20 mg  (methylprednisolone taper panel)        See Hyperspace for full Linked Orders Report.    -- Oral Daily 01/05/23 1824    01/10/23 0900  methylPREDNISolone sodium succinate injection 40 mg  (methylprednisolone taper panel)        See Hyperspace for full Linked Orders Report.    01/11 0859 IV Daily 01/05/23 1824    01/09/23 0900  methylPREDNISolone sodium succinate injection 80 mg  (methylprednisolone taper panel)        See Hyperspace for full Linked Orders Report.    01/10 0859 IV Daily 01/05/23 1824          Pressors:    Autonomic Drugs (From admission, onward)      None          Hyperglycemia/Diabetes Medications:   Antihyperglycemics (From admission, onward)      Start     Stop Route Frequency Ordered    01/07/23 1130  insulin aspart U-100 pen 6-18 Units         -- SubQ 3 times daily with meals 01/07/23 0923 01/07/23 1030  insulin regular in 0.9 % NaCl 100 unit/100 mL (1 unit/mL) infusion         Question:  Enter initial dose (Units/hr):  Answer:  3    -- IV Continuous 01/07/23 0932    01/07/23 1021  insulin aspart U-100 pen 0-10 Units         -- SubQ As needed (PRN) 01/07/23 0923            ASSESSMENT and PLAN    * Liver transplanted  Managed by primary team  Optimize BG for surgical wound healing.         Type 2 diabetes mellitus with hyperglycemia  BG goal: 140-180     Pt w/ T2DM.  S/p Liver transplant. Diet advanced. Receiving steroids On transition drip w/  sliding scale and mealtime insulin (based on intake).  Sugars variable with some prandial elevations likely secondary to steroids.      - Continue Transition insulin drip  at 3.3 u/ hr w/ stepdown parameters  - Continue Novolog 6-18 units based on intake. Administer 6 units if patient eats 25% -  50% and 18 units if patient eats 50% or more.  - Continue Novolog Moderate dose correction with ISF 25 starting at 180    - POCT Glucose AC HS 2AM  - Hypoglycemia protocol in place      ** Please notify Endocrine for any change and/or advance in diet**  ** Please call Endocrine for any BG related issues **     Discharge Planning:   TBD. Please notify endocrinology prior to discharge.        Steroid-induced hyperglycemia  On steroids post transplant.  Will continue to monitor          Unruly Jose PA-C  Endocrinology  [facility]

## 2023-01-08 NOTE — PLAN OF CARE
"      SICU PLAN OF CARE NOTE    Dx: Liver transplanted    Shift Events: No acute events overnight     Goals of Care: -160    Neuro: AAO x4, Follows Commands, and Moves All Extremities    Vital Signs: /62 (BP Location: Right arm, Patient Position: Lying)   Pulse 77   Temp 98 °F (36.7 °C)   Resp 14   Ht 5' 8" (1.727 m)   Wt 135.1 kg (297 lb 13.5 oz)   SpO2 99%   BMI 45.29 kg/m²     Cardiac: NSR    Respiratory: Room Air    Diet: Diabetic Diet    Gtts: Insulin and MIVF    Urine Output: Urinary Catheter 1070 cc/shift    Drains: BRITTANIE Drain, total output 700 cc / shift and Chest Tube, total output 60 cc /  shift     Labs/Accuchecks: Daily labs/q2 accuchecks.    Skin: Bed plugged in with foams and incontinence pads in place. Chevron incision ALBA. Subtle jaundice tint to skin throughout. Pt able to weight shift and turn independently. Up to chair in day time. No new skin integrity compromises noted upon assessment.       "

## 2023-01-08 NOTE — PROGRESS NOTES
[facility] - [facility]ensive [facility]  Critical Care - Surgery  Progress Note    Patient Name: Rosalio Mccarty  MRN: [id number]  Admission Date: 1/5/2023  Hospital Length of Stay: 3 days  Code Status: Full Code  Attending Provider: Rosa Foreman MD  Primary Care Provider: Primary Doctor No   Principal Problem: Liver transplanted    Subjective:     Hospital/ICU Course:  Mr. 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 Mr. Mccarty presented to the SICU from the OR following orthotopic DBD liver transplant with end to end biliary anastomosis and replaced right hepatic artery reconstruction.      Interval History/Significant Events:   NAEON. AF, HDS  1uRBC, 1uPLT prior to line removal   Some anxiety     Follow-up For: Procedure(s) (LRB):  TRANSPLANT, LIVER (N/A)    Post-Operative Day: 2 Day Post-Op    Objective:     Vital Signs (Most Recent):  Temp: 98.1 °F (36.7 °C) (01/07/23 1900)  Pulse: 85 (01/07/23 2200)  Resp: 13 (01/07/23 2200)  BP: (!) 125/59 (01/07/23 2200)  SpO2: 98 % (01/07/23 2200) Vital Signs (24h Range):  Temp:  [97.3 °F (36.3 °C)-98.3 °F (36.8 °C)] 98.1 °F (36.7 °C)  Pulse:  [60-97] 85  Resp:  [12-51] 13  SpO2:  [89 %-100 %] 98 %  BP: ()/(57-75) 125/59  Arterial Line BP: ()/(47-74) 112/53     Weight: 133.6 kg (294 lb 8.6 oz)  Body mass index is 44.78 kg/m².      Intake/Output Summary (Last 24 hours) at 1/7/2023 2207  Last data filed at 1/7/2023 2200  Gross per 24 hour   Intake 3476.48 ml   Output 3145 ml   Net 331.48 ml         Physical Exam  Vitals and nursing note reviewed.   HENT:      Head: Normocephalic and atraumatic.      Right Ear: External ear normal.      Left Ear: External ear normal.      Nose: Nose normal.      Mouth/Throat:      Pharynx: No oropharyngeal exudate.   Eyes:      Pupils: Pupils are equal, round, and reactive to light.   Cardiovascular:      Rate and Rhythm:  Normal rate and regular rhythm.   Pulmonary:      Effort: Pulmonary effort is normal. No respiratory distress.   Abdominal:      Palpations: Abdomen is soft.      Comments: Incision dressed with minimal  drainage, BRITTANIE x 2 with serosanguineous output   Genitourinary:     Comments: Banuelos in place with clear yellow urine output  Skin:     General: Skin is warm and dry.   Neurological:      General: No focal deficit present.         Lines/Drains/Airways       Central Venous Catheter Line  Duration             Trialysis (Dialysis) Catheter 01/05/23 1344 right internal jugular 2 days              Drain  Duration                  Chest Tube 01/05/23 1624 Tube - 1 Right Other (Comment) 8.5 Fr. 2 days         Closed/Suction Drain 01/05/23 1736 Right RLQ Bulb 19 Fr. 2 days         Closed/Suction Drain 01/05/23 1737 Lateral;Right RLQ Bulb 19 Fr. 2 days         Urethral Catheter 01/05/23 1225 Non-latex;Silicone 16 Fr. 2 days              Arterial Line  Duration             Arterial Line 01/05/23 1238 Left Radial 2 days              Peripheral Intravenous Line  Duration                  Peripheral IV - Single Lumen 01/05/23 1137 20 G Anterior;Distal;Right Forearm 2 days                    Significant Labs:    CBC/Anemia Profile:  Recent Labs   Lab 01/06/23  1608 01/07/23  0322 01/07/23  1219   WBC 7.06 2.67* 4.92   HGB 8.3* 7.0* 8.6*   HCT 25.5* 21.9* 26.4*   PLT 57* 38* 48*   MCV 78* 80* 80*   RDW 15.8* 15.7* 16.1*          Chemistries:  Recent Labs   Lab 01/05/23  2344 01/06/23  0407 01/06/23  0800 01/06/23  1217 01/06/23  1608 01/07/23  0322    140 141  --  141 137   K 5.3* 4.5 3.9  --  4.0 4.1    109 107  --  109 108   CO2 23 20* 23  --  20* 18*   BUN 14 17 16  --  19 21*   CREATININE 1.2 1.3 1.4  --  1.2 1.2   CALCIUM 7.8* 8.0* 8.1*  --  8.0* 8.0*   ALBUMIN  --  4.4  --   --   --  3.8   PROT  --  5.2*  --   --   --  4.8*   BILITOT  --  8.8*  --   --   --  3.8*   ALKPHOS  --  48*  --   --   --  40*   ALT  --  78*   --   --   --  72*   * 135*  136* 112* 81* 78* 65*   MG 1.9 2.0  --   --   --  1.9   PHOS 3.1 3.0  --   --   --  3.7         All pertinent labs within the past 24 hours have been reviewed.    Significant Imaging:  CXR: I have reviewed all pertinent results/findings within the past 24 hours and my personal findings are:  no pneumothorax    Assessment/Plan:     Hepatic granuloma associated with sarcoidosis    Neuro/Psych:   -- Sedation: none  -- Pain: multimodals w/ PRN oxy, dilaudid  -- home lexapro, abilify restarted  -- Home trazodone, PRN nightly   -- add robaxin             Cards:   -- Pressors: none  -- CVP 5-8  -- home coreg, entresto held for now; restart when appropriate      Pulm:   -- Goal O2 sat > 90%  -- Doing well on nasal cannula   -- DC CT       Renal:  -- DC kat   -- Trend BUN/Cr  -- BMP daily    Lab Results   Component Value Date    CREATININE 1.1 01/08/2023    BUN 25 (H) 01/08/2023     01/08/2023    K 4.6 01/08/2023     01/08/2023    CO2 20 (L) 01/08/2023         FEN / GI:   -- Replace lytes as needed  -- Nutrition: diabetic diet  -- Anti-rejection methylpred, mycophenolate, tacro; daily tacro levels  -- AST WNL  -- T-bili downtrending   -- Liver US 1/6 largely wnl      ID:   -- Tm: afebrile  -- Abx Amp, Bactrim, Valganciclovir, Nystatin suspension    Recent Labs   Lab 01/08/23  0321   WBC 4.62   RBC 3.33*   HGB 8.8*   HCT 26.9*   PLT 45*   MCV 81*   MCH 26.4*   MCHC 32.7           Heme/Onc:   -- Hgb stable  -- No indication for transfusion  -- Daily labs      Endo:   -- Gluc goal 140-180  -- Insulin gtt; Endo consulted appreciate recommendations      PPx:   Feeding: Diabetic diet   Analgesia/Sedation: PRNs / none  Thromboembolic prevention: SQH  HOB >30: Ordered  Stress Ulcer ppx: Famotidine Q12H  Glucose control: Critical care goal 140-180 g/dl, insulin gtt    Lines/Drains/Airway: RIJ CVC , A line / BRITTANIE x2 / kat / R CT; DC CT      Dispo/Code Status/Palliative:   --  SICU / Full Code           Critical care was time spent personally by me on the following activities: development of treatment plan with patient or surrogate and bedside caregivers, discussions with consultants, evaluation of patient's response to treatment, examination of patient, ordering and performing treatments and interventions, ordering and review of laboratory studies, ordering and review of radiographic studies, pulse oximetry, re-evaluation of patient's condition.  This critical care time did not overlap with that of any other provider or involve time for any procedures.     Pennie Glaser MD  Critical Care - Surgery  [facility] - Surgical Intensive Care

## 2023-01-08 NOTE — SUBJECTIVE & OBJECTIVE
"Interval HPI:   No acute events overnight. Patient in room 61068 CVICU/79982 CVICU A. Blood glucose variable. BG at and above goal on current insulin regimen (Transition Insulin Drip +SSI+prandial). Steroid use- Methylprednisolone  120 mg .   3 Days Post-Op  Renal function- Normal   Vasopressors-  None     Diet diabetic [facility]; 2000 Calorie; Isolation Tray - Regular [place]     Eating:    > 50%  Nausea: No  Hypoglycemia and intervention: No  Fever: No  TPN and/or TF: No    /75   Pulse 74   Temp 98 °F (36.7 °C)   Resp 20   Ht 5' 8" (1.727 m)   Wt 135.1 kg (297 lb 13.5 oz)   SpO2 100%   BMI 45.29 kg/m²     Labs Reviewed and Include    Recent Labs   Lab 01/08/23  0321   *   CALCIUM 8.2*   ALBUMIN 3.3*   PROT 4.6*      K 4.6   CO2 20*      BUN 25*   CREATININE 1.1   ALKPHOS 70   ALT 57*   AST 31   BILITOT 2.1*     Lab Results   Component Value Date    WBC 4.62 01/08/2023    HGB 8.8 (L) 01/08/2023    HCT 26.9 (L) 01/08/2023    MCV 81 (L) 01/08/2023    PLT 45 (L) 01/08/2023     No results for input(s): TSH, FREET4 in the last 168 hours.  Lab Results   Component Value Date    HGBA1C 6.0 (H) 01/04/2023       Nutritional status:   Body mass index is 45.29 kg/m².  Lab Results   Component Value Date    ALBUMIN 3.3 (L) 01/08/2023    ALBUMIN 3.8 01/07/2023    ALBUMIN 4.4 01/06/2023     No results found for: PREALBUMIN    Estimated Creatinine Clearance: 111.7 mL/min (based on SCr of 1.1 mg/dL).    Accu-Checks  Recent Labs     01/07/23  1414 01/07/23  1520 01/07/23  1710 01/07/23  2026 01/07/23  2112 01/07/23  2319 01/08/23  0103 01/08/23  0319 01/08/23  0606 01/08/23  0714   POCTGLUCOSE 240* 279* 267* 212* 213* 176* 182* 188* 173* 144*       Current Medications and/or Treatments Impacting Glycemic Control  Immunotherapy:    Immunosuppressants           Stop Route Frequency     mycophenolate capsule 1,000 mg         -- Oral 2 times daily     tacrolimus capsule 2 mg         -- Oral 2 times " daily          Steroids:   Hormones (From admission, onward)      Start     Stop Route Frequency Ordered    01/11/23 0900  predniSONE tablet 20 mg  (methylprednisolone taper panel)        See Hyperspace for full Linked Orders Report.    -- Oral Daily 01/05/23 1824    01/10/23 0900  methylPREDNISolone sodium succinate injection 40 mg  (methylprednisolone taper panel)        See Hyperspace for full Linked Orders Report.    01/11 0859 IV Daily 01/05/23 1824    01/09/23 0900  methylPREDNISolone sodium succinate injection 80 mg  (methylprednisolone taper panel)        See Hyperspace for full Linked Orders Report.    01/10 0859 IV Daily 01/05/23 1824          Pressors:    Autonomic Drugs (From admission, onward)      None          Hyperglycemia/Diabetes Medications:   Antihyperglycemics (From admission, onward)      Start     Stop Route Frequency Ordered    01/07/23 1130  insulin aspart U-100 pen 6-18 Units         -- SubQ 3 times daily with meals 01/07/23 0923 01/07/23 1030  insulin regular in 0.9 % NaCl 100 unit/100 mL (1 unit/mL) infusion        Question:  Enter initial dose (Units/hr):  Answer:  3    -- IV Continuous 01/07/23 0932    01/07/23 1021  insulin aspart U-100 pen 0-10 Units         -- SubQ As needed (PRN) 01/07/23 0923

## 2023-01-08 NOTE — ASSESSMENT & PLAN NOTE
  Neuro/Psych:   -- Sedation: none  -- Pain: multimodals w/ PRN oxy, dilaudid  -- home lexapro, abilify restarted  -- Home trazodone, PRN nightly   -- add robaxin             Cards:   -- Pressors: none  -- CVP 5-8  -- home coreg, entresto held for now; restart when appropriate      Pulm:   -- Goal O2 sat > 90%  -- Doing well on nasal cannula   -- DC CT       Renal:  -- DC kat   -- Trend BUN/Cr  -- BMP daily    Lab Results   Component Value Date    CREATININE 1.1 01/08/2023    BUN 25 (H) 01/08/2023     01/08/2023    K 4.6 01/08/2023     01/08/2023    CO2 20 (L) 01/08/2023         FEN / GI:   -- Replace lytes as needed  -- Nutrition: diabetic diet  -- Anti-rejection methylpred, mycophenolate, tacro; daily tacro levels  -- AST WNL  -- T-bili downtrending   -- Liver US 1/6 largely wnl      ID:   -- Tm: afebrile  -- Abx Amp, Bactrim, Valganciclovir, Nystatin suspension    Recent Labs   Lab 01/08/23  0321   WBC 4.62   RBC 3.33*   HGB 8.8*   HCT 26.9*   PLT 45*   MCV 81*   MCH 26.4*   MCHC 32.7           Heme/Onc:   -- Hgb stable  -- No indication for transfusion  -- Daily labs      Endo:   -- Gluc goal 140-180  -- Insulin gtt; Endo consulted appreciate recommendations      PPx:   Feeding: Diabetic diet   Analgesia/Sedation: PRNs / none  Thromboembolic prevention: SQH  HOB >30: Ordered  Stress Ulcer ppx: Famotidine Q12H  Glucose control: Critical care goal 140-180 g/dl, insulin gtt    Lines/Drains/Airway: RIJ CVC x2, A line x2 / BRITTANIE x2 / kat / R CT; DC femoral lines       Dispo/Code Status/Palliative:   -- SICU / Full Code

## 2023-01-08 NOTE — ASSESSMENT & PLAN NOTE
BG goal: 140-180     Pt w/ T2DM.  S/p Liver transplant. Diet advanced. Receiving steroids On transition drip w/  sliding scale and mealtime insulin (based on intake).  Sugars variable with some prandial elevations likely secondary to steroids.      - Continue Transition insulin drip  at 3.3 u/ hr w/ stepdown parameters  - Continue Novolog 6-18 units based on intake. Administer 6 units if patient eats 25% -  50% and 18 units if patient eats 50% or more.  - Continue Novolog Moderate dose correction with ISF 25 starting at 180    - POCT Glucose AC HS 2AM  - Hypoglycemia protocol in place      ** Please notify Endocrine for any change and/or advance in diet**  ** Please call Endocrine for any BG related issues **     Discharge Planning:   TBD. Please notify endocrinology prior to discharge.

## 2023-01-09 PROBLEM — Z79.60 LONG-TERM USE OF IMMUNOSUPPRESSANT MEDICATION: Status: ACTIVE | Noted: 2023-01-09

## 2023-01-09 PROBLEM — R18.8 CIRRHOSIS OF LIVER WITH ASCITES: Status: RESOLVED | Noted: 2022-11-17 | Resolved: 2023-01-09

## 2023-01-09 PROBLEM — E44.1 MILD MALNUTRITION: Status: ACTIVE | Noted: 2023-01-09

## 2023-01-09 PROBLEM — D69.6 THROMBOCYTOPENIA, UNSPECIFIED: Status: ACTIVE | Noted: 2023-01-09

## 2023-01-09 PROBLEM — I50.22 CHRONIC SYSTOLIC HEART FAILURE: Status: ACTIVE | Noted: 2023-01-09

## 2023-01-09 PROBLEM — K74.60 CIRRHOSIS OF LIVER WITH ASCITES: Status: RESOLVED | Noted: 2022-11-17 | Resolved: 2023-01-09

## 2023-01-09 PROBLEM — D68.4 ACQUIRED COAGULATION FACTOR DEFICIENCY: Status: ACTIVE | Noted: 2023-01-09

## 2023-01-09 PROBLEM — D62 ACUTE BLOOD LOSS ANEMIA: Status: ACTIVE | Noted: 2023-01-09

## 2023-01-09 PROBLEM — D86.89 HEPATIC GRANULOMA ASSOCIATED WITH SARCOIDOSIS: Chronic | Status: RESOLVED | Noted: 2022-11-17 | Resolved: 2023-01-09

## 2023-01-09 PROBLEM — Z91.89 AT RISK FOR OPPORTUNISTIC INFECTIONS: Status: ACTIVE | Noted: 2023-01-09

## 2023-01-09 PROBLEM — D63.8 ANEMIA OF CHRONIC DISEASE: Status: ACTIVE | Noted: 2023-01-09

## 2023-01-09 PROBLEM — Z29.89 PROPHYLACTIC IMMUNOTHERAPY: Status: ACTIVE | Noted: 2023-01-09

## 2023-01-09 PROBLEM — Z01.818 PRE-OP EVALUATION: Status: RESOLVED | Noted: 2023-01-04 | Resolved: 2023-01-09

## 2023-01-09 LAB
ABO + RH BLD: NORMAL
ALBUMIN SERPL BCP-MCNC: 3.2 G/DL (ref 3.5–5.2)
ALP SERPL-CCNC: 172 U/L (ref 55–135)
ALT SERPL W/O P-5'-P-CCNC: 77 U/L (ref 10–44)
ANION GAP SERPL CALC-SCNC: 7 MMOL/L (ref 8–16)
AST SERPL-CCNC: 75 U/L (ref 10–40)
BACTERIA SPEC AEROBE CULT: NO GROWTH
BASOPHILS # BLD AUTO: 0.01 K/UL (ref 0–0.2)
BASOPHILS NFR BLD: 0.1 % (ref 0–1.9)
BILIRUB SERPL-MCNC: 3.9 MG/DL (ref 0.1–1)
BLD GP AB SCN CELLS X3 SERPL QL: NORMAL
BLD PROD TYP BPU: NORMAL
BLOOD UNIT EXPIRATION DATE: NORMAL
BLOOD UNIT TYPE CODE: 6200
BLOOD UNIT TYPE: NORMAL
BUN SERPL-MCNC: 32 MG/DL (ref 6–20)
CA-I BLDV-SCNC: 1.29 MMOL/L (ref 1.06–1.42)
CALCIUM SERPL-MCNC: 9 MG/DL (ref 8.7–10.5)
CHLORIDE SERPL-SCNC: 107 MMOL/L (ref 95–110)
CO2 SERPL-SCNC: 20 MMOL/L (ref 23–29)
CODING SYSTEM: NORMAL
CREAT SERPL-MCNC: 1.3 MG/DL (ref 0.5–1.4)
DIFFERENTIAL METHOD: ABNORMAL
DISPENSE STATUS: NORMAL
EOSINOPHIL # BLD AUTO: 0.2 K/UL (ref 0–0.5)
EOSINOPHIL NFR BLD: 3.3 % (ref 0–8)
ERYTHROCYTE [DISTWIDTH] IN BLOOD BY AUTOMATED COUNT: 16.6 % (ref 11.5–14.5)
EST. GFR  (NO RACE VARIABLE): >60 ML/MIN/1.73 M^2
GLUCOSE SERPL-MCNC: 120 MG/DL (ref 70–110)
HCT VFR BLD AUTO: 34.3 % (ref 40–54)
HGB BLD-MCNC: 11.1 G/DL (ref 14–18)
IMM GRANULOCYTES # BLD AUTO: 0.07 K/UL (ref 0–0.04)
IMM GRANULOCYTES NFR BLD AUTO: 1 % (ref 0–0.5)
INR PPP: 1.2 (ref 0.8–1.2)
LYMPHOCYTES # BLD AUTO: 0.6 K/UL (ref 1–4.8)
LYMPHOCYTES NFR BLD: 8.7 % (ref 18–48)
MAGNESIUM SERPL-MCNC: 1.8 MG/DL (ref 1.6–2.6)
MCH RBC QN AUTO: 26.2 PG (ref 27–31)
MCHC RBC AUTO-ENTMCNC: 32.4 G/DL (ref 32–36)
MCV RBC AUTO: 81 FL (ref 82–98)
MONOCYTES # BLD AUTO: 0.9 K/UL (ref 0.3–1)
MONOCYTES NFR BLD: 11.9 % (ref 4–15)
NEUTROPHILS # BLD AUTO: 5.5 K/UL (ref 1.8–7.7)
NEUTROPHILS NFR BLD: 75 % (ref 38–73)
NRBC BLD-RTO: 0 /100 WBC
NUM UNITS TRANS PACKED RBC: NORMAL
PHOSPHATE SERPL-MCNC: 3.2 MG/DL (ref 2.7–4.5)
PLATELET # BLD AUTO: 52 K/UL (ref 150–450)
PMV BLD AUTO: 10.5 FL (ref 9.2–12.9)
POCT GLUCOSE: 109 MG/DL (ref 70–110)
POCT GLUCOSE: 150 MG/DL (ref 70–110)
POCT GLUCOSE: 162 MG/DL (ref 70–110)
POTASSIUM SERPL-SCNC: 4.1 MMOL/L (ref 3.5–5.1)
PROT SERPL-MCNC: 4.8 G/DL (ref 6–8.4)
PROTHROMBIN TIME: 12.1 SEC (ref 9–12.5)
RBC # BLD AUTO: 4.24 M/UL (ref 4.6–6.2)
SODIUM SERPL-SCNC: 134 MMOL/L (ref 136–145)
TACROLIMUS BLD-MCNC: 2.5 NG/ML (ref 5–15)
WBC # BLD AUTO: 7.34 K/UL (ref 3.9–12.7)

## 2023-01-09 PROCEDURE — 63600175 PHARM REV CODE 636 W HCPCS: Performed by: PHYSICIAN ASSISTANT

## 2023-01-09 PROCEDURE — 25000003 PHARM REV CODE 250: Performed by: STUDENT IN AN ORGANIZED HEALTH CARE EDUCATION/TRAINING PROGRAM

## 2023-01-09 PROCEDURE — 63600175 PHARM REV CODE 636 W HCPCS: Performed by: TRANSPLANT SURGERY

## 2023-01-09 PROCEDURE — 25000003 PHARM REV CODE 250: Performed by: PHYSICIAN ASSISTANT

## 2023-01-09 PROCEDURE — 85610 PROTHROMBIN TIME: CPT | Performed by: STUDENT IN AN ORGANIZED HEALTH CARE EDUCATION/TRAINING PROGRAM

## 2023-01-09 PROCEDURE — 99232 PR SUBSEQUENT HOSPITAL CARE,LEVL II: ICD-10-PCS | Mod: ,,, | Performed by: PHYSICIAN ASSISTANT

## 2023-01-09 PROCEDURE — 25000003 PHARM REV CODE 250

## 2023-01-09 PROCEDURE — 99233 SBSQ HOSP IP/OBS HIGH 50: CPT | Mod: 24,,, | Performed by: PHYSICIAN ASSISTANT

## 2023-01-09 PROCEDURE — 25000003 PHARM REV CODE 250: Performed by: TRANSPLANT SURGERY

## 2023-01-09 PROCEDURE — 86900 BLOOD TYPING SEROLOGIC ABO: CPT | Performed by: TRANSPLANT SURGERY

## 2023-01-09 PROCEDURE — 25000003 PHARM REV CODE 250: Performed by: SURGERY

## 2023-01-09 PROCEDURE — 84100 ASSAY OF PHOSPHORUS: CPT

## 2023-01-09 PROCEDURE — 80197 ASSAY OF TACROLIMUS: CPT | Performed by: STUDENT IN AN ORGANIZED HEALTH CARE EDUCATION/TRAINING PROGRAM

## 2023-01-09 PROCEDURE — 99232 SBSQ HOSP IP/OBS MODERATE 35: CPT | Mod: ,,, | Performed by: PHYSICIAN ASSISTANT

## 2023-01-09 PROCEDURE — 97116 GAIT TRAINING THERAPY: CPT | Mod: CQ

## 2023-01-09 PROCEDURE — 80053 COMPREHEN METABOLIC PANEL: CPT | Performed by: STUDENT IN AN ORGANIZED HEALTH CARE EDUCATION/TRAINING PROGRAM

## 2023-01-09 PROCEDURE — 85025 COMPLETE CBC W/AUTO DIFF WBC: CPT | Performed by: STUDENT IN AN ORGANIZED HEALTH CARE EDUCATION/TRAINING PROGRAM

## 2023-01-09 PROCEDURE — 63600175 PHARM REV CODE 636 W HCPCS: Performed by: STUDENT IN AN ORGANIZED HEALTH CARE EDUCATION/TRAINING PROGRAM

## 2023-01-09 PROCEDURE — 99233 PR SUBSEQUENT HOSPITAL CARE,LEVL III: ICD-10-PCS | Mod: 24,,, | Performed by: PHYSICIAN ASSISTANT

## 2023-01-09 PROCEDURE — 20600001 HC STEP DOWN PRIVATE ROOM

## 2023-01-09 PROCEDURE — 36415 COLL VENOUS BLD VENIPUNCTURE: CPT

## 2023-01-09 PROCEDURE — 83735 ASSAY OF MAGNESIUM: CPT

## 2023-01-09 PROCEDURE — 82330 ASSAY OF CALCIUM: CPT

## 2023-01-09 RX ORDER — IBUPROFEN 200 MG
24 TABLET ORAL
Status: DISCONTINUED | OUTPATIENT
Start: 2023-01-09 | End: 2023-01-11 | Stop reason: HOSPADM

## 2023-01-09 RX ORDER — TACROLIMUS 1 MG/1
2 CAPSULE ORAL ONCE
Status: COMPLETED | OUTPATIENT
Start: 2023-01-09 | End: 2023-01-09

## 2023-01-09 RX ORDER — GLUCAGON 1 MG
1 KIT INJECTION
Status: DISCONTINUED | OUTPATIENT
Start: 2023-01-09 | End: 2023-01-11 | Stop reason: HOSPADM

## 2023-01-09 RX ORDER — TACROLIMUS 1 MG/1
4 CAPSULE ORAL 2 TIMES DAILY
Status: DISCONTINUED | OUTPATIENT
Start: 2023-01-09 | End: 2023-01-10

## 2023-01-09 RX ORDER — IBUPROFEN 200 MG
16 TABLET ORAL
Status: DISCONTINUED | OUTPATIENT
Start: 2023-01-09 | End: 2023-01-11 | Stop reason: HOSPADM

## 2023-01-09 RX ORDER — INSULIN ASPART 100 [IU]/ML
5-15 INJECTION, SOLUTION INTRAVENOUS; SUBCUTANEOUS
Status: DISCONTINUED | OUTPATIENT
Start: 2023-01-09 | End: 2023-01-11 | Stop reason: HOSPADM

## 2023-01-09 RX ORDER — SODIUM BICARBONATE 650 MG/1
1300 TABLET ORAL 2 TIMES DAILY
Status: DISCONTINUED | OUTPATIENT
Start: 2023-01-09 | End: 2023-01-11 | Stop reason: HOSPADM

## 2023-01-09 RX ORDER — LIDOCAINE HYDROCHLORIDE 10 MG/ML
1 INJECTION INFILTRATION; PERINEURAL ONCE
Status: COMPLETED | OUTPATIENT
Start: 2023-01-09 | End: 2023-01-09

## 2023-01-09 RX ADMIN — NYSTATIN 500000 UNITS: 500000 SUSPENSION ORAL at 01:01

## 2023-01-09 RX ADMIN — OXYCODONE HYDROCHLORIDE 10 MG: 10 TABLET ORAL at 08:01

## 2023-01-09 RX ADMIN — INSULIN ASPART 5 UNITS: 100 INJECTION, SOLUTION INTRAVENOUS; SUBCUTANEOUS at 06:01

## 2023-01-09 RX ADMIN — ARIPIPRAZOLE 2 MG: 2 TABLET ORAL at 09:01

## 2023-01-09 RX ADMIN — NYSTATIN 500000 UNITS: 500000 SUSPENSION ORAL at 09:01

## 2023-01-09 RX ADMIN — MUPIROCIN 1 G: 20 OINTMENT TOPICAL at 08:01

## 2023-01-09 RX ADMIN — MYCOPHENOLATE MOFETIL 1000 MG: 250 CAPSULE ORAL at 08:01

## 2023-01-09 RX ADMIN — NYSTATIN 500000 UNITS: 500000 SUSPENSION ORAL at 06:01

## 2023-01-09 RX ADMIN — LIDOCAINE HYDROCHLORIDE 1 ML: 10 INJECTION, SOLUTION INFILTRATION; PERINEURAL at 11:01

## 2023-01-09 RX ADMIN — METHOCARBAMOL 500 MG: 500 TABLET ORAL at 08:01

## 2023-01-09 RX ADMIN — OXYCODONE HYDROCHLORIDE 10 MG: 10 TABLET ORAL at 10:01

## 2023-01-09 RX ADMIN — SODIUM BICARBONATE 650 MG TABLET 1300 MG: at 08:01

## 2023-01-09 RX ADMIN — TACROLIMUS 2 MG: 1 CAPSULE ORAL at 08:01

## 2023-01-09 RX ADMIN — HEPARIN SODIUM 5000 UNITS: 5000 INJECTION INTRAVENOUS; SUBCUTANEOUS at 05:01

## 2023-01-09 RX ADMIN — FAMOTIDINE 20 MG: 20 TABLET ORAL at 08:01

## 2023-01-09 RX ADMIN — ESCITALOPRAM OXALATE 20 MG: 5 TABLET, FILM COATED ORAL at 08:01

## 2023-01-09 RX ADMIN — METHYLPREDNISOLONE SODIUM SUCCINATE 80 MG: 40 INJECTION, POWDER, FOR SOLUTION INTRAMUSCULAR; INTRAVENOUS at 08:01

## 2023-01-09 RX ADMIN — SACUBITRIL AND VALSARTAN 1 TABLET: 49; 51 TABLET, FILM COATED ORAL at 08:01

## 2023-01-09 RX ADMIN — SACUBITRIL AND VALSARTAN 1 TABLET: 49; 51 TABLET, FILM COATED ORAL at 01:01

## 2023-01-09 RX ADMIN — OXYCODONE HYDROCHLORIDE 10 MG: 10 TABLET ORAL at 02:01

## 2023-01-09 RX ADMIN — SODIUM BICARBONATE 650 MG TABLET 1300 MG: at 09:01

## 2023-01-09 RX ADMIN — INSULIN DETEMIR 30 UNITS: 100 INJECTION, SOLUTION SUBCUTANEOUS at 08:01

## 2023-01-09 RX ADMIN — METHOCARBAMOL 500 MG: 500 TABLET ORAL at 01:01

## 2023-01-09 RX ADMIN — INSULIN ASPART 5 UNITS: 100 INJECTION, SOLUTION INTRAVENOUS; SUBCUTANEOUS at 01:01

## 2023-01-09 RX ADMIN — HEPARIN SODIUM 5000 UNITS: 5000 INJECTION INTRAVENOUS; SUBCUTANEOUS at 01:01

## 2023-01-09 RX ADMIN — TACROLIMUS 2 MG: 1 CAPSULE ORAL at 10:01

## 2023-01-09 RX ADMIN — TACROLIMUS 4 MG: 1 CAPSULE ORAL at 05:01

## 2023-01-09 RX ADMIN — OXYCODONE HYDROCHLORIDE 10 MG: 10 TABLET ORAL at 06:01

## 2023-01-09 RX ADMIN — INSULIN ASPART 6 UNITS: 100 INJECTION, SOLUTION INTRAVENOUS; SUBCUTANEOUS at 06:01

## 2023-01-09 RX ADMIN — HEPARIN SODIUM 5000 UNITS: 5000 INJECTION INTRAVENOUS; SUBCUTANEOUS at 08:01

## 2023-01-09 RX ADMIN — INSULIN DETEMIR 30 UNITS: 100 INJECTION, SOLUTION SUBCUTANEOUS at 12:01

## 2023-01-09 RX ADMIN — INSULIN ASPART 4 UNITS: 100 INJECTION, SOLUTION INTRAVENOUS; SUBCUTANEOUS at 08:01

## 2023-01-09 RX ADMIN — METHOCARBAMOL 500 MG: 500 TABLET ORAL at 05:01

## 2023-01-09 RX ADMIN — INSULIN ASPART 6 UNITS: 100 INJECTION, SOLUTION INTRAVENOUS; SUBCUTANEOUS at 08:01

## 2023-01-09 RX ADMIN — TRAZODONE HYDROCHLORIDE 75 MG: 50 TABLET ORAL at 08:01

## 2023-01-09 NOTE — NURSING TRANSFER
Nursing Transfer Note      1/8/2023     Reason patient is being transferred: Step-Down    Transfer From: [id number] to [id number]    Transfer via wheelchair    Transfer with cardiac monitoring    Transported by GAIL Joe    Medicines sent: insulin pen     Chart send with patient: Yes    Patient reassessed at: 1/8/2301720     Upon arrival to floor: cardiac monitor applied, patient oriented to room, call bell in reach, and bed in lowest position. Pt AAOx4. PERRLA. Pain managed prior to leaving SICU. All pt belongings at bedside. Bedside handoff completed with GAIL Malik. All questions addressed.

## 2023-01-09 NOTE — PROGRESS NOTES
Patient arrived on unit via wheelchair from SICU. VSS. Patient AAO x4. Chevron incision ALBA with staples - painted with betadine. 2 BRITTANIE drains to RLQ with SS drainage - dressing changed. Insulin gtt infusing via PIV at 3.3 units/hr. Call bell and urinal placed within reach. Patient instructed to call for assistance when ready to get OOB.

## 2023-01-09 NOTE — ASSESSMENT & PLAN NOTE
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

## 2023-01-09 NOTE — PROGRESS NOTES
"[facility] - Transplant Stepdown  Endocrinology  Progress Note    Admit Date: 1/5/2023     Reason for Consult: Management of T2DM, Hyperglycemia     Surgical Procedure and Date: Liver transplant 1/5/2023    Diabetes diagnosis year: Over 5 years ago    Home Diabetes Medications:  Tresiba 40 units BID, Humalog (pt estimates dose based on intake), Jardiance 25 mg, Ozempic 2 mg      Diabetes Complications include:     Hyperglycemia    Complicating diabetes co morbidities:   Glucocorticoid use       HPI:   Patient is a 47 y.o. 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  Pt s/p  Mr. orthotopic DBD liver transplant with end to end biliary anastomosis and replaced right hepatic artery reconstruction.  Endocrine consulted for management of BG          Interval HPI:   No acute events overnight. Patient in room 53945/88294 A. Blood glucose stable. BG at and below goal on current insulin regimen (Transition Insulin Drip +mealtime+SSI. Steroid use- Methylprednisolone  80 mg .   4 Days Post-Op  Renal function- Normal   Vasopressors-  None     Diet diabetic [facility]; 2000 Calorie; Isolation Tray - Regular [place]     Eating:    >50%  Nausea: No  Hypoglycemia and intervention: No  Fever: No  TPN and/or TF: No      /80 (BP Location: Left arm, Patient Position: Lying)   Pulse 83   Temp 97.9 °F (36.6 °C) (Oral)   Resp 16   Ht 5' 8" (1.727 m)   Wt 103.8 kg (228 lb 15.2 oz)   SpO2 98%   BMI 34.81 kg/m²     Labs Reviewed and Include    Recent Labs   Lab 01/09/23  0700   *   CALCIUM 9.0   ALBUMIN 3.2*   PROT 4.8*   *   K 4.1   CO2 20*      BUN 32*   CREATININE 1.3   ALKPHOS 172*   ALT 77*   AST 75*   BILITOT 3.9*     Lab Results   Component Value Date    WBC 7.34 01/09/2023    HGB 11.1 (L) 01/09/2023    HCT 34.3 (L) 01/09/2023    MCV 81 (L) 01/09/2023    PLT 52 (L) " 01/09/2023     No results for input(s): TSH, FREET4 in the last 168 hours.  Lab Results   Component Value Date    HGBA1C 6.0 (H) 01/04/2023       Nutritional status:   Body mass index is 34.81 kg/m².  Lab Results   Component Value Date    ALBUMIN 3.2 (L) 01/09/2023    ALBUMIN 3.3 (L) 01/08/2023    ALBUMIN 3.8 01/07/2023     No results found for: PREALBUMIN    Estimated Creatinine Clearance: 82.1 mL/min (based on SCr of 1.3 mg/dL).    Accu-Checks  Recent Labs     01/08/23  0319 01/08/23  0606 01/08/23  0714 01/08/23  0909 01/08/23  1104 01/08/23  1333 01/08/23  1455 01/08/23  1632 01/08/23  2057 01/09/23  0839   POCTGLUCOSE 188* 173* 144* 151* 170* 196* 193* 169* 158* 109       Current Medications and/or Treatments Impacting Glycemic Control  Immunotherapy:    Immunosuppressants           Stop Route Frequency     tacrolimus capsule 4 mg         -- Oral 2 times daily     tacrolimus capsule 2 mg         -- Oral Once     mycophenolate capsule 1,000 mg         -- Oral 2 times daily          Steroids:   Hormones (From admission, onward)      Start     Stop Route Frequency Ordered    01/11/23 0900  predniSONE tablet 20 mg  (methylprednisolone taper panel)        See Hyperspace for full Linked Orders Report.    -- Oral Daily 01/05/23 1824    01/10/23 0900  methylPREDNISolone sodium succinate injection 40 mg  (methylprednisolone taper panel)        See Hyperspace for full Linked Orders Report.    01/11 0859 IV Daily 01/05/23 1824          Pressors:    Autonomic Drugs (From admission, onward)      None          Hyperglycemia/Diabetes Medications:   Antihyperglycemics (From admission, onward)      Start     Stop Route Frequency Ordered    01/07/23 1130  insulin aspart U-100 pen 6-18 Units         -- SubQ 3 times daily with meals 01/07/23 0923 01/07/23 1030  insulin regular in 0.9 % NaCl 100 unit/100 mL (1 unit/mL) infusion        Question:  Enter initial dose (Units/hr):  Answer:  3    -- IV Continuous 01/07/23 0932     01/07/23 1021  insulin aspart U-100 pen 0-10 Units         -- SubQ As needed (PRN) 01/07/23 0923            ASSESSMENT and PLAN    * S/P liver transplant  Managed by primary team  Optimize BG for surgical wound healing.         Type 2 diabetes mellitus with hyperglycemia  BG goal: 140-180     Pt w/ T2DM.  S/p Liver transplant. Diet advanced. Receiving steroids On transition drip w/  sliding scale and mealtime insulin (based on intake).  Sugars much improved.  Will  transition to subcutaneous regimen. Will decrease mealtime insulin as expect need to decrease with steroids decreasing also appetite limited right now.    - Discontinue Transition insulin drip    - Start on Levemir 30 units BID (~20% reduction of current drip rate)  - Decrease Novolog 5-15 units based on intake. Administer 5 units if patient eats 25% -  50% and 15 units if patient eats 50% or more.  - Continue Novolog Moderate dose correction with ISF 25 starting at 180    - POCT Glucose AC HS 2AM  - Hypoglycemia protocol in place      ** Please notify Endocrine for any change and/or advance in diet**  ** Please call Endocrine for any BG related issues **     Discharge Planning:   TBD. Please notify endocrinology prior to discharge.        Steroid-induced hyperglycemia  On steroids post transplant.  Will continue to monitor          Unruly Jose PA-C  Endocrinology  [facility] - Transplant Stepdown

## 2023-01-09 NOTE — HOSPITAL COURSE
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 Surgery went without complications. 2 BRITTANIE drains placed. POD1 US showed elevated RI's but was otherwise satisfactory. Stepped down POD3.     Interval history: No acute events overnight. He is overall progressing well. LFTs and tbili remain elevated in setting of sub therapeutic prograf level, will continue to monitor with daily labs. Tolerating PO without n/v. + flatus, + BM. Medial BRITTANIE drains with ~100cc ss output, plan to remove second drain today. PT/OT following, no HH needs. Encourage IS. Encourage ambulation. Will continue to monitor.

## 2023-01-09 NOTE — PROGRESS NOTES
EDUCATION NOTE:    Met with Rosalio Mccarty and his caregivers to provide teaching re: immunosuppressant medications.  Reviewed medication section of the Liver Transplant Education book that was provided.  Emphasized the importance of compliance, role of the blue medication card, concerns for drug interactions, and process of obtaining refills.  Counseled regarding Prograf, Cellcept , prednisone, including directions for use, monitoring, how to handle missed doses, and side effects.  Patient and wife verbalized understanding and had the opportunity to ask questions.

## 2023-01-09 NOTE — SUBJECTIVE & OBJECTIVE
"Interval HPI:   No acute events overnight. Patient in room 73958/99140 A. Blood glucose stable. BG at and below goal on current insulin regimen (Transition Insulin Drip +mealtime+SSI. Steroid use- Methylprednisolone  80 mg .   4 Days Post-Op  Renal function- Normal   Vasopressors-  None     Diet diabetic [facility]; 2000 Calorie; Isolation Tray - Regular [place]     Eating:    >50%  Nausea: No  Hypoglycemia and intervention: No  Fever: No  TPN and/or TF: No      /80 (BP Location: Left arm, Patient Position: Lying)   Pulse 83   Temp 97.9 °F (36.6 °C) (Oral)   Resp 16   Ht 5' 8" (1.727 m)   Wt 103.8 kg (228 lb 15.2 oz)   SpO2 98%   BMI 34.81 kg/m²     Labs Reviewed and Include    Recent Labs   Lab 01/09/23  0700   *   CALCIUM 9.0   ALBUMIN 3.2*   PROT 4.8*   *   K 4.1   CO2 20*      BUN 32*   CREATININE 1.3   ALKPHOS 172*   ALT 77*   AST 75*   BILITOT 3.9*     Lab Results   Component Value Date    WBC 7.34 01/09/2023    HGB 11.1 (L) 01/09/2023    HCT 34.3 (L) 01/09/2023    MCV 81 (L) 01/09/2023    PLT 52 (L) 01/09/2023     No results for input(s): TSH, FREET4 in the last 168 hours.  Lab Results   Component Value Date    HGBA1C 6.0 (H) 01/04/2023       Nutritional status:   Body mass index is 34.81 kg/m².  Lab Results   Component Value Date    ALBUMIN 3.2 (L) 01/09/2023    ALBUMIN 3.3 (L) 01/08/2023    ALBUMIN 3.8 01/07/2023     No results found for: PREALBUMIN    Estimated Creatinine Clearance: 82.1 mL/min (based on SCr of 1.3 mg/dL).    Accu-Checks  Recent Labs     01/08/23  0319 01/08/23  0606 01/08/23  0714 01/08/23  0909 01/08/23  1104 01/08/23  1333 01/08/23  1455 01/08/23  1632 01/08/23  2057 01/09/23  0839   POCTGLUCOSE 188* 173* 144* 151* 170* 196* 193* 169* 158* 109       Current Medications and/or Treatments Impacting Glycemic Control  Immunotherapy:    Immunosuppressants           Stop Route Frequency     tacrolimus capsule 4 mg         -- Oral 2 times daily     " tacrolimus capsule 2 mg         -- Oral Once     mycophenolate capsule 1,000 mg         -- Oral 2 times daily          Steroids:   Hormones (From admission, onward)      Start     Stop Route Frequency Ordered    01/11/23 0900  predniSONE tablet 20 mg  (methylprednisolone taper panel)        See Hyperspace for full Linked Orders Report.    -- Oral Daily 01/05/23 1824    01/10/23 0900  methylPREDNISolone sodium succinate injection 40 mg  (methylprednisolone taper panel)        See Hyperspace for full Linked Orders Report.    01/11 0859 IV Daily 01/05/23 1824          Pressors:    Autonomic Drugs (From admission, onward)      None          Hyperglycemia/Diabetes Medications:   Antihyperglycemics (From admission, onward)      Start     Stop Route Frequency Ordered    01/07/23 1130  insulin aspart U-100 pen 6-18 Units         -- SubQ 3 times daily with meals 01/07/23 0923 01/07/23 1030  insulin regular in 0.9 % NaCl 100 unit/100 mL (1 unit/mL) infusion        Question:  Enter initial dose (Units/hr):  Answer:  3    -- IV Continuous 01/07/23 0932    01/07/23 1021  insulin aspart U-100 pen 0-10 Units         -- SubQ As needed (PRN) 01/07/23 0923

## 2023-01-09 NOTE — ASSESSMENT & PLAN NOTE
BG goal: 140-180     Pt w/ T2DM.  S/p Liver transplant. Diet advanced. Receiving steroids On transition drip w/  sliding scale and mealtime insulin (based on intake).  Sugars much improved.  Will  transition to subcutaneous regimen. Will decrease mealtime insulin as expect need to decrease with steroids decreasing also appetite limited right now.    - Discontinue Transition insulin drip  at 3.3 u/ hr w/ stepdown parameters  - Start on Levemir 30 units BID (~20% reduction of current drip rate)  - Decrease Novolog 5-15 units based on intake. Administer 5 units if patient eats 25% -  50% and 15 units if patient eats 50% or more.  - Continue Novolog Moderate dose correction with ISF 25 starting at 180    - POCT Glucose AC HS 2AM  - Hypoglycemia protocol in place      ** Please notify Endocrine for any change and/or advance in diet**  ** Please call Endocrine for any BG related issues **     Discharge Planning:   TBD. Please notify endocrinology prior to discharge.

## 2023-01-09 NOTE — PROGRESS NOTES
[facility] - [facility]plant [facility]down  Liver Transplant  Progress Note    Patient Name: Rosalio Mccarty  MRN: [id number]  Admission Date: 1/5/2023  Hospital Length of Stay: 4 days  Code Status: Full Code  Primary Care Provider: Primary Doctor No  Post-Operative Day: 4    ORGAN:   LIVER  Disease Etiology: Cirrhosis: Other, Specify - granulomatous hepatitis  Donor Type:   Donation after Brain Death  [facility] High Risk:   Yes  Donor CMV Status:   Donor CMV Status: Positive  Donor HBcAB:   Negative  Donor HCV Status:   Positive  Donor HBV ASAF: Negative  Donor HCV ASAF: Negative  Whole or Partial: Whole Liver  Biliary Anastomosis: End to End  Arterial Anatomy: Replaced Right Hepatic from SMA  Subjective:     History of Present Illness:  Rosalio Mccarty is a 47yr old male with 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 ESLD c/b ascites, BLE edema, and nonbleeding esophageal varices. He is listed for a liver transplant with MELD 16. He is being admitted for liver transplant surgery. He feels well in his usual state of health. He denies any recent illnesses or hospitalizations. Primary surgeon Dr. Foreman.       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 Surgery went without complications. 2 BRITTANIE drains placed. POD1 US showed elevated RI's but was otherwise satisfactory. Stepped down POD3.     Interval history: No acute events overnight. Pt stepped down from SICU yesterday evening. He is overall progressing well. LFTs and tbili elevated in setting of sub therapeutic prograf level, will continue to monitor with daily labs. Tolerating PO without n/v. + flatus/ + BM. BRITTANIE drains with ~1200cc ss output, plan to remove lateral drain today. PT/OT following, no HH needs. Encourage IS. Encourage ambulation. Will continue to monitor.       Scheduled Meds:    ARIPiprazole  2 mg Oral Daily    EScitalopram oxalate  20 mg Oral Daily    famotidine  20 mg Oral QHS    heparin (porcine)  5,000 Units Subcutaneous Q8H    insulin aspart U-100  5-15 Units Subcutaneous TIDWM    insulin detemir U-100  30 Units Subcutaneous BID    LIDOcaine HCL 10 mg/ml (1%)  1 mL Other Once    methocarbamoL  500 mg Oral QID    [START ON 1/10/2023] methylPREDNISolone sodium succinate injection  40 mg Intravenous Daily    Followed by    [START ON 1/11/2023] predniSONE  20 mg Oral Daily    mupirocin  1 g Nasal BID    mycophenolate  1,000 mg Oral BID    nystatin  500,000 Units Mouth/Throat TID PC    sacubitriL-valsartan  1 tablet Oral BID    sodium bicarbonate  1,300 mg Oral BID    [START ON 1/12/2023] sulfamethoxazole-trimethoprim 400-80mg  1 tablet Oral Daily AM    tacrolimus  4 mg Oral BID    traZODone  75 mg Oral QHS    [START ON 1/15/2023] valGANciclovir  450 mg Oral Daily     Continuous Infusions:   sodium chloride 0.9% Stopped (01/08/23 0110)     PRN Meds:dextrose 10%, dextrose 10%, dextrose 10%, dextrose 10%, diphenhydrAMINE, glucagon (human recombinant), glucose, glucose, insulin aspart U-100, oxyCODONE, oxyCODONE, sodium chloride 0.9%    Review of Systems   Constitutional:  Negative for appetite change, chills and fever.   HENT: Negative.     Eyes: Negative.    Respiratory:  Negative for cough, chest tightness and shortness of breath.    Cardiovascular:  Negative for chest pain, palpitations and leg swelling.   Gastrointestinal:  Positive for abdominal distention. Negative for abdominal pain, constipation, diarrhea, nausea and vomiting.   Endocrine: Negative.    Genitourinary:  Negative for difficulty urinating, dysuria, frequency and urgency.   Musculoskeletal:  Negative for back pain and neck pain.   Skin:  Positive for wound. Negative for color change, pallor and rash.   Allergic/Immunologic: Positive for immunocompromised state.   Neurological:  Negative for dizziness,  weakness and headaches.   Psychiatric/Behavioral:  Negative for agitation, behavioral problems, confusion and sleep disturbance.    Objective:     Vital Signs (Most Recent):  Temp: 98.1 °F (36.7 °C) (01/09/23 1211)  Pulse: 83 (01/09/23 1211)  Resp: 18 (01/09/23 1041)  BP: 122/74 (01/09/23 1211)  SpO2: 98 % (01/09/23 1211) Vital Signs (24h Range):  Temp:  [97.6 °F (36.4 °C)-98.1 °F (36.7 °C)] 98.1 °F (36.7 °C)  Pulse:  [66-91] 83  Resp:  [10-21] 18  SpO2:  [96 %-100 %] 98 %  BP: (117-145)/(69-86) 122/74  Arterial Line BP: (113-134)/(55-69) 113/55     Weight: 103.8 kg (228 lb 15.2 oz)  Body mass index is 34.81 kg/m².    Intake/Output - Last 3 Shifts         01/07 0700  01/08 0659 01/08 0700  01/09 0659 01/09 0700  01/10 0659    P.O.  600     I.V. (mL/kg) 1437 (10.6) 133 (1.3)     Blood 828      IV Piggyback 0.8      Total Intake(mL/kg) 2265.8 (16.8) 733 (7.1)     Urine (mL/kg/hr) 1710 (0.5) 1185 (0.5)     Drains 1190 1430     Stool  0     Chest Tube 305      Total Output 3205 2615     Net -939.2 -1882.1            Stool Occurrence  0 x             Physical Exam  Vitals and nursing note reviewed.   HENT:      Head: Normocephalic and atraumatic.      Right Ear: External ear normal.      Left Ear: External ear normal.      Nose: Nose normal.   Eyes:      Pupils: Pupils are equal, round, and reactive to light.   Cardiovascular:      Rate and Rhythm: Normal rate and regular rhythm.      Pulses: Normal pulses.   Pulmonary:      Effort: Pulmonary effort is normal. No respiratory distress.   Abdominal:      General: There is distension.      Palpations: Abdomen is soft.      Tenderness: There is no abdominal tenderness.      Comments: Chevron incision CDI w/ staples  BRITTANIE x 2 with ss output   Musculoskeletal:         General: Normal range of motion.      Cervical back: Normal range of motion.      Right lower leg: No edema.      Left lower leg: No edema.   Skin:     General: Skin is warm and dry.   Neurological:      General:  No focal deficit present.      Motor: No weakness.   Psychiatric:         Behavior: Behavior normal.         Thought Content: Thought content normal.       Laboratory:  Immunosuppressants           Stop Route Frequency     tacrolimus capsule 4 mg         -- Oral 2 times daily     mycophenolate capsule 1,000 mg         -- Oral 2 times daily          CBC:   Recent Labs   Lab 01/09/23  0700   WBC 7.34   RBC 4.24*   HGB 11.1*   HCT 34.3*   PLT 52*   MCV 81*   MCH 26.2*   MCHC 32.4     BMP:   Recent Labs   Lab 01/09/23  0700   *   *   K 4.1      CO2 20*   BUN 32*   CREATININE 1.3   CALCIUM 9.0     Labs within the past 24 hours have been reviewed.    Diagnostic Results:  US Liver Transplant Post:  Results for orders placed during the hospital encounter of 01/05/23    US Doppler Liver Transplant Post (xpd)    Narrative  EXAMINATION:  US DOPPLER LIVER TRANSPLANT POST (XPD)    CLINICAL HISTORY:  Assess perfusion following liver transplant;    TECHNIQUE:  Limited abdominal ultrasound of the transplant liver with Doppler evaluation.  Color and spectral Doppler were performed.    COMPARISON:  None.    FINDINGS:  Patient is status post orthotopic liver transplantday 1.  Liver allograft is normal in size.  The liver demonstrates homogeneous echotexture.  No focal hepatic lesions are seen.  No fluid collections.    The common duct is not dilated, measuring 4 mm.  No dilated intrahepatic radicles are seen.    Color flow and spectral waveform analysis was performed.  The main portal vein, right portal vein, left portal vein, middle hepatic vein, right hepatic vein, left hepatic vein, and IVC are patent with proper directional flow.    Anastomosis site of the main hepatic artery demonstrates a peak systolic velocity 47 cm/sec.    Main hepatic artery demonstrates resistive index 1.0 with normal waveform.    Left hepatic artery shows resistive index 1.0 with normal waveform.    Anterior branch of the right hepatic  artery demonstrates resistive index 0.8 with normal waveform.    Posterior branch of the right hepatic artery demonstrates resistive index 1.0 with normal waveform.    Impression  In this postop day 1 liver transplant, there are elevated hepatic artery resistive indices which is likely related to postoperative edema.  Attention on follow-up is suggested.    No fluid collections are seen.    Trace ascites and right-sided pleural effusion.      Electronically signed by: Bruce Girard Jr  Date:    01/06/2023  Time:    08:56    Debility/Functional status: Patient debilitated by evidence of Muscle wasting and atrophy and Weakness. Physical and occupational therapy ordered daily to evaluate and treat. Debility was: present on admission.    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       Mild malnutrition  - mild muscle wasting  - dietary following       Prophylactic immunotherapy  - see long term use of immunosuppression       Long-term use of immunosuppressant medication  - Continue prograf, cellcept, steroids.  - Continue to monitor prograf levels daily, monitor for toxic side effects, and adjust for therapeutic dose.       Acquired coagulation factor deficiency  - Due to end stage liver disease, likely to improve post liver transplant.       Thrombocytopenia, unspecified  - Due to end stage liver disease, likely to improve post liver transplant.       Anemia of chronic disease  - H/H stable. Will continue to monitor with daily cbc.        Acute blood loss anemia  - expected post op      Chronic systolic heart failure  - Cont entresto       Type 2 diabetes mellitus with hyperglycemia  - Endocrine consulted. Appreciate their assistance.      VTE Risk Mitigation (From admission, onward)         Ordered     heparin (porcine) injection 5,000 Units  Every 8 hours         01/05/23 1824     IP VTE HIGH RISK PATIENT  Once         01/05/23 1824                The patients clinical status was discussed at multidisplinary rounds, involving transplant surgery, transplant medicine, pharmacy, nursing, nutrition, and social work    Discharge Planning: Not a candidate for d/c at this time.     ESTEPHANIA Escamilla-C  Liver Transplant  [facility] - Transplant Step[facility]

## 2023-01-09 NOTE — PROGRESS NOTES
[facility] - Transplant Stepdown  Adult Nutrition  Progress Note    SUMMARY       Recommendations    Continue Diabetic diet    If PO intake <50%, add Boost glucose control BID    RD to monitor and follow    Goals: meets % een/epn by next rd f/u  Nutrition Goal Status:  (POC)  Communication of RD Recs: other (comment) (POC)    Assessment and Plan    Nutrition Problem  Increased nutrient needs (protein, energy)    Related to (etiology):   Increased physiological demands    Signs and Symptoms (as evidenced by):   S/p DBD Liver transplant (1/5)    Interventions/Recommendations (treatment strategy):  Collaboration with other providers  Nutrition education    Nutrition Diagnosis Status:   New      Reason for Assessment    Reason For Assessment: RD follow-up  Diagnosis: transplant/postoperative complications  Relevant Medical History: 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 Tolerating 50% of meals per chart. Pt usually drink 1-2 ProteinO2 and fairlife protein shakes per day. Denies N/V, chewing/swallowing difficulties. Had a loose BM today. Stated  lb. Wt stable per chart. Post transplant diet education provided to pt and family. Pt and family verbalized understanding. All questions has been answered. NFPE completed today, pt appears nourished. No indicator of malnutrition.    1/6: Pt seen for consult, wife at bedside. Pt extubated, w/ propofol weaned at time of visit. Pt talking to wife when eyes closed but not fully coherent. Pt following LS diet PTA with good intake. Pt appears nourished.   2gm Na education provided on 1/6. Discussed foods to limit or avoid and benefits of diet adherence. Post-transplant expectations/guidelines also introduced. Appropriate education material with RD contact information provided. No other needs  "identified. RD following    Nutrition Discharge Planning: Post transplant nutrition education provided on 1/9. Food safety/drug interactions emphasized. General healthy/low salt diet recommended. Education material left at bedside. No other needs identified.    Nutrition Risk Screen    Nutrition Risk Screen: no indicators present    Nutrition/Diet History    Patient Reported Diet/Restrictions/Preferences: low salt  Spiritual, Cultural Beliefs, Congregation Practices, Values that Affect Care: no  Food Allergies: NKFA    Anthropometrics    Temp: 98.1 °F (36.7 °C)  Height Method: Stated  Height: 5' 8" (172.7 cm)  Height (inches): 68 in  Weight Method: Standard Scale  Weight: 103.8 kg (228 lb 15.2 oz)  Weight (lb): 228.95 lb  Ideal Body Weight (IBW), Male: 154 lb  % Ideal Body Weight, Male (lb): 187.4 %  BMI (Calculated): 34.8  BMI Grade: greater than 40 - morbid obesity     Lab/Procedures/Meds    Pertinent Labs Reviewed: reviewed  Pertinent Labs Comments: Na 134, glucose 120, BUN 32, Tbili 3.9, , AST 75, ALT 77  Pertinent Medications Reviewed: reviewed  Pertinent Medications Comments: prednisone, methylprednisolone, tacrolimus, insulin, Na bicarb, famotidine, mycophenolate    Estimated/Assessed Needs    Weight Used For Calorie Calculations: 130.9 kg (288 lb 9.3 oz)  Energy Calorie Requirements (kcal): 2158 (MSJ)  Energy Need Method: [facility]  Protein Requirements: 157-170 (1.2-1.3 g/kg)  Weight Used For Protein Calculations: 130.9 kg (288 lb 9.3 oz)  RDA Method (mL): 2158    Nutrition Prescription Ordered    Current Diet Order: Diabetic    Evaluation of Received Nutrient/Fluid Intake    I/O: -4.4 L since admit  Energy Calories Required: not meeting needs  Protein Required: not meeting needs  Comments: LBM 1/4    Nutrition Risk    Level of Risk/Frequency of Follow-up:  (1x/week)     Monitor and Evaluation    Food and Nutrient Intake: energy intake, food and beverage intake  Food and Nutrient Adminstration: " diet order  Knowledge/Beliefs/Attitudes: beliefs and attitudes, food and nutrition knowledge/skill  Physical Activity and Function: nutrition-related ADLs and IADLs  Anthropometric Measurements: height/length, weight, weight change, body mass index  Biochemical Data, Medical Tests and Procedures: electrolyte and renal panel, gastrointestinal profile, glucose/endocrine profile, inflammatory profile, lipid profile  Nutrition-Focused Physical Findings: overall appearance     Nutrition Follow-Up    RD Follow-up?: Yes

## 2023-01-09 NOTE — PROGRESS NOTES
Met with patient and his wife for  discharge teaching.  Reviewed My New Journey: Living Smart After My Liver Transplant.  Sections reviewed were: First Steps, Appointments,  Prevention, Hep C positive donor.   Medication will be reviewed by Pharm D.  Allowed time for questions and answers.  Asked patient to complete the review questions in the discharge book.

## 2023-01-09 NOTE — PROCEDURES
Lateral drain removed:  Site cleaned with alcohol, lidocaine 1% used to numb area to place prolene 3.0 suture to site. Drain intact at the time of removal. Patient tolerated procedure well. Will continue to monitor for any complications.

## 2023-01-09 NOTE — PLAN OF CARE
Pt aaox4. VSS on RA. Pt post liver transplant from 1/5/23. Pt stepped down to TSU 1/8. Chevron incision ALBA, staples intact. RLQ BRITTANIE X2 intact draining serosanguinous output. NSR on telemetry. PIV x2 intact. Insulin gtt continued @ 3.3 units/hr, AC/HS accuchecks monitored. Pt voiding post kat removal. Prn oxy administered for pain. Scheduled robaxin administered per order. VS and assessments in flowsheets.

## 2023-01-09 NOTE — PT/OT/SLP PROGRESS
Physical Therapy Treatment    Patient Name:  Rosalio Mccarty   MRN:  [id number]    Recommendations:     Discharge Recommendations: home  Discharge Equipment Recommendations:  (TBD)  Barriers to discharge: None    Assessment:     Rosalio Mccarty is a 47 y.o. male admitted with a medical diagnosis of Liver transplanted.  He presents with the following impairments/functional limitations: weakness, impaired endurance, impaired functional mobility, impaired self care skills, gait instability, impaired balance. Pt participated, and tolerated treatment well. Pt will continue to benefit from skilled PT services to improve all deficits noted above. Resume PT POC as indicated.     Rehab Prognosis: Good; patient would benefit from acute skilled PT services to address these deficits and reach maximum level of function.    Recent Surgery: Procedure(s) (LRB):  TRANSPLANT, LIVER (N/A) 4 Days Post-Op    Plan:     During this hospitalization, patient to be seen 4 x/week to address the identified rehab impairments via gait training, therapeutic activities, therapeutic exercises and progress toward the following goals:    Plan of Care Expires:  02/06/23    Subjective     Chief Complaint: none stated  Patient/Family Comments/goals: none stated  Pain/Comfort:  Pain Rating 1:  (not rated)  Location - Orientation 1: generalized  Location 1: abdomen  Pain Addressed 1: Pre-medicate for activity  Pain Rating Post-Intervention 1: 6/10      Objective:     Communicated with nursing prior to session.  Patient found up in chair with  (all lines intact) upon PT entry to room.     General Precautions: Standard, fall  Orthopedic Precautions: N/A  Braces: N/A  Respiratory Status: Room air     Functional Mobility:  Transfers:  Sit to Stand:  stand by assistance with no AD  Gait: ~140ft pushing IV pole with SBA. No LOB noted. Pt required 1 brief  standing break due to abdominal pain.       AM-PAC 6 CLICK MOBILITY  Turning over in bed (including adjusting  bedclothes, sheets and blankets)?: 3  Sitting down on and standing up from a chair with arms (e.g., wheelchair, bedside commode, etc.): 4  Moving from lying on back to sitting on the side of the bed?: 3  Moving to and from a bed to a chair (including a wheelchair)?: 3  Need to walk in hospital room?: 3  Climbing 3-5 steps with a railing?: 2  Basic Mobility Total Score: 18       Treatment & Education:  -Questions answered within PTA scope of practice.     Patient left up in chair with all lines intact, call button in reach, and nursing notified..    GOALS:   Multidisciplinary Problems       Physical Therapy Goals          Problem: Physical Therapy    Goal Priority Disciplines Outcome Goal Variances Interventions   Physical Therapy Goal     PT, PT/OT Ongoing, Progressing     Description: Goals to be met by: 2023     Patient will increase functional independence with mobility by performin. Supine to sit with Set-up [place]  2. Sit to supine with Set-up [place]  3. Sit to stand transfer with Supervision  4. Bed to chair transfer with Supervision using LRAD  5. Gait  x 150 feet with Supervision using LRAD.   6. Lower extremity exercise program x15 reps per handout, with supervision                         Time Tracking:     PT Received On: 23  PT Start Time: 930     PT Stop Time: 948  PT Total Time (min): 18 min     Billable Minutes: Gait Training 12    Treatment Type: Treatment  PT/PTA: PTA     PTA Visit Number: 1     2023

## 2023-01-09 NOTE — SUBJECTIVE & OBJECTIVE
Scheduled Meds:   ARIPiprazole  2 mg Oral Daily    EScitalopram oxalate  20 mg Oral Daily    famotidine  20 mg Oral QHS    heparin (porcine)  5,000 Units Subcutaneous Q8H    insulin aspart U-100  5-15 Units Subcutaneous TIDWM    insulin detemir U-100  30 Units Subcutaneous BID    LIDOcaine HCL 10 mg/ml (1%)  1 mL Other Once    methocarbamoL  500 mg Oral QID    [START ON 1/10/2023] methylPREDNISolone sodium succinate injection  40 mg Intravenous Daily    Followed by    [START ON 1/11/2023] predniSONE  20 mg Oral Daily    mupirocin  1 g Nasal BID    mycophenolate  1,000 mg Oral BID    nystatin  500,000 Units Mouth/Throat TID PC    sacubitriL-valsartan  1 tablet Oral BID    sodium bicarbonate  1,300 mg Oral BID    [START ON 1/12/2023] sulfamethoxazole-trimethoprim 400-80mg  1 tablet Oral Daily AM    tacrolimus  4 mg Oral BID    traZODone  75 mg Oral QHS    [START ON 1/15/2023] valGANciclovir  450 mg Oral Daily     Continuous Infusions:   sodium chloride 0.9% Stopped (01/08/23 0110)     PRN Meds:dextrose 10%, dextrose 10%, dextrose 10%, dextrose 10%, diphenhydrAMINE, glucagon (human recombinant), glucose, glucose, insulin aspart U-100, oxyCODONE, oxyCODONE, sodium chloride 0.9%    Review of Systems   Constitutional:  Negative for appetite change, chills and fever.   HENT: Negative.     Eyes: Negative.    Respiratory:  Negative for cough, chest tightness and shortness of breath.    Cardiovascular:  Negative for chest pain, palpitations and leg swelling.   Gastrointestinal:  Positive for abdominal distention. Negative for abdominal pain, constipation, diarrhea, nausea and vomiting.   Endocrine: Negative.    Genitourinary:  Negative for difficulty urinating, dysuria, frequency and urgency.   Musculoskeletal:  Negative for back pain and neck pain.   Skin:  Positive for wound. Negative for color change, pallor and rash.   Allergic/Immunologic: Positive for immunocompromised state.   Neurological:  Negative for dizziness,  weakness and headaches.   Psychiatric/Behavioral:  Negative for agitation, behavioral problems, confusion and sleep disturbance.    Objective:     Vital Signs (Most Recent):  Temp: 98.1 °F (36.7 °C) (01/09/23 1211)  Pulse: 83 (01/09/23 1211)  Resp: 18 (01/09/23 1041)  BP: 122/74 (01/09/23 1211)  SpO2: 98 % (01/09/23 1211) Vital Signs (24h Range):  Temp:  [97.6 °F (36.4 °C)-98.1 °F (36.7 °C)] 98.1 °F (36.7 °C)  Pulse:  [66-91] 83  Resp:  [10-21] 18  SpO2:  [96 %-100 %] 98 %  BP: (117-145)/(69-86) 122/74  Arterial Line BP: (113-134)/(55-69) 113/55     Weight: 103.8 kg (228 lb 15.2 oz)  Body mass index is 34.81 kg/m².    Intake/Output - Last 3 Shifts         01/07 0700  01/08 0659 01/08 0700  01/09 0659 01/09 0700  01/10 0659    P.O.  600     I.V. (mL/kg) 1437 (10.6) 133 (1.3)     Blood 828      IV Piggyback 0.8      Total Intake(mL/kg) 2265.8 (16.8) 733 (7.1)     Urine (mL/kg/hr) 1710 (0.5) 1185 (0.5)     Drains 1190 1430     Stool  0     Chest Tube 305      Total Output 3205 2615     Net -939.2 -1882.1            Stool Occurrence  0 x             Physical Exam  Vitals and nursing note reviewed.   HENT:      Head: Normocephalic and atraumatic.      Right Ear: External ear normal.      Left Ear: External ear normal.      Nose: Nose normal.   Eyes:      Pupils: Pupils are equal, round, and reactive to light.   Cardiovascular:      Rate and Rhythm: Normal rate and regular rhythm.      Pulses: Normal pulses.   Pulmonary:      Effort: Pulmonary effort is normal. No respiratory distress.   Abdominal:      General: There is distension.      Palpations: Abdomen is soft.      Tenderness: There is no abdominal tenderness.      Comments: Chevron incision CDI w/ staples  BRITTANIE x 2 with ss output   Musculoskeletal:         General: Normal range of motion.      Cervical back: Normal range of motion.      Right lower leg: No edema.      Left lower leg: No edema.   Skin:     General: Skin is warm and dry.   Neurological:      General:  No focal deficit present.      Motor: No weakness.   Psychiatric:         Behavior: Behavior normal.         Thought Content: Thought content normal.       Laboratory:  Immunosuppressants           Stop Route Frequency     tacrolimus capsule 4 mg         -- Oral 2 times daily     mycophenolate capsule 1,000 mg         -- Oral 2 times daily          CBC:   Recent Labs   Lab 01/09/23  0700   WBC 7.34   RBC 4.24*   HGB 11.1*   HCT 34.3*   PLT 52*   MCV 81*   MCH 26.2*   MCHC 32.4     BMP:   Recent Labs   Lab 01/09/23  0700   *   *   K 4.1      CO2 20*   BUN 32*   CREATININE 1.3   CALCIUM 9.0     Labs within the past 24 hours have been reviewed.    Diagnostic Results:  US Liver Transplant Post:  Results for orders placed during the hospital encounter of 01/05/23    US Doppler Liver Transplant Post (xpd)    Narrative  EXAMINATION:  US DOPPLER LIVER TRANSPLANT POST (XPD)    CLINICAL HISTORY:  Assess perfusion following liver transplant;    TECHNIQUE:  Limited abdominal ultrasound of the transplant liver with Doppler evaluation.  Color and spectral Doppler were performed.    COMPARISON:  None.    FINDINGS:  Patient is status post orthotopic liver transplantday 1.  Liver allograft is normal in size.  The liver demonstrates homogeneous echotexture.  No focal hepatic lesions are seen.  No fluid collections.    The common duct is not dilated, measuring 4 mm.  No dilated intrahepatic radicles are seen.    Color flow and spectral waveform analysis was performed.  The main portal vein, right portal vein, left portal vein, middle hepatic vein, right hepatic vein, left hepatic vein, and IVC are patent with proper directional flow.    Anastomosis site of the main hepatic artery demonstrates a peak systolic velocity 47 cm/sec.    Main hepatic artery demonstrates resistive index 1.0 with normal waveform.    Left hepatic artery shows resistive index 1.0 with normal waveform.    Anterior branch of the right hepatic  artery demonstrates resistive index 0.8 with normal waveform.    Posterior branch of the right hepatic artery demonstrates resistive index 1.0 with normal waveform.    Impression  In this postop day 1 liver transplant, there are elevated hepatic artery resistive indices which is likely related to postoperative edema.  Attention on follow-up is suggested.    No fluid collections are seen.    Trace ascites and right-sided pleural effusion.      Electronically signed by: Bruce Girard Jr  Date:    01/06/2023  Time:    08:56    Debility/Functional status: Patient debilitated by evidence of Muscle wasting and atrophy and Weakness. Physical and occupational therapy ordered daily to evaluate and treat. Debility was: present on admission.

## 2023-01-09 NOTE — PLAN OF CARE
Recommendations    Continue Diabetic diet    If PO intake <50%, add Boost glucose control BID    RD to monitor and follow    Goals: meets % een/epn by next rd f/u  Nutrition Goal Status:  (POC)  Communication of RD Recs: other (comment) (POC)

## 2023-01-10 LAB
ALBUMIN SERPL BCP-MCNC: 3 G/DL (ref 3.5–5.2)
ALP SERPL-CCNC: 159 U/L (ref 55–135)
ALT SERPL W/O P-5'-P-CCNC: 86 U/L (ref 10–44)
ANION GAP SERPL CALC-SCNC: 7 MMOL/L (ref 8–16)
AST SERPL-CCNC: 74 U/L (ref 10–40)
BASOPHILS # BLD AUTO: 0.03 K/UL (ref 0–0.2)
BASOPHILS NFR BLD: 0.4 % (ref 0–1.9)
BILIRUB SERPL-MCNC: 3.8 MG/DL (ref 0.1–1)
BUN SERPL-MCNC: 30 MG/DL (ref 6–20)
CA-I BLDV-SCNC: 1.19 MMOL/L (ref 1.06–1.42)
CALCIUM SERPL-MCNC: 8.5 MG/DL (ref 8.7–10.5)
CHLORIDE SERPL-SCNC: 108 MMOL/L (ref 95–110)
CMV IGG SERPL QL IA: REACTIVE
CO2 SERPL-SCNC: 20 MMOL/L (ref 23–29)
CREAT SERPL-MCNC: 1.3 MG/DL (ref 0.5–1.4)
DIFFERENTIAL METHOD: ABNORMAL
EOSINOPHIL # BLD AUTO: 0.2 K/UL (ref 0–0.5)
EOSINOPHIL NFR BLD: 2.9 % (ref 0–8)
ERYTHROCYTE [DISTWIDTH] IN BLOOD BY AUTOMATED COUNT: 16.7 % (ref 11.5–14.5)
EST. GFR  (NO RACE VARIABLE): >60 ML/MIN/1.73 M^2
GLUCOSE SERPL-MCNC: 193 MG/DL (ref 70–110)
HCT VFR BLD AUTO: 34.7 % (ref 40–54)
HGB BLD-MCNC: 11.7 G/DL (ref 14–18)
IMM GRANULOCYTES # BLD AUTO: 0.1 K/UL (ref 0–0.04)
IMM GRANULOCYTES NFR BLD AUTO: 1.3 % (ref 0–0.5)
LYMPHOCYTES # BLD AUTO: 0.8 K/UL (ref 1–4.8)
LYMPHOCYTES NFR BLD: 10.6 % (ref 18–48)
MAGNESIUM SERPL-MCNC: 1.7 MG/DL (ref 1.6–2.6)
MCH RBC QN AUTO: 26.1 PG (ref 27–31)
MCHC RBC AUTO-ENTMCNC: 33.7 G/DL (ref 32–36)
MCV RBC AUTO: 77 FL (ref 82–98)
MONOCYTES # BLD AUTO: 1 K/UL (ref 0.3–1)
MONOCYTES NFR BLD: 12.9 % (ref 4–15)
NEUTROPHILS # BLD AUTO: 5.5 K/UL (ref 1.8–7.7)
NEUTROPHILS NFR BLD: 71.9 % (ref 38–73)
NRBC BLD-RTO: 0 /100 WBC
PHOSPHATE SERPL-MCNC: 3.7 MG/DL (ref 2.7–4.5)
PLATELET # BLD AUTO: 61 K/UL (ref 150–450)
PMV BLD AUTO: 11 FL (ref 9.2–12.9)
POCT GLUCOSE: 190 MG/DL (ref 70–110)
POCT GLUCOSE: 213 MG/DL (ref 70–110)
POCT GLUCOSE: 225 MG/DL (ref 70–110)
POCT GLUCOSE: 239 MG/DL (ref 70–110)
POCT GLUCOSE: 254 MG/DL (ref 70–110)
POCT GLUCOSE: 255 MG/DL (ref 70–110)
POCT GLUCOSE: 270 MG/DL (ref 70–110)
POCT GLUCOSE: 292 MG/DL (ref 70–110)
POTASSIUM SERPL-SCNC: 3.9 MMOL/L (ref 3.5–5.1)
PROT SERPL-MCNC: 4.7 G/DL (ref 6–8.4)
RBC # BLD AUTO: 4.49 M/UL (ref 4.6–6.2)
SODIUM SERPL-SCNC: 135 MMOL/L (ref 136–145)
TACROLIMUS BLD-MCNC: 3.6 NG/ML (ref 5–15)
WBC # BLD AUTO: 7.58 K/UL (ref 3.9–12.7)

## 2023-01-10 PROCEDURE — 25000003 PHARM REV CODE 250: Performed by: STUDENT IN AN ORGANIZED HEALTH CARE EDUCATION/TRAINING PROGRAM

## 2023-01-10 PROCEDURE — 63600175 PHARM REV CODE 636 W HCPCS: Performed by: PHYSICIAN ASSISTANT

## 2023-01-10 PROCEDURE — 80053 COMPREHEN METABOLIC PANEL: CPT | Performed by: STUDENT IN AN ORGANIZED HEALTH CARE EDUCATION/TRAINING PROGRAM

## 2023-01-10 PROCEDURE — 83735 ASSAY OF MAGNESIUM: CPT

## 2023-01-10 PROCEDURE — 97530 THERAPEUTIC ACTIVITIES: CPT

## 2023-01-10 PROCEDURE — 99232 SBSQ HOSP IP/OBS MODERATE 35: CPT | Mod: ,,, | Performed by: PHYSICIAN ASSISTANT

## 2023-01-10 PROCEDURE — 63600175 PHARM REV CODE 636 W HCPCS: Performed by: TRANSPLANT SURGERY

## 2023-01-10 PROCEDURE — 25000003 PHARM REV CODE 250: Performed by: SURGERY

## 2023-01-10 PROCEDURE — 99233 PR SUBSEQUENT HOSPITAL CARE,LEVL III: ICD-10-PCS | Mod: 24,,, | Performed by: PHYSICIAN ASSISTANT

## 2023-01-10 PROCEDURE — 84100 ASSAY OF PHOSPHORUS: CPT

## 2023-01-10 PROCEDURE — 25000003 PHARM REV CODE 250: Performed by: PHYSICIAN ASSISTANT

## 2023-01-10 PROCEDURE — 63600175 PHARM REV CODE 636 W HCPCS: Performed by: STUDENT IN AN ORGANIZED HEALTH CARE EDUCATION/TRAINING PROGRAM

## 2023-01-10 PROCEDURE — 99232 PR SUBSEQUENT HOSPITAL CARE,LEVL II: ICD-10-PCS | Mod: ,,, | Performed by: PHYSICIAN ASSISTANT

## 2023-01-10 PROCEDURE — 99233 SBSQ HOSP IP/OBS HIGH 50: CPT | Mod: 24,,, | Performed by: PHYSICIAN ASSISTANT

## 2023-01-10 PROCEDURE — 25000003 PHARM REV CODE 250

## 2023-01-10 PROCEDURE — 82330 ASSAY OF CALCIUM: CPT

## 2023-01-10 PROCEDURE — 20600001 HC STEP DOWN PRIVATE ROOM

## 2023-01-10 PROCEDURE — 25000003 PHARM REV CODE 250: Performed by: TRANSPLANT SURGERY

## 2023-01-10 PROCEDURE — 80197 ASSAY OF TACROLIMUS: CPT | Performed by: STUDENT IN AN ORGANIZED HEALTH CARE EDUCATION/TRAINING PROGRAM

## 2023-01-10 PROCEDURE — 85025 COMPLETE CBC W/AUTO DIFF WBC: CPT | Performed by: STUDENT IN AN ORGANIZED HEALTH CARE EDUCATION/TRAINING PROGRAM

## 2023-01-10 RX ORDER — TACROLIMUS 1 MG/1
6 CAPSULE ORAL 2 TIMES DAILY
Status: DISCONTINUED | OUTPATIENT
Start: 2023-01-10 | End: 2023-01-11 | Stop reason: HOSPADM

## 2023-01-10 RX ORDER — TACROLIMUS 1 MG/1
2 CAPSULE ORAL ONCE
Status: COMPLETED | OUTPATIENT
Start: 2023-01-10 | End: 2023-01-10

## 2023-01-10 RX ADMIN — MYCOPHENOLATE MOFETIL 1000 MG: 250 CAPSULE ORAL at 08:01

## 2023-01-10 RX ADMIN — TACROLIMUS 4 MG: 1 CAPSULE ORAL at 08:01

## 2023-01-10 RX ADMIN — METHOCARBAMOL 500 MG: 500 TABLET ORAL at 08:01

## 2023-01-10 RX ADMIN — MUPIROCIN 1 G: 20 OINTMENT TOPICAL at 08:01

## 2023-01-10 RX ADMIN — OXYCODONE HYDROCHLORIDE 10 MG: 10 TABLET ORAL at 03:01

## 2023-01-10 RX ADMIN — TACROLIMUS 2 MG: 1 CAPSULE ORAL at 09:01

## 2023-01-10 RX ADMIN — INSULIN ASPART 4 UNITS: 100 INJECTION, SOLUTION INTRAVENOUS; SUBCUTANEOUS at 01:01

## 2023-01-10 RX ADMIN — SODIUM BICARBONATE 650 MG TABLET 1300 MG: at 08:01

## 2023-01-10 RX ADMIN — FAMOTIDINE 20 MG: 20 TABLET ORAL at 08:01

## 2023-01-10 RX ADMIN — INSULIN ASPART 2 UNITS: 100 INJECTION, SOLUTION INTRAVENOUS; SUBCUTANEOUS at 09:01

## 2023-01-10 RX ADMIN — OXYCODONE HYDROCHLORIDE 10 MG: 10 TABLET ORAL at 08:01

## 2023-01-10 RX ADMIN — HEPARIN SODIUM 5000 UNITS: 5000 INJECTION INTRAVENOUS; SUBCUTANEOUS at 08:01

## 2023-01-10 RX ADMIN — INSULIN ASPART 5 UNITS: 100 INJECTION, SOLUTION INTRAVENOUS; SUBCUTANEOUS at 01:01

## 2023-01-10 RX ADMIN — INSULIN ASPART 5 UNITS: 100 INJECTION, SOLUTION INTRAVENOUS; SUBCUTANEOUS at 09:01

## 2023-01-10 RX ADMIN — HEPARIN SODIUM 5000 UNITS: 5000 INJECTION INTRAVENOUS; SUBCUTANEOUS at 01:01

## 2023-01-10 RX ADMIN — OXYCODONE HYDROCHLORIDE 10 MG: 10 TABLET ORAL at 01:01

## 2023-01-10 RX ADMIN — TACROLIMUS 6 MG: 1 CAPSULE ORAL at 06:01

## 2023-01-10 RX ADMIN — ARIPIPRAZOLE 2 MG: 2 TABLET ORAL at 08:01

## 2023-01-10 RX ADMIN — INSULIN ASPART 4 UNITS: 100 INJECTION, SOLUTION INTRAVENOUS; SUBCUTANEOUS at 06:01

## 2023-01-10 RX ADMIN — INSULIN DETEMIR 30 UNITS: 100 INJECTION, SOLUTION SUBCUTANEOUS at 10:01

## 2023-01-10 RX ADMIN — INSULIN ASPART 15 UNITS: 100 INJECTION, SOLUTION INTRAVENOUS; SUBCUTANEOUS at 06:01

## 2023-01-10 RX ADMIN — METHYLPREDNISOLONE SODIUM SUCCINATE 40 MG: 40 INJECTION, POWDER, FOR SOLUTION INTRAMUSCULAR; INTRAVENOUS at 08:01

## 2023-01-10 RX ADMIN — INSULIN DETEMIR 30 UNITS: 100 INJECTION, SOLUTION SUBCUTANEOUS at 08:01

## 2023-01-10 RX ADMIN — METHOCARBAMOL 500 MG: 500 TABLET ORAL at 06:01

## 2023-01-10 RX ADMIN — INSULIN ASPART 2 UNITS: 100 INJECTION, SOLUTION INTRAVENOUS; SUBCUTANEOUS at 02:01

## 2023-01-10 RX ADMIN — INSULIN ASPART 2 UNITS: 100 INJECTION, SOLUTION INTRAVENOUS; SUBCUTANEOUS at 10:01

## 2023-01-10 RX ADMIN — METHOCARBAMOL 500 MG: 500 TABLET ORAL at 01:01

## 2023-01-10 RX ADMIN — ESCITALOPRAM OXALATE 20 MG: 5 TABLET, FILM COATED ORAL at 08:01

## 2023-01-10 RX ADMIN — NYSTATIN 500000 UNITS: 500000 SUSPENSION ORAL at 08:01

## 2023-01-10 RX ADMIN — TRAZODONE HYDROCHLORIDE 75 MG: 50 TABLET ORAL at 08:01

## 2023-01-10 RX ADMIN — NYSTATIN 500000 UNITS: 500000 SUSPENSION ORAL at 01:01

## 2023-01-10 RX ADMIN — SACUBITRIL AND VALSARTAN 1 TABLET: 49; 51 TABLET, FILM COATED ORAL at 09:01

## 2023-01-10 RX ADMIN — SACUBITRIL AND VALSARTAN 1 TABLET: 49; 51 TABLET, FILM COATED ORAL at 08:01

## 2023-01-10 RX ADMIN — HEPARIN SODIUM 5000 UNITS: 5000 INJECTION INTRAVENOUS; SUBCUTANEOUS at 05:01

## 2023-01-10 RX ADMIN — NYSTATIN 500000 UNITS: 500000 SUSPENSION ORAL at 06:01

## 2023-01-10 NOTE — SUBJECTIVE & OBJECTIVE
"Interval HPI:   No acute events overnight. Patient in room 39720/20438 A. Blood glucose variable. BG at, above, and below goal on current insulin regimen (SSI, prandial, and basal insulin ). Steroid use- Methylprednisolone  40 mg .   5 Days Post-Op  Renal function- Normal   Vasopressors-  None     Diet diabetic [facility]; 2000 Calorie; Isolation Tray - Regular [place]     Eating:   <50%  Nausea: No  Hypoglycemia and intervention: No  Fever: No  TPN and/or TF: No    /66   Pulse 80   Temp 98.3 °F (36.8 °C)   Resp 16   Ht 5' 8" (1.727 m)   Wt 103.1 kg (227 lb 4.7 oz)   SpO2 100%   BMI 34.56 kg/m²     Labs Reviewed and Include    Recent Labs   Lab 01/10/23  0626   *   CALCIUM 8.5*   ALBUMIN 3.0*   PROT 4.7*   *   K 3.9   CO2 20*      BUN 30*   CREATININE 1.3   ALKPHOS 159*   ALT 86*   AST 74*   BILITOT 3.8*     Lab Results   Component Value Date    WBC 7.58 01/10/2023    HGB 11.7 (L) 01/10/2023    HCT 34.7 (L) 01/10/2023    MCV 77 (L) 01/10/2023    PLT 61 (L) 01/10/2023     No results for input(s): TSH, FREET4 in the last 168 hours.  Lab Results   Component Value Date    HGBA1C 6.0 (H) 01/04/2023       Nutritional status:   Body mass index is 34.56 kg/m².  Lab Results   Component Value Date    ALBUMIN 3.0 (L) 01/10/2023    ALBUMIN 3.2 (L) 01/09/2023    ALBUMIN 3.3 (L) 01/08/2023     No results found for: PREALBUMIN    Estimated Creatinine Clearance: 81.8 mL/min (based on SCr of 1.3 mg/dL).    Accu-Checks  Recent Labs     01/08/23  1455 01/08/23  1632 01/08/23  2057 01/09/23  0216 01/09/23  0839 01/09/23  1157 01/09/23  1827 01/09/23  2033 01/10/23  0204 01/10/23  0905   POCTGLUCOSE 193* 169* 158* 150* 109 162* 292* 254* 213* 190*       Current Medications and/or Treatments Impacting Glycemic Control  Immunotherapy:    Immunosuppressants           Stop Route Frequency     tacrolimus capsule 6 mg         -- Oral 2 times daily     mycophenolate capsule 1,000 mg         -- Oral 2 times " daily          Steroids:   Hormones (From admission, onward)      Start     Stop Route Frequency Ordered    01/11/23 0900  predniSONE tablet 20 mg  (methylprednisolone taper panel)        See Hyperspace for full Linked Orders Report.    -- Oral Daily 01/05/23 1824          Pressors:    Autonomic Drugs (From admission, onward)      None          Hyperglycemia/Diabetes Medications:   Antihyperglycemics (From admission, onward)      Start     Stop Route Frequency Ordered    01/09/23 1130  insulin aspart U-100 pen 5-15 Units         -- SubQ 3 times daily with meals 01/09/23 1055    01/09/23 1100  insulin detemir U-100 pen 30 Units         -- SubQ 2 times daily 01/09/23 1055    01/07/23 1021  insulin aspart U-100 pen 0-10 Units         -- SubQ As needed (PRN) 01/07/23 0923

## 2023-01-10 NOTE — PLAN OF CARE
Pt aao x 4. VSS. Bed in low locked pos, call light within reach. Tanvir carmencita w/ louise. BRITTANIE collecting serosanguinous drainage/ Pain controlled with prn. Pt voids per urinal. BS monitored ac/hs/2am. AM labs ordered.

## 2023-01-10 NOTE — PLAN OF CARE
Pt AAOx4. Independent and showered today. Final BRITTANIE drain removed. Liver enzymes still elevated. Given robaxin and oxy10 for pain. Liver US tomorrow. VS stable and BG elevated again, but managed with insulin.

## 2023-01-10 NOTE — PROGRESS NOTES
[facility] - Transplant Stepdown  Liver Transplant  Progress Note    Patient Name: Rosalio Mccarty  MRN: [id number]  Admission Date: 1/5/2023  Hospital Length of Stay: 5 days  Code Status: Full Code  Primary Care Provider: Primary Doctor No  Post-Operative Day: 5    ORGAN:   LIVER  Disease Etiology: Cirrhosis: Other, Specify - granulomatous hepatitis  Donor Type:   Donation after Brain Death  [facility] High Risk:   Yes  Donor CMV Status:   Donor CMV Status: Positive  Donor HBcAB:   Negative  Donor HCV Status:   Positive  Donor HBV ASAF: Negative  Donor HCV ASAF: Negative  Whole or Partial: Whole Liver  Biliary Anastomosis: End to End  Arterial Anatomy: Replaced Right Hepatic from SMA  Subjective:     History of Present Illness:  Rosalio Mccarty is a 47yr old male with 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 ESLD c/b ascites, BLE edema, and nonbleeding esophageal varices. He is listed for a liver transplant with MELD 16. He is being admitted for liver transplant surgery. He feels well in his usual state of health. He denies any recent illnesses or hospitalizations. Primary surgeon Dr. Foreman.       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 Surgery went without complications. 2 BRITTANIE drains placed. POD1 US showed elevated RI's but was otherwise satisfactory. Stepped down POD3.     Interval history: No acute events overnight. He is overall progressing well. LFTs and tbili remain elevated in setting of sub therapeutic prograf level, will continue to monitor with daily labs. Tolerating PO without n/v. + flatus, + BM. Medial BRITTANIE drains with ~100cc ss output, plan to remove second drain today. PT/OT following, no HH needs. Encourage IS. Encourage ambulation. Will continue to monitor.       Scheduled Meds:   ARIPiprazole  2 mg Oral Daily     EScitalopram oxalate  20 mg Oral Daily    famotidine  20 mg Oral QHS    heparin (porcine)  5,000 Units Subcutaneous Q8H    insulin aspart U-100  5-15 Units Subcutaneous TIDWM    insulin detemir U-100  30 Units Subcutaneous BID    methocarbamoL  500 mg Oral QID    mupirocin  1 g Nasal BID    mycophenolate  1,000 mg Oral BID    nystatin  500,000 Units Mouth/Throat TID PC    [START ON 1/11/2023] predniSONE  20 mg Oral Daily    sacubitriL-valsartan  1 tablet Oral BID    sodium bicarbonate  1,300 mg Oral BID    [START ON 1/12/2023] sulfamethoxazole-trimethoprim 400-80mg  1 tablet Oral Daily AM    tacrolimus  6 mg Oral BID    traZODone  75 mg Oral QHS    [START ON 1/15/2023] valGANciclovir  450 mg Oral Daily     Continuous Infusions:   sodium chloride 0.9% Stopped (01/08/23 0110)     PRN Meds:dextrose 10%, dextrose 10%, dextrose 10%, dextrose 10%, diphenhydrAMINE, glucagon (human recombinant), glucose, glucose, insulin aspart U-100, oxyCODONE, oxyCODONE, sodium chloride 0.9%    Review of Systems   Constitutional:  Negative for appetite change, chills and fever.   HENT: Negative.     Eyes: Negative.    Respiratory:  Negative for cough, chest tightness and shortness of breath.    Cardiovascular:  Negative for chest pain, palpitations and leg swelling.   Gastrointestinal:  Positive for abdominal distention. Negative for abdominal pain, constipation, diarrhea, nausea and vomiting.   Endocrine: Negative.    Genitourinary:  Negative for difficulty urinating, dysuria, frequency and urgency.   Musculoskeletal:  Negative for back pain and neck pain.   Skin:  Positive for wound. Negative for color change, pallor and rash.   Allergic/Immunologic: Positive for immunocompromised state.   Neurological:  Negative for dizziness, weakness and headaches.   Psychiatric/Behavioral:  Negative for agitation, behavioral problems, confusion and sleep disturbance.    Objective:     Vital Signs (Most Recent):  Temp: 96.3 °F (35.7  °C) (01/10/23 1137)  Pulse: 82 (01/10/23 1137)  Resp: 18 (01/10/23 1137)  BP: 111/71 (01/10/23 1137)  SpO2: 96 % (01/10/23 1137) Vital Signs (24h Range):  Temp:  [96.3 °F (35.7 °C)-98.6 °F (37 °C)] 96.3 °F (35.7 °C)  Pulse:  [69-98] 82  Resp:  [15-18] 18  SpO2:  [96 %-100 %] 96 %  BP: ()/(66-82) 111/71     Weight: 103.1 kg (227 lb 4.7 oz)  Body mass index is 34.56 kg/m².    Intake/Output - Last 3 Shifts         01/08 0700  01/09 0659 01/09 0700  01/10 0659 01/10 0700  01/11 0659    P.O. 600 0     I.V. (mL/kg) 133 (1.3)      Blood       IV Piggyback       Total Intake(mL/kg) 733 (7.1) 0 (0)     Urine (mL/kg/hr) 1185 (0.5) 1100 (0.4)     Drains 1430 100     Stool 0 0     Chest Tube       Total Output 2615 1200     Net -1882.1 -1200            Urine Occurrence  0 x     Stool Occurrence 0 x 2 x             Physical Exam  Vitals and nursing note reviewed.   HENT:      Head: Normocephalic and atraumatic.      Right Ear: External ear normal.      Left Ear: External ear normal.      Nose: Nose normal.   Eyes:      Pupils: Pupils are equal, round, and reactive to light.   Cardiovascular:      Rate and Rhythm: Normal rate and regular rhythm.      Pulses: Normal pulses.   Pulmonary:      Effort: Pulmonary effort is normal. No respiratory distress.   Abdominal:      General: There is distension.      Palpations: Abdomen is soft.      Tenderness: There is abdominal tenderness.      Comments: Chevron incision CDI w/ staples  BRITTANIE x 1 with ss output   Musculoskeletal:         General: Normal range of motion.      Cervical back: Normal range of motion.      Right lower leg: No edema.      Left lower leg: No edema.   Skin:     General: Skin is warm and dry.   Neurological:      General: No focal deficit present.      Motor: No weakness.   Psychiatric:         Behavior: Behavior normal.         Thought Content: Thought content normal.       Laboratory:  Immunosuppressants           Stop Route Frequency     tacrolimus capsule 6 mg          -- Oral 2 times daily     mycophenolate capsule 1,000 mg         -- Oral 2 times daily          CBC:   Recent Labs   Lab 01/10/23  0626   WBC 7.58   RBC 4.49*   HGB 11.7*   HCT 34.7*   PLT 61*   MCV 77*   MCH 26.1*   MCHC 33.7     CMP:   Recent Labs   Lab 01/10/23  0626   *   CALCIUM 8.5*   ALBUMIN 3.0*   PROT 4.7*   *   K 3.9   CO2 20*      BUN 30*   CREATININE 1.3   ALKPHOS 159*   ALT 86*   AST 74*   BILITOT 3.8*     Labs within the past 24 hours have been reviewed.    Diagnostic Results:  US Liver Transplant Post:  Results for orders placed during the hospital encounter of 01/05/23    US Doppler Liver Transplant Post (xpd)    Narrative  EXAMINATION:  US DOPPLER LIVER TRANSPLANT POST (XPD)    CLINICAL HISTORY:  Assess perfusion following liver transplant;    TECHNIQUE:  Limited abdominal ultrasound of the transplant liver with Doppler evaluation.  Color and spectral Doppler were performed.    COMPARISON:  None.    FINDINGS:  Patient is status post orthotopic liver transplantday 1.  Liver allograft is normal in size.  The liver demonstrates homogeneous echotexture.  No focal hepatic lesions are seen.  No fluid collections.    The common duct is not dilated, measuring 4 mm.  No dilated intrahepatic radicles are seen.    Color flow and spectral waveform analysis was performed.  The main portal vein, right portal vein, left portal vein, middle hepatic vein, right hepatic vein, left hepatic vein, and IVC are patent with proper directional flow.    Anastomosis site of the main hepatic artery demonstrates a peak systolic velocity 47 cm/sec.    Main hepatic artery demonstrates resistive index 1.0 with normal waveform.    Left hepatic artery shows resistive index 1.0 with normal waveform.    Anterior branch of the right hepatic artery demonstrates resistive index 0.8 with normal waveform.    Posterior branch of the right hepatic artery demonstrates resistive index 1.0 with normal  waveform.    Impression  In this postop day 1 liver transplant, there are elevated hepatic artery resistive indices which is likely related to postoperative edema.  Attention on follow-up is suggested.    No fluid collections are seen.    Trace ascites and right-sided pleural effusion.      Electronically signed by: Bruce Girard Jr  Date:    01/06/2023  Time:    08:56    Debility/Functional status: Patient debilitated by evidence of Weakness. Physical and occupational therapy ordered daily to evaluate and treat. Debility was: present on admission.    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       Mild malnutrition  - mild muscle wasting  - dietary following       Prophylactic immunotherapy  - see long term use of immunosuppression       Long-term use of immunosuppressant medication  - Continue prograf, cellcept, steroids.  - Continue to monitor prograf levels daily, monitor for toxic side effects, and adjust for therapeutic dose.       Acquired coagulation factor deficiency  - Due to end stage liver disease, likely to improve post liver transplant.       Thrombocytopenia, unspecified  - Due to end stage liver disease, likely to improve post liver transplant.       Anemia of chronic disease  - H/H stable. Will continue to monitor with daily cbc.       Acute blood loss anemia  - expected post op      Chronic systolic heart failure  - Cont entresto       Type 2 diabetes  mellitus with hyperglycemia  - Endocrine consulted. Appreciate their assistance.        VTE Risk Mitigation (From admission, onward)         Ordered     heparin (porcine) injection 5,000 Units  Every 8 hours         01/05/23 1824     IP VTE HIGH RISK PATIENT  Once         01/05/23 1824                The patients clinical status was discussed at multidisplinary rounds, involving transplant surgery, transplant medicine, pharmacy, nursing, nutrition, and social work    Discharge Planning: Not a candidate for d/c at this time.     Rosa Darnell PA-C  Liver Transplant  [facility] - Transplant Stepdown

## 2023-01-10 NOTE — ASSESSMENT & PLAN NOTE
BG goal: 140-180     Pt w/ T2DM.  S/p Liver transplant. Diet advanced. Receiving steroids.  Transitioned to subcutaneous insulin yesterday. Sugars above goal yesterday.  Will continue to monitor as steroid dose decreasing    - Continue on Levemir 30 units BID (~20% reduction of current drip rate)  - Continue Novolog 5-15 units based on intake. Administer 5 units if patient eats 25% -  50% and 15 units if patient eats 50% or more.  - Continue Novolog Moderate dose correction with ISF 25 starting at 180    - POCT Glucose AC HS 2AM  - Hypoglycemia protocol in place      ** Please notify Endocrine for any change and/or advance in diet**  ** Please call Endocrine for any BG related issues **     Discharge Planning:   TBD. Please notify endocrinology prior to discharge.

## 2023-01-10 NOTE — PT/OT/SLP PROGRESS
Occupational Therapy   Treatment and Discharge    Name: Rosalio Mccarty  MRN: [id number]  Admitting Diagnosis:  S/P liver transplant  5 Days Post-Op    Recommendations:     Discharge Recommendations: home  Discharge Equipment Recommendations:  none  Barriers to discharge:  None    Assessment:     Rosalio Mccarty is a 47 y.o. male with a medical diagnosis of S/P liver transplant.  He presents as independently walking, completing ADLs, and has met all goals suggesting that this client is no longer appropriate for therapy services in this setting. Performance deficits affecting function are impaired endurance, gait instability.     Rehab Prognosis:  Good; patient would benefit from acute skilled OT services to address these deficits and reach maximum level of function.       Plan:     Patient to be seen  (D/C) to address the above listed problems via  (Client is to be D/C)  Plan of Care Expires: 02/09/23  Plan of Care Reviewed with: patient    Subjective     Pain/Comfort:  Pain Rating 1: 0/10    Objective:     Communicated with: RN prior to session.  Patient found with bed in chair position with telemetry upon OT entry to room.    General Precautions: Standard, fall    Orthopedic Precautions:   Braces: N/A  Respiratory Status: Room air     Occupational Performance:     Bed Mobility:    Patient completed Rolling/Turning to Left with  independence  Patient completed Rolling/Turning to Right with independence  Patient completed Scooting/Bridging with independence  Patient completed Supine to Sit with independence  Patient completed Sit to Supine with independence     Functional Mobility/Transfers:  Patient completed Sit <> Stand Transfer with supervision  with  no assistive device   Functional Mobility: Client completed ~400 ft functional mobility task during session with supervision present with no assistive devices used.      [facility] 6 Click ADL: 21    Treatment & Education:  Client was educated to the importance of OOB activities  to improve endurance and activity tolerance.  Client was educated to POC and discharge recommendations.    Patient left HOB elevated with call button in reach    GOALS:   Multidisciplinary Problems       Occupational Therapy Goals          Problem: Occupational Therapy    Goal Priority Disciplines Outcome Interventions   Occupational Therapy Goal     OT, PT/OT Ongoing, Progressing    Description: Goals to be met by: 1/14/23    Patient will increase functional independence with ADLs by performing:    LE Dressing with Stand-by Assistance.  Grooming while standing at sink with Supervision.  Toileting from toilet with Supervision for hygiene and clothing management.   Supine to sit with [place].  Step transfer with Supervision  Toilet transfer to toilet with Supervision.                         Time Tracking:     OT Date of Treatment: 01/10/23  OT Start Time: 1014  OT Stop Time: 1023  OT Total Time (min): 9 min    Billable Minutes:Therapeutic Activity 9    OT/ALBA: OT          1/10/2023

## 2023-01-10 NOTE — SUBJECTIVE & OBJECTIVE
Scheduled Meds:   ARIPiprazole  2 mg Oral Daily    EScitalopram oxalate  20 mg Oral Daily    famotidine  20 mg Oral QHS    heparin (porcine)  5,000 Units Subcutaneous Q8H    insulin aspart U-100  5-15 Units Subcutaneous TIDWM    insulin detemir U-100  30 Units Subcutaneous BID    methocarbamoL  500 mg Oral QID    mupirocin  1 g Nasal BID    mycophenolate  1,000 mg Oral BID    nystatin  500,000 Units Mouth/Throat TID PC    [START ON 1/11/2023] predniSONE  20 mg Oral Daily    sacubitriL-valsartan  1 tablet Oral BID    sodium bicarbonate  1,300 mg Oral BID    [START ON 1/12/2023] sulfamethoxazole-trimethoprim 400-80mg  1 tablet Oral Daily AM    tacrolimus  6 mg Oral BID    traZODone  75 mg Oral QHS    [START ON 1/15/2023] valGANciclovir  450 mg Oral Daily     Continuous Infusions:   sodium chloride 0.9% Stopped (01/08/23 0110)     PRN Meds:dextrose 10%, dextrose 10%, dextrose 10%, dextrose 10%, diphenhydrAMINE, glucagon (human recombinant), glucose, glucose, insulin aspart U-100, oxyCODONE, oxyCODONE, sodium chloride 0.9%    Review of Systems   Constitutional:  Negative for appetite change, chills and fever.   HENT: Negative.     Eyes: Negative.    Respiratory:  Negative for cough, chest tightness and shortness of breath.    Cardiovascular:  Negative for chest pain, palpitations and leg swelling.   Gastrointestinal:  Positive for abdominal distention. Negative for abdominal pain, constipation, diarrhea, nausea and vomiting.   Endocrine: Negative.    Genitourinary:  Negative for difficulty urinating, dysuria, frequency and urgency.   Musculoskeletal:  Negative for back pain and neck pain.   Skin:  Positive for wound. Negative for color change, pallor and rash.   Allergic/Immunologic: Positive for immunocompromised state.   Neurological:  Negative for dizziness, weakness and headaches.   Psychiatric/Behavioral:  Negative for agitation, behavioral problems, confusion and sleep disturbance.    Objective:     Vital Signs  (Most Recent):  Temp: 96.3 °F (35.7 °C) (01/10/23 1137)  Pulse: 82 (01/10/23 1137)  Resp: 18 (01/10/23 1137)  BP: 111/71 (01/10/23 1137)  SpO2: 96 % (01/10/23 1137) Vital Signs (24h Range):  Temp:  [96.3 °F (35.7 °C)-98.6 °F (37 °C)] 96.3 °F (35.7 °C)  Pulse:  [69-98] 82  Resp:  [15-18] 18  SpO2:  [96 %-100 %] 96 %  BP: ()/(66-82) 111/71     Weight: 103.1 kg (227 lb 4.7 oz)  Body mass index is 34.56 kg/m².    Intake/Output - Last 3 Shifts         01/08 0700  01/09 0659 01/09 0700  01/10 0659 01/10 0700  01/11 0659    P.O. 600 0     I.V. (mL/kg) 133 (1.3)      Blood       IV Piggyback       Total Intake(mL/kg) 733 (7.1) 0 (0)     Urine (mL/kg/hr) 1185 (0.5) 1100 (0.4)     Drains 1430 100     Stool 0 0     Chest Tube       Total Output 2615 1200     Net -1882.1 -1200            Urine Occurrence  0 x     Stool Occurrence 0 x 2 x             Physical Exam  Vitals and nursing note reviewed.   HENT:      Head: Normocephalic and atraumatic.      Right Ear: External ear normal.      Left Ear: External ear normal.      Nose: Nose normal.   Eyes:      Pupils: Pupils are equal, round, and reactive to light.   Cardiovascular:      Rate and Rhythm: Normal rate and regular rhythm.      Pulses: Normal pulses.   Pulmonary:      Effort: Pulmonary effort is normal. No respiratory distress.   Abdominal:      General: There is distension.      Palpations: Abdomen is soft.      Tenderness: There is abdominal tenderness.      Comments: [place] incision CDI w/ staples  BRITTANIE x 1 with ss output   Musculoskeletal:         General: Normal range of motion.      Cervical back: Normal range of motion.      Right lower leg: No edema.      Left lower leg: No edema.   Skin:     General: Skin is warm and dry.   Neurological:      General: No focal deficit present.      Motor: No weakness.   Psychiatric:         Behavior: Behavior normal.         Thought Content: Thought content normal.       Laboratory:  Immunosuppressants           Stop Route  Frequency     tacrolimus capsule 6 mg         -- Oral 2 times daily     mycophenolate capsule 1,000 mg         -- Oral 2 times daily          CBC:   Recent Labs   Lab 01/10/23  0626   WBC 7.58   RBC 4.49*   HGB 11.7*   HCT 34.7*   PLT 61*   MCV 77*   MCH 26.1*   MCHC 33.7     CMP:   Recent Labs   Lab 01/10/23  0626   *   CALCIUM 8.5*   ALBUMIN 3.0*   PROT 4.7*   *   K 3.9   CO2 20*      BUN 30*   CREATININE 1.3   ALKPHOS 159*   ALT 86*   AST 74*   BILITOT 3.8*     Labs within the past 24 hours have been reviewed.    Diagnostic Results:  US Liver Transplant Post:  Results for orders placed during the hospital encounter of 01/05/23    US Doppler Liver Transplant Post (xpd)    Narrative  EXAMINATION:  US DOPPLER LIVER TRANSPLANT POST (XPD)    CLINICAL HISTORY:  Assess perfusion following liver transplant;    TECHNIQUE:  Limited abdominal ultrasound of the transplant liver with Doppler evaluation.  Color and spectral Doppler were performed.    COMPARISON:  None.    FINDINGS:  Patient is status post orthotopic liver transplantday 1.  Liver allograft is normal in size.  The liver demonstrates homogeneous echotexture.  No focal hepatic lesions are seen.  No fluid collections.    The common duct is not dilated, measuring 4 mm.  No dilated intrahepatic radicles are seen.    Color flow and spectral waveform analysis was performed.  The main portal vein, right portal vein, left portal vein, middle hepatic vein, right hepatic vein, left hepatic vein, and IVC are patent with proper directional flow.    Anastomosis site of the main hepatic artery demonstrates a peak systolic velocity 47 cm/sec.    Main hepatic artery demonstrates resistive index 1.0 with normal waveform.    Left hepatic artery shows resistive index 1.0 with normal waveform.    Anterior branch of the right hepatic artery demonstrates resistive index 0.8 with normal waveform.    Posterior branch of the right hepatic artery demonstrates resistive  index 1.0 with normal waveform.    Impression  In this postop day 1 liver transplant, there are elevated hepatic artery resistive indices which is likely related to postoperative edema.  Attention on follow-up is suggested.    No fluid collections are seen.    Trace ascites and right-sided pleural effusion.      Electronically signed by: Bruce Girard Jr  Date:    01/06/2023  Time:    08:56    Debility/Functional status: Patient debilitated by evidence of Weakness. Physical and occupational therapy ordered daily to evaluate and treat. Debility was: present on admission.

## 2023-01-10 NOTE — PROCEDURES
Medial drain removed:  Site cleaned with alcohol, lidocaine 1% used to numb area to place prolene 3.0 suture to site. Drain intact at the time of removal. Patient tolerated procedure well. Will continue to monitor for any complications.

## 2023-01-10 NOTE — PLAN OF CARE
Pt AAOx4. Wife at the bedside and attentive. Given robaxin and oxy 10 for pain. Lateral BRITTANIE drain pulled today. Independent and walking to the bathroom. Walked the halls today as well. Insulin drip stopped earlier today. Bm noted and UO adequate. Right fa IV pulled.

## 2023-01-10 NOTE — ASSESSMENT & PLAN NOTE
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

## 2023-01-10 NOTE — PROGRESS NOTES
"[facility] - Transplant Stepdown  Endocrinology  Progress Note    Admit Date: 1/5/2023     Reason for Consult: Management of T2DM, Hyperglycemia     Surgical Procedure and Date: Liver transplant 1/5/2023    Diabetes diagnosis year: Over 5 years ago    Home Diabetes Medications:  Tresiba 40 units BID, Humalog (pt estimates dose based on intake), Jardiance 25 mg, Ozempic 2 mg      Diabetes Complications include:     Hyperglycemia    Complicating diabetes co morbidities:   Glucocorticoid use       HPI:   Patient is a 47 y.o. 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  Pt s/p  Mr. orthotopic DBD liver transplant with end to end biliary anastomosis and replaced right hepatic artery reconstruction.  Endocrine consulted for management of BG          Interval HPI:   No acute events overnight. Patient in room 21200/55842 A. Blood glucose variable. BG at, above, and below goal on current insulin regimen (SSI, prandial, and basal insulin ). Steroid use- Methylprednisolone  40 mg .   5 Days Post-Op  Renal function- Normal   Vasopressors-  None     Diet diabetic [facility]; 2000 Calorie; Isolation Tray - Regular [place]     Eating:   <50%  Nausea: No  Hypoglycemia and intervention: No  Fever: No  TPN and/or TF: No    /66   Pulse 80   Temp 98.3 °F (36.8 °C)   Resp 16   Ht 5' 8" (1.727 m)   Wt 103.1 kg (227 lb 4.7 oz)   SpO2 100%   BMI 34.56 kg/m²     Labs Reviewed and Include    Recent Labs   Lab 01/10/23  0626   *   CALCIUM 8.5*   ALBUMIN 3.0*   PROT 4.7*   *   K 3.9   CO2 20*      BUN 30*   CREATININE 1.3   ALKPHOS 159*   ALT 86*   AST 74*   BILITOT 3.8*     Lab Results   Component Value Date    WBC 7.58 01/10/2023    HGB 11.7 (L) 01/10/2023    HCT 34.7 (L) 01/10/2023    MCV 77 (L) 01/10/2023    PLT 61 (L) 01/10/2023     No results for input(s): TSH, FREET4 in " the last 168 hours.  Lab Results   Component Value Date    HGBA1C 6.0 (H) 01/04/2023       Nutritional status:   Body mass index is 34.56 kg/m².  Lab Results   Component Value Date    ALBUMIN 3.0 (L) 01/10/2023    ALBUMIN 3.2 (L) 01/09/2023    ALBUMIN 3.3 (L) 01/08/2023     No results found for: PREALBUMIN    Estimated Creatinine Clearance: 81.8 mL/min (based on SCr of 1.3 mg/dL).    Accu-Checks  Recent Labs     01/08/23  1455 01/08/23  1632 01/08/23  2057 01/09/23  0216 01/09/23  0839 01/09/23  1157 01/09/23  1827 01/09/23  2033 01/10/23  0204 01/10/23  0905   POCTGLUCOSE 193* 169* 158* 150* 109 162* 292* 254* 213* 190*       Current Medications and/or Treatments Impacting Glycemic Control  Immunotherapy:    Immunosuppressants           Stop Route Frequency     tacrolimus capsule 6 mg         -- Oral 2 times daily     mycophenolate capsule 1,000 mg         -- Oral 2 times daily          Steroids:   Hormones (From admission, onward)      Start     Stop Route Frequency Ordered    01/11/23 0900  predniSONE tablet 20 mg  (methylprednisolone taper panel)        See Hyperspace for full Linked Orders Report.    -- Oral Daily 01/05/23 1824          Pressors:    Autonomic Drugs (From admission, onward)      None          Hyperglycemia/Diabetes Medications:   Antihyperglycemics (From admission, onward)      Start     Stop Route Frequency Ordered    01/09/23 1130  insulin aspart U-100 pen 5-15 Units         -- SubQ 3 times daily with meals 01/09/23 1055    01/09/23 1100  insulin detemir U-100 pen 30 Units         -- SubQ 2 times daily 01/09/23 1055    01/07/23 1021  insulin aspart U-100 pen 0-10 Units         -- SubQ As needed (PRN) 01/07/23 0923            ASSESSMENT and PLAN    * S/P liver transplant  Managed by primary team  Optimize BG for surgical wound healing.         Type 2 diabetes mellitus with hyperglycemia  BG goal: 140-180     Pt w/ T2DM.  S/p Liver transplant. Diet advanced. Receiving steroids.  Transitioned to  subcutaneous insulin yesterday. Sugars above goal yesterday.  Will continue to monitor as steroid dose decreasing    - Continue on Levemir 30 units BID (~20% reduction of current drip rate)  - Continue Novolog 5-15 units based on intake. Administer 5 units if patient eats 25% -  50% and 15 units if patient eats 50% or more.  - Continue Novolog Moderate dose correction with ISF 25 starting at 180    - POCT Glucose AC HS 2AM  - Hypoglycemia protocol in place      ** Please notify Endocrine for any change and/or advance in diet**  ** Please call Endocrine for any BG related issues **     Discharge Planning:   TBD. Please notify endocrinology prior to discharge.        Steroid-induced hyperglycemia  On steroids post transplant.  Will continue to monitor          Unruly Jose PA-C  Endocrinology  [facility] - Transplant Stepdown

## 2023-01-10 NOTE — DISCHARGE SUMMARY
[facility]plant Step[facility]  Liver Transplant  Discharge Summary      Patient Name: Rosalio Mccarty  MRN: [id number]  Admission Date: 1/5/2023  Hospital Length of Stay: 6 days  Discharge Date and Time:  01/11/2023 1:35 PM  Attending Physician: Rosa Foreman MD   Discharging Provider: Sola Espinoza DNP  Primary Care Provider: Primary Doctor No  Post-Operative Day: 6     ORGAN:   LIVER  Disease Etiology: Cirrhosis: Other, Specify - granulomatous hepatitis  Donor Type:   Donation after Brain Death  CDC High Risk:   Yes  Donor CMV Status:   Donor CMV Status: Positive  Donor HBcAB:   Negative  Donor HCV Status:   Positive  Whole or Partial: Whole Liver  Biliary Anastomosis: End to End  Arterial Anatomy: Replaced Right Hepatic from SMA    HPI:   Rosalio Mccarty is a 47yr old male with 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 ESLD c/b ascites, BLE edema, and nonbleeding esophageal varices. He is listed for a liver transplant with MELD 16. He is being admitted for liver transplant surgery. He feels well in his usual state of health. He denies any recent illnesses or hospitalizations. Primary surgeon Dr. Foreman.       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 Surgery went without complications. 2 BRITTANIE drains placed, both removed without issue. POD1 US showed elevated RI's but was otherwise satisfactory. Stepped down POD3. LFTs and tbili were trending down but became stagnant in setting of sub therapeutic prograf level, improved with increased dose. Routine POD6 liver US satisfactory flow, new 7.9cm peritransplant fluid collection noted. H/H stable. He is tolerating PO without n/v. + flatus, + BM. Pain controlled with PO pain medication. Ambulating without assistance. Patient reported  some epigastric pain with eating. Encouraged to take medications with food. Restart protonix.    Pt is now stable and ready for discharge. He has met with PharmD and received education. He will follow up with labs and clinic appointment 1/12/23. Pt expressed understanding of discharge instructions and importance of follow up.    Of note, patient was scheduled to see hematology 1/12/23 for neutroepnia pre-txp. Appointment canceled as WBC stable at this time. If counts worsen, can reconsider scheduling hematology appointment.     Goals of Care Treatment Preferences:  Code Status: Full Code      Final Active Diagnoses:    Diagnosis Date Noted POA    PRINCIPAL PROBLEM:  S/P liver transplant [Z94.4] 01/07/2023 Not Applicable    Chronic systolic heart failure [I50.22] 01/09/2023 Yes    Acute blood loss anemia [D62] 01/09/2023 No    Anemia of chronic disease [D63.8] 01/09/2023 Yes    Thrombocytopenia, unspecified [D69.6] 01/09/2023 Yes    Long-term use of immunosuppressant medication [Z79.60] 01/09/2023 Not Applicable    Prophylactic immunotherapy [Z29.8] 01/09/2023 Not Applicable    Mild malnutrition [E44.1] 01/09/2023 Yes    At risk for opportunistic infections [Z91.89] 01/09/2023 No    Type 2 diabetes mellitus with hyperglycemia [E11.65] 01/06/2023 Yes    Steroid-induced hyperglycemia [R73.9, T38.0X5A] 01/06/2023 No      Problems Resolved During this Admission:    Diagnosis Date Noted Date Resolved POA    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] 01/09/2023 01/11/2023 Yes    Hepatic granuloma associated with sarcoidosis [D86.89] 11/17/2022 01/09/2023 Yes     Chronic    Cirrhosis of liver with ascites [K74.60, R18.8] 11/17/2022 01/09/2023 Yes       Consults (From admission, onward)          Status Ordering Provider     Inpatient consult to Endocrinology  Once        Provider:  (Not yet assigned)    Completed WAQAS LARA     Inpatient consult to Registered Dietitian/Nutritionist  Once        Provider:  (Not yet  assigned)    Completed WAQAS LARA            Pending Diagnostic Studies:       Procedure Component Value Units Date/Time    Freeze and Hold -BB HEP [[id number]] Collected: 01/05/23 1841    Order Status: Sent Lab Status: In process Updated: 01/05/23 1842    Specimen: Blood     Specimen to Pathology, Surgery Liver [[id number]] Collected: 01/05/23 1731    Order Status: Sent Lab Status: In process Updated: 01/05/23 1955    Specimen: Tissue           Significant Diagnostic Studies: Labs: CMP   Recent Labs   Lab 01/10/23  0626 01/11/23  0654   * 138   K 3.9 4.0    109   CO2 20* 23   * 122*   BUN 30* 28*   CREATININE 1.3 1.1   CALCIUM 8.5* 8.7   PROT 4.7* 4.7*   ALBUMIN 3.0* 2.9*   BILITOT 3.8* 2.8*   ALKPHOS 159* 139*   AST 74* 24   ALT 86* 62*   ANIONGAP 7* 6*   , CBC   Recent Labs   Lab 01/10/23  0626 01/11/23  0654   WBC 7.58 8.30   HGB 11.7* 11.5*   HCT 34.7* 34.6*   PLT 61* 43*   , INR   Lab Results   Component Value Date    INR 1.2 01/09/2023    INR 1.3 (H) 01/08/2023    INR 1.4 (H) 01/07/2023   , and All labs within the past 24 hours have been reviewed    The patients clinical status was discussed at multidisplinary rounds, involving transplant surgery, transplant medicine, pharmacy, nursing, nutrition, and social work    Discharged Condition: good    Disposition: Home or Self Care    Follow Up: see hospital course    Patient Instructions:      Diet diabetic     No dressing needed     Notify your health care provider if you experience any of the following:  temperature >100.4     Notify your health care provider if you experience any of the following:  persistent nausea and vomiting or diarrhea     Notify your health care provider if you experience any of the following:  severe uncontrolled pain     Notify your health care provider if you experience any of the following:  redness, tenderness, or signs of infection (pain, swelling, redness, odor or green/yellow discharge around incision site)      Notify your health care provider if you experience any of the following:  difficulty breathing or increased cough     Notify your health care provider if you experience any of the following:  severe persistent headache     Notify your health care provider if you experience any of the following:  worsening rash     Notify your health care provider if you experience any of the following:  persistent dizziness, light-headedness, or visual disturbances     Notify your health care provider if you experience any of the following:  increased confusion or weakness     Notify your health care provider if you experience any of the following:   Order Comments: Any other concerning signs and symptoms. If you have not received your scheduled follow up within 24 hours of your discharge or for any questions or concerns, please call [phone] for further assistance.     Activity as tolerated     Medications:  Reconciled Home Medications:      Medication List        START taking these medications      blood sugar diagnostic Strp  1 strip by Misc.(Non-Drug; Combo Route) route 4 (four) times daily.     blood-glucose meter Misc  Use as instructed     lancets 33 gauge Misc  by Misc.(Non-Drug; Combo Route) route 4 (four) times daily.     methocarbamoL 500 MG Tab  Commonly known as: ROBAXIN  Take 1 tablet (500 mg total) by mouth 4 (four) times daily as needed.     mycophenolate 250 mg Cap  Commonly known as: CELLCEPT  Take 4 capsules (1,000 mg total) by mouth 2 (two) times daily.     nystatin 100,000 unit/mL suspension  Commonly known as: MYCOSTATIN  Take 5 mLs (500,000 Units total) by mouth 3 (three) times daily after meals. STOP 1/25/23     oxyCODONE 10 mg Tab immediate release tablet  Commonly known as: ROXICODONE  Take 1 tablet (10 mg total) by mouth every 8 (eight) hours as needed for Pain.     pantoprazole 40 MG tablet  Commonly known as: PROTONIX  Take 1 tablet (40 mg total) by mouth once daily.     pen needle, diabetic 32  "gauge x 5/32" Ndle  1 Dose by Misc.(Non-Drug; Combo Route) route 3 (three) times daily.     predniSONE 5 MG tablet  Commonly known as: DELTASONE  Take by mouth daily: 20mg 1/11-1/17, 15mg 1/18-1/24, 10mg 1/25-1/31, 5mg 2/1-2/7, then stop 2/8/23     sodium bicarbonate 650 MG tablet  Take 1 tablet (650 mg total) by mouth 2 (two) times daily.     sulfamethoxazole-trimethoprim 400-80mg 400-80 mg per tablet  Commonly known as: BACTRIM,SEPTRA  Take 1 tablet by mouth every morning. STOP 7/5/23  Start taking on: January 12, 2023     tacrolimus 1 MG Cap  Commonly known as: PROGRAF  Take 6 capsules (6 mg total) by mouth every 12 (twelve) hours.     valGANciclovir 450 mg Tab  Commonly known as: VALCYTE  Take 1 tablet (450 mg total) by mouth once daily. STOP 7/5/23  Start taking on: January 15, 2023            CHANGE how you take these medications      insulin degludec 100 unit/mL (3 mL) insulin pen  Commonly known as: TRESIBA FLEXTOUCH U-100  Inject 45 Units into the skin once daily.  What changed: See the new instructions.     insulin lispro 100 unit/mL pen  Commonly known as: HumaLOG KwikPen Insulin  Inject 15 Units into the skin 3 (three) times daily before meals. Plus sliding scale insulin. Max insulin per day: 75 units  What changed: See the new instructions.     traZODone 150 MG tablet  Commonly known as: DESYREL  Take 1 tablet (150 mg total) by mouth every evening.  What changed: medication strength            CONTINUE taking these medications      ARIPiprazole 2 MG Tab  Commonly known as: ABILIFY  Take 2 mg by mouth once daily.     ENTRESTO 49-51 mg per tablet  Generic drug: sacubitriL-valsartan  Take 1 tablet by mouth 2 (two) times daily.     EScitalopram oxalate 20 MG tablet  Commonly known as: LEXAPRO  Take 20 mg by mouth once daily.     FREESTYLE LUZMA 14 DAY SENSOR Kit  Generic drug: flash glucose sensor  Apply topically.     HUMIRA PEN Pnkt injection  Generic drug: adalimumab  Inject into the skin.   "   hydrOXYchloroQUINE 200 mg tablet  Commonly known as: PLAQUENIL  Take 200 mg by mouth 2 (two) times daily.     multivitamin per tablet  Commonly known as: THERAGRAN  Take 1 tablet by mouth once daily.     OZEMPIC 2 mg/dose (8 mg/3 mL) Pnij  Generic drug: semaglutide  Inject 2 mg into the skin every 7 days.            STOP taking these medications      carvediloL 6.25 MG tablet  Commonly known as: COREG     colestipoL 1 gram Tab  Commonly known as: COLESTID     empagliflozin 25 mg tablet  Commonly known as: JARDIANCE            Time spent caring for patient (Greater than 1/2 spent in direct face-to-face contact): > 30 minutes    Sola Espinoza DNP  Liver Transplant  [facility] - Transplant Stepdown

## 2023-01-11 ENCOUNTER — PATIENT MESSAGE (OUTPATIENT)
Dept: TRANSPLANT | Facility: CLINIC | Age: 48
End: 2023-01-11
Payer: COMMERCIAL

## 2023-01-11 ENCOUNTER — PATIENT MESSAGE (OUTPATIENT)
Dept: ENDOCRINOLOGY | Facility: HOSPITAL | Age: 48
End: 2023-01-11
Payer: COMMERCIAL

## 2023-01-11 VITALS
HEART RATE: 98 BPM | SYSTOLIC BLOOD PRESSURE: 113 MMHG | OXYGEN SATURATION: 98 % | BODY MASS INDEX: 36.75 KG/M2 | DIASTOLIC BLOOD PRESSURE: 62 MMHG | HEIGHT: 68 IN | WEIGHT: 242.5 LBS | TEMPERATURE: 98 F | RESPIRATION RATE: 18 BRPM

## 2023-01-11 DIAGNOSIS — Z94.4 LIVER REPLACED BY TRANSPLANT: Primary | ICD-10-CM

## 2023-01-11 PROBLEM — D68.4 ACQUIRED COAGULATION FACTOR DEFICIENCY: Status: RESOLVED | Noted: 2023-01-09 | Resolved: 2023-01-11

## 2023-01-11 LAB
ALBUMIN SERPL BCP-MCNC: 2.9 G/DL (ref 3.5–5.2)
ALP SERPL-CCNC: 139 U/L (ref 55–135)
ALT SERPL W/O P-5'-P-CCNC: 62 U/L (ref 10–44)
ANION GAP SERPL CALC-SCNC: 6 MMOL/L (ref 8–16)
AST SERPL-CCNC: 24 U/L (ref 10–40)
BASOPHILS # BLD AUTO: 0.05 K/UL (ref 0–0.2)
BASOPHILS NFR BLD: 0.6 % (ref 0–1.9)
BILIRUB SERPL-MCNC: 2.8 MG/DL (ref 0.1–1)
BUN SERPL-MCNC: 28 MG/DL (ref 6–20)
CALCIUM SERPL-MCNC: 8.7 MG/DL (ref 8.7–10.5)
CHLORIDE SERPL-SCNC: 109 MMOL/L (ref 95–110)
CO2 SERPL-SCNC: 23 MMOL/L (ref 23–29)
CREAT SERPL-MCNC: 1.1 MG/DL (ref 0.5–1.4)
DIFFERENTIAL METHOD: ABNORMAL
EOSINOPHIL # BLD AUTO: 0.5 K/UL (ref 0–0.5)
EOSINOPHIL NFR BLD: 5.4 % (ref 0–8)
ERYTHROCYTE [DISTWIDTH] IN BLOOD BY AUTOMATED COUNT: 17.6 % (ref 11.5–14.5)
EST. GFR  (NO RACE VARIABLE): >60 ML/MIN/1.73 M^2
GLUCOSE SERPL-MCNC: 122 MG/DL (ref 70–110)
HCT VFR BLD AUTO: 34.6 % (ref 40–54)
HGB BLD-MCNC: 11.5 G/DL (ref 14–18)
IMM GRANULOCYTES # BLD AUTO: 0.15 K/UL (ref 0–0.04)
IMM GRANULOCYTES NFR BLD AUTO: 1.8 % (ref 0–0.5)
LYMPHOCYTES # BLD AUTO: 0.8 K/UL (ref 1–4.8)
LYMPHOCYTES NFR BLD: 10.1 % (ref 18–48)
MAGNESIUM SERPL-MCNC: 1.7 MG/DL (ref 1.6–2.6)
MCH RBC QN AUTO: 25.9 PG (ref 27–31)
MCHC RBC AUTO-ENTMCNC: 33.2 G/DL (ref 32–36)
MCV RBC AUTO: 78 FL (ref 82–98)
MONOCYTES # BLD AUTO: 1.1 K/UL (ref 0.3–1)
MONOCYTES NFR BLD: 12.7 % (ref 4–15)
NEUTROPHILS # BLD AUTO: 5.8 K/UL (ref 1.8–7.7)
NEUTROPHILS NFR BLD: 69.4 % (ref 38–73)
NRBC BLD-RTO: 0 /100 WBC
PHOSPHATE SERPL-MCNC: 3.1 MG/DL (ref 2.7–4.5)
PLATELET # BLD AUTO: 43 K/UL (ref 150–450)
PMV BLD AUTO: ABNORMAL FL (ref 9.2–12.9)
POCT GLUCOSE: 135 MG/DL (ref 70–110)
POCT GLUCOSE: 153 MG/DL (ref 70–110)
POTASSIUM SERPL-SCNC: 4 MMOL/L (ref 3.5–5.1)
PROT SERPL-MCNC: 4.7 G/DL (ref 6–8.4)
RBC # BLD AUTO: 4.44 M/UL (ref 4.6–6.2)
SODIUM SERPL-SCNC: 138 MMOL/L (ref 136–145)
TACROLIMUS BLD-MCNC: 8 NG/ML (ref 5–15)
WBC # BLD AUTO: 8.3 K/UL (ref 3.9–12.7)

## 2023-01-11 PROCEDURE — 25000003 PHARM REV CODE 250: Performed by: PHYSICIAN ASSISTANT

## 2023-01-11 PROCEDURE — 25000003 PHARM REV CODE 250: Performed by: NURSE PRACTITIONER

## 2023-01-11 PROCEDURE — 80053 COMPREHEN METABOLIC PANEL: CPT | Performed by: STUDENT IN AN ORGANIZED HEALTH CARE EDUCATION/TRAINING PROGRAM

## 2023-01-11 PROCEDURE — 25000003 PHARM REV CODE 250: Performed by: STUDENT IN AN ORGANIZED HEALTH CARE EDUCATION/TRAINING PROGRAM

## 2023-01-11 PROCEDURE — 63600175 PHARM REV CODE 636 W HCPCS: Performed by: TRANSPLANT SURGERY

## 2023-01-11 PROCEDURE — 25000003 PHARM REV CODE 250: Performed by: SURGERY

## 2023-01-11 PROCEDURE — 99239 HOSP IP/OBS DSCHRG MGMT >30: CPT | Mod: 24,,, | Performed by: TRANSPLANT SURGERY

## 2023-01-11 PROCEDURE — 63600175 PHARM REV CODE 636 W HCPCS: Performed by: STUDENT IN AN ORGANIZED HEALTH CARE EDUCATION/TRAINING PROGRAM

## 2023-01-11 PROCEDURE — 99239 PR HOSPITAL DISCHARGE DAY,>30 MIN: ICD-10-PCS | Mod: 24,,, | Performed by: TRANSPLANT SURGERY

## 2023-01-11 PROCEDURE — 85025 COMPLETE CBC W/AUTO DIFF WBC: CPT | Performed by: STUDENT IN AN ORGANIZED HEALTH CARE EDUCATION/TRAINING PROGRAM

## 2023-01-11 PROCEDURE — 99232 SBSQ HOSP IP/OBS MODERATE 35: CPT | Mod: ,,, | Performed by: PHYSICIAN ASSISTANT

## 2023-01-11 PROCEDURE — 84100 ASSAY OF PHOSPHORUS: CPT | Performed by: STUDENT IN AN ORGANIZED HEALTH CARE EDUCATION/TRAINING PROGRAM

## 2023-01-11 PROCEDURE — 83735 ASSAY OF MAGNESIUM: CPT | Performed by: STUDENT IN AN ORGANIZED HEALTH CARE EDUCATION/TRAINING PROGRAM

## 2023-01-11 PROCEDURE — 36415 COLL VENOUS BLD VENIPUNCTURE: CPT | Performed by: STUDENT IN AN ORGANIZED HEALTH CARE EDUCATION/TRAINING PROGRAM

## 2023-01-11 PROCEDURE — 63600175 PHARM REV CODE 636 W HCPCS: Performed by: PHYSICIAN ASSISTANT

## 2023-01-11 PROCEDURE — 99232 PR SUBSEQUENT HOSPITAL CARE,LEVL II: ICD-10-PCS | Mod: ,,, | Performed by: PHYSICIAN ASSISTANT

## 2023-01-11 PROCEDURE — 80197 ASSAY OF TACROLIMUS: CPT | Performed by: STUDENT IN AN ORGANIZED HEALTH CARE EDUCATION/TRAINING PROGRAM

## 2023-01-11 RX ORDER — INSULIN DEGLUDEC 100 U/ML
45 INJECTION, SOLUTION SUBCUTANEOUS DAILY
Qty: 5 PEN | Refills: 10 | Status: ON HOLD | OUTPATIENT
Start: 2023-01-11 | End: 2023-05-21 | Stop reason: SDUPTHER

## 2023-01-11 RX ORDER — PEN NEEDLE, DIABETIC 30 GX3/16"
1 NEEDLE, DISPOSABLE MISCELLANEOUS 3 TIMES DAILY
Qty: 100 EACH | Refills: 11 | Status: SHIPPED | OUTPATIENT
Start: 2023-01-11

## 2023-01-11 RX ORDER — INSULIN LISPRO 100 [IU]/ML
15 INJECTION, SOLUTION INTRAVENOUS; SUBCUTANEOUS
Qty: 13.5 ML | Refills: 11 | Status: ON HOLD | OUTPATIENT
Start: 2023-01-11 | End: 2023-05-21 | Stop reason: SDUPTHER

## 2023-01-11 RX ORDER — ARIPIPRAZOLE 2 MG/1
2 TABLET ORAL DAILY
Qty: 30 TABLET | Refills: 11 | Status: SHIPPED | OUTPATIENT
Start: 2023-01-11 | End: 2024-04-03

## 2023-01-11 RX ORDER — DEXTROSE 4 G
TABLET,CHEWABLE ORAL
Qty: 1 EACH | Refills: 0 | Status: ON HOLD | OUTPATIENT
Start: 2023-01-11 | End: 2024-02-09 | Stop reason: HOSPADM

## 2023-01-11 RX ORDER — METHOCARBAMOL 500 MG/1
500 TABLET, FILM COATED ORAL 4 TIMES DAILY PRN
Qty: 40 TABLET | Refills: 0 | Status: SHIPPED | OUTPATIENT
Start: 2023-01-11 | End: 2023-01-21

## 2023-01-11 RX ORDER — OXYCODONE HYDROCHLORIDE 10 MG/1
10 TABLET ORAL EVERY 8 HOURS PRN
Qty: 21 TABLET | Refills: 0 | Status: SHIPPED | OUTPATIENT
Start: 2023-01-11 | End: 2023-01-17 | Stop reason: SDUPTHER

## 2023-01-11 RX ORDER — SEMAGLUTIDE 1.34 MG/ML
2 INJECTION, SOLUTION SUBCUTANEOUS
Qty: 2 PEN | Refills: 11 | Status: SHIPPED | OUTPATIENT
Start: 2023-01-11 | End: 2023-01-11 | Stop reason: HOSPADM

## 2023-01-11 RX ORDER — SODIUM BICARBONATE 650 MG/1
650 TABLET ORAL 2 TIMES DAILY
Qty: 60 TABLET | Refills: 11 | Status: SHIPPED | OUTPATIENT
Start: 2023-01-11 | End: 2023-01-24

## 2023-01-11 RX ORDER — TRAZODONE HYDROCHLORIDE 150 MG/1
150 TABLET ORAL NIGHTLY
Qty: 30 TABLET | Refills: 11 | Status: SHIPPED | OUTPATIENT
Start: 2023-01-11 | End: 2024-04-03

## 2023-01-11 RX ORDER — SACUBITRIL AND VALSARTAN 49; 51 MG/1; MG/1
1 TABLET, FILM COATED ORAL 2 TIMES DAILY
Qty: 30 TABLET | Refills: 11 | Status: SHIPPED | OUTPATIENT
Start: 2023-01-11 | End: 2023-02-01 | Stop reason: SINTOL

## 2023-01-11 RX ORDER — METHOCARBAMOL 500 MG/1
500 TABLET, FILM COATED ORAL 4 TIMES DAILY
Qty: 40 TABLET | Refills: 0 | Status: SHIPPED | OUTPATIENT
Start: 2023-01-11 | End: 2023-01-11 | Stop reason: SDUPTHER

## 2023-01-11 RX ORDER — ESCITALOPRAM OXALATE 20 MG/1
20 TABLET ORAL DAILY
Qty: 30 TABLET | Refills: 11 | Status: SHIPPED | OUTPATIENT
Start: 2023-01-11

## 2023-01-11 RX ORDER — SEMAGLUTIDE 1.34 MG/ML
1 INJECTION, SOLUTION SUBCUTANEOUS
Qty: 1 PEN | Refills: 11 | Status: SHIPPED | OUTPATIENT
Start: 2023-01-11 | End: 2023-01-11 | Stop reason: SDUPTHER

## 2023-01-11 RX ORDER — SEMAGLUTIDE 2.68 MG/ML
2 INJECTION, SOLUTION SUBCUTANEOUS
Qty: 1 PEN | Refills: 11 | COMMUNITY
Start: 2023-01-11 | End: 2024-02-09

## 2023-01-11 RX ORDER — PANTOPRAZOLE SODIUM 40 MG/1
40 TABLET, DELAYED RELEASE ORAL DAILY
Status: DISCONTINUED | OUTPATIENT
Start: 2023-01-11 | End: 2023-01-11 | Stop reason: HOSPADM

## 2023-01-11 RX ORDER — PANTOPRAZOLE SODIUM 40 MG/1
40 TABLET, DELAYED RELEASE ORAL DAILY
Qty: 30 TABLET | Refills: 1 | Status: SHIPPED | OUTPATIENT
Start: 2023-01-11 | End: 2023-07-10 | Stop reason: SDUPTHER

## 2023-01-11 RX ORDER — LANCETS 33 GAUGE
EACH MISCELLANEOUS 4 TIMES DAILY
Qty: 100 EACH | Refills: 11 | Status: SHIPPED | OUTPATIENT
Start: 2023-01-11

## 2023-01-11 RX ADMIN — SODIUM BICARBONATE 650 MG TABLET 1300 MG: at 11:01

## 2023-01-11 RX ADMIN — OXYCODONE HYDROCHLORIDE 10 MG: 10 TABLET ORAL at 10:01

## 2023-01-11 RX ADMIN — TACROLIMUS 6 MG: 1 CAPSULE ORAL at 08:01

## 2023-01-11 RX ADMIN — MYCOPHENOLATE MOFETIL 1000 MG: 250 CAPSULE ORAL at 10:01

## 2023-01-11 RX ADMIN — INSULIN ASPART 5 UNITS: 100 INJECTION, SOLUTION INTRAVENOUS; SUBCUTANEOUS at 11:01

## 2023-01-11 RX ADMIN — PANTOPRAZOLE SODIUM 40 MG: 40 TABLET, DELAYED RELEASE ORAL at 11:01

## 2023-01-11 RX ADMIN — INSULIN DETEMIR 30 UNITS: 100 INJECTION, SOLUTION SUBCUTANEOUS at 11:01

## 2023-01-11 RX ADMIN — SACUBITRIL AND VALSARTAN 1 TABLET: 49; 51 TABLET, FILM COATED ORAL at 10:01

## 2023-01-11 RX ADMIN — HEPARIN SODIUM 5000 UNITS: 5000 INJECTION INTRAVENOUS; SUBCUTANEOUS at 05:01

## 2023-01-11 RX ADMIN — METHOCARBAMOL 500 MG: 500 TABLET ORAL at 11:01

## 2023-01-11 RX ADMIN — PREDNISONE 20 MG: 20 TABLET ORAL at 11:01

## 2023-01-11 RX ADMIN — NYSTATIN 500000 UNITS: 500000 SUSPENSION ORAL at 11:01

## 2023-01-11 RX ADMIN — ARIPIPRAZOLE 2 MG: 2 TABLET ORAL at 10:01

## 2023-01-11 RX ADMIN — DIPHENHYDRAMINE HYDROCHLORIDE 50 MG: 25 CAPSULE ORAL at 02:01

## 2023-01-11 RX ADMIN — ESCITALOPRAM OXALATE 20 MG: 5 TABLET, FILM COATED ORAL at 11:01

## 2023-01-11 NOTE — PROGRESS NOTES
Pt and wife left the floor via wheelchair transport. AVS given and verbal understanding/consent was made. Pt stable and self meds 100%.

## 2023-01-11 NOTE — PROGRESS NOTES
"[facility] - Transplant Stepdown  Endocrinology  Progress Note    Admit Date: 1/5/2023     Reason for Consult: Management of T2DM, Hyperglycemia     Surgical Procedure and Date: Liver transplant 1/5/2023    Diabetes diagnosis year: Over 5 years ago    Home Diabetes Medications:  Tresiba 40 units BID, Humalog (pt estimates dose based on intake), Jardiance 25 mg, Ozempic 2 mg      Diabetes Complications include:     Hyperglycemia    Complicating diabetes co morbidities:   Glucocorticoid use       HPI:   Patient is a 47 y.o. 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  Pt s/p  Mr. orthotopic DBD liver transplant with end to end biliary anastomosis and replaced right hepatic artery reconstruction.  Endocrine consulted for management of BG          Interval HPI:   No acute events overnight. Patient in room 39926/26744 A. Blood glucose variable. BG at and above goal on current insulin regimen (SSI, prandial, and basal insulin ). Steroid use- Prednisone 20 mg QD.   6 Days Post-Op  Renal function- Normal   Vasopressors-  None     Diet diabetic [facility]; 2000 Calorie; Isolation Tray - Regular [place]     Eating:    Diet variable  Nausea: No  Hypoglycemia and intervention: No  Fever: No  TPN and/or TF: No      /69 (BP Location: Left arm, Patient Position: Lying)   Pulse 73   Temp 98 °F (36.7 °C) (Oral)   Resp 18   Ht 5' 8" (1.727 m)   Wt 110 kg (242 lb 8.1 oz)   SpO2 100%   BMI 36.87 kg/m²     Labs Reviewed and Include    Recent Labs   Lab 01/11/23  0654   *   CALCIUM 8.7   ALBUMIN 2.9*   PROT 4.7*      K 4.0   CO2 23      BUN 28*   CREATININE 1.1   ALKPHOS 139*   ALT 62*   AST 24   BILITOT 2.8*     Lab Results   Component Value Date    WBC 8.30 01/11/2023    HGB 11.5 (L) 01/11/2023    HCT 34.6 (L) 01/11/2023    MCV 78 (L) 01/11/2023    PLT 43 (L) 01/11/2023 "     No results for input(s): TSH, FREET4 in the last 168 hours.  Lab Results   Component Value Date    HGBA1C 6.0 (H) 01/04/2023       Nutritional status:   Body mass index is 36.87 kg/m².  Lab Results   Component Value Date    ALBUMIN 2.9 (L) 01/11/2023    ALBUMIN 3.0 (L) 01/10/2023    ALBUMIN 3.2 (L) 01/09/2023     No results found for: PREALBUMIN    Estimated Creatinine Clearance: 99.8 mL/min (based on SCr of 1.1 mg/dL).    Accu-Checks  Recent Labs     01/09/23  0839 01/09/23  1157 01/09/23  1827 01/09/23  2033 01/10/23  0204 01/10/23  0905 01/10/23  1330 01/10/23  1837 01/10/23  2059 01/10/23  2227   POCTGLUCOSE 109 162* 292* 254* 213* 190* 239* 270* 255* 225*       Current Medications and/or Treatments Impacting Glycemic Control  Immunotherapy:    Immunosuppressants           Stop Route Frequency     tacrolimus capsule 6 mg         -- Oral 2 times daily     mycophenolate capsule 1,000 mg         -- Oral 2 times daily          Steroids:   Hormones (From admission, onward)      Start     Stop Route Frequency Ordered    01/11/23 0900  predniSONE tablet 20 mg  (methylprednisolone taper panel)        See Hyperspace for full Linked Orders Report.    -- Oral Daily 01/05/23 1824          Pressors:    Autonomic Drugs (From admission, onward)      None          Hyperglycemia/Diabetes Medications:   Antihyperglycemics (From admission, onward)      Start     Stop Route Frequency Ordered    01/09/23 1130  insulin aspart U-100 pen 5-15 Units         -- SubQ 3 times daily with meals 01/09/23 1055    01/09/23 1100  insulin detemir U-100 pen 30 Units         -- SubQ 2 times daily 01/09/23 1055    01/07/23 1021  insulin aspart U-100 pen 0-10 Units         -- SubQ As needed (PRN) 01/07/23 0923            ASSESSMENT and PLAN    * S/P liver transplant  Managed by primary team  Optimize BG for surgical wound healing.         Type 2 diabetes mellitus with hyperglycemia  BG goal: 140-180     Pt w/ T2DM.  S/p Liver transplant. Diet  advanced. Receiving steroids.  Transitioned to subcutaneous insulin. Sugars above goal yesterday.  Will continue to monitor as steroid dose decreasing.  AM fasting slightly below goal.    - Continue on Levemir 30 units BID.  - Continue Novolog 5-15 units based on intake. Administer 5 units if patient eats 25% -  50% and 15 units if patient eats 50% or more.  - Continue Novolog Moderate dose correction with ISF 25 starting at 180    - POCT Glucose AC HS 2AM  - Hypoglycemia protocol in place      ** Please notify Endocrine for any change and/or advance in diet**  ** Please call Endocrine for any BG related issues **     Discharge Planning:   TBD. Please notify endocrinology prior to discharge.   Notified of plans for discharge today by Pharm D.   Will send home on similar regimen than at hospital with a reduction of basal insulin as below goal this morning.  Steroids decreasing so will continue on same mealtime regimen although slightly above goal post prandial.  Restarting home meds should also provide decrease in insulin requirements.       -Restart home Jardiance (discussed with pharm D) and Ozempic  - Adjust home Tresiba to 45 units daily  - Adjust  Humalog to 15 units AC  - Add mealtime correction scale if needed.  Blood sugar 180 to 230 add 2 units  Blood sugar 231 to 280 add 4 units  Blood sugar 281 to 330 add 6 units  Blood sugar 331 to 380 add 8 units  Blood sugar greater than 380 add 10 units  - Insurance approved diabetes testing supplies to check blood sugar 4 times a day (Before meals and at bedtime) and as needed.  - BD pen needles   - Insurance approved glucagon for extreme hypoglycemia (Baqsimi or Zegalogue if insurance approved)                Steroid-induced hyperglycemia  On steroids post transplant.  Will continue to monitor          Unruly Jose PA-C  Endocrinology  [facility] Transplant Step[facility]

## 2023-01-11 NOTE — PT/OT/SLP PROGRESS
Physical Therapy      Patient Name:  Rosalio Mccarty   MRN:  [id number]    Patient not seen today secondary to pt off floor at ultrasound AM attempt, and pt refusal PM attempt 2/2 pt awaiting to talk to pharmacist before discharging later today. No charges made. If pt happens to stay in hospital will follow up 1/12.

## 2023-01-11 NOTE — PROGRESS NOTES
Met with patient and his wife in preparation for discharge today.  Reviewed their answers to the questions in [facility].  All questions were answered correctly.  These questions cover: post op care, medication management, infection prevention and emergency contacts.  Reviewed outpatient appointments.  Outpatient coordinator assigned.  Allowed time for questions and answers.

## 2023-01-11 NOTE — ASSESSMENT & PLAN NOTE
BG goal: 140-180     Pt w/ T2DM.  S/p Liver transplant. Diet advanced. Receiving steroids.  Transitioned to subcutaneous insulin. Sugars above goal yesterday.  Will continue to monitor as steroid dose decreasing.  AM fasting slightly below goal.    - Continue on Levemir 30 units BID.  - Continue Novolog 5-15 units based on intake. Administer 5 units if patient eats 25% -  50% and 15 units if patient eats 50% or more.  - Continue Novolog Moderate dose correction with ISF 25 starting at 180    - POCT Glucose AC HS 2AM  - Hypoglycemia protocol in place      ** Please notify Endocrine for any change and/or advance in diet**  ** Please call Endocrine for any BG related issues **     Discharge Planning:   TBD. Please notify endocrinology prior to discharge.   Notified of plans for discharge today by Pharm D.   Will send home on similar regimen than at hospital with a reduction of basal insulin as below goal this morning.  Steroids decreasing so will continue on same mealtime regimen although slightly above goal post prandial.  Restarting home meds should also provide decrease in insulin requirements.       -Restart home Jardiance (discussed with pharm D) and Ozempic  - Adjust home Tresiba to 45 units daily  - Adjust  Humalog to 15 units AC  - Add mealtime correction scale if needed.  Blood sugar 180 to 230 add 2 units  Blood sugar 231 to 280 add 4 units  Blood sugar 281 to 330 add 6 units  Blood sugar 331 to 380 add 8 units  Blood sugar greater than 380 add 10 units  - Insurance approved diabetes testing supplies to check blood sugar 4 times a day (Before meals and at bedtime) and as needed.  - BD pen needles   - Insurance approved glucagon for extreme hypoglycemia (Baqsimi or Zegalogue if insurance approved)

## 2023-01-11 NOTE — PROGRESS NOTES
"DISCHARGE NOTE:    Rosalio Mccarty is a 47 y.o. 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      Past Medical History:   Diagnosis Date    C. difficile diarrhea     Cirrhosis of liver with ascites 11/17/2022    Diabetes mellitus, type II, insulin dependent     Granulomatous colitis     Hepatic granuloma associated with sarcoidosis     Hepatic granuloma associated with sarcoidosis 11/17/2022    Hepatic granuloma associated with sarcoidosis 11/17/2022    Personal history of colonic polyps     Pre-op evaluation 1/4/2023    Sarcoidosis, unspecified     Thrombocytopenia, unspecified     Unspecified cirrhosis of liver        Hospital Course:   F: 47 yoM OLT1/5/2023 (Liver) for Cirrhosis: Other, Specify - granulomatous hepatitis.   CMV +/-  INDUCTION MP  Hospital course uncomplicated. Took a few days to get tac therapeutic. Robaxin added for pain management.       Allergies:   Review of patient's allergies indicates:   Allergen Reactions    Adhesive Other (See Comments)     Skin irritation  Paper tape okay to use    Pcn [penicillins] Rash     Happened as a baby       Patient Pharmacy: [facility]     Discharge Medications:     Medication List        START taking these medications      BD ULTRA-FINE MELVINA PEN NEEDLE 32 gauge x 5/32" Ndle  Generic drug: pen needle, diabetic  1 Dose by Misc.(Non-Drug; Combo Route) route 3 (three) times daily.     CELLCEPT 250 mg Cap  Generic drug: mycophenolate  Take 4 capsules (1,000 mg total) by mouth 2 (two) times daily.     methocarbamoL 500 MG Tab  Commonly known as: ROBAXIN  Take 1 tablet (500 mg total) by mouth 4 (four) times daily as needed.     nystatin 100,000 unit/mL suspension  Commonly known as: MYCOSTATIN  Take 5 mLs (500,000 Units total) by mouth 3 (three) times daily after meals. STOP 1/25/23     ONETOUCH DELICA PLUS LANCET 33 gauge Misc  Generic drug: lancets  by Misc.(Non-Drug; " Combo Route) route 4 (four) times daily.     ONETOUCH VERIO REFLECT METER Misc  Generic drug: blood-glucose meter  Use as instructed     ONETOUCH VERIO TEST STRIPS Strp  Generic drug: blood sugar diagnostic  1 strip by Misc.(Non-Drug; Combo Route) route 4 (four) times daily.     oxyCODONE 10 mg Tab immediate release tablet  Commonly known as: ROXICODONE  Take 1 tablet (10 mg total) by mouth every 8 (eight) hours as needed for Pain.     pantoprazole 40 MG tablet  Commonly known as: PROTONIX  Take 1 tablet (40 mg total) by mouth once daily.     predniSONE 5 MG tablet  Commonly known as: DELTASONE  Take by mouth daily: 20mg 1/11-1/17, 15mg 1/18-1/24, 10mg 1/25-1/31, 5mg 2/1-2/7, then stop 2/8/23     sodium bicarbonate 650 MG tablet  Take 1 tablet (650 mg total) by mouth 2 (two) times daily.     sulfamethoxazole-trimethoprim 400-80mg 400-80 mg per tablet  Commonly known as: BACTRIM,SEPTRA  Take 1 tablet by mouth every morning. STOP 7/5/23  Start taking on: January 12, 2023     tacrolimus 1 MG Cap  Commonly known as: PROGRAF  Take 6 capsules (6 mg total) by mouth every 12 (twelve) hours.     valGANciclovir 450 mg Tab  Commonly known as: VALCYTE  Take 1 tablet (450 mg total) by mouth once daily. STOP 7/5/23  Start taking on: January 15, 2023            CHANGE how you take these medications      insulin lispro 100 unit/mL pen  Commonly known as: HumaLOG KwikPen Insulin  Inject 15 Units into the skin 3 (three) times daily before meals. Plus sliding scale insulin. Max insulin per day: 75 units  What changed: See the new instructions.     OZEMPIC 2 mg/dose (8 mg/3 mL) Pnij  Generic drug: semaglutide  What changed: Another medication with the same name was removed. Continue taking this medication, and follow the directions you see here.     traZODone 150 MG tablet  Commonly known as: DESYREL  Take 1 tablet (150 mg total) by mouth every evening.  What changed: medication strength     TRESIBA FLEXTOUCH U-100 100 unit/mL (3 mL)  "insulin pen  Generic drug: insulin degludec  Inject 45 Units into the skin once daily.  What changed: See the new instructions.            CONTINUE taking these medications      ARIPiprazole 2 MG Tab  Commonly known as: ABILIFY  Take 1 tablet (2 mg total) by mouth once daily.     empagliflozin 25 mg tablet  Commonly known as: JARDIANCE  Take 1 tablet (25 mg total) by mouth once daily.     ENTRESTO 49-51 mg per tablet  Generic drug: sacubitriL-valsartan  Take 1 tablet by mouth 2 (two) times daily.     EScitalopram oxalate 20 MG tablet  Commonly known as: LEXAPRO  Take 1 tablet (20 mg total) by mouth once daily.     FREESTYLE LUZMA 14 DAY SENSOR Kit  Generic drug: flash glucose sensor     HUMIRA PEN Pnkt injection  Generic drug: adalimumab     hydrOXYchloroQUINE 200 mg tablet  Commonly known as: PLAQUENIL     multivitamin per tablet  Commonly known as: THERAGRAN            STOP taking these medications      carvediloL 6.25 MG tablet  Commonly known as: COREG     colestipoL 1 gram Tab  Commonly known as: COLESTID               Where to Get Your Medications        These medications were sent to [facility] [address], [place] [address]      Hours: Mon-Fri 7a-7p, Sat-Sun 10a-4p Phone: [phone]   ARIPiprazole 2 MG Tab  BD ULTRA-FINE MELVINA PEN NEEDLE 32 gauge x 5/32" Ndle  CELLCEPT 250 mg Cap  empagliflozin 25 mg tablet  ENTRESTO 49-51 mg per tablet  EScitalopram oxalate 20 MG tablet  insulin lispro 100 unit/mL pen  methocarbamoL 500 MG Tab  nystatin 100,000 unit/mL suspension  ONETOUCH DELICA PLUS LANCET 33 gauge Misc  ONETOUCH VERIO REFLECT METER [place]  ONETOUCH VERIO TEST STRIPS Strp  oxyCODONE 10 mg Tab immediate release tablet  pantoprazole 40 MG tablet  predniSONE 5 MG tablet  sodium bicarbonate 650 MG tablet  sulfamethoxazole-trimethoprim 400-80mg 400-80 mg per tablet  tacrolimus 1 MG Cap  traZODone 150 MG tablet  TRESIBA FLEXTOUCH U-100 100 unit/mL (3 mL) insulin pen  valGANciclovir " 450 mg Tab          Pharmacy Interventions/Recommendations:  1) Transplant Immunosuppression: Induction steroids, and maintenance tac 6 mg BID. Cellcept 1000 mg BID, pred per taper     2) Opportunistic Infection prophylaxis:   Sulfamethoxazole/Trimethoprim SS daily for 6 months (Stop: 7/5/23 )  Valganciclovir 450 mg daily for 3 months  (Stop: 7/5/23)  Nystatin til (Stop: 1/25/23)    3) Osteoporosis Prevention measures (liver txp): Oscal needed    4) Patient Counseling/Education: Demonstrated the use of the BP cuff, thermometer.    5) Follow-Up/Discharge Needs:    Oscal needed  F/u BP control - slightly low on entresto but no history of heart failure, consider holding if BP continues to be borderline low     6) Patient Assistance Information: NA, but pt has very high deductible and is aware of high copays. Will likely need to use insurance approved specialty pharmacy after this first fill here.     7) The following medications have been placed on HOLD and should be restarted in the outpatient setting (when appropriate): lucio Santamaria and his caregiver verbalized their understanding and had the opportunity to ask questions.

## 2023-01-11 NOTE — PLAN OF CARE
Pt remains AAO x 4 with VSS, afebrile, sats upper 90s on RA throughout shift  PRN Oxy given for pain, scheduled Robaxin given as well  Chevron ALBA with staples - cleaned with Betadine; RLQ old BRITTANIE sites sutured - CDI  Liver US to be in the AM 1/11 (1 wk post op). LFT remain elevated but stable. Tbili 3.8  R UA 20g - CDI, saline locked  Teaching done, self meds updated and stocked this shift to be pulled in the AM - possible d/c later today  CBG ACHS + 2 AM - last  - scheduled Levemir and 2 U SSI given as ordered, 0200 - 153 - no SSI given as ordered  Pt up independent and ambulatory, voids in urinal, LBM 1/9  Diabetic diet   Pt remains free from falls and injuries, call light in reach, bed in lowest position, nonskid socks on when OOB, side rails up x2  Will continue to monitor

## 2023-01-11 NOTE — SUBJECTIVE & OBJECTIVE
"Interval HPI:   No acute events overnight. Patient in room 98275/70426 A. Blood glucose variable. BG at and above goal on current insulin regimen (SSI, prandial, and basal insulin ). Steroid use- Prednisone 20 mg QD.   6 Days Post-Op  Renal function- Normal   Vasopressors-  None     Diet diabetic [facility]; 2000 Calorie; Isolation Tray - Regular [place]     Eating:    Diet variable  Nausea: No  Hypoglycemia and intervention: No  Fever: No  TPN and/or TF: No      /69 (BP Location: Left arm, Patient Position: Lying)   Pulse 73   Temp 98 °F (36.7 °C) (Oral)   Resp 18   Ht 5' 8" (1.727 m)   Wt 110 kg (242 lb 8.1 oz)   SpO2 100%   BMI 36.87 kg/m²     Labs Reviewed and Include    Recent Labs   Lab 01/11/23  0654   *   CALCIUM 8.7   ALBUMIN 2.9*   PROT 4.7*      K 4.0   CO2 23      BUN 28*   CREATININE 1.1   ALKPHOS 139*   ALT 62*   AST 24   BILITOT 2.8*     Lab Results   Component Value Date    WBC 8.30 01/11/2023    HGB 11.5 (L) 01/11/2023    HCT 34.6 (L) 01/11/2023    MCV 78 (L) 01/11/2023    PLT 43 (L) 01/11/2023     No results for input(s): TSH, FREET4 in the last 168 hours.  Lab Results   Component Value Date    HGBA1C 6.0 (H) 01/04/2023       Nutritional status:   Body mass index is 36.87 kg/m².  Lab Results   Component Value Date    ALBUMIN 2.9 (L) 01/11/2023    ALBUMIN 3.0 (L) 01/10/2023    ALBUMIN 3.2 (L) 01/09/2023     No results found for: PREALBUMIN    Estimated Creatinine Clearance: 99.8 mL/min (based on SCr of 1.1 mg/dL).    Accu-Checks  Recent Labs     01/09/23  0839 01/09/23  1157 01/09/23  1827 01/09/23  2033 01/10/23  0204 01/10/23  0905 01/10/23  1330 01/10/23  1837 01/10/23  2059 01/10/23  2227   POCTGLUCOSE 109 162* 292* 254* 213* 190* 239* 270* 255* 225*       Current Medications and/or Treatments Impacting Glycemic Control  Immunotherapy:    Immunosuppressants           Stop Route Frequency     tacrolimus capsule 6 mg         -- Oral 2 times daily     " mycophenolate capsule 1,000 mg         -- Oral 2 times daily          Steroids:   Hormones (From admission, onward)      Start     Stop Route Frequency Ordered    01/11/23 0900  predniSONE tablet 20 mg  (methylprednisolone taper panel)        See Hyperspace for full Linked Orders Report.    -- Oral Daily 01/05/23 1824          Pressors:    Autonomic Drugs (From admission, onward)      None          Hyperglycemia/Diabetes Medications:   Antihyperglycemics (From admission, onward)      Start     Stop Route Frequency Ordered    01/09/23 1130  insulin aspart U-100 pen 5-15 Units         -- SubQ 3 times daily with meals 01/09/23 1055    01/09/23 1100  insulin detemir U-100 pen 30 Units         -- SubQ 2 times daily 01/09/23 1055    01/07/23 1021  insulin aspart U-100 pen 0-10 Units         -- SubQ As needed (PRN) 01/07/23 0923

## 2023-01-11 NOTE — PROGRESS NOTES
Discharge Note       presented to patient bedside to discuss discharge plans, as well as assess any concerns or needs.     Patient was alert and oriented x 4 and communicative. Patient will discharge to home today with no needs.  Patient plans to stay with patient's aunt, Inez Elliott at [address] [facility], [address] under the care of Triston Grant, patient's wife. Patient's wife will transport patient upon discharge. Patient is coping well with support from family.      Patient reports agreeing with the discharge plan and has no other psychosocial concerns. Patient and caregiver verbalize understanding and are involved in treatment planning and discharge process. Patient nor caregiver had any further concerns or questions at this time.     SW remains available and will continue to follow, providing psychosocial support, education and discharge planning as indicated. Transplant team remains available and patient states understanding of how to access team and resources as needed.

## 2023-01-12 ENCOUNTER — PATIENT OUTREACH (OUTPATIENT)
Dept: ADMINISTRATIVE | Facility: CLINIC | Age: 48
End: 2023-01-12
Payer: COMMERCIAL

## 2023-01-12 ENCOUNTER — CLINICAL SUPPORT (OUTPATIENT)
Dept: TRANSPLANT | Facility: CLINIC | Age: 48
End: 2023-01-12
Payer: COMMERCIAL

## 2023-01-12 ENCOUNTER — LAB VISIT (OUTPATIENT)
Dept: LAB | Facility: HOSPITAL | Age: 48
End: 2023-01-12
Attending: SURGERY
Payer: COMMERCIAL

## 2023-01-12 ENCOUNTER — PATIENT MESSAGE (OUTPATIENT)
Dept: TRANSPLANT | Facility: CLINIC | Age: 48
End: 2023-01-12

## 2023-01-12 VITALS
BODY MASS INDEX: 34.55 KG/M2 | SYSTOLIC BLOOD PRESSURE: 121 MMHG | OXYGEN SATURATION: 99 % | DIASTOLIC BLOOD PRESSURE: 72 MMHG | RESPIRATION RATE: 16 BRPM | BODY MASS INDEX: 34.55 KG/M2 | TEMPERATURE: 97 F | TEMPERATURE: 97 F | HEIGHT: 68 IN | SYSTOLIC BLOOD PRESSURE: 121 MMHG | HEART RATE: 91 BPM | OXYGEN SATURATION: 99 % | DIASTOLIC BLOOD PRESSURE: 72 MMHG | WEIGHT: 227.94 LBS | WEIGHT: 227.94 LBS | HEART RATE: 91 BPM | RESPIRATION RATE: 16 BRPM | HEIGHT: 68 IN

## 2023-01-12 DIAGNOSIS — Z94.4 LIVER REPLACED BY TRANSPLANT: Primary | ICD-10-CM

## 2023-01-12 DIAGNOSIS — U07.1 COVID: ICD-10-CM

## 2023-01-12 DIAGNOSIS — Z94.4 LIVER TRANSPLANTED: ICD-10-CM

## 2023-01-12 DIAGNOSIS — Z94.4 LIVER REPLACED BY TRANSPLANT: ICD-10-CM

## 2023-01-12 LAB
ALBUMIN SERPL BCP-MCNC: 3.5 G/DL (ref 3.5–5.2)
ALP SERPL-CCNC: 197 U/L (ref 55–135)
ALT SERPL W/O P-5'-P-CCNC: 46 U/L (ref 10–44)
ANION GAP SERPL CALC-SCNC: 10 MMOL/L (ref 8–16)
AST SERPL-CCNC: 15 U/L (ref 10–40)
BACTERIA SPEC ANAEROBE CULT: NORMAL
BASOPHILS # BLD AUTO: 0.06 K/UL (ref 0–0.2)
BASOPHILS NFR BLD: 0.5 % (ref 0–1.9)
BILIRUB DIRECT SERPL-MCNC: 0.8 MG/DL (ref 0.1–0.3)
BILIRUB SERPL-MCNC: 2.2 MG/DL (ref 0.1–1)
BUN SERPL-MCNC: 27 MG/DL (ref 6–20)
CALCIUM SERPL-MCNC: 8.8 MG/DL (ref 8.7–10.5)
CHLORIDE SERPL-SCNC: 104 MMOL/L (ref 95–110)
CO2 SERPL-SCNC: 23 MMOL/L (ref 23–29)
CREAT SERPL-MCNC: 1.2 MG/DL (ref 0.5–1.4)
DIFFERENTIAL METHOD: ABNORMAL
EOSINOPHIL # BLD AUTO: 0.5 K/UL (ref 0–0.5)
EOSINOPHIL NFR BLD: 4.2 % (ref 0–8)
ERYTHROCYTE [DISTWIDTH] IN BLOOD BY AUTOMATED COUNT: 18.6 % (ref 11.5–14.5)
EST. GFR  (NO RACE VARIABLE): >60 ML/MIN/1.73 M^2
GLUCOSE SERPL-MCNC: 143 MG/DL (ref 70–110)
HCT VFR BLD AUTO: 36.1 % (ref 40–54)
HCV AB SERPL QL IA: REACTIVE
HCV RNA SERPL QL NAA+PROBE: NOT DETECTED
HCV RNA SPEC NAA+PROBE-ACNC: <12 IU/ML
HGB BLD-MCNC: 12 G/DL (ref 14–18)
IMM GRANULOCYTES # BLD AUTO: 0.38 K/UL (ref 0–0.04)
IMM GRANULOCYTES NFR BLD AUTO: 3.2 % (ref 0–0.5)
LYMPHOCYTES # BLD AUTO: 1.3 K/UL (ref 1–4.8)
LYMPHOCYTES NFR BLD: 10.9 % (ref 18–48)
MCH RBC QN AUTO: 25.8 PG (ref 27–31)
MCHC RBC AUTO-ENTMCNC: 33.2 G/DL (ref 32–36)
MCV RBC AUTO: 78 FL (ref 82–98)
MONOCYTES # BLD AUTO: 1.5 K/UL (ref 0.3–1)
MONOCYTES NFR BLD: 12.9 % (ref 4–15)
NEUTROPHILS # BLD AUTO: 8.1 K/UL (ref 1.8–7.7)
NEUTROPHILS NFR BLD: 68.3 % (ref 38–73)
NRBC BLD-RTO: 0 /100 WBC
PLATELET # BLD AUTO: 50 K/UL (ref 150–450)
PMV BLD AUTO: 11.9 FL (ref 9.2–12.9)
POTASSIUM SERPL-SCNC: 3.7 MMOL/L (ref 3.5–5.1)
PROT SERPL-MCNC: 5.7 G/DL (ref 6–8.4)
RBC # BLD AUTO: 4.65 M/UL (ref 4.6–6.2)
SODIUM SERPL-SCNC: 137 MMOL/L (ref 136–145)
TACROLIMUS BLD-MCNC: 9.9 NG/ML (ref 5–15)
WBC # BLD AUTO: 11.78 K/UL (ref 3.9–12.7)

## 2023-01-12 PROCEDURE — 99999 PR PBB SHADOW E&M-EST. PATIENT-LVL V: ICD-10-PCS | Mod: PBBFAC,,,

## 2023-01-12 PROCEDURE — 36415 COLL VENOUS BLD VENIPUNCTURE: CPT | Performed by: SURGERY

## 2023-01-12 PROCEDURE — 87522 HEPATITIS C REVRS TRNSCRPJ: CPT | Performed by: SURGERY

## 2023-01-12 PROCEDURE — 85025 COMPLETE CBC W/AUTO DIFF WBC: CPT | Performed by: SURGERY

## 2023-01-12 PROCEDURE — 99999 PR PBB SHADOW E&M-EST. PATIENT-LVL III: ICD-10-PCS | Mod: PBBFAC,,,

## 2023-01-12 PROCEDURE — 99999 PR PBB SHADOW E&M-EST. PATIENT-LVL V: CPT | Mod: PBBFAC,,,

## 2023-01-12 PROCEDURE — 82248 BILIRUBIN DIRECT: CPT | Performed by: SURGERY

## 2023-01-12 PROCEDURE — 80053 COMPREHEN METABOLIC PANEL: CPT | Performed by: SURGERY

## 2023-01-12 PROCEDURE — 86803 HEPATITIS C AB TEST: CPT | Performed by: SURGERY

## 2023-01-12 PROCEDURE — 80197 ASSAY OF TACROLIMUS: CPT | Performed by: SURGERY

## 2023-01-12 PROCEDURE — 99999 PR PBB SHADOW E&M-EST. PATIENT-LVL III: CPT | Mod: PBBFAC,,,

## 2023-01-12 RX ORDER — TACROLIMUS 1 MG/1
5 CAPSULE ORAL EVERY 12 HOURS
Qty: 300 CAPSULE | Refills: 11 | Status: SHIPPED | OUTPATIENT
Start: 2023-01-12 | End: 2023-02-01 | Stop reason: SDUPTHER

## 2023-01-12 RX ORDER — FUROSEMIDE 20 MG/1
20 TABLET ORAL 2 TIMES DAILY
Qty: 60 TABLET | Refills: 11 | Status: SHIPPED | OUTPATIENT
Start: 2023-01-12 | End: 2023-01-24

## 2023-01-12 NOTE — PROGRESS NOTES
1ST NURSING VISIT POST DISCHARGE NOTE    1st RN appointment with Rosalio Mccarty post discharge 1/11/23 s/p liver transplant 1/5/23.  Patient's spouse , Robinaccompanied him.  Upon assessment, patient complains of shortness of breath, swelling abdomen, but soft to touch, and diarrhea.  Incision intact with staples.  Patient that he is able to explain daily incision care and showering instructions.  Medication list and rationale were reviewed.  Patient states  did bring blue medication card and medication bottles for review.  Self-assessment reviewed with patient and spouse; time allowed for questions.  Patient expressed understanding of daily care including BID VS and medications.  Patient aware that nurse will review today's labs with a transplant physician and call with any dose changes indicated.  Next labs and first post-operative transplant team appointment with labs scheduled for 1/12/23, surgery clinic 1/17/23.

## 2023-01-12 NOTE — TELEPHONE ENCOUNTER
Called patient with med change  decrease Prograf to 5 mg BID and start lasix 20 mg daily.  Repeat labs 1/13/23

## 2023-01-12 NOTE — PROGRESS NOTES
Clinic Note: First Return to Clinic Post-  Liver Transplant    Rosalio Mccarty  is a 47 y.o. male  S/p   LIVER transplant on 1/5/2023 (Liver) for Cirrhosis: Other, Specify - granulomatous hepatitis.      Discharge Course (Issues/Concerns): Patient presents with increased SOB, believed to be related to fluid overload. Discussed with coordinator and formulate a plan from there.    Objective:   Vitals:    01/12/23 0850   BP: 121/72   Pulse: 91   Resp: 16   Temp: 97.3 °F (36.3 °C)       Met with patient and his caregiver in the clinic to review current medication list.     Current Outpatient Medications   Medication Sig Dispense Refill    ARIPiprazole (ABILIFY) 2 MG Tab Take 1 tablet (2 mg total) by mouth once daily. 30 tablet 11    blood sugar diagnostic Strp 1 strip by Misc.(Non-Drug; Combo Route) route 4 (four) times daily. 100 strip 11    blood-glucose meter Misc Use as instructed 1 each 0    empagliflozin (JARDIANCE) 25 mg tablet Take 1 tablet (25 mg total) by mouth once daily. 30 tablet 11    ENTRESTO 49-51 mg per tablet Take 1 tablet by mouth 2 (two) times daily. 30 tablet 11    EScitalopram oxalate (LEXAPRO) 20 MG tablet Take 1 tablet (20 mg total) by mouth once daily. 30 tablet 11    FREESTYLE LUZMA 14 DAY SENSOR Kit Apply topically.      HUMIRA PEN PnKt injection Inject into the skin.      hydrOXYchloroQUINE (PLAQUENIL) 200 mg tablet Take 200 mg by mouth 2 (two) times daily.      insulin degludec (TRESIBA FLEXTOUCH U-100) 100 unit/mL (3 mL) insulin pen Inject 45 Units into the skin once daily. 5 pen 10    insulin lispro (HUMALOG KWIKPEN INSULIN) 100 unit/mL pen Inject 15 Units into the skin 3 (three) times daily before meals. Plus sliding scale insulin. Max insulin per day: 75 units 13.5 mL 11    lancets 33 gauge Misc by Misc.(Non-Drug; Combo Route) route 4 (four) times daily. 100 each 11    methocarbamoL (ROBAXIN) 500 MG Tab Take 1 tablet (500 mg total) by mouth 4 (four) times daily as needed. 40 tablet 0     "multivitamin (THERAGRAN) per tablet Take 1 tablet by mouth once daily.      mycophenolate (CELLCEPT) 250 mg Cap Take 4 capsules (1,000 mg total) by mouth 2 (two) times daily. 240 capsule 2    nystatin (MYCOSTATIN) 100,000 unit/mL suspension Take 5 mLs (500,000 Units total) by mouth 3 (three) times daily after meals. STOP 1/25/23 250 mL 0    oxyCODONE (ROXICODONE) 10 mg Tab immediate release tablet Take 1 tablet (10 mg total) by mouth every 8 (eight) hours as needed for Pain. 21 tablet 0    pantoprazole (PROTONIX) 40 MG tablet Take 1 tablet (40 mg total) by mouth once daily. 30 tablet 1    pen needle, diabetic 32 gauge x 5/32" Ndle 1 Dose by Misc.(Non-Drug; Combo Route) route 3 (three) times daily. 100 each 11    predniSONE (DELTASONE) 5 MG tablet Take by mouth daily: 20mg 1/11-1/17, 15mg 1/18-1/24, 10mg 1/25-1/31, 5mg 2/1-2/7, then stop 2/8/23 70 tablet 0    semaglutide (OZEMPIC) 2 mg/dose (8 mg/3 mL) PnIj Inject 2 mg into the skin every 7 days. 1 pen 11    sodium bicarbonate 650 MG tablet Take 1 tablet (650 mg total) by mouth 2 (two) times daily. 60 tablet 11    sulfamethoxazole-trimethoprim 400-80mg (BACTRIM,SEPTRA) 400-80 mg per tablet Take 1 tablet by mouth every morning. STOP 7/5/23 30 tablet 5    tacrolimus (PROGRAF) 1 MG Cap Take 6 capsules (6 mg total) by mouth every 12 (twelve) hours. 360 capsule 11    traZODone (DESYREL) 150 MG tablet Take 1 tablet (150 mg total) by mouth every evening. 30 tablet 11    [START ON 1/15/2023] valGANciclovir (VALCYTE) 450 mg Tab Take 1 tablet (450 mg total) by mouth once daily. STOP 7/5/23 30 tablet 5     No current facility-administered medications for this visit.       Pharmacy Interventions/Recommendations:     1) Graft Function & Immunosuppression Issues:     2) Opportunistic Infection prophylaxis:   PCP ppx: Bactrim until 7/5/23  CMV ppx: Valcyte until 7/5/23  Fungal ppx: Nystatin until 1/23/23    3) Donor Serologies & Monitoring:     Donor CMV Status: Positive  Donor HCV " Status: Positive  Donor HBcAb: Negative  Donor HBV ASAF: Negative  Donor HCV ASAF: Negative      4) Pain Management & Bowel Regimen: Oxycodone/methocarbamol    5) Blood Pressure Management: Entresto    6) Blood Sugar Management & Follow-up: Jardiance, ozempic, and insulin.    7) Electrolyte Management: Sodium bicarb    8) Osteoporosis Prevention Strategy (Liver Transplant): Will add Ca/Vit D once patients nausea improved.    8) OTHER medication follow-up (patient assistance, held medications, etc): Patient with high deductible, but should meet it soon    9) EUA referral for David Was (was/was not) sent for this patient.  f David FAQ provided to patient and answered their questions about the monoclonal antibody.      10) Reinforced medication education conducted in the hospital, including medication indications, dosing, administration, side effects, monitoring-- including timing of immunosuppressant levels.     [facility]  Discussed the process for obtaining refills of medications, including verifying the dose and mailing address to have medications delivered.     Rosalio and his caregivers verbalized understanding and had the opportunity to ask questions.

## 2023-01-13 ENCOUNTER — TELEPHONE (OUTPATIENT)
Dept: TRANSPLANT | Facility: CLINIC | Age: 48
End: 2023-01-13
Payer: COMMERCIAL

## 2023-01-13 ENCOUNTER — PATIENT MESSAGE (OUTPATIENT)
Dept: TRANSPLANT | Facility: CLINIC | Age: 48
End: 2023-01-13
Payer: COMMERCIAL

## 2023-01-13 ENCOUNTER — LAB VISIT (OUTPATIENT)
Dept: LAB | Facility: HOSPITAL | Age: 48
End: 2023-01-13
Attending: SURGERY
Payer: COMMERCIAL

## 2023-01-13 DIAGNOSIS — Z94.4 LIVER REPLACED BY TRANSPLANT: ICD-10-CM

## 2023-01-13 DIAGNOSIS — Z94.4 LIVER REPLACED BY TRANSPLANT: Primary | ICD-10-CM

## 2023-01-13 LAB
ALBUMIN SERPL BCP-MCNC: 3.4 G/DL (ref 3.5–5.2)
ALP SERPL-CCNC: 92 U/L (ref 55–135)
ALT SERPL W/O P-5'-P-CCNC: 32 U/L (ref 10–44)
ANION GAP SERPL CALC-SCNC: 8 MMOL/L (ref 8–16)
ANISOCYTOSIS BLD QL SMEAR: ABNORMAL
AST SERPL-CCNC: 12 U/L (ref 10–40)
BASOPHILS # BLD AUTO: 0.07 K/UL (ref 0–0.2)
BASOPHILS NFR BLD: 0.8 % (ref 0–1.9)
BILIRUB DIRECT SERPL-MCNC: 0.6 MG/DL (ref 0.1–0.3)
BILIRUB SERPL-MCNC: 1.8 MG/DL (ref 0.1–1)
BUN SERPL-MCNC: 23 MG/DL (ref 6–20)
CALCIUM SERPL-MCNC: 8.6 MG/DL (ref 8.7–10.5)
CHLORIDE SERPL-SCNC: 107 MMOL/L (ref 95–110)
CO2 SERPL-SCNC: 20 MMOL/L (ref 23–29)
CREAT SERPL-MCNC: 1.3 MG/DL (ref 0.5–1.4)
DACRYOCYTES BLD QL SMEAR: ABNORMAL
DIFFERENTIAL METHOD: ABNORMAL
EOSINOPHIL # BLD AUTO: 0.3 K/UL (ref 0–0.5)
EOSINOPHIL NFR BLD: 4.1 % (ref 0–8)
ERYTHROCYTE [DISTWIDTH] IN BLOOD BY AUTOMATED COUNT: 19.3 % (ref 11.5–14.5)
EST. GFR  (NO RACE VARIABLE): >60 ML/MIN/1.73 M^2
GLUCOSE SERPL-MCNC: 158 MG/DL (ref 70–110)
HCT VFR BLD AUTO: 31.7 % (ref 40–54)
HGB BLD-MCNC: 10.6 G/DL (ref 14–18)
IMM GRANULOCYTES # BLD AUTO: 0.29 K/UL (ref 0–0.04)
IMM GRANULOCYTES NFR BLD AUTO: 3.5 % (ref 0–0.5)
LYMPHOCYTES # BLD AUTO: 1.3 K/UL (ref 1–4.8)
LYMPHOCYTES NFR BLD: 15.8 % (ref 18–48)
MCH RBC QN AUTO: 26.3 PG (ref 27–31)
MCHC RBC AUTO-ENTMCNC: 33.4 G/DL (ref 32–36)
MCV RBC AUTO: 79 FL (ref 82–98)
MONOCYTES # BLD AUTO: 1.1 K/UL (ref 0.3–1)
MONOCYTES NFR BLD: 12.9 % (ref 4–15)
NEUTROPHILS # BLD AUTO: 5.2 K/UL (ref 1.8–7.7)
NEUTROPHILS NFR BLD: 62.9 % (ref 38–73)
NRBC BLD-RTO: 0 /100 WBC
OVALOCYTES BLD QL SMEAR: ABNORMAL
PLATELET # BLD AUTO: 36 K/UL (ref 150–450)
PLATELET BLD QL SMEAR: ABNORMAL
PMV BLD AUTO: ABNORMAL FL (ref 9.2–12.9)
POIKILOCYTOSIS BLD QL SMEAR: SLIGHT
POLYCHROMASIA BLD QL SMEAR: ABNORMAL
POTASSIUM SERPL-SCNC: 3.8 MMOL/L (ref 3.5–5.1)
PROT SERPL-MCNC: 5.5 G/DL (ref 6–8.4)
RBC # BLD AUTO: 4.03 M/UL (ref 4.6–6.2)
SODIUM SERPL-SCNC: 135 MMOL/L (ref 136–145)
TACROLIMUS BLD-MCNC: 7.4 NG/ML (ref 5–15)
WBC # BLD AUTO: 8.29 K/UL (ref 3.9–12.7)

## 2023-01-13 PROCEDURE — 85025 COMPLETE CBC W/AUTO DIFF WBC: CPT | Performed by: SURGERY

## 2023-01-13 PROCEDURE — 36415 COLL VENOUS BLD VENIPUNCTURE: CPT | Performed by: SURGERY

## 2023-01-13 PROCEDURE — 80197 ASSAY OF TACROLIMUS: CPT | Performed by: SURGERY

## 2023-01-13 PROCEDURE — 80053 COMPREHEN METABOLIC PANEL: CPT | Performed by: SURGERY

## 2023-01-13 PROCEDURE — 82248 BILIRUBIN DIRECT: CPT | Performed by: SURGERY

## 2023-01-13 NOTE — PHYSICIAN QUERY
PT Name: Rosalio Mccarty  MR #: [id number]    DOCUMENTATION CLARIFICATION     CDS/: Whit Sanchez RN            Contact information: [email or web address]    This form is a permanent document in the medical record.     Query Date: January 13, 2023    By submitting this query, we are merely seeking further clarification of documentation.. Please utilize your independent clinical judgment when addressing the question(s) below.    The medical record contains the following:   Indicators  Supporting Clinical Findings Location in Medical Record   x Energy Intake Pt reports appetite remains poor  Tolerating 50% of meals per chart RD note 1-9    Weight Loss      Fat Loss      Muscle Loss      Edema/Fluid Accumulation      Reduced  Strength (by dynamometer)     x Weight, BMI, Usual Body Weight Ecyclv=259  BMI=34 RD note 1-9    Delayed Wound Healing      Registered Dietician Diagnosis     x Acute or Chronic Illness Mild malnutrition     ESLD secondary to granulomatous hepatitis from hepatic sarcoidosis c/b ascites, Liver transplant 1/5/22 Discharge summary    RD note 1-9    Social or Environmental Circumstances      Treatment     x Other - mild muscle wasting  - dietary following       Nutrition Risk Screen  Nutrition Risk Screen: no indicators present    Pt appears nourished Transplant liver note 1-10        RD note 1-9     Academy of Nutrition and Dietetics (Academy) and the American Society for Parenteral and Enteral Nutrition (A.S.P.E.N.) Clinical Characteristics to support Malnutrition   Malnutrition in the Context of Acute Illness or Injury Malnutrition in the Context of Chronic Illness or Injury Malnutrition in the Context of Social or Environmental Circumstances   Malnutrition Level Moderate Severe Moderate Severe   Moderate   Severe   Energy Intake <75%                   >7 days <50%                 >5 days <75%           >1 month <75%                      >1 month   <75% for >3 months   <50%  for >1 month   Weight Loss   1-2% in 1 week >2% in 1 week 5% in 1 month >5% in 1 month 5% in 1 month >5% in 1 month    5% in 1 month >5% in 1 month 7.5% in 3 months >7.5% in 3 months 7.5% in 3 months >7.5% in 3 months    7.5% in 3 months >7.5% in 3 months 10% in 6 months >10% in 6 months 10% in 6 months >10% in 6 months        20% in 1 year                    >20% in 1 year                                                                  20% in 1 year                            >20% in 1 year                                                  Subcutaneous Fat Loss Mild  Moderate  Mild  Severe    Mild   Severe   Muscle Loss Mild  Moderate  Mild  Severe    Mild   Severe   Edema/Fluid Accumulation Mild Moderate to severe  Mild  Severe   Mild   Severe   Reduced  Strength         (based on standards supplied by  of dynamometer) N/A Measurably reduced N/A Measurably reduced N/A Measurably reduced     Criteria for mild malnutrition is defined as 1 characteristic outlined above within the established moderate or severe parameters.  A minimum of 2 out of the 6 characteristics noted above are recommended for a diagnosis of moderate or severe malnutrition.  Chronic illness/injury is a disease/condition lasting 3 months or longer.    The noted clinical guidelines are only system guidelines and do not replace the providers clinical judgment.    Provider, please clarify the conflicting malnutrition  diagnosis or diagnoses associated with above clinical findings.    [ x ] Mild Malnutrition - 1 characteristic outlined above within the established moderate or severe parameters   [  ] Other Nutritional Diagnosis (please specify): _______   [  ] Malnutrition ruled out   Please document in your progress notes daily for the duration of treatment until resolved and  include in your discharge summary.      References:    HARRY Tovar, & KAYCE Garcia (2022, April). Assessment and management of anorexia and cachexia in palliative  care. Retrieved May 23, 2022, from [email or web address]-[email or web address]-[email or web address]-care?[email or web address]_link     RYAN Snyder, PhD, RD, LAISHA, LISET Cabezas, PhD, RN, KODI Osborn MD, PhD, Eliot ZIMMER A., MS, RD, [facility], SOPHIA Lynn, MS, RD, The Academy Malnutrition Work Group, The A.S.P.E.N. Board of Directors. (2012). Consensus Statement: Academy of Nutrition and Dietetics and American Society for Parenteral and Enteral Nutrition: Characteristics Recommended for the Identification and Documentation of Adult Malnutrition (Undernutrition). Journal of Parenteral and Enteral Nutrition, 36(3), 275-283. doi:10.1177/3319093532610963     Form No. 07012

## 2023-01-15 ENCOUNTER — NURSE TRIAGE (OUTPATIENT)
Dept: ADMINISTRATIVE | Facility: CLINIC | Age: 48
End: 2023-01-15
Payer: COMMERCIAL

## 2023-01-15 NOTE — TELEPHONE ENCOUNTER
Liver Transplant 1/5/23    Pt stated that his incision is red and has clear drainage. Dr. Rosa Foreman notified; advised to be seen in clinic on Tuesday and to go to ED is develop fever, purulent drainage or pain. Pt made aware; verified that he has appt on Tuesday. Verbalized understanding. Encounter routed to provider.    Reason for Disposition   [1] Follow-up call from patient regarding patient's clinical status AND [2] information urgent    Protocols used: PCP Call - No Triage-A-

## 2023-01-17 ENCOUNTER — LAB VISIT (OUTPATIENT)
Dept: LAB | Facility: HOSPITAL | Age: 48
End: 2023-01-17
Attending: SURGERY
Payer: COMMERCIAL

## 2023-01-17 ENCOUNTER — OFFICE VISIT (OUTPATIENT)
Dept: TRANSPLANT | Facility: CLINIC | Age: 48
End: 2023-01-17
Payer: COMMERCIAL

## 2023-01-17 VITALS
RESPIRATION RATE: 16 BRPM | WEIGHT: 219.56 LBS | SYSTOLIC BLOOD PRESSURE: 120 MMHG | HEIGHT: 68 IN | OXYGEN SATURATION: 99 % | HEART RATE: 75 BPM | BODY MASS INDEX: 33.28 KG/M2 | DIASTOLIC BLOOD PRESSURE: 67 MMHG | TEMPERATURE: 98 F

## 2023-01-17 DIAGNOSIS — Z79.60 LONG-TERM USE OF IMMUNOSUPPRESSANT MEDICATION: ICD-10-CM

## 2023-01-17 DIAGNOSIS — Z51.81 ENCOUNTER FOR THERAPEUTIC DRUG MONITORING: ICD-10-CM

## 2023-01-17 DIAGNOSIS — Z94.4 S/P LIVER TRANSPLANT: Primary | ICD-10-CM

## 2023-01-17 DIAGNOSIS — Z94.4 LIVER REPLACED BY TRANSPLANT: ICD-10-CM

## 2023-01-17 LAB
25(OH)D3+25(OH)D2 SERPL-MCNC: 22 NG/ML (ref 30–96)
ALBUMIN SERPL BCP-MCNC: 3.9 G/DL (ref 3.5–5.2)
ALP SERPL-CCNC: 101 U/L (ref 55–135)
ALT SERPL W/O P-5'-P-CCNC: 18 U/L (ref 10–44)
ANION GAP SERPL CALC-SCNC: 7 MMOL/L (ref 8–16)
AST SERPL-CCNC: 8 U/L (ref 10–40)
BASOPHILS # BLD AUTO: 0.13 K/UL (ref 0–0.2)
BASOPHILS NFR BLD: 1.4 % (ref 0–1.9)
BILIRUB DIRECT SERPL-MCNC: 0.5 MG/DL (ref 0.1–0.3)
BILIRUB SERPL-MCNC: 1.3 MG/DL (ref 0.1–1)
BUN SERPL-MCNC: 34 MG/DL (ref 6–20)
CALCIUM SERPL-MCNC: 9.4 MG/DL (ref 8.7–10.5)
CHLORIDE SERPL-SCNC: 107 MMOL/L (ref 95–110)
CO2 SERPL-SCNC: 22 MMOL/L (ref 23–29)
CREAT SERPL-MCNC: 1.3 MG/DL (ref 0.5–1.4)
DIFFERENTIAL METHOD: ABNORMAL
EOSINOPHIL # BLD AUTO: 0.2 K/UL (ref 0–0.5)
EOSINOPHIL NFR BLD: 2 % (ref 0–8)
ERYTHROCYTE [DISTWIDTH] IN BLOOD BY AUTOMATED COUNT: 22.5 % (ref 11.5–14.5)
EST. GFR  (NO RACE VARIABLE): >60 ML/MIN/1.73 M^2
GLUCOSE SERPL-MCNC: 116 MG/DL (ref 70–110)
HCT VFR BLD AUTO: 32.8 % (ref 40–54)
HGB BLD-MCNC: 10.5 G/DL (ref 14–18)
IMM GRANULOCYTES # BLD AUTO: 0.12 K/UL (ref 0–0.04)
IMM GRANULOCYTES NFR BLD AUTO: 1.3 % (ref 0–0.5)
LYMPHOCYTES # BLD AUTO: 1.2 K/UL (ref 1–4.8)
LYMPHOCYTES NFR BLD: 12.7 % (ref 18–48)
MCH RBC QN AUTO: 26.3 PG (ref 27–31)
MCHC RBC AUTO-ENTMCNC: 32 G/DL (ref 32–36)
MCV RBC AUTO: 82 FL (ref 82–98)
MONOCYTES # BLD AUTO: 0.8 K/UL (ref 0.3–1)
MONOCYTES NFR BLD: 7.9 % (ref 4–15)
NEUTROPHILS # BLD AUTO: 7.1 K/UL (ref 1.8–7.7)
NEUTROPHILS NFR BLD: 74.7 % (ref 38–73)
NRBC BLD-RTO: 0 /100 WBC
PLATELET # BLD AUTO: 59 K/UL (ref 150–450)
PMV BLD AUTO: 11.2 FL (ref 9.2–12.9)
POTASSIUM SERPL-SCNC: 4.4 MMOL/L (ref 3.5–5.1)
PROT SERPL-MCNC: 6.4 G/DL (ref 6–8.4)
RBC # BLD AUTO: 4 M/UL (ref 4.6–6.2)
SODIUM SERPL-SCNC: 136 MMOL/L (ref 136–145)
TACROLIMUS BLD-MCNC: 5.1 NG/ML (ref 5–15)
WBC # BLD AUTO: 9.44 K/UL (ref 3.9–12.7)

## 2023-01-17 PROCEDURE — 82248 BILIRUBIN DIRECT: CPT | Performed by: SURGERY

## 2023-01-17 PROCEDURE — 99999 PR PBB SHADOW E&M-EST. PATIENT-LVL III: CPT | Mod: PBBFAC,,,

## 2023-01-17 PROCEDURE — 85025 COMPLETE CBC W/AUTO DIFF WBC: CPT | Performed by: SURGERY

## 2023-01-17 PROCEDURE — 99215 PR OFFICE/OUTPT VISIT, EST, LEVL V, 40-54 MIN: ICD-10-PCS | Mod: 24,S$GLB,, | Performed by: TRANSPLANT SURGERY

## 2023-01-17 PROCEDURE — 99215 OFFICE O/P EST HI 40 MIN: CPT | Mod: 24,S$GLB,, | Performed by: TRANSPLANT SURGERY

## 2023-01-17 PROCEDURE — 82306 VITAMIN D 25 HYDROXY: CPT | Performed by: SURGERY

## 2023-01-17 PROCEDURE — 99999 PR PBB SHADOW E&M-EST. PATIENT-LVL III: ICD-10-PCS | Mod: PBBFAC,,,

## 2023-01-17 PROCEDURE — 80053 COMPREHEN METABOLIC PANEL: CPT | Performed by: SURGERY

## 2023-01-17 PROCEDURE — 80197 ASSAY OF TACROLIMUS: CPT | Performed by: SURGERY

## 2023-01-17 PROCEDURE — 36415 COLL VENOUS BLD VENIPUNCTURE: CPT | Performed by: SURGERY

## 2023-01-17 RX ORDER — OXYCODONE HYDROCHLORIDE 10 MG/1
10 TABLET ORAL EVERY 8 HOURS PRN
Qty: 21 TABLET | Refills: 0 | Status: ON HOLD | OUTPATIENT
Start: 2023-01-17 | End: 2023-07-28 | Stop reason: HOSPADM

## 2023-01-17 NOTE — PROGRESS NOTES
Transplant Surgery  Liver Transplant Recipient Follow-up    Original Referring Physician: Alan Chinchilla  Current Corresponding Physician: Alan Chinchilla    Chief Complaint: Rosalio is here for follow up of his liver transplant performed 1/5/2023 for the primary diagnosis (UNOS) of Cirrhosis: Other, Specify - granulomatous hepatitis    ORGAN: LIVER  Whole or Partial: whole liver  Donor Type: donation after brain death  PHS Increased Risk: yes  Donor CMV Status: Positive  Donor HCV Status: Positive  Donor HBcAb: Negative  Donor HBV ASAF: Negative  Donor HCV ASAF: Negative    Biliary Anastomosis: end to end  Arterial Anatomy: replaced right hepatic from sma  IVC reconstruction: end to end ivc  Portal vein status: patent    Subjective:     History of Present Illness: He has had the following complications since transplant: none.  The noted complications are well controlled.    Interval History: Currently, he is doing well.  Current complaints include abdominal pain and wound drainage.  Rosalio is here for management of his immunosuppression medication.    External provider notes reviewed: Yes    Review of Systems  Objective:     Physical Exam  Constitutional:       Appearance: He is well-developed.   HENT:      Head: Normocephalic and atraumatic.   Eyes:      Pupils: Pupils are equal, round, and reactive to light.   Neck:      Vascular: No JVD.   Cardiovascular:      Rate and Rhythm: Normal rate and regular rhythm.      Heart sounds: Normal heart sounds.   Pulmonary:      Effort: Pulmonary effort is normal.      Breath sounds: Normal breath sounds. No stridor.   Abdominal:      General: There is no distension.      Palpations: Abdomen is soft.      Tenderness: There is no abdominal tenderness.      Comments: Incison c/d/I staples in place     Musculoskeletal:         General: Normal range of motion.      Right lower leg: No edema.      Left lower leg: No edema.   Skin:     General: Skin is warm and dry.    Neurological:      Mental Status: He is alert and oriented to person, place, and time.   Psychiatric:         Behavior: Behavior normal.     Lab Results   Component Value Date    BILITOT 1.3 (H) 01/17/2023    AST 8 (L) 01/17/2023    ALT 18 01/17/2023    ALKPHOS 101 01/17/2023    CREATININE 1.3 01/17/2023    ALBUMIN 3.9 01/17/2023     Lab Results   Component Value Date    WBC 9.44 01/17/2023    HGB 10.5 (L) 01/17/2023    HCT 32.8 (L) 01/17/2023    HCT 33 (L) 01/05/2023    PLT 59 (L) 01/17/2023     Lab Results   Component Value Date    TACROLIMUS 5.1 01/17/2023       Diagnostics:  The following labs have been reviewed: CBC  CMP  INR  TACROLIMUS LEVEL  The following radiology images have been independently reviewed and interpreted: Liver US    Assessment/Plan:          S/P liver transplant.  Chronic immunosuppressive medications for rejection prophylaxis at target.  Plan: no adjustment needed.  Continue monitoring symptoms, labs and drug levels for drug-related toxicity and side effects.  Incision: staples in place; wound clean, dry, and intact  Femoral arterial line site: no complications evident    Additional testing to be completed according to Written Order Guidelines for Post-Liver and Combined Liver/Kidney Transplant Follow-up (LI-09)    Interpretation of tests and discussion of patient management involves all members of the multidisciplinary transplant team  Patient advised that it is recommended that all transplanted patients, and their close contacts and household members receive Covid vaccination.  Aldo Hdz Jr, MD       [facility] Patient Status  Functional Status: 60% - Requires occasional assistance but is able to care for needs  Physical Capacity: No Limitations

## 2023-01-18 LAB
FINAL PATHOLOGIC DIAGNOSIS: NORMAL
FROZEN SECTION FOOTNOTE: NORMAL
GROSS: NORMAL
Lab: NORMAL
MICROSCOPIC EXAM: NORMAL

## 2023-01-19 ENCOUNTER — CONFERENCE (OUTPATIENT)
Dept: TRANSPLANT | Facility: CLINIC | Age: 48
End: 2023-01-19
Payer: COMMERCIAL

## 2023-01-19 ENCOUNTER — PATIENT MESSAGE (OUTPATIENT)
Dept: ADMINISTRATIVE | Facility: OTHER | Age: 48
End: 2023-01-19
Payer: COMMERCIAL

## 2023-01-19 NOTE — TELEPHONE ENCOUNTER
Liver pathology conference     Explant :     Negative for malignancy  Cirrhosis, history of hepatic sarcoidosis

## 2023-01-23 ENCOUNTER — PATIENT MESSAGE (OUTPATIENT)
Dept: TRANSPLANT | Facility: CLINIC | Age: 48
End: 2023-01-23
Payer: COMMERCIAL

## 2023-01-23 ENCOUNTER — LAB VISIT (OUTPATIENT)
Dept: LAB | Facility: HOSPITAL | Age: 48
End: 2023-01-23
Attending: SURGERY
Payer: COMMERCIAL

## 2023-01-23 DIAGNOSIS — Z94.4 LIVER REPLACED BY TRANSPLANT: ICD-10-CM

## 2023-01-23 LAB
ALBUMIN SERPL BCP-MCNC: 4.4 G/DL (ref 3.5–5.2)
ALP SERPL-CCNC: 74 U/L (ref 55–135)
ALT SERPL W/O P-5'-P-CCNC: 13 U/L (ref 10–44)
ANION GAP SERPL CALC-SCNC: 10 MMOL/L (ref 8–16)
AST SERPL-CCNC: 15 U/L (ref 10–40)
BASOPHILS # BLD AUTO: 0.11 K/UL (ref 0–0.2)
BASOPHILS NFR BLD: 1.5 % (ref 0–1.9)
BILIRUB DIRECT SERPL-MCNC: 0.5 MG/DL (ref 0.1–0.3)
BILIRUB SERPL-MCNC: 1.6 MG/DL (ref 0.1–1)
BUN SERPL-MCNC: 37 MG/DL (ref 6–20)
CALCIUM SERPL-MCNC: 10.1 MG/DL (ref 8.7–10.5)
CHLORIDE SERPL-SCNC: 107 MMOL/L (ref 95–110)
CO2 SERPL-SCNC: 18 MMOL/L (ref 23–29)
CREAT SERPL-MCNC: 1.2 MG/DL (ref 0.5–1.4)
DIFFERENTIAL METHOD: ABNORMAL
EOSINOPHIL # BLD AUTO: 0.2 K/UL (ref 0–0.5)
EOSINOPHIL NFR BLD: 2.9 % (ref 0–8)
ERYTHROCYTE [DISTWIDTH] IN BLOOD BY AUTOMATED COUNT: 24.2 % (ref 11.5–14.5)
EST. GFR  (NO RACE VARIABLE): >60 ML/MIN/1.73 M^2
GLUCOSE SERPL-MCNC: 113 MG/DL (ref 70–110)
HCT VFR BLD AUTO: 36.8 % (ref 40–54)
HGB BLD-MCNC: 11.9 G/DL (ref 14–18)
IMM GRANULOCYTES # BLD AUTO: 0.04 K/UL (ref 0–0.04)
IMM GRANULOCYTES NFR BLD AUTO: 0.5 % (ref 0–0.5)
LYMPHOCYTES # BLD AUTO: 1 K/UL (ref 1–4.8)
LYMPHOCYTES NFR BLD: 13.1 % (ref 18–48)
MCH RBC QN AUTO: 26.7 PG (ref 27–31)
MCHC RBC AUTO-ENTMCNC: 32.3 G/DL (ref 32–36)
MCV RBC AUTO: 83 FL (ref 82–98)
MONOCYTES # BLD AUTO: 0.6 K/UL (ref 0.3–1)
MONOCYTES NFR BLD: 7.7 % (ref 4–15)
NEUTROPHILS # BLD AUTO: 5.6 K/UL (ref 1.8–7.7)
NEUTROPHILS NFR BLD: 74.3 % (ref 38–73)
NRBC BLD-RTO: 0 /100 WBC
PLATELET # BLD AUTO: 99 K/UL (ref 150–450)
PMV BLD AUTO: 10.4 FL (ref 9.2–12.9)
POTASSIUM SERPL-SCNC: 4.9 MMOL/L (ref 3.5–5.1)
PROT SERPL-MCNC: 7.5 G/DL (ref 6–8.4)
RBC # BLD AUTO: 4.46 M/UL (ref 4.6–6.2)
SODIUM SERPL-SCNC: 135 MMOL/L (ref 136–145)
TACROLIMUS BLD-MCNC: 5.1 NG/ML (ref 5–15)
WBC # BLD AUTO: 7.56 K/UL (ref 3.9–12.7)

## 2023-01-23 PROCEDURE — 85025 COMPLETE CBC W/AUTO DIFF WBC: CPT | Performed by: SURGERY

## 2023-01-23 PROCEDURE — 80053 COMPREHEN METABOLIC PANEL: CPT | Performed by: SURGERY

## 2023-01-23 PROCEDURE — 82248 BILIRUBIN DIRECT: CPT | Performed by: SURGERY

## 2023-01-23 PROCEDURE — 80197 ASSAY OF TACROLIMUS: CPT | Performed by: SURGERY

## 2023-01-23 PROCEDURE — 36415 COLL VENOUS BLD VENIPUNCTURE: CPT | Performed by: SURGERY

## 2023-01-23 NOTE — TELEPHONE ENCOUNTER
Portal message sent, starting aspirin 81 mg daily.  ----- Message from Christian Alejandre MD sent at 1/23/2023  2:32 PM CST -----  Results ok. No action needed

## 2023-01-24 ENCOUNTER — OFFICE VISIT (OUTPATIENT)
Dept: TRANSPLANT | Facility: CLINIC | Age: 48
End: 2023-01-24
Payer: COMMERCIAL

## 2023-01-24 ENCOUNTER — PATIENT MESSAGE (OUTPATIENT)
Dept: TRANSPLANT | Facility: CLINIC | Age: 48
End: 2023-01-24

## 2023-01-24 VITALS
SYSTOLIC BLOOD PRESSURE: 122 MMHG | DIASTOLIC BLOOD PRESSURE: 71 MMHG | BODY MASS INDEX: 31.57 KG/M2 | WEIGHT: 208.31 LBS | HEART RATE: 89 BPM | OXYGEN SATURATION: 100 % | TEMPERATURE: 97 F | RESPIRATION RATE: 16 BRPM | HEIGHT: 68 IN

## 2023-01-24 DIAGNOSIS — Z51.81 ENCOUNTER FOR THERAPEUTIC DRUG MONITORING: Primary | ICD-10-CM

## 2023-01-24 DIAGNOSIS — Z94.4 LIVER REPLACED BY TRANSPLANT: ICD-10-CM

## 2023-01-24 DIAGNOSIS — Z94.4 LIVER TRANSPLANTED: ICD-10-CM

## 2023-01-24 PROCEDURE — 99214 PR OFFICE/OUTPT VISIT, EST, LEVL IV, 30-39 MIN: ICD-10-PCS | Mod: 24,S$GLB,, | Performed by: SURGERY

## 2023-01-24 PROCEDURE — 99999 PR PBB SHADOW E&M-EST. PATIENT-LVL III: CPT | Mod: PBBFAC,,,

## 2023-01-24 PROCEDURE — 99999 PR PBB SHADOW E&M-EST. PATIENT-LVL III: ICD-10-PCS | Mod: PBBFAC,,,

## 2023-01-24 PROCEDURE — 99214 OFFICE O/P EST MOD 30 MIN: CPT | Mod: 24,S$GLB,, | Performed by: SURGERY

## 2023-01-24 RX ORDER — CALCIUM CARBONATE 500(1250)
1 TABLET ORAL DAILY
Qty: 30 TABLET | Refills: 11 | Status: SHIPPED | OUTPATIENT
Start: 2023-01-24 | End: 2023-02-13 | Stop reason: SDUPTHER

## 2023-01-24 RX ORDER — SODIUM BICARBONATE 650 MG/1
1300 TABLET ORAL 2 TIMES DAILY
Qty: 120 TABLET | Refills: 11 | Status: SHIPPED | OUTPATIENT
Start: 2023-01-24 | End: 2023-03-06 | Stop reason: SDUPTHER

## 2023-01-24 NOTE — PROGRESS NOTES
Transplant Surgery  Liver Transplant Recipient Follow-up    Original Referring Physician: Alan Chinchilla  Current Corresponding Physician: Alan Chinchilla    Chief Complaint: Rosalio is here for follow up of his liver transplant performed 1/5/2023 for the primary diagnosis (UNOS) of Cirrhosis: Other, Specify - granulomatous hepatitis    ORGAN: LIVER  Whole or Partial: whole liver  Donor Type: donation after brain death  PHS Increased Risk: yes  Donor CMV Status: Positive  Donor HCV Status: Positive  Donor HBcAb: Negative  Donor HBV ASAF: Negative  Donor HCV ASAF: Negative    Biliary Anastomosis: end to end  Arterial Anatomy: replaced right hepatic from sma  IVC reconstruction: end to end ivc  Portal vein status: patent    Subjective:     History of Present Illness: He has had the following complications since transplant: none.  The noted complications are well controlled.    Interval History: Currently, he is doing well.  Current complaints include none.  Rosalio is here for management of his immunosuppression medication.    External provider notes reviewed: No    Review of Systems   Constitutional:  Negative for fatigue.   HENT:  Negative for drooling, postnasal drip and sore throat.    Eyes:  Negative for discharge and itching.   Respiratory:  Negative for choking and stridor.    Gastrointestinal:  Negative for rectal pain.   Endocrine: Negative for polydipsia.   Genitourinary:  Negative for enuresis and genital sores.   Musculoskeletal:  Negative for back pain, neck pain and neck stiffness.   Allergic/Immunologic: Positive for immunocompromised state.   Neurological:  Negative for facial asymmetry and numbness.   Hematological:  Negative for adenopathy.   Psychiatric/Behavioral:  Negative for behavioral problems, self-injury and suicidal ideas.    Objective:     Physical Exam  Abdominal:       Lab Results   Component Value Date    BILITOT 1.6 (H) 01/23/2023    AST 15 01/23/2023    ALT 13 01/23/2023    ALKPHOS  74 01/23/2023    CREATININE 1.2 01/23/2023    ALBUMIN 4.4 01/23/2023     Lab Results   Component Value Date    WBC 7.56 01/23/2023    HGB 11.9 (L) 01/23/2023    HCT 36.8 (L) 01/23/2023    HCT 33 (L) 01/05/2023    PLT 99 (L) 01/23/2023     Lab Results   Component Value Date    TACROLIMUS 5.1 01/23/2023       Diagnostics:  The following labs have been reviewed: CBC  BMP  TACROLIMUS LEVEL    Assessment/Plan:          S/P liver transplant.  Chronic immunosuppressive medications for rejection prophylaxis at target.  Plan: no adjustment needed.  Continue monitoring symptoms, labs and drug levels for drug-related toxicity and side effects.  Incision: staples in place; wound clean, dry, and intact  Femoral arterial line site: no complications evident  Path reviewed    Additional testing to be completed according to Written Order Guidelines for Post-Liver and Combined Liver/Kidney Transplant Follow-up (LI-09)    Interpretation of tests and discussion of patient management involves all members of the multidisciplinary transplant team  Patient advised that it is recommended that all transplanted patients, and their close contacts and household members receive Covid vaccination.  Abdirahman Arnold MD       [facility] [email or web address]] Patient Status  Functional Status: 80% - Normal activity with effort: some symptoms of disease  Physical Capacity: No Limitations

## 2023-01-25 ENCOUNTER — TELEPHONE (OUTPATIENT)
Dept: TRANSPLANT | Facility: CLINIC | Age: 48
End: 2023-01-25
Payer: COMMERCIAL

## 2023-01-25 RX ORDER — ASPIRIN 81 MG/1
81 TABLET ORAL DAILY
Qty: 30 TABLET | Refills: 11 | Status: SHIPPED | OUTPATIENT
Start: 2023-01-25 | End: 2023-02-13 | Stop reason: SDUPTHER

## 2023-01-26 ENCOUNTER — INFUSION (OUTPATIENT)
Dept: INFUSION THERAPY | Facility: HOSPITAL | Age: 48
End: 2023-01-26
Payer: COMMERCIAL

## 2023-01-26 VITALS
RESPIRATION RATE: 17 BRPM | TEMPERATURE: 98 F | OXYGEN SATURATION: 99 % | HEART RATE: 88 BPM | DIASTOLIC BLOOD PRESSURE: 55 MMHG | SYSTOLIC BLOOD PRESSURE: 109 MMHG

## 2023-01-26 DIAGNOSIS — Z79.60 LONG-TERM USE OF IMMUNOSUPPRESSANT MEDICATION: Primary | ICD-10-CM

## 2023-01-26 PROCEDURE — M0220 HC INJECTION ADMIN, TIXAGEVIMAB-CILGAVIMAB, INCL POST ADMIN MONIT: HCPCS | Performed by: INTERNAL MEDICINE

## 2023-01-26 PROCEDURE — 63600175 PHARM REV CODE 636 W HCPCS: Performed by: INTERNAL MEDICINE

## 2023-01-26 RX ORDER — PREDNISONE 20 MG/1
40 TABLET ORAL ONCE AS NEEDED
Status: CANCELLED | OUTPATIENT
Start: 2023-01-26

## 2023-01-26 RX ORDER — ALBUTEROL SULFATE 90 UG/1
2 AEROSOL, METERED RESPIRATORY (INHALATION) ONCE AS NEEDED
Status: CANCELLED | OUTPATIENT
Start: 2023-01-26

## 2023-01-26 RX ORDER — EPINEPHRINE 0.3 MG/.3ML
0.3 INJECTION SUBCUTANEOUS ONCE AS NEEDED
Status: CANCELLED | OUTPATIENT
Start: 2023-01-26

## 2023-01-26 RX ORDER — ACETAMINOPHEN 325 MG/1
650 TABLET ORAL ONCE AS NEEDED
Status: CANCELLED | OUTPATIENT
Start: 2023-01-26

## 2023-01-26 RX ORDER — EPINEPHRINE 0.3 MG/.3ML
0.3 INJECTION SUBCUTANEOUS ONCE AS NEEDED
Status: DISCONTINUED | OUTPATIENT
Start: 2023-01-26 | End: 2023-01-26 | Stop reason: HOSPADM

## 2023-01-26 RX ORDER — DIPHENHYDRAMINE HCL 25 MG
25 CAPSULE ORAL ONCE AS NEEDED
Status: CANCELLED | OUTPATIENT
Start: 2023-01-26

## 2023-01-26 RX ORDER — ACETAMINOPHEN 325 MG/1
650 TABLET ORAL ONCE AS NEEDED
Status: DISCONTINUED | OUTPATIENT
Start: 2023-01-26 | End: 2023-01-26 | Stop reason: HOSPADM

## 2023-01-26 RX ORDER — PREDNISONE 20 MG/1
40 TABLET ORAL ONCE AS NEEDED
Status: DISCONTINUED | OUTPATIENT
Start: 2023-01-26 | End: 2023-01-26 | Stop reason: HOSPADM

## 2023-01-26 RX ORDER — DIPHENHYDRAMINE HCL 25 MG
25 CAPSULE ORAL ONCE AS NEEDED
Status: DISCONTINUED | OUTPATIENT
Start: 2023-01-26 | End: 2023-01-26 | Stop reason: HOSPADM

## 2023-01-26 RX ORDER — ONDANSETRON 4 MG/1
4 TABLET, ORALLY DISINTEGRATING ORAL ONCE AS NEEDED
Status: DISCONTINUED | OUTPATIENT
Start: 2023-01-26 | End: 2023-01-26 | Stop reason: HOSPADM

## 2023-01-26 RX ORDER — ALBUTEROL SULFATE 90 UG/1
2 AEROSOL, METERED RESPIRATORY (INHALATION) ONCE AS NEEDED
Status: DISCONTINUED | OUTPATIENT
Start: 2023-01-26 | End: 2023-01-26 | Stop reason: HOSPADM

## 2023-01-26 RX ORDER — ONDANSETRON 4 MG/1
4 TABLET, ORALLY DISINTEGRATING ORAL ONCE AS NEEDED
Status: CANCELLED | OUTPATIENT
Start: 2023-01-26

## 2023-01-26 RX ADMIN — Medication 300 MG: at 10:01

## 2023-01-28 PROBLEM — Z99.11 ENCOUNTER FOR WEANING FROM VENTILATOR: Status: ACTIVE | Noted: 2023-01-28

## 2023-01-30 LAB
EXT ALBUMIN: 4.8
EXT ALKALINE PHOSPHATASE: 177
EXT ALT: 26
EXT AST: 16
EXT BASOPHIL%: 1.4
EXT BILIRUBIN TOTAL: 1.9
EXT BUN: 35
EXT CALCIUM: 10.4
EXT CHLORIDE: 103
EXT CO2: 22
EXT CREATININE: 1.57 MG/DL
EXT EOSINOPHIL%: 7.4
EXT GLUCOSE: 131
EXT HEMATOCRIT: 35.3
EXT HEMOGLOBIN: 11.6
EXT LYMPH%: 10.6
EXT MONOCYTES%: 8.4
EXT PLATELETS: 102
EXT POTASSIUM: 5.8
EXT PROTEIN TOTAL: 7.2
EXT SEGS%: 71.7
EXT SODIUM: 134 MMOL/L
EXT TACROLIMUS LVL: 4.7
EXT WBC: 5.8

## 2023-01-30 RX ORDER — SODIUM POLYSTYRENE SULFONATE 4.1 MEQ/G
30 POWDER, FOR SUSPENSION ORAL; RECTAL ONCE
Qty: 30 G | Refills: 0 | Status: SHIPPED | OUTPATIENT
Start: 2023-01-30 | End: 2023-01-30

## 2023-01-30 NOTE — TELEPHONE ENCOUNTER
Called patient and informed him his potassium is elevated.  We called in sodium polystyrene 30 grams mix with water and take one time.  Repeat potassium tomorrow.

## 2023-01-30 NOTE — LETTER
"LAB ORDERS    2023 lab draw for 23      ORDERING MD:   Dr. Christian Alejandre        Patient Name: Rosalio Mccarty               : 1975    [facility] Number: [id number]                  #:        Please draw the following labs:     Test Frequency  Diagnosis/ICD-10 Code     Potassium once  E87.5 hyperkalemia        G6PD once  50.995A/ Z94.4 liver transplant                     FAX RESULTS [phone] "[facility] Liver Transplant Coordinator", and send the hard copy when completed. If you have any questions regarding this request or need additional information, please call.      "

## 2023-01-31 ENCOUNTER — OFFICE VISIT (OUTPATIENT)
Dept: TRANSPLANT | Facility: CLINIC | Age: 48
End: 2023-01-31
Payer: COMMERCIAL

## 2023-01-31 ENCOUNTER — PATIENT MESSAGE (OUTPATIENT)
Dept: TRANSPLANT | Facility: CLINIC | Age: 48
End: 2023-01-31

## 2023-01-31 VITALS
SYSTOLIC BLOOD PRESSURE: 127 MMHG | HEIGHT: 68 IN | TEMPERATURE: 97 F | RESPIRATION RATE: 16 BRPM | WEIGHT: 204.81 LBS | HEART RATE: 84 BPM | OXYGEN SATURATION: 100 % | BODY MASS INDEX: 31.04 KG/M2 | DIASTOLIC BLOOD PRESSURE: 74 MMHG

## 2023-01-31 DIAGNOSIS — Z51.81 ENCOUNTER FOR THERAPEUTIC DRUG MONITORING: Primary | ICD-10-CM

## 2023-01-31 DIAGNOSIS — Z94.4 LIVER REPLACED BY TRANSPLANT: ICD-10-CM

## 2023-01-31 DIAGNOSIS — Z79.899 ENCOUNTER FOR LONG-TERM (CURRENT) USE OF OTHER MEDICATIONS: ICD-10-CM

## 2023-01-31 LAB — EXT POTASSIUM: 4.9

## 2023-01-31 PROCEDURE — 99999 PR PBB SHADOW E&M-EST. PATIENT-LVL III: CPT | Mod: PBBFAC,,,

## 2023-01-31 PROCEDURE — 99213 OFFICE O/P EST LOW 20 MIN: CPT | Mod: 24,S$GLB,, | Performed by: TRANSPLANT SURGERY

## 2023-01-31 PROCEDURE — 99999 PR PBB SHADOW E&M-EST. PATIENT-LVL III: ICD-10-PCS | Mod: PBBFAC,,,

## 2023-01-31 PROCEDURE — 99213 PR OFFICE/OUTPT VISIT, EST, LEVL III, 20-29 MIN: ICD-10-PCS | Mod: 24,S$GLB,, | Performed by: TRANSPLANT SURGERY

## 2023-01-31 NOTE — PROGRESS NOTES
Transplant Surgery  Liver Transplant Recipient Follow-up    Original Referring Physician: Alan Chinchilla  Current Corresponding Physician: Alan Chinchilla    Chief Complaint: Rosalio is here for follow up of his liver transplant performed 1/5/2023 for the primary diagnosis (UNOS) of Cirrhosis: Other, Specify - granulomatous hepatitis    ORGAN: LIVER  Whole or Partial: whole liver  Donor Type: donation after brain death  PHS Increased Risk: yes  Donor CMV Status: Positive  Donor HCV Status: Positive  Donor HBcAb: Negative  Donor HBV ASAF: Negative  Donor HCV ASAF: Negative    Biliary Anastomosis: end to end  Arterial Anatomy: replaced right hepatic from sma  IVC reconstruction: end to end ivc  Portal vein status: patent    Subjective:     History of Present Illness: He has had the following complications since transplant: none.      Interval History: Currently, he is doing well.  Current complaints include none.  Rosalio is here for management of his immunosuppression medication.    External provider notes reviewed: Yes    Review of Systems   Constitutional: Negative.    Respiratory: Negative.     Cardiovascular: Negative.    Gastrointestinal: Negative.    Objective:     Physical Exam  Constitutional:       Appearance: Normal appearance. He is normal weight.   Abdominal:       Neurological:      Mental Status: He is alert.     Lab Results   Component Value Date    BILITOT 1.6 (H) 01/23/2023    AST 15 01/23/2023    ALT 13 01/23/2023    ALKPHOS 74 01/23/2023    CREATININE 1.2 01/23/2023    ALBUMIN 4.4 01/23/2023     Lab Results   Component Value Date    WBC 5.8 01/30/2023    WBC 7.56 01/23/2023    HGB 11.6 (L) 01/30/2023    HGB 11.9 (L) 01/23/2023    HCT 35.3 (L) 01/30/2023    HCT 36.8 (L) 01/23/2023    HCT 33 (L) 01/05/2023     (L) 01/30/2023    PLT 99 (L) 01/23/2023     Lab Results   Component Value Date    TACROLIMUS 5.1 01/23/2023       Diagnostics:  The following labs have been reviewed:  CBC  BMP  TACROLIMUS LEVEL  The following radiology images have been independently reviewed and interpreted: Liver US    Assessment/Plan:          S/P liver transplant. Slight TB-AP increasing. Repeat labs in 2 days   Chronic immunosuppressive medications for rejection prophylaxis at target.  Plan: no adjustment needed.  Continue monitoring symptoms, labs and drug levels for drug-related toxicity and side effects.  Incision: staples in place; wound clean, dry, and intact. Remove staples  Femoral arterial line site: no complications evident    Additional testing to be completed according to Written Order Guidelines for Post-Liver and Combined Liver/Kidney Transplant Follow-up (LI-09)    Interpretation of tests and discussion of patient management involves all members of the multidisciplinary transplant team  Patient advised that it is recommended that all transplanted patients, and their close contacts and household members receive Covid vaccination.  Shane Davis MD       [facility] Patient Status  Functional Status: 50% - Requires considerable assistance and frequent medical care  Physical Capacity: No Limitations

## 2023-01-31 NOTE — PROGRESS NOTES
STAPLE REMOVAL NOTE    Staples removed from liver transplant incision, steri-strips applied with Benzoin per Dr. Davis's order.  Patient tolerated procedure well.  Skin dry and intact.  Patient instructed to shower with back to water spray and to pat dry incision and to let the steri-strips wear off on their own.  Patient instructed to report any redness, warmth, or drainage from incision to transplant coordinators.  All questions answered.

## 2023-02-01 DIAGNOSIS — Z94.4 LIVER TRANSPLANTED: ICD-10-CM

## 2023-02-01 LAB — FUNGUS SPEC CULT: NORMAL

## 2023-02-01 RX ORDER — TACROLIMUS 1 MG/1
5 CAPSULE ORAL EVERY 12 HOURS
Qty: 300 CAPSULE | Refills: 11 | Status: SHIPPED | OUTPATIENT
Start: 2023-02-01 | End: 2023-02-02

## 2023-02-01 RX ORDER — MYCOPHENOLATE MOFETIL 250 MG/1
1000 CAPSULE ORAL 2 TIMES DAILY
Qty: 240 CAPSULE | Refills: 2 | Status: SHIPPED | OUTPATIENT
Start: 2023-02-01 | End: 2023-02-20

## 2023-02-02 ENCOUNTER — PATIENT MESSAGE (OUTPATIENT)
Dept: TRANSPLANT | Facility: CLINIC | Age: 48
End: 2023-02-02
Payer: COMMERCIAL

## 2023-02-02 DIAGNOSIS — Z94.4 LIVER TRANSPLANTED: ICD-10-CM

## 2023-02-02 LAB
EXT ALBUMIN: 4.3
EXT ALKALINE PHOSPHATASE: 93
EXT ALT: 10
EXT AST: 9
EXT BASOPHIL%: 1.4
EXT BILIRUBIN TOTAL: 1.1
EXT BUN: 38
EXT CALCIUM: 9
EXT CHLORIDE: 104
EXT CO2: 27
EXT CREATININE: 1.88 MG/DL
EXT EOSINOPHIL%: 7.4
EXT GFR MDRD NON AF AMER: 44
EXT GLUCOSE: 98
EXT HEMATOCRIT: 29.9
EXT HEMOGLOBIN: 9.8
EXT LYMPH%: 15.4
EXT MONOCYTES%: 10.7
EXT PLATELETS: 82
EXT POTASSIUM: 5.1
EXT PROTEIN TOTAL: 7
EXT SEGS%: 64.5
EXT SODIUM: 137 MMOL/L
EXT TACROLIMUS LVL: 6.4
EXT WBC: 3.6

## 2023-02-03 ENCOUNTER — PATIENT MESSAGE (OUTPATIENT)
Dept: TRANSPLANT | Facility: CLINIC | Age: 48
End: 2023-02-03
Payer: COMMERCIAL

## 2023-02-03 LAB — EXT G6PD QUAL: 19.3

## 2023-02-03 RX ORDER — TACROLIMUS 1 MG/1
4 CAPSULE ORAL EVERY 12 HOURS
Qty: 240 CAPSULE | Refills: 11 | Status: ON HOLD | OUTPATIENT
Start: 2023-02-03 | End: 2023-05-21 | Stop reason: SDUPTHER

## 2023-02-04 RX ORDER — DAPSONE 100 MG/1
100 TABLET ORAL DAILY
Qty: 30 TABLET | Refills: 11 | Status: ON HOLD | OUTPATIENT
Start: 2023-02-04 | End: 2023-05-21 | Stop reason: SDUPTHER

## 2023-02-06 LAB — EXT HCV QUANT: NOT DETECTED

## 2023-02-07 LAB
EXT ALBUMIN: 4.8
EXT ALKALINE PHOSPHATASE: 92
EXT ALT: 12
EXT AST: 10
EXT BASOPHIL%: 1.4
EXT BILIRUBIN TOTAL: 1.5
EXT BUN: 25
EXT CALCIUM: 10.1
EXT CHLORIDE: 102
EXT CO2: 25
EXT CREATININE: 1.6 MG/DL
EXT EOSINOPHIL%: 3.4
EXT GFR MDRD NON AF AMER: 53
EXT GLUCOSE: 156
EXT HEMATOCRIT: 32.6
EXT HEMOGLOBIN: 10.7
EXT LYMPH%: 11.9
EXT MONOCYTES%: 8
EXT PLATELETS: 85
EXT POTASSIUM: 5.2
EXT PROTEIN TOTAL: 7.1
EXT SEGS%: 74.4
EXT SODIUM: 136 MMOL/L
EXT TACROLIMUS LVL: 6.4
EXT WBC: 4.4

## 2023-02-09 ENCOUNTER — PATIENT MESSAGE (OUTPATIENT)
Dept: TRANSPLANT | Facility: CLINIC | Age: 48
End: 2023-02-09
Payer: COMMERCIAL

## 2023-02-09 ENCOUNTER — TELEPHONE (OUTPATIENT)
Dept: TRANSPLANT | Facility: CLINIC | Age: 48
End: 2023-02-09
Payer: COMMERCIAL

## 2023-02-09 NOTE — TELEPHONE ENCOUNTER
Prograf reviewed with Dr. Shin, no medication changes.  Portal message sent to patient, continue weekly labs

## 2023-02-13 LAB
EXT ALBUMIN: 4.3
EXT ALKALINE PHOSPHATASE: 59
EXT ALT: <7
EXT AST: 9
EXT BASOPHIL%: 1.1
EXT BILIRUBIN TOTAL: 1.5
EXT BUN: 26
EXT CALCIUM: 9.6
EXT CHLORIDE: 107
EXT CO2: 24
EXT CREATININE: 1.34 MG/DL
EXT EOSINOPHIL%: 1.7
EXT GFR MDRD NON AF AMER: 65
EXT GLUCOSE: 139
EXT HEMATOCRIT: 31.5
EXT HEMOGLOBIN: 10.3
EXT LYMPH%: 10.1
EXT MONOCYTES%: 7.3
EXT PLATELETS: 80
EXT POTASSIUM: 4.8
EXT PROTEIN TOTAL: 6.8
EXT SEGS%: 79
EXT SODIUM: 138 MMOL/L
EXT TACROLIMUS LVL: 5.1
EXT WBC: 3.6

## 2023-02-13 RX ORDER — CALCIUM CARBONATE 500(1250)
1 TABLET ORAL DAILY
Qty: 30 TABLET | Refills: 11 | Status: SHIPPED | OUTPATIENT
Start: 2023-02-13 | End: 2024-04-03

## 2023-02-13 RX ORDER — ASPIRIN 81 MG/1
81 TABLET ORAL DAILY
Qty: 30 TABLET | Refills: 11 | Status: SHIPPED | OUTPATIENT
Start: 2023-02-13 | End: 2024-02-23 | Stop reason: SDUPTHER

## 2023-02-14 ENCOUNTER — HOSPITAL ENCOUNTER (OUTPATIENT)
Dept: RADIOLOGY | Facility: HOSPITAL | Age: 48
Discharge: HOME | End: 2023-02-14
Attending: SURGERY
Payer: COMMERCIAL

## 2023-02-14 ENCOUNTER — TELEPHONE (OUTPATIENT)
Dept: TRANSPLANT | Facility: CLINIC | Age: 48
End: 2023-02-14
Payer: COMMERCIAL

## 2023-02-14 ENCOUNTER — PATIENT MESSAGE (OUTPATIENT)
Dept: TRANSPLANT | Facility: CLINIC | Age: 48
End: 2023-02-14
Payer: COMMERCIAL

## 2023-02-14 DIAGNOSIS — Z94.4 LIVER REPLACED BY TRANSPLANT: Primary | ICD-10-CM

## 2023-02-14 DIAGNOSIS — Z94.4 LIVER REPLACED BY TRANSPLANT: ICD-10-CM

## 2023-02-14 LAB — EXT HCV QUANT: NOT DETECTED

## 2023-02-14 PROCEDURE — 76705 ECHO EXAM OF ABDOMEN: CPT | Mod: 26,XS,, | Performed by: RADIOLOGY

## 2023-02-14 PROCEDURE — 76705 US DOPPLER LIVER TRANSPLANT POST (XPD): ICD-10-PCS | Mod: 26,XS,, | Performed by: RADIOLOGY

## 2023-02-14 PROCEDURE — 93976 VASCULAR STUDY: CPT | Mod: TC

## 2023-02-14 PROCEDURE — 93976 US DOPPLER LIVER TRANSPLANT POST (XPD): ICD-10-PCS | Mod: 26,,, | Performed by: RADIOLOGY

## 2023-02-14 PROCEDURE — 93976 VASCULAR STUDY: CPT | Mod: 26,,, | Performed by: RADIOLOGY

## 2023-02-14 NOTE — TELEPHONE ENCOUNTER
Portal message sent, repeat labs 3/20/23----- Message from Christian Alejandre MD sent at 2/13/2023  2:39 PM CST -----  Results ok. No action needed

## 2023-02-16 LAB
EXT ALBUMIN: 4.3
EXT ALKALINE PHOSPHATASE: 59
EXT ALT: <7
EXT AST: 9
EXT BASOPHIL%: 1.1
EXT BILIRUBIN TOTAL: 1.5
EXT BUN: 26
EXT CALCIUM: 9.6
EXT CHLORIDE: 107
EXT CO2: 24
EXT CREATININE: 1.34 MG/DL
EXT EGFR NO RACE VARIABLE: 65
EXT EOSINOPHIL%: 1.7
EXT GLUCOSE: 139
EXT HEMATOCRIT: 31.5
EXT HEMOGLOBIN: 10.3
EXT LYMPH%: 10.1
EXT MONOCYTES%: 7.3
EXT PLATELETS: 80
EXT POTASSIUM: 4.8
EXT PROTEIN TOTAL: 6.8
EXT SEGS%: 79
EXT SODIUM: 138 MMOL/L
EXT TACROLIMUS LVL: 5.1
EXT WBC: 3.6

## 2023-02-17 ENCOUNTER — TELEPHONE (OUTPATIENT)
Dept: TRANSPLANT | Facility: CLINIC | Age: 48
End: 2023-02-17
Payer: COMMERCIAL

## 2023-02-17 ENCOUNTER — PATIENT MESSAGE (OUTPATIENT)
Dept: TRANSPLANT | Facility: CLINIC | Age: 48
End: 2023-02-17
Payer: COMMERCIAL

## 2023-02-17 NOTE — TELEPHONE ENCOUNTER
Sent patient message via portal to let him know labs are stable.  No medication changes, next labs due on 02/20/23      ----- Message from Christian Alejandre MD sent at 2/16/2023  2:14 PM CST -----  Results ok. No action needed

## 2023-02-20 ENCOUNTER — PATIENT MESSAGE (OUTPATIENT)
Dept: TRANSPLANT | Facility: CLINIC | Age: 48
End: 2023-02-20
Payer: COMMERCIAL

## 2023-02-20 DIAGNOSIS — Z94.4 LIVER TRANSPLANTED: ICD-10-CM

## 2023-02-20 LAB
EXT ALBUMIN: 4.7
EXT ALKALINE PHOSPHATASE: 60
EXT ALT: <7
EXT AST: 10
EXT BASOPHIL%: 1.4
EXT BILIRUBIN TOTAL: 1.7
EXT BUN: 24
EXT CALCIUM: 9.8
EXT CHLORIDE: 106
EXT CO2: 25
EXT CREATININE: 1.43 MG/DL
EXT EOSINOPHIL%: 3.7
EXT GFR MDRD NON AF AMER: 60
EXT GLUCOSE: 184
EXT HEMATOCRIT: 33.1
EXT HEMOGLOBIN: 10.9
EXT LYMPH%: 12.9
EXT MONOCYTES%: 10.5
EXT PLATELETS: 77
EXT POTASSIUM: 4.6
EXT PROTEIN TOTAL: 6.9
EXT SEGS%: 69.5
EXT SODIUM: 139 MMOL/L
EXT WBC: 3

## 2023-02-20 RX ORDER — MYCOPHENOLATE MOFETIL 250 MG/1
500 CAPSULE ORAL 2 TIMES DAILY
Qty: 120 CAPSULE | Refills: 11 | Status: SHIPPED | OUTPATIENT
Start: 2023-02-20 | End: 2023-06-06 | Stop reason: SINTOL

## 2023-02-20 NOTE — TELEPHONE ENCOUNTER
----- Message from Christian Alejandre MD sent at 2/16/2023  2:14 PM CST -----  Results ok. No action needed   Cooperative/Awake/Alert

## 2023-02-20 NOTE — TELEPHONE ENCOUNTER
Labs reviewed with Dr. Davis.  Reduce Cellcept to 500 mg BID due to lower wbc.  Continue weekly labs.

## 2023-02-23 ENCOUNTER — TELEPHONE (OUTPATIENT)
Dept: TRANSPLANT | Facility: CLINIC | Age: 48
End: 2023-02-23
Payer: COMMERCIAL

## 2023-02-23 ENCOUNTER — PATIENT MESSAGE (OUTPATIENT)
Dept: TRANSPLANT | Facility: CLINIC | Age: 48
End: 2023-02-23
Payer: COMMERCIAL

## 2023-02-23 LAB
ACID FAST MOD KINY STN SPEC: NORMAL
EXT TACROLIMUS LVL: 5.7
MYCOBACTERIUM SPEC QL CULT: NORMAL

## 2023-02-23 NOTE — TELEPHONE ENCOUNTER
Portal message sent, repeat labs 2/27/23----- Message from Rosa Foreman MD sent at 2/23/2023  2:05 PM CST -----  Results ok. No action needed.

## 2023-02-27 ENCOUNTER — PATIENT MESSAGE (OUTPATIENT)
Dept: TRANSPLANT | Facility: CLINIC | Age: 48
End: 2023-02-27
Payer: COMMERCIAL

## 2023-02-27 LAB
EXT ALBUMIN: 4.6
EXT ALKALINE PHOSPHATASE: 60
EXT ALT: 7
EXT AST: 16
EXT BILIRUBIN TOTAL: 1.8
EXT BUN: 25
EXT CALCIUM: 9.6
EXT CHLORIDE: 106
EXT CO2: 23
EXT CREATININE: 1.51 MG/DL
EXT EOSINOPHIL%: 2
EXT GFR MDRD NON AF AMER: 57
EXT GLUCOSE: 137
EXT HEMATOCRIT: 32.6
EXT HEMOGLOBIN: 10.4
EXT LYMPH%: 18
EXT MONOCYTES%: 2
EXT PLATELETS: 70
EXT POTASSIUM: 5.1
EXT PROTEIN TOTAL: 6.7
EXT SEGS%: 78
EXT SODIUM: 140 MMOL/L
EXT TACROLIMUS LVL: ABNORMAL
EXT WBC: 2.4

## 2023-03-02 ENCOUNTER — TELEPHONE (OUTPATIENT)
Dept: TRANSPLANT | Facility: CLINIC | Age: 48
End: 2023-03-02
Payer: COMMERCIAL

## 2023-03-02 ENCOUNTER — PATIENT MESSAGE (OUTPATIENT)
Dept: TRANSPLANT | Facility: CLINIC | Age: 48
End: 2023-03-02
Payer: COMMERCIAL

## 2023-03-02 LAB — EXT TACROLIMUS LVL: 5.7

## 2023-03-02 NOTE — TELEPHONE ENCOUNTER
Portal message sent, continue weekly labs----- Message from Christian Alejandre MD sent at 2/27/2023  1:51 PM CST -----  Await fk

## 2023-03-06 ENCOUNTER — OFFICE VISIT (OUTPATIENT)
Dept: TRANSPLANT | Facility: CLINIC | Age: 48
End: 2023-03-06
Attending: INTERNAL MEDICINE
Payer: COMMERCIAL

## 2023-03-06 ENCOUNTER — LAB VISIT (OUTPATIENT)
Dept: LAB | Facility: HOSPITAL | Age: 48
End: 2023-03-06
Attending: SURGERY
Payer: COMMERCIAL

## 2023-03-06 VITALS
TEMPERATURE: 97 F | WEIGHT: 216.69 LBS | HEART RATE: 81 BPM | HEIGHT: 68 IN | RESPIRATION RATE: 18 BRPM | OXYGEN SATURATION: 97 % | DIASTOLIC BLOOD PRESSURE: 83 MMHG | BODY MASS INDEX: 32.84 KG/M2 | SYSTOLIC BLOOD PRESSURE: 156 MMHG

## 2023-03-06 DIAGNOSIS — Z94.4 S/P LIVER TRANSPLANT: Primary | ICD-10-CM

## 2023-03-06 DIAGNOSIS — Z94.4 LIVER REPLACED BY TRANSPLANT: ICD-10-CM

## 2023-03-06 DIAGNOSIS — Z94.4 LIVER TRANSPLANTED: ICD-10-CM

## 2023-03-06 DIAGNOSIS — Z29.89 PROPHYLACTIC IMMUNOTHERAPY: ICD-10-CM

## 2023-03-06 LAB
EXT ALBUMIN: 4.3
EXT ALKALINE PHOSPHATASE: 81
EXT ALT: 30
EXT AST: 21
EXT BILIRUBIN TOTAL: 1.6
EXT BUN: 29
EXT CALCIUM: 9.5
EXT CHLORIDE: 106
EXT CMV DNA QUANT. BY PCR: NOT DETECTED
EXT CO2: 23
EXT CREATININE: 1.59 MG/DL
EXT EOSINOPHIL%: 4
EXT GFR MDRD NON AF AMER: 53
EXT GLUCOSE: 202
EXT HEMATOCRIT: 31.7
EXT HEMOGLOBIN: 10.1
EXT LYMPH%: 22
EXT MONOCYTES%: 7
EXT PLATELETS: 85
EXT POTASSIUM: 4.9
EXT PROTEIN TOTAL: 6.7
EXT SEGS%: 56
EXT SODIUM: 138 MMOL/L
EXT TACROLIMUS LVL: 5
EXT WBC: 2.1
HCV AB SERPL QL IA: REACTIVE

## 2023-03-06 PROCEDURE — 99213 PR OFFICE/OUTPT VISIT, EST, LEVL III, 20-29 MIN: ICD-10-PCS | Mod: 24,S$GLB,, | Performed by: INTERNAL MEDICINE

## 2023-03-06 PROCEDURE — 36415 COLL VENOUS BLD VENIPUNCTURE: CPT | Performed by: SURGERY

## 2023-03-06 PROCEDURE — 87522 HEPATITIS C REVRS TRNSCRPJ: CPT | Performed by: SURGERY

## 2023-03-06 PROCEDURE — 99999 PR PBB SHADOW E&M-EST. PATIENT-LVL V: CPT | Mod: PBBFAC,,, | Performed by: INTERNAL MEDICINE

## 2023-03-06 PROCEDURE — 99213 OFFICE O/P EST LOW 20 MIN: CPT | Mod: 24,S$GLB,, | Performed by: INTERNAL MEDICINE

## 2023-03-06 PROCEDURE — 99999 PR PBB SHADOW E&M-EST. PATIENT-LVL V: ICD-10-PCS | Mod: PBBFAC,,, | Performed by: INTERNAL MEDICINE

## 2023-03-06 PROCEDURE — 86803 HEPATITIS C AB TEST: CPT | Performed by: SURGERY

## 2023-03-06 RX ORDER — VALGANCICLOVIR 450 MG/1
450 TABLET, FILM COATED ORAL DAILY
Qty: 30 TABLET | Refills: 5 | Status: ON HOLD | OUTPATIENT
Start: 2023-03-06 | End: 2023-05-21 | Stop reason: SDUPTHER

## 2023-03-06 RX ORDER — SODIUM BICARBONATE 650 MG/1
1300 TABLET ORAL 2 TIMES DAILY
Qty: 120 TABLET | Refills: 11 | Status: ON HOLD | OUTPATIENT
Start: 2023-03-06 | End: 2023-05-21 | Stop reason: SDUPTHER

## 2023-03-06 NOTE — PROGRESS NOTES
Transplant Hepatology  Liver Transplant Recipient Follow-up    Transplant Date: 1/5/2023  UNOS Native Liver Dx: Cirrhosis: Other, Specify - granulomatous hepatitis    Rosalio is here for follow up of his liver transplant.    ORGAN: LIVER  Whole or Partial: whole liver  Donor Type: donation after brain death  [facility] High Risk: yes  Donor CMV Status: Positive  Donor HCV Status: Positive  Donor HBcAb: Negative  Donor HBV ASAF: Negative  Donor HCV ASAF: Negative  Biliary Anastomosis: end to end  Arterial Anatomy: replaced right hepatic from sma  IVC reconstruction: end to end ivc  Portal vein status: patent    He has had the following complications since transplant: none.   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  He is done well posttransplant and enjoys excellent allograft function on tacrolimus and CellCept.  He has a very mildly elevated bilirubin of 1.6 but a normal alkaline phosphate is and normal serum transaminases.  Continues to get strength.  He has returned to work.  He is no symptoms of hepatic decompensation.    Review of Systems   Constitutional:  Negative for activity change, appetite change, chills, fatigue and unexpected weight change.   HENT:  Negative for congestion, facial swelling and tinnitus.    Eyes:  Negative for visual disturbance.   Respiratory:  Negative for cough, shortness of breath and wheezing.    Cardiovascular:  Negative for chest pain and palpitations.   Gastrointestinal:  Negative for abdominal distention.   Genitourinary:  Negative for dysuria.   Musculoskeletal:  Negative for arthralgias, joint swelling and myalgias.   Neurological:  Negative for syncope and headaches.   Hematological:  Does not bruise/bleed easily.   Psychiatric/Behavioral:  Negative for confusion.      Objective:     Physical Exam  Constitutional:       Appearance: He is well-developed.   Eyes:       General: No scleral icterus.  Cardiovascular:      Rate and Rhythm: Normal rate and regular rhythm.      Heart sounds: Normal heart sounds.   Pulmonary:      Effort: Pulmonary effort is normal. No respiratory distress.      Breath sounds: Normal breath sounds. No wheezing.   Abdominal:      General: Bowel sounds are normal. There is no distension.      Palpations: Abdomen is soft. There is no mass.      Tenderness: There is no abdominal tenderness. There is no rebound.       Musculoskeletal:         General: Normal range of motion.   Lymphadenopathy:      Cervical: No cervical adenopathy.   Skin:     General: Skin is warm and dry.   Neurological:      Mental Status: He is alert and oriented to person, place, and time.     Lab Results   Component Value Date    BILITOT 1.6 (H) 01/23/2023    AST 15 01/23/2023    ALT 13 01/23/2023    ALKPHOS 74 01/23/2023    CREATININE 1.2 01/23/2023    ALBUMIN 4.4 01/23/2023     Lab Results   Component Value Date    WBC 2.1 03/06/2023    WBC 2.1 (L) 03/06/2023    WBC 7.56 01/23/2023    HGB 10.1 (L) 03/06/2023    HGB 11.9 (L) 01/23/2023    HCT 31.7 (L) 03/06/2023    HCT 36.8 (L) 01/23/2023    HCT 33 (L) 01/05/2023    PLT 85 (L) 03/06/2023    PLT 99 (L) 01/23/2023     Lab Results   Component Value Date    TACROLIMUS 5.1 01/23/2023       Assessment/Plan:     1. S/P liver transplant    2. Prophylactic immunotherapy    3. Liver transplanted        Patient advised that it is recommended that all transplant candidates, and their close contacts and household members receive Covid vaccination.    He will continue his immunosuppression as prescribed.  I think we can stretch out his labs to every 2 weeks.  He will return to clinic in 3 months time.    Ranjith Aiken MD           [facility] Patient Status  Functional Status: 80% - Normal activity with effort: some symptoms of disease  Physical Capacity: No Limitations  Working for income: yes  New diabetes onset between last follow-up to the current  follow-up: No  Did patient have any acute rejection episodes during the follow-up period: No  Post transplant malignancy: No

## 2023-03-06 NOTE — LETTER
March 6, 2023        Alan Chinchilla  [address] [place] [address]  Phone: [phone]  Fax: [phone]             [facility] 1st Fl  [address]  [facility] [address]  Phone: [phone]   Patient: Rosalio Mccarty   MR Number: [id number]   YOB: 1975   Date of Visit: 3/6/2023       Dear Dr. Alan Chinchilla    Thank you for referring Rosalio Mccarty to me for evaluation. Attached you will find relevant portions of my assessment and plan of care.    If you have questions, please do not hesitate to call me. I look forward to following Rosalio Mccarty along with you.    Sincerely,    Ranjith Aiken MD    Enclosure    If you would like to receive this communication electronically, please contact [email or web address] or [phone] to request [facility] [facility] access.    [employer] [facility] is a tool which provides read-only access to select patient information with whom you have a relationship. Its easy to use and provides real time access to review your patients record including encounter summaries, notes, results, and demographic information.    If you feel you have received this communication in error or would no longer like to receive these types of communications, please e-mail [email or web address]

## 2023-03-07 LAB
HCV RNA SERPL QL NAA+PROBE: NOT DETECTED
HCV RNA SPEC NAA+PROBE-ACNC: NOT DETECTED IU/ML

## 2023-03-14 ENCOUNTER — PATIENT MESSAGE (OUTPATIENT)
Dept: TRANSPLANT | Facility: CLINIC | Age: 48
End: 2023-03-14

## 2023-03-16 ENCOUNTER — PATIENT MESSAGE (OUTPATIENT)
Dept: TRANSPLANT | Facility: CLINIC | Age: 48
End: 2023-03-16

## 2023-03-16 ENCOUNTER — TELEPHONE (OUTPATIENT)
Dept: TRANSPLANT | Facility: CLINIC | Age: 48
End: 2023-03-16

## 2023-03-16 LAB
EXT ALBUMIN: 4.5
EXT ALKALINE PHOSPHATASE: 64
EXT ALT: 9
EXT AST: 15
EXT BASOPHIL%: 1.4
EXT BILIRUBIN TOTAL: 2.2
EXT BUN: 30
EXT CALCIUM: 9.6
EXT CHLORIDE: 109
EXT CO2: 24
EXT CREATININE: 1.67 MG/DL
EXT EOSINOPHIL%: 5
EXT GFR MDRD NON AF AMER: 50
EXT GLUCOSE: 169
EXT HBV DNA QUANT PCR: NOT DETECTED
EXT HCV QUANT: NOT DETECTED
EXT HEMATOCRIT: 34.1
EXT HEMOGLOBIN: 10.8
EXT HIV RNA QUANT PCR: NOT DETECTED
EXT LYMPH%: 15.7
EXT MONOCYTES%: 11
EXT PLATELETS: 80
EXT POTASSIUM: 4.8
EXT PROTEIN TOTAL: 7.2
EXT SEGS%: 61.9
EXT SODIUM: 138 MMOL/L
EXT TACROLIMUS LVL: 6.4
EXT WBC: 2.8

## 2023-03-16 NOTE — TELEPHONE ENCOUNTER
Portal message sent, repeat labs 3/20/23----- Message from Ranjith Aiken MD sent at 3/16/2023  3:18 PM CDT -----  Results reviewed. No action.

## 2023-03-20 ENCOUNTER — TELEPHONE (OUTPATIENT)
Dept: TRANSPLANT | Facility: CLINIC | Age: 48
End: 2023-03-20

## 2023-03-20 LAB
EXT ALBUMIN: 4.6
EXT ALKALINE PHOSPHATASE: 60
EXT ALT: 10
EXT AST: 14
EXT BASOPHIL%: 1.8
EXT BILIRUBIN TOTAL: 2
EXT BUN: 24
EXT CALCIUM: 9.6
EXT CHLORIDE: 105
EXT CO2: 27
EXT CREATININE: 1.61 MG/DL
EXT EGFR NO RACE VARIABLE: 52
EXT EOSINOPHIL%: 4.8
EXT GLUCOSE: 160
EXT HEMATOCRIT: 32.5
EXT HEMOGLOBIN: 10.4
EXT LYMPH%: 13.2
EXT MONOCYTES%: 14.3
EXT PLATELETS: 70
EXT POTASSIUM: 5.7
EXT PROTEIN TOTAL: 6.6
EXT SEGS%: 64.8
EXT SODIUM: 139 MMOL/L
EXT TACROLIMUS LVL: 9.5
EXT WBC: 2.7

## 2023-03-20 NOTE — TELEPHONE ENCOUNTER
Called patient, potassium 5.7 he still has the sodium polystrene at home.  He will take 30 grams and repeat potassium lab draw tomorrow.

## 2023-03-21 LAB — EXT POTASSIUM: 5.1

## 2023-03-23 ENCOUNTER — TELEPHONE (OUTPATIENT)
Dept: TRANSPLANT | Facility: CLINIC | Age: 48
End: 2023-03-23

## 2023-03-23 NOTE — TELEPHONE ENCOUNTER
Portal message sent, repeat labs 4/3/23----- Message from Ranjith Aiken MD sent at 3/23/2023 12:34 PM CDT -----  Results reviewed. No action.

## 2023-04-04 LAB
EXT ALBUMIN: 4.5
EXT ALKALINE PHOSPHATASE: 56
EXT ALT: 13
EXT AST: 19
EXT BASOPHIL%: 2.2
EXT BILIRUBIN TOTAL: 2.7
EXT BUN: 29
EXT CALCIUM: 9.6
EXT CHLORIDE: 105
EXT CO2: 23
EXT CREATININE: 1.68 MG/DL
EXT EGFR NO RACE VARIABLE: 50
EXT EOSINOPHIL%: 8
EXT GLUCOSE: 208
EXT HCV AB: REACTIVE
EXT HCV QUANT: NOT DETECTED
EXT HEMATOCRIT: 31.7
EXT HEMOGLOBIN: 10.2
EXT LYMPH%: 13.7
EXT MONOCYTES%: 17.3
EXT PLATELETS: 57
EXT POTASSIUM: 4.9
EXT PROTEIN TOTAL: 6.9
EXT SEGS%: 57.9
EXT SODIUM: 137 MMOL/L
EXT TACROLIMUS LVL: 7.5
EXT WBC: 2.3

## 2023-04-07 ENCOUNTER — PATIENT MESSAGE (OUTPATIENT)
Dept: TRANSPLANT | Facility: CLINIC | Age: 48
End: 2023-04-07

## 2023-04-10 ENCOUNTER — PATIENT MESSAGE (OUTPATIENT)
Dept: TRANSPLANT | Facility: CLINIC | Age: 48
End: 2023-04-10

## 2023-04-10 ENCOUNTER — TELEPHONE (OUTPATIENT)
Dept: TRANSPLANT | Facility: CLINIC | Age: 48
End: 2023-04-10

## 2023-04-10 NOTE — LETTER
"LAB ORDERS    4/10/2023- 23      ORDERING MD:    Dr. Ranjith Aiken MD, PhD            Patient Name: Rosalio Mccarty               : 1975    [facility] Number: [id number]                  #:        Please draw the following labs:     Test Frequency  Diagnosis/ICD-10 Code     CBC/DIFF/PLT Every 2 weeks  Z94.4 Liver Transplant       Complete Metabolic Panel Every 2 weeks  Z94.4 Liver Transplant         Prograf level Every 2 weeks  Z94.4 Liver Transplant     Vitamin D -25 once  Z94.4 Liver Transplant          FAX RESULTS [phone] "[facility] Liver Transplant Coordinator", and send the hard copy when completed. If you have any questions regarding this request or need additional information, please call [phone].    [facility] fax [phone]    "

## 2023-04-10 NOTE — TELEPHONE ENCOUNTER
Portal message sent, repeat labs 4/17/23.  Hydrate----- Message from Ranjith Aiken MD sent at 4/9/2023 11:44 AM CDT -----  Results reviewed. No action.

## 2023-04-17 LAB
EXT ALBUMIN: 4.1
EXT ALKALINE PHOSPHATASE: 58
EXT ALT: 39
EXT AST: 28
EXT BASOPHIL%: 1
EXT BILIRUBIN TOTAL: 2.3
EXT BUN: 22
EXT CALCIUM: 9.2
EXT CHLORIDE: 105
EXT CO2: 24
EXT CREATININE: 1.52 MG/DL
EXT EGFR NO RACE VARIABLE: 56
EXT EOSINOPHIL%: 8.5
EXT GLUCOSE: 228
EXT HCV QUANT: NOT DETECTED
EXT HEMATOCRIT: 31.2
EXT HEMOGLOBIN: 9.9
EXT LYMPH%: 35.3
EXT MONOCYTES%: 14.9
EXT PLATELETS: 66
EXT POTASSIUM: 4.7
EXT PROTEIN TOTAL: 6.3
EXT SEGS%: 38.9
EXT SODIUM: 136 MMOL/L
EXT TACROLIMUS LVL: 7.7
EXT WBC: 3

## 2023-04-21 ENCOUNTER — PATIENT MESSAGE (OUTPATIENT)
Dept: TRANSPLANT | Facility: CLINIC | Age: 48
End: 2023-04-21

## 2023-04-21 ENCOUNTER — TELEPHONE (OUTPATIENT)
Dept: TRANSPLANT | Facility: CLINIC | Age: 48
End: 2023-04-21

## 2023-04-21 NOTE — TELEPHONE ENCOUNTER
Portal message sent, repeat labs 5/15/23----- Message from Ranjith Aiken MD sent at 4/21/2023  8:28 AM CDT -----  Results reviewed. No action.

## 2023-04-21 NOTE — LETTER
"LAB ORDERS    2023      ORDERING MD:    Dr. Ranjith Aiken MD, PhD            Patient Name: Rosalio Mccarty               : 1975    [facility] Number: [id number]                  #:        Please draw the following labs:     Test Frequency  Diagnosis/ICD-10 Code     CBC/DIFF/PLT Every 2 weeks  Z94.4 Liver Transplant       Complete Metabolic Panel Every 2 weeks  Z94.4 Liver Transplant         Prograf level Every 2 weeks   Z94.4 Liver Transplant              FAX RESULTS [phone] "[facility] Liver Transplant Coordinator", and send the hard copy when completed. If you have any questions regarding this request or need additional information, please call [phone].    [place] [phone]    "

## 2023-05-10 ENCOUNTER — HOSPITAL ENCOUNTER (OUTPATIENT)
Dept: RADIOLOGY | Facility: HOSPITAL | Age: 48
Discharge: HOME | End: 2023-05-10
Attending: INTERNAL MEDICINE
Payer: COMMERCIAL

## 2023-05-10 DIAGNOSIS — Z94.4 LIVER REPLACED BY TRANSPLANT: ICD-10-CM

## 2023-05-10 PROCEDURE — 93976 VASCULAR STUDY: CPT | Mod: TC

## 2023-05-10 PROCEDURE — 76705 ECHO EXAM OF ABDOMEN: CPT | Mod: 26,XS,, | Performed by: RADIOLOGY

## 2023-05-10 PROCEDURE — 93976 VASCULAR STUDY: CPT | Mod: 26,,, | Performed by: RADIOLOGY

## 2023-05-10 PROCEDURE — 93976 US DOPPLER LIVER TRANSPLANT POST (XPD): ICD-10-PCS | Mod: 26,,, | Performed by: RADIOLOGY

## 2023-05-10 PROCEDURE — 76705 US DOPPLER LIVER TRANSPLANT POST (XPD): ICD-10-PCS | Mod: 26,XS,, | Performed by: RADIOLOGY

## 2023-05-15 ENCOUNTER — PATIENT MESSAGE (OUTPATIENT)
Dept: TRANSPLANT | Facility: CLINIC | Age: 48
End: 2023-05-15

## 2023-05-15 DIAGNOSIS — Z94.4 LIVER REPLACED BY TRANSPLANT: Primary | ICD-10-CM

## 2023-05-15 LAB
EXT ALBUMIN: 3.9
EXT ALKALINE PHOSPHATASE: 113
EXT ALT: 444
EXT AST: 233
EXT BASOPHIL%: 0.9
EXT BILIRUBIN TOTAL: 1.4
EXT BUN: 33
EXT CALCIUM: 9.2
EXT CHLORIDE: 104
EXT CO2: 23
EXT CREATININE: 1.62 MG/DL
EXT EOSINOPHIL%: 1.5
EXT GFR MDRD NON AF AMER: 52
EXT GLUCOSE: 172
EXT HEMATOCRIT: 32.3
EXT HEMOGLOBIN: 10
EXT LYMPH%: 60.6
EXT MONOCYTES%: 11.1
EXT PLATELETS: 71
EXT POTASSIUM: 5.1
EXT PROTEIN TOTAL: 5.8
EXT SEGS%: 25.3
EXT SODIUM: 135 MMOL/L
EXT TACROLIMUS LVL: 6.7
EXT WBC: 3.3

## 2023-05-16 ENCOUNTER — TELEPHONE (OUTPATIENT)
Dept: TRANSPLANT | Facility: CLINIC | Age: 48
End: 2023-05-16

## 2023-05-16 ENCOUNTER — PATIENT MESSAGE (OUTPATIENT)
Dept: TRANSPLANT | Facility: CLINIC | Age: 48
End: 2023-05-16

## 2023-05-16 ENCOUNTER — TELEPHONE (OUTPATIENT)
Dept: INTERVENTIONAL RADIOLOGY/VASCULAR | Facility: CLINIC | Age: 48
End: 2023-05-16

## 2023-05-16 NOTE — LETTER
"LAB ORDERS    2023      ORDERING MD:    Dr. Ranjith Aiken MD, PhD            Patient Name: Rosalio Mccarty               : 1975    [facility] Number: [id number]                  #:        Please draw the following labs:     Test Frequency  Diagnosis/ICD-10 Code     CBC/DIFF/PLT once  Z94.4 Liver Transplant       Complete Metabolic Panel once  Z94.4 Liver Transplant         Prograf level once  Z94.4 Liver Transplant     PT/ INR once  Z94.4 Liver Transplant      HCV PCR Quantitative Once  Z94.4 Liver Transplant         FAX RESULTS [phone] "[facility] Liver Transplant Coordinator", and send the hard copy when completed. If you have any questions regarding this request or need additional information, please call [phone].        "

## 2023-05-16 NOTE — TELEPHONE ENCOUNTER
Advised patient to repeat labs this week.  Biopsy is being set up.  LIver US last week reviewed----- Message from Ranjith Aiken MD sent at 5/16/2023  7:37 AM CDT -----  Results reviewed. Biopsy planned. Labs twice weekly.

## 2023-05-16 NOTE — TELEPHONE ENCOUNTER
Spoke to pt on phone,  Pt is scheduled on 5/18/2023 for IR procedure at  location.  Preop instructions given (NPO after midnight, MUST have a ride home, Nurse will call 1-2  days before to go over instructions and medications),  pt verbally understood. Pt aware and confirmed, Thanks

## 2023-05-17 ENCOUNTER — PATIENT MESSAGE (OUTPATIENT)
Dept: TRANSPLANT | Facility: CLINIC | Age: 48
End: 2023-05-17

## 2023-05-17 ENCOUNTER — TELEPHONE (OUTPATIENT)
Dept: TRANSPLANT | Facility: CLINIC | Age: 48
End: 2023-05-17

## 2023-05-17 ENCOUNTER — TELEPHONE (OUTPATIENT)
Dept: INTERVENTIONAL RADIOLOGY/VASCULAR | Facility: HOSPITAL | Age: 48
End: 2023-05-17

## 2023-05-17 ENCOUNTER — PATIENT MESSAGE (OUTPATIENT)
Dept: INTERVENTIONAL RADIOLOGY/VASCULAR | Facility: HOSPITAL | Age: 48
End: 2023-05-17

## 2023-05-17 NOTE — TELEPHONE ENCOUNTER
Portal message sent, liver biopsy 5/18/23----- Message from Aldo Hdz Jr., MD sent at 5/17/2023  3:58 PM CDT -----  I think it looks ok.     ----- Message -----  From: Ayala Pedraza RN  Sent: 5/16/2023   9:31 AM CDT  To: Christian Alejandre MD, Aldo Hdz Jr., MD      ----- Message -----  From: Ranjith Aiken MD  Sent: 5/11/2023   2:37 PM CDT  To: [facility] Post-Liver Transplant Clinical    Results reviewed. Can your review with the surgeon who is reviewing? Thanks

## 2023-05-18 ENCOUNTER — PATIENT MESSAGE (OUTPATIENT)
Dept: TRANSPLANT | Facility: CLINIC | Age: 48
End: 2023-05-18

## 2023-05-18 ENCOUNTER — TELEPHONE (OUTPATIENT)
Dept: TRANSPLANT | Facility: CLINIC | Age: 48
End: 2023-05-18

## 2023-05-18 ENCOUNTER — HOSPITAL ENCOUNTER (OUTPATIENT)
Dept: INTERVENTIONAL RADIOLOGY/VASCULAR | Facility: HOSPITAL | Age: 48
Discharge: HOME | End: 2023-05-18
Attending: INTERNAL MEDICINE
Payer: COMMERCIAL

## 2023-05-18 VITALS
BODY MASS INDEX: 33.33 KG/M2 | HEART RATE: 79 BPM | DIASTOLIC BLOOD PRESSURE: 70 MMHG | WEIGHT: 225 LBS | OXYGEN SATURATION: 100 % | HEIGHT: 69 IN | TEMPERATURE: 98 F | RESPIRATION RATE: 18 BRPM | SYSTOLIC BLOOD PRESSURE: 107 MMHG

## 2023-05-18 DIAGNOSIS — Z94.4 LIVER REPLACED BY TRANSPLANT: Primary | ICD-10-CM

## 2023-05-18 LAB — POCT GLUCOSE: 132 MG/DL (ref 70–110)

## 2023-05-18 PROCEDURE — 88313 SPECIAL STAINS GROUP 2: CPT | Performed by: PATHOLOGY

## 2023-05-18 PROCEDURE — 88312 SPECIAL STAINS GROUP 1: CPT | Mod: 26,,, | Performed by: PATHOLOGY

## 2023-05-18 PROCEDURE — 88365 PR  TISSUE HYBRIDIZATION: ICD-10-PCS | Mod: 26,,, | Performed by: PATHOLOGY

## 2023-05-18 PROCEDURE — 63600175 PHARM REV CODE 636 W HCPCS: Performed by: RADIOLOGY

## 2023-05-18 PROCEDURE — 82962 GLUCOSE BLOOD TEST: CPT

## 2023-05-18 PROCEDURE — 88307 PR  SURG PATH,LEVEL V: ICD-10-PCS | Mod: 26,,, | Performed by: PATHOLOGY

## 2023-05-18 PROCEDURE — 88342 CHG IMMUNOCYTOCHEMISTRY: ICD-10-PCS | Mod: 26,,, | Performed by: PATHOLOGY

## 2023-05-18 PROCEDURE — 88342 IMHCHEM/IMCYTCHM 1ST ANTB: CPT | Mod: 26,,, | Performed by: PATHOLOGY

## 2023-05-18 PROCEDURE — 88313 PR  SPECIAL STAINS,GROUP II: ICD-10-PCS | Mod: 26,,, | Performed by: PATHOLOGY

## 2023-05-18 PROCEDURE — 88307 TISSUE EXAM BY PATHOLOGIST: CPT | Mod: 26,,, | Performed by: PATHOLOGY

## 2023-05-18 PROCEDURE — 25000003 PHARM REV CODE 250: Performed by: RADIOLOGY

## 2023-05-18 PROCEDURE — 88342 IMHCHEM/IMCYTCHM 1ST ANTB: CPT | Performed by: PATHOLOGY

## 2023-05-18 PROCEDURE — 88312 SPECIAL STAINS GROUP 1: CPT | Performed by: PATHOLOGY

## 2023-05-18 PROCEDURE — 88313 SPECIAL STAINS GROUP 2: CPT | Mod: 26,,, | Performed by: PATHOLOGY

## 2023-05-18 PROCEDURE — 88365 INSITU HYBRIDIZATION (FISH): CPT | Performed by: PATHOLOGY

## 2023-05-18 PROCEDURE — 88307 TISSUE EXAM BY PATHOLOGIST: CPT | Performed by: PATHOLOGY

## 2023-05-18 PROCEDURE — 88365 INSITU HYBRIDIZATION (FISH): CPT | Mod: 26,,, | Performed by: PATHOLOGY

## 2023-05-18 PROCEDURE — 88312 PR  SPECIAL STAINS,GROUP I: ICD-10-PCS | Mod: 26,,, | Performed by: PATHOLOGY

## 2023-05-18 RX ORDER — MIDAZOLAM HYDROCHLORIDE 1 MG/ML
INJECTION INTRAMUSCULAR; INTRAVENOUS
Status: COMPLETED | OUTPATIENT
Start: 2023-05-18 | End: 2023-05-18

## 2023-05-18 RX ORDER — LIDOCAINE HYDROCHLORIDE 10 MG/ML
INJECTION INFILTRATION; PERINEURAL
Status: COMPLETED | OUTPATIENT
Start: 2023-05-18 | End: 2023-05-18

## 2023-05-18 RX ORDER — SODIUM CHLORIDE 9 MG/ML
75 INJECTION, SOLUTION INTRAVENOUS CONTINUOUS
Status: DISCONTINUED | OUTPATIENT
Start: 2023-05-18 | End: 2023-05-19 | Stop reason: HOSPADM

## 2023-05-18 RX ORDER — FENTANYL CITRATE 50 UG/ML
INJECTION, SOLUTION INTRAMUSCULAR; INTRAVENOUS
Status: COMPLETED | OUTPATIENT
Start: 2023-05-18 | End: 2023-05-18

## 2023-05-18 RX ORDER — LIDOCAINE HYDROCHLORIDE 10 MG/ML
1 INJECTION, SOLUTION EPIDURAL; INFILTRATION; INTRACAUDAL; PERINEURAL ONCE
Status: DISCONTINUED | OUTPATIENT
Start: 2023-05-18 | End: 2023-05-19 | Stop reason: HOSPADM

## 2023-05-18 RX ADMIN — FENTANYL CITRATE 50 MCG: 50 INJECTION, SOLUTION INTRAMUSCULAR; INTRAVENOUS at 11:05

## 2023-05-18 RX ADMIN — LIDOCAINE HYDROCHLORIDE 3 ML: 10 INJECTION, SOLUTION INFILTRATION; PERINEURAL at 11:05

## 2023-05-18 RX ADMIN — FENTANYL CITRATE 25 MCG: 50 INJECTION, SOLUTION INTRAMUSCULAR; INTRAVENOUS at 11:05

## 2023-05-18 RX ADMIN — MIDAZOLAM HYDROCHLORIDE 0.5 MG: 1 INJECTION INTRAMUSCULAR; INTRAVENOUS at 11:05

## 2023-05-18 RX ADMIN — FENTANYL CITRATE 50 MCG: 50 INJECTION, SOLUTION INTRAMUSCULAR; INTRAVENOUS at 10:05

## 2023-05-18 RX ADMIN — MIDAZOLAM HYDROCHLORIDE 1.5 MG: 1 INJECTION INTRAMUSCULAR; INTRAVENOUS at 10:05

## 2023-05-18 RX ADMIN — GELATIN ABSORBABLE SPONGE 12-7 MM 1 EACH: 12-7 MISC at 11:05

## 2023-05-18 NOTE — PROCEDURES
Radiology Post-Procedure Note    Pre Op Diagnosis: Post transplant  Post Op Diagnosis: Same    Procedure: US-guided random liver biopsy    Procedure performed by: Cecilio Hyman MD    Written Informed Consent Obtained: Yes  Specimen Removed: YES 3 18g core biopsies  Estimated Blood Loss: Minimal    Findings:   Unremarkable sonographic appearance of the liver.     Patient tolerated procedure well.    Cecilio Hyman MD  Interventional Radiology  Department of Radiology

## 2023-05-18 NOTE — TELEPHONE ENCOUNTER
IR Liver Pathology Conference Note    Patient:  Rosalio Mccarty  MRN:   [id number]  YOB: 1975  Date of Transplant:  1/5/23  Native Diagnosis: Cirrhosis: Other, Specify - granulomatous hepatitis    Discussion/Plan:    Presenter: Hepatologist - Ranjith Aiken MD    Reason for presenting: elevated liver function, The prograf 27.7 is not real, he took his med prior to lab.  Will repeat Prograf 5/19/23.      Concerns for Pathologists:     Lab Results  WBC   Date Value   05/18/2023 4.05 K/uL   05/15/2023 3.3 K/UL (L)   04/17/2023 3.0 K/UL (L)   04/03/2023 2.3 K/UL (L)   01/23/2023 7.56 K/uL   01/17/2023 9.44 K/uL     PLT   Date Value   05/18/2023 73 K/uL (L)   05/15/2023 71 K/UL (L)   04/17/2023 66 K/UL (L)   04/03/2023 57 K/UL (L)   01/23/2023 99 K/uL (L)   01/17/2023 59 K/uL (L)     TACROLIMUS (ng/mL)   Date Value   05/18/2023 27.7 (H)   01/23/2023 5.1   01/17/2023 5.1     INR (no units)   Date Value   05/18/2023 1.2   01/09/2023 1.2   01/08/2023 1.3 (H)     AST (U/L)   Date Value   05/18/2023 261 (H)     ALT (U/L)   Date Value   05/18/2023 579 (H)   01/23/2023 13   01/17/2023 18     BILITOT (mg/dL)   Date Value   05/18/2023 1.5 (H)   01/23/2023 1.6 (H)   01/17/2023 1.3 (H)     ALKPHOS (U/L)   Date Value   05/18/2023 140 (H)   01/23/2023 74   01/17/2023 101     CREATININE (mg/dL)   Date Value   05/18/2023 1.7 (H)   01/23/2023 1.2   01/17/2023 1.3     AFP (ng/mL)   Date Value   12/08/2022 2.9   11/17/2022 3.0     CMVIGGINTERP (no units)   Date Value   01/07/2023 Reactive (A)   01/04/2023 Non-Reactive

## 2023-05-18 NOTE — PLAN OF CARE
Patient arrived to room. PIV placed. Admit assessment completed. Plan of care discussed with patient.

## 2023-05-18 NOTE — CARE UPDATE
Pt fully recovered and states full understanding of discharge. Pt left with escort and wife for car.

## 2023-05-18 NOTE — PLAN OF CARE
Pt arrived to IR Room 189 for random liver biopsy. Pt oriented to unit and staff. Plan of care reviewed with patient, patient verbalizes understanding. Comfort measures utilized. Blankets applied. Pt prepped and draped utilizing standard sterile technique. Patient placed on continuous monitoring, as required by sedation policy. Timeouts completed utilizing standard universal time-out, per department and facility policy. RN to remain at bedside, continuous monitoring maintained. Pt resting comfortably. Denies pain/discomfort. Will continue to monitor. See flow sheets for monitoring, medication administration, and updates.

## 2023-05-18 NOTE — H&P
Radiology History & Physical      SUBJECTIVE:     History of Present Illness:  Rosalio Mccarty is a 48 y.o. male with hx of liver tx in 04/05/23 who presents for imaging guided percutaneous random liver biopsy.    Past Medical History:   Diagnosis Date    C. difficile diarrhea     Cirrhosis of liver with ascites 11/17/2022    Diabetes mellitus, type II, insulin dependent     Granulomatous colitis     Hepatic granuloma associated with sarcoidosis     Hepatic granuloma associated with sarcoidosis 11/17/2022    Hepatic granuloma associated with sarcoidosis 11/17/2022    Personal history of colonic polyps     Pre-op evaluation 1/4/2023    Sarcoidosis, unspecified     Thrombocytopenia, unspecified     Unspecified cirrhosis of liver      Past Surgical History:   Procedure Laterality Date    ADENOIDECTOMY      CARDIAC CATHETERIZATION Right     COLONOSCOPY  07/11/2018    HIP SURGERY      RIght removed ruptured cyst    LIVER TRANSPLANT N/A 1/5/2023    Procedure: TRANSPLANT, LIVER;  Surgeon: Rosa Foreman MD;  Location: [facility] OR [address] [place] [address];  Service: Transplant;  Laterality: N/A;    UPPER GASTROINTESTINAL ENDOSCOPY  10/09/2019       Home Meds:   Prior to Admission medications    Medication Sig Start Date End Date Taking? Authorizing Provider   ARIPiprazole (ABILIFY) 2 MG Tab Take 1 tablet (2 mg total) by mouth once daily. 1/11/23 1/11/24 Yes Rosa Foreman MD   calcium carbonate (OS-ORTIZ) 500 mg calcium (1,250 mg) tablet Take 1 tablet (500 mg total) by mouth once daily. 2/13/23 2/13/24 Yes Christian Alejandre MD   dapsone 100 MG Tab Take 1 tablet (100 mg total) by mouth once daily. 2/4/23 7/6/23 Yes Christian Alejandre MD   empagliflozin (JARDIANCE) 25 mg tablet Take 1 tablet (25 mg total) by mouth once daily. 1/11/23  Yes Rosa Foreman MD   EScitalopram oxalate (LEXAPRO) 20 MG tablet Take 1 tablet (20 mg total) by mouth once daily. 1/11/23  Yes Rosa Foreman MD   hydrOXYchloroQUINE (PLAQUENIL) 200 mg tablet Take 200 mg by mouth 2  (two) times daily. 10/31/22  Yes Historical Provider   insulin degludec (TRESIBA FLEXTOUCH U-100) 100 unit/mL (3 mL) insulin pen Inject 45 Units into the skin once daily. 1/11/23 1/11/24 Yes Rosa Foreman MD   insulin lispro (HUMALOG KWIKPEN INSULIN) 100 unit/mL pen Inject 15 Units into the skin 3 (three) times daily before meals. Plus sliding scale insulin. Max insulin per day: 75 units 1/11/23 1/11/24 Yes Rosa Foreman MD   multivitamin (THERAGRAN) per tablet Take 1 tablet by mouth once daily.   Yes Historical Provider   mycophenolate (CELLCEPT) 250 mg Cap Take 2 capsules (500 mg total) by mouth 2 (two) times daily. 2/20/23  Yes Shane Davis MD   pantoprazole (PROTONIX) 40 MG tablet Take 1 tablet (40 mg total) by mouth once daily. 1/11/23 1/11/24 Yes Rosa Foreman MD   sodium bicarbonate 650 MG tablet Take 2 tablets (1,300 mg total) by mouth 2 (two) times daily. 3/6/23 3/5/24 Yes Ranjith Aiken MD   tacrolimus (PROGRAF) 1 MG Cap Take 4 capsules (4 mg total) by mouth every 12 (twelve) hours. 2/3/23  Yes Christian Alejandre MD   traZODone (DESYREL) 150 MG tablet Take 1 tablet (150 mg total) by mouth every evening. 1/11/23 1/11/24 Yes Rosa Foreman MD   aspirin (ECOTRIN) 81 MG EC tablet Take 1 tablet (81 mg total) by mouth once daily. 2/13/23 2/13/24  Christian Alejandre MD   blood sugar diagnostic Strp 1 strip by Misc.(Non-Drug; Combo Route) route 4 (four) times daily. 1/11/23   Rosa Foreman MD   blood-glucose meter Misc Use as instructed 1/11/23   Rosa Foreman MD   FREESTYLE LUZMA 14 DAY SENSOR Kit Apply topically. 10/31/22   Historical Provider   HUMIRA PEN PnKt injection Inject into the skin. 11/9/22   Historical Provider   lancets 33 gauge Misc by Misc.(Non-Drug; Combo Route) route 4 (four) times daily. 1/11/23   Rosa Foreman MD   oxyCODONE (ROXICODONE) 10 mg Tab immediate release tablet Take 1 tablet (10 mg total) by mouth every 8 (eight) hours as needed for Pain. 1/17/23   Christian DAVIS  "MD [facility]   pen needle, diabetic 32 gauge x 5/32" Ndle 1 Dose by Misc.(Non-Drug; Combo Route) route 3 (three) times daily. 1/11/23   Rosa Foreman MD   semaglutide (OZEMPIC) 2 mg/dose (8 mg/3 mL) PnIj Inject 2 mg into the skin every 7 days. 1/11/23   Rosa Foreman MD   valGANciclovir (VALCYTE) 450 mg Tab Take 1 tablet (450 mg total) by mouth once daily. STOP 7/5/23 3/6/23   Ranjith Aiken MD       Allergies:   Review of patient's allergies indicates:   Allergen Reactions    Adhesive Other (See Comments)     Skin irritation  Paper tape okay to use    Pcn [penicillins] Rash     Happened as a baby       Sedation History:  no adverse reactions    Review of Systems:   Hematological: no known coagulopathies  Respiratory: no shortness of breath  Cardiovascular: no chest pain  Gastrointestinal: no abdominal pain  Genito-Urinary: no dysuria  Musculoskeletal: negative  Neurological: no TIA or stroke symptoms       OBJECTIVE:     Laboratory  Lab Results   Component Value Date    INR 1.2 05/18/2023       Lab Results   Component Value Date    WBC 4.05 05/18/2023    HGB 10.9 (L) 05/18/2023    HCT 36.1 (L) 05/18/2023    MCV 80 (L) 05/18/2023    PLT 73 (L) 05/18/2023      Lab Results   Component Value Date     (H) 05/18/2023     05/18/2023    K 5.7 (H) 05/18/2023     05/18/2023    CO2 21 (L) 05/18/2023    BUN 30 (H) 05/18/2023    CREATININE 1.7 (H) 05/18/2023    CALCIUM 9.2 05/18/2023    MG 1.7 01/11/2023     (H) 05/18/2023     (H) 05/18/2023    ALBUMIN 4.1 05/18/2023    BILITOT 1.5 (H) 05/18/2023    BILIDIR 0.6 (H) 05/18/2023       Vitals:  Temp: 98.6 °F (37 °C) (05/18/23 1010)  Pulse: 82 (05/18/23 1010)  Resp: 18 (05/18/23 1010)  BP: 129/75 (05/18/23 1010)  SpO2: 95 % (05/18/23 1010)     Physical Exam:  ASA: 3  Mallampati: 2    General: no acute distress  Mental Status: alert and oriented to person, place and time  HEENT: normocephalic, atraumatic  Chest: unlabored breathing  Heart: " regular heart rate  Abdomen: nondistended  Extremity: moves all extremities       Anticoagulants/Antiplatelets: aspirin 81mg (last dose Mon 05/15/23)    Sedation Plan: up to moderate    Plan: imaging guided percutaneous random liver allograft biopsy.    Sabrina Lagos MD  Radiology Department/ PGY-5  [facility]

## 2023-05-18 NOTE — PLAN OF CARE
Liver biopsy completed. Patient tolerated well; VSS. Site CDI. Specimen transported to pathology. Patient to be transported to [facility] for 2 hour recovery; report to be given at the bedside.

## 2023-05-19 ENCOUNTER — TELEPHONE (OUTPATIENT)
Dept: TRANSPLANT | Facility: CLINIC | Age: 48
End: 2023-05-19
Payer: COMMERCIAL

## 2023-05-19 ENCOUNTER — PATIENT MESSAGE (OUTPATIENT)
Dept: TRANSPLANT | Facility: CLINIC | Age: 48
End: 2023-05-19
Payer: COMMERCIAL

## 2023-05-19 ENCOUNTER — HOSPITAL ENCOUNTER (INPATIENT)
Facility: HOSPITAL | Age: 48
LOS: 2 days | Discharge: HOME | DRG: 443 | End: 2023-05-21
Attending: SURGERY | Admitting: SURGERY
Payer: COMMERCIAL

## 2023-05-19 DIAGNOSIS — Z94.4 S/P LIVER TRANSPLANT: ICD-10-CM

## 2023-05-19 DIAGNOSIS — D63.8 ANEMIA OF CHRONIC DISEASE: ICD-10-CM

## 2023-05-19 DIAGNOSIS — Z29.89 PROPHYLACTIC IMMUNOTHERAPY: ICD-10-CM

## 2023-05-19 DIAGNOSIS — Z94.4 LIVER TRANSPLANTED: ICD-10-CM

## 2023-05-19 DIAGNOSIS — Z91.89 AT RISK FOR OPPORTUNISTIC INFECTIONS: ICD-10-CM

## 2023-05-19 DIAGNOSIS — E11.65 TYPE 2 DIABETES MELLITUS WITH HYPERGLYCEMIA, UNSPECIFIED WHETHER LONG TERM INSULIN USE: ICD-10-CM

## 2023-05-19 DIAGNOSIS — Z79.60 LONG-TERM USE OF IMMUNOSUPPRESSANT MEDICATION: ICD-10-CM

## 2023-05-19 DIAGNOSIS — D69.6 THROMBOCYTOPENIA, UNSPECIFIED: ICD-10-CM

## 2023-05-19 DIAGNOSIS — T86.41 ACUTE REJECTION OF LIVER TRANSPLANT: Primary | ICD-10-CM

## 2023-05-19 LAB
POCT GLUCOSE: 257 MG/DL (ref 70–110)
SARS-COV-2 RDRP RESP QL NAA+PROBE: NEGATIVE

## 2023-05-19 PROCEDURE — 85025 COMPLETE CBC W/AUTO DIFF WBC: CPT | Performed by: PHYSICIAN ASSISTANT

## 2023-05-19 PROCEDURE — 99223 PR INITIAL HOSPITAL CARE,LEVL III: ICD-10-PCS | Mod: ,,, | Performed by: PHYSICIAN ASSISTANT

## 2023-05-19 PROCEDURE — 25000003 PHARM REV CODE 250: Performed by: PHYSICIAN ASSISTANT

## 2023-05-19 PROCEDURE — 63600175 PHARM REV CODE 636 W HCPCS: Performed by: PHYSICIAN ASSISTANT

## 2023-05-19 PROCEDURE — 99223 1ST HOSP IP/OBS HIGH 75: CPT | Mod: ,,, | Performed by: PHYSICIAN ASSISTANT

## 2023-05-19 PROCEDURE — 36415 COLL VENOUS BLD VENIPUNCTURE: CPT | Performed by: PHYSICIAN ASSISTANT

## 2023-05-19 PROCEDURE — 63600175 PHARM REV CODE 636 W HCPCS: Performed by: NURSE PRACTITIONER

## 2023-05-19 PROCEDURE — 80053 COMPREHEN METABOLIC PANEL: CPT | Performed by: PHYSICIAN ASSISTANT

## 2023-05-19 PROCEDURE — 83036 HEMOGLOBIN GLYCOSYLATED A1C: CPT | Performed by: PHYSICIAN ASSISTANT

## 2023-05-19 PROCEDURE — 20600001 HC STEP DOWN PRIVATE ROOM

## 2023-05-19 PROCEDURE — U0002 COVID-19 LAB TEST NON-CDC: HCPCS | Performed by: NURSE PRACTITIONER

## 2023-05-19 PROCEDURE — 25000003 PHARM REV CODE 250: Performed by: NURSE PRACTITIONER

## 2023-05-19 RX ORDER — TRAZODONE HYDROCHLORIDE 50 MG/1
150 TABLET ORAL NIGHTLY
Status: DISCONTINUED | OUTPATIENT
Start: 2023-05-19 | End: 2023-05-21 | Stop reason: HOSPADM

## 2023-05-19 RX ORDER — ARIPIPRAZOLE 2 MG/1
2 TABLET ORAL NIGHTLY
Status: DISCONTINUED | OUTPATIENT
Start: 2023-05-19 | End: 2023-05-21 | Stop reason: HOSPADM

## 2023-05-19 RX ORDER — IBUPROFEN 200 MG
24 TABLET ORAL
Status: DISCONTINUED | OUTPATIENT
Start: 2023-05-19 | End: 2023-05-19

## 2023-05-19 RX ORDER — HYDROXYCHLOROQUINE SULFATE 200 MG/1
200 TABLET, FILM COATED ORAL 2 TIMES DAILY
Status: DISCONTINUED | OUTPATIENT
Start: 2023-05-19 | End: 2023-05-21 | Stop reason: HOSPADM

## 2023-05-19 RX ORDER — GLUCAGON 1 MG
1 KIT INJECTION
Status: DISCONTINUED | OUTPATIENT
Start: 2023-05-19 | End: 2023-05-19

## 2023-05-19 RX ORDER — INSULIN ASPART 100 [IU]/ML
1-10 INJECTION, SOLUTION INTRAVENOUS; SUBCUTANEOUS
Status: DISCONTINUED | OUTPATIENT
Start: 2023-05-19 | End: 2023-05-19

## 2023-05-19 RX ORDER — IBUPROFEN 200 MG
16 TABLET ORAL
Status: DISCONTINUED | OUTPATIENT
Start: 2023-05-19 | End: 2023-05-19

## 2023-05-19 RX ORDER — INSULIN ASPART 100 [IU]/ML
0-5 INJECTION, SOLUTION INTRAVENOUS; SUBCUTANEOUS
Status: DISCONTINUED | OUTPATIENT
Start: 2023-05-19 | End: 2023-05-19

## 2023-05-19 RX ORDER — DEXTROSE 40 %
30 GEL (GRAM) ORAL
Status: DISCONTINUED | OUTPATIENT
Start: 2023-05-19 | End: 2023-05-19

## 2023-05-19 RX ORDER — SODIUM BICARBONATE 650 MG/1
1300 TABLET ORAL 2 TIMES DAILY
Status: DISCONTINUED | OUTPATIENT
Start: 2023-05-19 | End: 2023-05-20

## 2023-05-19 RX ORDER — DEXTROSE 40 %
15 GEL (GRAM) ORAL
Status: DISCONTINUED | OUTPATIENT
Start: 2023-05-19 | End: 2023-05-19

## 2023-05-19 RX ORDER — ACETAMINOPHEN 325 MG/1
650 TABLET ORAL EVERY 8 HOURS PRN
Status: DISCONTINUED | OUTPATIENT
Start: 2023-05-19 | End: 2023-05-21 | Stop reason: HOSPADM

## 2023-05-19 RX ORDER — MYCOPHENOLATE MOFETIL 250 MG/1
500 CAPSULE ORAL 2 TIMES DAILY
Status: DISCONTINUED | OUTPATIENT
Start: 2023-05-19 | End: 2023-05-21 | Stop reason: HOSPADM

## 2023-05-19 RX ORDER — TACROLIMUS 1 MG/1
5 CAPSULE ORAL 2 TIMES DAILY
Status: DISCONTINUED | OUTPATIENT
Start: 2023-05-19 | End: 2023-05-21 | Stop reason: HOSPADM

## 2023-05-19 RX ORDER — VALGANCICLOVIR 450 MG/1
450 TABLET, FILM COATED ORAL DAILY
Status: DISCONTINUED | OUTPATIENT
Start: 2023-05-20 | End: 2023-05-21 | Stop reason: HOSPADM

## 2023-05-19 RX ORDER — INSULIN ASPART 100 [IU]/ML
1-10 INJECTION, SOLUTION INTRAVENOUS; SUBCUTANEOUS
Status: DISCONTINUED | OUTPATIENT
Start: 2023-05-19 | End: 2023-05-20

## 2023-05-19 RX ORDER — PANTOPRAZOLE SODIUM 40 MG/1
40 TABLET, DELAYED RELEASE ORAL DAILY
Status: DISCONTINUED | OUTPATIENT
Start: 2023-05-20 | End: 2023-05-21 | Stop reason: HOSPADM

## 2023-05-19 RX ORDER — TACROLIMUS 1 MG/1
4 CAPSULE ORAL 2 TIMES DAILY
Status: CANCELLED | OUTPATIENT
Start: 2023-05-19

## 2023-05-19 RX ORDER — ASPIRIN 81 MG/1
81 TABLET ORAL DAILY
Status: DISCONTINUED | OUTPATIENT
Start: 2023-05-20 | End: 2023-05-21 | Stop reason: HOSPADM

## 2023-05-19 RX ORDER — DEXTROSE 40 %
30 GEL (GRAM) ORAL
Status: DISCONTINUED | OUTPATIENT
Start: 2023-05-19 | End: 2023-05-21 | Stop reason: HOSPADM

## 2023-05-19 RX ORDER — DAPSONE 100 MG/1
100 TABLET ORAL DAILY
Status: DISCONTINUED | OUTPATIENT
Start: 2023-05-20 | End: 2023-05-21 | Stop reason: HOSPADM

## 2023-05-19 RX ORDER — DEXTROSE 40 %
15 GEL (GRAM) ORAL
Status: DISCONTINUED | OUTPATIENT
Start: 2023-05-19 | End: 2023-05-21 | Stop reason: HOSPADM

## 2023-05-19 RX ORDER — SODIUM CHLORIDE 0.9 % (FLUSH) 0.9 %
3 SYRINGE (ML) INJECTION
Status: DISCONTINUED | OUTPATIENT
Start: 2023-05-19 | End: 2023-05-21 | Stop reason: HOSPADM

## 2023-05-19 RX ORDER — TACROLIMUS 1 MG/1
5 CAPSULE ORAL EVERY 12 HOURS
Qty: 300 CAPSULE | Refills: 11 | Status: CANCELLED | OUTPATIENT
Start: 2023-05-19

## 2023-05-19 RX ORDER — ARIPIPRAZOLE 2 MG/1
2 TABLET ORAL DAILY
Status: DISCONTINUED | OUTPATIENT
Start: 2023-05-20 | End: 2023-05-19

## 2023-05-19 RX ORDER — ONDANSETRON 8 MG/1
8 TABLET, ORALLY DISINTEGRATING ORAL EVERY 6 HOURS PRN
Status: DISCONTINUED | OUTPATIENT
Start: 2023-05-19 | End: 2023-05-21 | Stop reason: HOSPADM

## 2023-05-19 RX ORDER — ESCITALOPRAM OXALATE 10 MG/1
20 TABLET ORAL DAILY
Status: DISCONTINUED | OUTPATIENT
Start: 2023-05-20 | End: 2023-05-21 | Stop reason: HOSPADM

## 2023-05-19 RX ORDER — INSULIN ASPART 100 [IU]/ML
18 INJECTION, SOLUTION INTRAVENOUS; SUBCUTANEOUS
Status: DISCONTINUED | OUTPATIENT
Start: 2023-05-20 | End: 2023-05-20

## 2023-05-19 RX ORDER — GLUCAGON 1 MG
1 KIT INJECTION
Status: DISCONTINUED | OUTPATIENT
Start: 2023-05-19 | End: 2023-05-21 | Stop reason: HOSPADM

## 2023-05-19 RX ADMIN — HYDROXYCHLOROQUINE SULFATE 200 MG: 200 TABLET ORAL at 08:05

## 2023-05-19 RX ADMIN — MYCOPHENOLATE MOFETIL 500 MG: 250 CAPSULE ORAL at 08:05

## 2023-05-19 RX ADMIN — DEXTROSE MONOHYDRATE 500 MG: 50 INJECTION, SOLUTION INTRAVENOUS at 06:05

## 2023-05-19 RX ADMIN — INSULIN ASPART 3 UNITS: 100 INJECTION, SOLUTION INTRAVENOUS; SUBCUTANEOUS at 10:05

## 2023-05-19 RX ADMIN — SODIUM BICARBONATE 1300 MG: 650 TABLET ORAL at 08:05

## 2023-05-19 RX ADMIN — ARIPIPRAZOLE 2 MG: 2 TABLET ORAL at 09:05

## 2023-05-19 RX ADMIN — TACROLIMUS 5 MG: 1 CAPSULE ORAL at 05:05

## 2023-05-19 RX ADMIN — TRAZODONE HYDROCHLORIDE 150 MG: 50 TABLET ORAL at 08:05

## 2023-05-19 NOTE — ASSESSMENT & PLAN NOTE
- Has not been taking valcyte due to inability to get medication filled x 2 months  - Will check CMV PCR

## 2023-05-19 NOTE — NURSING
Pt AAOx4, VSS, afebrile. Pt admitted to unit for ACR (bx yesterday). Pt will receive Pulse x 3 & trend labs. Endocrine consulted. Labs drawn. Pt reports feeling fine. Bed in low/locked position, pt oriented to room, call light/personal belongings w/in reach, non-slip socks in place, pt remains free from falls, WCTM

## 2023-05-19 NOTE — ASSESSMENT & PLAN NOTE
"- S/p Oltxp 1/5/23  - Progressed well post-transplant  - Admit with elevated LFTs, see "acute liver rejection"    "

## 2023-05-19 NOTE — ASSESSMENT & PLAN NOTE
- Moderate ACR on liver biopsy completed 5/18  - Admit for SMP therapy, plan for 3 doses  - Daily CMP

## 2023-05-19 NOTE — HPI
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 He progressed well post-op and was discharged POD#6. He has continued to do well post-transplant and is currently on tacrolimus and cellcept for immunosuppression. Recently, his LFTs have been elevated on outpatient labs. Liver US 5/10 with Interval increase in velocity in the extrahepatic main hepatic artery at the anastomosis with otherwise unchanged waveforms. Also with Interval decrease in caliber of the common bile duct. Liver biopsy done 5/18 now resulted with moderate ACR. 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 Patient on high doses of insulin at home, will consult Endocrine to assist in management in setting of high dose steroid treatment. He reports feeling fine. Denies fever/chills. Has had mild diarrhea. Will check CMV PCR

## 2023-05-19 NOTE — TELEPHONE ENCOUNTER
Notified Hiliary Olga, Transplant [facility],  that we need to readmit patient due to biopsy proven moderate ACR.  Notified [facility] charge nurse Izabela Cheng, Dr. Arnold  who is on service and admit for a reservation.   Patient is headed this way

## 2023-05-19 NOTE — ASSESSMENT & PLAN NOTE
- Endocrine consulted for management, appreciate assistance  - Monitor closely in setting of SMP therapy

## 2023-05-19 NOTE — H&P
[facility] - Transplant Stepdown  Liver Transplant  History & Physical    Patient Name: Rosalio Mccarty  MRN: [id number]  Admission Date: 5/19/2023  Code Status: Full Code  Primary Care Provider: Primary Doctor No  Post-Operative Day: 134     ORGAN:   LIVER  Disease Etiology: Cirrhosis: Other, Specify - granulomatous hepatitis  Donor Type:   Donation after Brain Death  [facility] High Risk:   Yes  Donor CMV Status:   Donor CMV Status: Positive  Donor HBcAB:   Negative  Donor HCV Status:   Positive  Donor HBV ASAF: Negative  Donor HCV ASAF: Negative  Whole or Partial: Whole Liver  Biliary Anastomosis: End to End  Arterial Anatomy: Replaced Right Hepatic from SMA    Subjective:     History of Present Illness:  Rosalio Mccarty is a 48yr old male with 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 He progressed well post-op and was discharged POD#6. He has continued to do well post-transplant and is currently on tacrolimus and cellcept for immunosuppression. Recently, his LFTs have been elevated on outpatient labs. Liver US 5/10 with Interval increase in velocity in the extrahepatic main hepatic artery at the anastomosis with otherwise unchanged waveforms. Also with Interval decrease in caliber of the common bile duct. Liver biopsy done 5/18 now resulted with moderate ACR. 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 Patient on high doses of insulin at home, will consult Endocrine to assist in management in setting of high dose steroid treatment. He reports feeling fine. Denies fever/chills. Has had mild diarrhea. Will check CMV PCR      Past Medical History:   Diagnosis Date    C. difficile diarrhea     Cirrhosis of liver with ascites 11/17/2022    Diabetes mellitus, type II, insulin dependent     Granulomatous colitis     Hepatic granuloma associated with  sarcoidosis     Hepatic granuloma associated with sarcoidosis 11/17/2022    Hepatic granuloma associated with sarcoidosis 11/17/2022    Personal history of colonic polyps     Pre-op evaluation 1/4/2023    Sarcoidosis, unspecified     Thrombocytopenia, unspecified     Unspecified cirrhosis of liver        Past Surgical History:   Procedure Laterality Date    ADENOIDECTOMY      CARDIAC CATHETERIZATION Right     COLONOSCOPY  07/11/2018    HIP SURGERY      RIght removed ruptured cyst    LIVER TRANSPLANT N/A 1/5/2023    Procedure: TRANSPLANT, LIVER;  Surgeon: Rosa Foreman MD;  Location: [facility] OR [address] [place] [address];  Service: Transplant;  Laterality: N/A;    UPPER GASTROINTESTINAL ENDOSCOPY  10/09/2019       Review of patient's allergies indicates:   Allergen Reactions    Adhesive Other (See Comments)     Skin irritation  Paper tape okay to use    Pcn [penicillins] Rash     Happened as a baby       Family History       Problem Relation (Age of Onset)    Cancer Father, Maternal Grandfather    Diabetes Maternal Grandmother, Maternal Grandfather, Paternal Grandmother    Heart disease Father    Hypertension Mother, Father    Rectal cancer Father    Thyroid disease Father          Tobacco Use    Smoking status: Never    Smokeless tobacco: Never   Substance and Sexual Activity    Alcohol use: Never    Drug use: Never    Sexual activity: Not on file       No medications prior to admission.       Review of Systems   Constitutional:  Negative for activity change, appetite change, chills and fever.   Respiratory:  Negative for shortness of breath.    Cardiovascular:  Negative for leg swelling.   Gastrointestinal:  Negative for diarrhea.   Allergic/Immunologic: Positive for immunocompromised state.   Psychiatric/Behavioral:  The patient is nervous/anxious.    Objective:     Vital Signs (Most Recent):    Vital Signs (24h Range):           There is no height or weight on file to calculate BMI.    No intake or output data  "in the 24 hours ending 05/19/23 1432     Physical Exam  Vitals and nursing note reviewed.   Cardiovascular:      Rate and Rhythm: Normal rate and regular rhythm.      Heart sounds: No murmur heard.    No gallop.   Pulmonary:      Effort: Pulmonary effort is normal.      Breath sounds: No wheezing or rales.   Neurological:      Mental Status: He is alert and oriented to person, place, and time.   Psychiatric:         Mood and Affect: Mood normal.         Behavior: Behavior normal.         Thought Content: Thought content normal.         Judgment: Judgment normal.        Laboratory:  CBC:   Recent Labs   Lab 05/18/23  0859   WBC 4.05   RBC 4.52*   HGB 10.9*   HCT 36.1*   PLT 73*   MCV 80*   MCH 24.1*   MCHC 30.2*     CMP:   Recent Labs   Lab 05/18/23  0859 05/19/23  0715   *  --    CALCIUM 9.2  --    ALBUMIN 4.1  --    PROT 6.5  --      --    K 5.7* 4.6   CO2 21*  --      --    BUN 30*  --    CREATININE 1.7*  --    ALKPHOS 140*  --    *  --    *  --    BILITOT 1.5*  --      Labs within the past 24 hours have been reviewed.    Diagnostic Results:  None    Assessment/Plan:     * Acute rejection of liver transplant  - Moderate ACR on liver biopsy completed 5/18  - Admit for SMP therapy, plan for 3 doses  - Daily CMP      At risk for opportunistic infections  - Has not been taking valcyte due to inability to get medication filled x 2 months  - Will check CMV PCR      Prophylactic immunotherapy  - On prograf and cellcept  - Check prograf level daily and adjust for therapeutic dosage. Monitor for toxic side effects        Long-term use of immunosuppressant medication  - see "prophylactic immunotherapy"      Thrombocytopenia, unspecified  - due to chronic liver disease  - expect to improve eventually post-transplant      Anemia of chronic disease  - monitor daily CBC      S/P liver transplant  - S/p Oltxp 1/5/23  - Progressed well post-transplant  - Admit with elevated LFTs, see "acute liver " "rejection"      Type 2 diabetes mellitus with hyperglycemia  - Endocrine consulted for management, appreciate assistance  - Monitor closely in setting of SMP therapy                Discharge Planning: Not a candidate for discharge at this time   PharmD review: switch Ca carbonate to Ca 600 mg/vitaminD 800u daily, as vitD level is 22. [X 3/3/23]      Tatyana Grimm PAJose EC  Liver Transplant  [facility] - Transplant Stepdown      "

## 2023-05-19 NOTE — TELEPHONE ENCOUNTER
Notified patient to increase Prograf----- Message from Ranjith Aiken MD sent at 5/18/2023  9:21 AM CDT -----  Results reviewed. Increase tac to 5/5. Repeat labs.

## 2023-05-19 NOTE — ASSESSMENT & PLAN NOTE
- On prograf and cellcept  - Check prograf level daily and adjust for therapeutic dosage. Monitor for toxic side effects

## 2023-05-19 NOTE — SUBJECTIVE & OBJECTIVE
Past Medical History:   Diagnosis Date    C. difficile diarrhea     Cirrhosis of liver with ascites 11/17/2022    Diabetes mellitus, type II, insulin dependent     Granulomatous colitis     Hepatic granuloma associated with sarcoidosis     Hepatic granuloma associated with sarcoidosis 11/17/2022    Hepatic granuloma associated with sarcoidosis 11/17/2022    Personal history of colonic polyps     Pre-op evaluation 1/4/2023    Sarcoidosis, unspecified     Thrombocytopenia, unspecified     Unspecified cirrhosis of liver        Past Surgical History:   Procedure Laterality Date    ADENOIDECTOMY      CARDIAC CATHETERIZATION Right     COLONOSCOPY  07/11/2018    HIP SURGERY      RIght removed ruptured cyst    LIVER TRANSPLANT N/A 1/5/2023    Procedure: TRANSPLANT, LIVER;  Surgeon: Rosa Foreman MD;  Location: [facility] OR [address] [place] [address];  Service: Transplant;  Laterality: N/A;    UPPER GASTROINTESTINAL ENDOSCOPY  10/09/2019       Review of patient's allergies indicates:   Allergen Reactions    Adhesive Other (See Comments)     Skin irritation  Paper tape okay to use    Pcn [penicillins] Rash     Happened as a baby       Family History       Problem Relation (Age of Onset)    Cancer Father, Maternal Grandfather    Diabetes Maternal Grandmother, Maternal Grandfather, Paternal Grandmother    Heart disease Father    Hypertension Mother, Father    Rectal cancer Father    Thyroid disease Father          Tobacco Use    Smoking status: Never    Smokeless tobacco: Never   Substance and Sexual Activity    Alcohol use: Never    Drug use: Never    Sexual activity: Not on file       No medications prior to admission.       Review of Systems   Constitutional:  Negative for activity change, appetite change, chills and fever.   Respiratory:  Negative for shortness of breath.    Cardiovascular:  Negative for leg swelling.   Gastrointestinal:  Negative for diarrhea.   Allergic/Immunologic: Positive for immunocompromised state.    Psychiatric/Behavioral:  The patient is nervous/anxious.    Objective:     Vital Signs (Most Recent):    Vital Signs (24h Range):           There is no height or weight on file to calculate BMI.    No intake or output data in the 24 hours ending 05/19/23 1432     Physical Exam  Vitals and nursing note reviewed.   Cardiovascular:      Rate and Rhythm: Normal rate and regular rhythm.      Heart sounds: No murmur heard.    No gallop.   Pulmonary:      Effort: Pulmonary effort is normal.      Breath sounds: No wheezing or rales.   Neurological:      Mental Status: He is alert and oriented to person, place, and time.   Psychiatric:         Mood and Affect: Mood normal.         Behavior: Behavior normal.         Thought Content: Thought content normal.         Judgment: Judgment normal.        Laboratory:  CBC:   Recent Labs   Lab 05/18/23  0859   WBC 4.05   RBC 4.52*   HGB 10.9*   HCT 36.1*   PLT 73*   MCV 80*   MCH 24.1*   MCHC 30.2*     CMP:   Recent Labs   Lab 05/18/23  0859 05/19/23  0715   *  --    CALCIUM 9.2  --    ALBUMIN 4.1  --    PROT 6.5  --      --    K 5.7* 4.6   CO2 21*  --      --    BUN 30*  --    CREATININE 1.7*  --    ALKPHOS 140*  --    *  --    *  --    BILITOT 1.5*  --      Labs within the past 24 hours have been reviewed.    Diagnostic Results:  None

## 2023-05-20 LAB
ALBUMIN SERPL BCP-MCNC: 3.8 G/DL (ref 3.5–5.2)
ALBUMIN SERPL BCP-MCNC: 3.9 G/DL (ref 3.5–5.2)
ALP SERPL-CCNC: 160 U/L (ref 55–135)
ALP SERPL-CCNC: 167 U/L (ref 55–135)
ALT SERPL W/O P-5'-P-CCNC: 543 U/L (ref 10–44)
ALT SERPL W/O P-5'-P-CCNC: 602 U/L (ref 10–44)
ANION GAP SERPL CALC-SCNC: 11 MMOL/L (ref 8–16)
ANION GAP SERPL CALC-SCNC: 14 MMOL/L (ref 8–16)
ANION GAP SERPL CALC-SCNC: 7 MMOL/L (ref 8–16)
ANISOCYTOSIS BLD QL SMEAR: SLIGHT
AST SERPL-CCNC: 194 U/L (ref 10–40)
AST SERPL-CCNC: 264 U/L (ref 10–40)
BASOPHILS # BLD AUTO: 0.01 K/UL (ref 0–0.2)
BASOPHILS # BLD AUTO: 0.03 K/UL (ref 0–0.2)
BASOPHILS NFR BLD: 0.4 % (ref 0–1.9)
BASOPHILS NFR BLD: 0.8 % (ref 0–1.9)
BILIRUB SERPL-MCNC: 1.1 MG/DL (ref 0.1–1)
BILIRUB SERPL-MCNC: 1.4 MG/DL (ref 0.1–1)
BUN SERPL-MCNC: 32 MG/DL (ref 6–20)
BUN SERPL-MCNC: 36 MG/DL (ref 6–20)
BUN SERPL-MCNC: 40 MG/DL (ref 6–20)
BURR CELLS BLD QL SMEAR: ABNORMAL
CALCIUM SERPL-MCNC: 8.4 MG/DL (ref 8.7–10.5)
CALCIUM SERPL-MCNC: 8.7 MG/DL (ref 8.7–10.5)
CALCIUM SERPL-MCNC: 8.9 MG/DL (ref 8.7–10.5)
CHLORIDE SERPL-SCNC: 105 MMOL/L (ref 95–110)
CHLORIDE SERPL-SCNC: 106 MMOL/L (ref 95–110)
CHLORIDE SERPL-SCNC: 107 MMOL/L (ref 95–110)
CO2 SERPL-SCNC: 17 MMOL/L (ref 23–29)
CREAT SERPL-MCNC: 1.6 MG/DL (ref 0.5–1.4)
CREAT SERPL-MCNC: 1.6 MG/DL (ref 0.5–1.4)
CREAT SERPL-MCNC: 1.7 MG/DL (ref 0.5–1.4)
CREAT UR-MCNC: 56 MG/DL (ref 23–375)
DACRYOCYTES BLD QL SMEAR: ABNORMAL
DIFFERENTIAL METHOD: ABNORMAL
DIFFERENTIAL METHOD: ABNORMAL
EOSINOPHIL # BLD AUTO: 0 K/UL (ref 0–0.5)
EOSINOPHIL # BLD AUTO: 0.1 K/UL (ref 0–0.5)
EOSINOPHIL NFR BLD: 0.4 % (ref 0–8)
EOSINOPHIL NFR BLD: 2.1 % (ref 0–8)
ERYTHROCYTE [DISTWIDTH] IN BLOOD BY AUTOMATED COUNT: 16.4 % (ref 11.5–14.5)
ERYTHROCYTE [DISTWIDTH] IN BLOOD BY AUTOMATED COUNT: 17.1 % (ref 11.5–14.5)
EST. GFR  (NO RACE VARIABLE): 49.1 ML/MIN/1.73 M^2
EST. GFR  (NO RACE VARIABLE): 52.8 ML/MIN/1.73 M^2
EST. GFR  (NO RACE VARIABLE): 52.8 ML/MIN/1.73 M^2
ESTIMATED AVG GLUCOSE: 88 MG/DL (ref 68–131)
GLUCOSE SERPL-MCNC: 111 MG/DL (ref 70–110)
GLUCOSE SERPL-MCNC: 292 MG/DL (ref 70–110)
GLUCOSE SERPL-MCNC: 302 MG/DL (ref 70–110)
HBA1C MFR BLD: 4.7 % (ref 4–5.6)
HCT VFR BLD AUTO: 30.6 % (ref 40–54)
HCT VFR BLD AUTO: 34.3 % (ref 40–54)
HGB BLD-MCNC: 10.2 G/DL (ref 14–18)
HGB BLD-MCNC: 9.1 G/DL (ref 14–18)
HYPOCHROMIA BLD QL SMEAR: ABNORMAL
IMM GRANULOCYTES # BLD AUTO: 0.01 K/UL (ref 0–0.04)
IMM GRANULOCYTES # BLD AUTO: 0.01 K/UL (ref 0–0.04)
IMM GRANULOCYTES NFR BLD AUTO: 0.3 % (ref 0–0.5)
IMM GRANULOCYTES NFR BLD AUTO: 0.4 % (ref 0–0.5)
LYMPHOCYTES # BLD AUTO: 1.2 K/UL (ref 1–4.8)
LYMPHOCYTES # BLD AUTO: 2.5 K/UL (ref 1–4.8)
LYMPHOCYTES NFR BLD: 51 % (ref 18–48)
LYMPHOCYTES NFR BLD: 64.5 % (ref 18–48)
MCH RBC QN AUTO: 24.1 PG (ref 27–31)
MCH RBC QN AUTO: 24.2 PG (ref 27–31)
MCHC RBC AUTO-ENTMCNC: 29.7 G/DL (ref 32–36)
MCHC RBC AUTO-ENTMCNC: 29.7 G/DL (ref 32–36)
MCV RBC AUTO: 81 FL (ref 82–98)
MCV RBC AUTO: 81 FL (ref 82–98)
MONOCYTES # BLD AUTO: 0.1 K/UL (ref 0.3–1)
MONOCYTES # BLD AUTO: 0.4 K/UL (ref 0.3–1)
MONOCYTES NFR BLD: 10.3 % (ref 4–15)
MONOCYTES NFR BLD: 3.7 % (ref 4–15)
NEUTROPHILS # BLD AUTO: 0.8 K/UL (ref 1.8–7.7)
NEUTROPHILS # BLD AUTO: 1.1 K/UL (ref 1.8–7.7)
NEUTROPHILS NFR BLD: 22 % (ref 38–73)
NEUTROPHILS NFR BLD: 44.1 % (ref 38–73)
NRBC BLD-RTO: 0 /100 WBC
NRBC BLD-RTO: 0 /100 WBC
OVALOCYTES BLD QL SMEAR: ABNORMAL
PLATELET # BLD AUTO: 55 K/UL (ref 150–450)
PLATELET # BLD AUTO: 75 K/UL (ref 150–450)
PLATELET BLD QL SMEAR: ABNORMAL
PMV BLD AUTO: 10.8 FL (ref 9.2–12.9)
PMV BLD AUTO: 11.5 FL (ref 9.2–12.9)
POCT GLUCOSE: 269 MG/DL (ref 70–110)
POCT GLUCOSE: 280 MG/DL (ref 70–110)
POCT GLUCOSE: 285 MG/DL (ref 70–110)
POCT GLUCOSE: 336 MG/DL (ref 70–110)
POIKILOCYTOSIS BLD QL SMEAR: SLIGHT
POTASSIUM SERPL-SCNC: 4.8 MMOL/L (ref 3.5–5.1)
POTASSIUM SERPL-SCNC: 4.8 MMOL/L (ref 3.5–5.1)
POTASSIUM SERPL-SCNC: 5 MMOL/L (ref 3.5–5.1)
POTASSIUM SERPL-SCNC: 5.6 MMOL/L (ref 3.5–5.1)
POTASSIUM SERPL-SCNC: 6 MMOL/L (ref 3.5–5.1)
PROT SERPL-MCNC: 6 G/DL (ref 6–8.4)
PROT SERPL-MCNC: 6.2 G/DL (ref 6–8.4)
PROT UR-MCNC: <7 MG/DL (ref 0–15)
PROT/CREAT UR: NORMAL MG/G{CREAT} (ref 0–0.2)
RBC # BLD AUTO: 3.77 M/UL (ref 4.6–6.2)
RBC # BLD AUTO: 4.22 M/UL (ref 4.6–6.2)
SODIUM SERPL-SCNC: 131 MMOL/L (ref 136–145)
SODIUM SERPL-SCNC: 133 MMOL/L (ref 136–145)
SODIUM SERPL-SCNC: 137 MMOL/L (ref 136–145)
TACROLIMUS BLD-MCNC: 8.7 NG/ML (ref 5–15)
WBC # BLD AUTO: 2.43 K/UL (ref 3.9–12.7)
WBC # BLD AUTO: 3.8 K/UL (ref 3.9–12.7)

## 2023-05-20 PROCEDURE — 99222 1ST HOSP IP/OBS MODERATE 55: CPT | Mod: ,,, | Performed by: PHYSICIAN ASSISTANT

## 2023-05-20 PROCEDURE — 25000003 PHARM REV CODE 250: Performed by: NURSE PRACTITIONER

## 2023-05-20 PROCEDURE — 80048 BASIC METABOLIC PNL TOTAL CA: CPT | Mod: XB | Performed by: NURSE PRACTITIONER

## 2023-05-20 PROCEDURE — 99900035 HC TECH TIME PER 15 MIN (STAT)

## 2023-05-20 PROCEDURE — 99233 SBSQ HOSP IP/OBS HIGH 50: CPT | Mod: ,,, | Performed by: NURSE PRACTITIONER

## 2023-05-20 PROCEDURE — 25000003 PHARM REV CODE 250: Performed by: PHYSICIAN ASSISTANT

## 2023-05-20 PROCEDURE — 99233 PR SUBSEQUENT HOSPITAL CARE,LEVL III: ICD-10-PCS | Mod: ,,, | Performed by: NURSE PRACTITIONER

## 2023-05-20 PROCEDURE — 84132 ASSAY OF SERUM POTASSIUM: CPT | Performed by: NURSE PRACTITIONER

## 2023-05-20 PROCEDURE — 36415 COLL VENOUS BLD VENIPUNCTURE: CPT | Performed by: NURSE PRACTITIONER

## 2023-05-20 PROCEDURE — 84156 ASSAY OF PROTEIN URINE: CPT | Performed by: NURSE PRACTITIONER

## 2023-05-20 PROCEDURE — 20600001 HC STEP DOWN PRIVATE ROOM

## 2023-05-20 PROCEDURE — 80053 COMPREHEN METABOLIC PANEL: CPT | Performed by: NURSE PRACTITIONER

## 2023-05-20 PROCEDURE — 63600175 PHARM REV CODE 636 W HCPCS: Performed by: PHYSICIAN ASSISTANT

## 2023-05-20 PROCEDURE — 85025 COMPLETE CBC W/AUTO DIFF WBC: CPT | Performed by: NURSE PRACTITIONER

## 2023-05-20 PROCEDURE — 99222 PR INITIAL HOSPITAL CARE,LEVL II: ICD-10-PCS | Mod: ,,, | Performed by: PHYSICIAN ASSISTANT

## 2023-05-20 PROCEDURE — 63600175 PHARM REV CODE 636 W HCPCS: Performed by: NURSE PRACTITIONER

## 2023-05-20 PROCEDURE — 94761 N-INVAS EAR/PLS OXIMETRY MLT: CPT

## 2023-05-20 PROCEDURE — 80197 ASSAY OF TACROLIMUS: CPT | Performed by: NURSE PRACTITIONER

## 2023-05-20 RX ORDER — INSULIN ASPART 100 [IU]/ML
0-15 INJECTION, SOLUTION INTRAVENOUS; SUBCUTANEOUS
Status: DISCONTINUED | OUTPATIENT
Start: 2023-05-20 | End: 2023-05-20

## 2023-05-20 RX ORDER — INSULIN ASPART 100 [IU]/ML
0-15 INJECTION, SOLUTION INTRAVENOUS; SUBCUTANEOUS
Status: DISCONTINUED | OUTPATIENT
Start: 2023-05-20 | End: 2023-05-21 | Stop reason: HOSPADM

## 2023-05-20 RX ORDER — METHOCARBAMOL 500 MG/1
500 TABLET, FILM COATED ORAL 3 TIMES DAILY PRN
Status: DISCONTINUED | OUTPATIENT
Start: 2023-05-20 | End: 2023-05-20

## 2023-05-20 RX ORDER — CALCIUM GLUCONATE 20 MG/ML
1 INJECTION, SOLUTION INTRAVENOUS ONCE
Status: DISCONTINUED | OUTPATIENT
Start: 2023-05-20 | End: 2023-05-20

## 2023-05-20 RX ORDER — TRAMADOL HYDROCHLORIDE 50 MG/1
50 TABLET ORAL EVERY 8 HOURS PRN
Status: DISCONTINUED | OUTPATIENT
Start: 2023-05-20 | End: 2023-05-21 | Stop reason: HOSPADM

## 2023-05-20 RX ORDER — SODIUM BICARBONATE 650 MG/1
1300 TABLET ORAL 3 TIMES DAILY
Status: DISCONTINUED | OUTPATIENT
Start: 2023-05-20 | End: 2023-05-21 | Stop reason: HOSPADM

## 2023-05-20 RX ORDER — INSULIN ASPART 100 [IU]/ML
5 INJECTION, SOLUTION INTRAVENOUS; SUBCUTANEOUS
Status: DISCONTINUED | OUTPATIENT
Start: 2023-05-20 | End: 2023-05-21 | Stop reason: HOSPADM

## 2023-05-20 RX ORDER — INSULIN ASPART 100 [IU]/ML
22 INJECTION, SOLUTION INTRAVENOUS; SUBCUTANEOUS
Status: DISCONTINUED | OUTPATIENT
Start: 2023-05-20 | End: 2023-05-21

## 2023-05-20 RX ORDER — CALCIUM GLUCONATE 20 MG/ML
1 INJECTION, SOLUTION INTRAVENOUS EVERY 10 MIN PRN
Status: DISCONTINUED | OUTPATIENT
Start: 2023-05-20 | End: 2023-05-20

## 2023-05-20 RX ADMIN — INSULIN ASPART 12 UNITS: 100 INJECTION, SOLUTION INTRAVENOUS; SUBCUTANEOUS at 05:05

## 2023-05-20 RX ADMIN — TRAZODONE HYDROCHLORIDE 150 MG: 50 TABLET ORAL at 09:05

## 2023-05-20 RX ADMIN — INSULIN ASPART 3 UNITS: 100 INJECTION, SOLUTION INTRAVENOUS; SUBCUTANEOUS at 02:05

## 2023-05-20 RX ADMIN — DAPSONE 100 MG: 100 TABLET ORAL at 08:05

## 2023-05-20 RX ADMIN — VALGANCICLOVIR 450 MG: 450 TABLET, FILM COATED ORAL at 08:05

## 2023-05-20 RX ADMIN — ESCITALOPRAM OXALATE 20 MG: 10 TABLET ORAL at 08:05

## 2023-05-20 RX ADMIN — INSULIN ASPART 18 UNITS: 100 INJECTION, SOLUTION INTRAVENOUS; SUBCUTANEOUS at 08:05

## 2023-05-20 RX ADMIN — SODIUM BICARBONATE 1300 MG: 650 TABLET ORAL at 08:05

## 2023-05-20 RX ADMIN — MYCOPHENOLATE MOFETIL 500 MG: 250 CAPSULE ORAL at 08:05

## 2023-05-20 RX ADMIN — HYDROXYCHLOROQUINE SULFATE 200 MG: 200 TABLET ORAL at 08:05

## 2023-05-20 RX ADMIN — TRAMADOL HYDROCHLORIDE 50 MG: 50 TABLET, FILM COATED ORAL at 09:05

## 2023-05-20 RX ADMIN — SODIUM BICARBONATE 1300 MG: 650 TABLET ORAL at 12:05

## 2023-05-20 RX ADMIN — TACROLIMUS 5 MG: 1 CAPSULE ORAL at 08:05

## 2023-05-20 RX ADMIN — INSULIN ASPART 22 UNITS: 100 INJECTION, SOLUTION INTRAVENOUS; SUBCUTANEOUS at 12:05

## 2023-05-20 RX ADMIN — ASPIRIN 81 MG: 81 TABLET, COATED ORAL at 08:05

## 2023-05-20 RX ADMIN — DEXTROSE MONOHYDRATE 500 MG: 50 INJECTION, SOLUTION INTRAVENOUS at 01:05

## 2023-05-20 RX ADMIN — SODIUM BICARBONATE 1300 MG: 650 TABLET ORAL at 05:05

## 2023-05-20 RX ADMIN — INSULIN ASPART 5 UNITS: 100 INJECTION, SOLUTION INTRAVENOUS; SUBCUTANEOUS at 09:05

## 2023-05-20 RX ADMIN — INSULIN ASPART 12 UNITS: 100 INJECTION, SOLUTION INTRAVENOUS; SUBCUTANEOUS at 12:05

## 2023-05-20 RX ADMIN — INSULIN DETEMIR 35 UNITS: 100 INJECTION, SOLUTION SUBCUTANEOUS at 09:05

## 2023-05-20 RX ADMIN — INSULIN ASPART 22 UNITS: 100 INJECTION, SOLUTION INTRAVENOUS; SUBCUTANEOUS at 05:05

## 2023-05-20 RX ADMIN — SODIUM ZIRCONIUM CYCLOSILICATE 10 G: 5 POWDER, FOR SUSPENSION ORAL at 11:05

## 2023-05-20 RX ADMIN — INSULIN ASPART 6 UNITS: 100 INJECTION, SOLUTION INTRAVENOUS; SUBCUTANEOUS at 08:05

## 2023-05-20 RX ADMIN — TACROLIMUS 5 MG: 1 CAPSULE ORAL at 05:05

## 2023-05-20 RX ADMIN — PANTOPRAZOLE SODIUM 40 MG: 40 TABLET, DELAYED RELEASE ORAL at 08:05

## 2023-05-20 RX ADMIN — ARIPIPRAZOLE 2 MG: 2 TABLET ORAL at 08:05

## 2023-05-20 RX ADMIN — INSULIN ASPART 10 UNITS: 100 INJECTION, SOLUTION INTRAVENOUS; SUBCUTANEOUS at 09:05

## 2023-05-20 NOTE — ASSESSMENT & PLAN NOTE
- Has not been taking valcyte due to inability to get medication filled x 2 months  -  CMV PCR PENDING

## 2023-05-20 NOTE — PLAN OF CARE
Potassium 6.0 on AM labs but improved to 5.6 when rechecked. Lokelma given x1 - repeat potassium 4.8. Solumedrol #2 given today. LFTs slightly improved today. Endocrine following BG - Novolog and Levemir doses adjusted; snack dose of Novolog ordered.

## 2023-05-20 NOTE — PROGRESS NOTES
[facility] Transplant Stepdown  Liver Transplant  Progress Note    Patient Name: Rosalio Mccarty  MRN: [id number]  Admission Date: 5/19/2023  Hospital Length of Stay: 1 days  Code Status: Full Code  Primary Care Provider: Primary Doctor No  Post-Operative Day: 135    ORGAN:   LIVER  Disease Etiology: Cirrhosis: Other, Specify - granulomatous hepatitis  Donor Type:   Donation after Brain Death  CDC High Risk:   Yes  Donor CMV Status:   Donor CMV Status: Positive  Donor HBcAB:   Negative  Donor HCV Status:   Positive  Donor HBV ASAF: Negative  Donor HCV ASAF: Negative  Whole or Partial: Whole Liver  Biliary Anastomosis: End to End  Arterial Anatomy: Replaced Right Hepatic from SMA  Subjective:     History of Present Illness:  Rosalio Mccarty is a 48yr old male with 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 He progressed well post-op and was discharged POD#6. He has continued to do well post-transplant and is currently on tacrolimus and cellcept for immunosuppression. Recently, his LFTs have been elevated on outpatient labs. Liver US 5/10 with Interval increase in velocity in the extrahepatic main hepatic artery at the anastomosis with otherwise unchanged waveforms. Also with Interval decrease in caliber of the common bile duct. Liver biopsy done 5/18 now resulted with moderate ACR. 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 Patient on high doses of insulin at home, will consult Endocrine to assist in management in setting of high dose steroid treatment. He reports feeling fine. Denies fever/chills. Has had mild diarrhea. Will check CMV PCR      Hospital Course:  Hospital Interval: IAN,  Liver biopsy done 5/18  resulted with moderate ACR. Plan for SMP #1 dose 5/19. #2 5/20.  Blood glucose elevated, endocrine following patient.  VSS, monitor       Past  Medical History:   Diagnosis Date    C. difficile diarrhea     Cirrhosis of liver with ascites 11/17/2022    Diabetes mellitus, type II, insulin dependent     Granulomatous colitis     Hepatic granuloma associated with sarcoidosis     Hepatic granuloma associated with sarcoidosis 11/17/2022    Hepatic granuloma associated with sarcoidosis 11/17/2022    Personal history of colonic polyps     Pre-op evaluation 1/4/2023    Sarcoidosis, unspecified     Thrombocytopenia, unspecified     Unspecified cirrhosis of liver        Past Surgical History:   Procedure Laterality Date    ADENOIDECTOMY      CARDIAC CATHETERIZATION Right     COLONOSCOPY  07/11/2018    HIP SURGERY      RIght removed ruptured cyst    LIVER TRANSPLANT N/A 1/5/2023    Procedure: TRANSPLANT, LIVER;  Surgeon: Rosa Foreman MD;  Location: [facility] OR [address] [place] [address];  Service: Transplant;  Laterality: N/A;    UPPER GASTROINTESTINAL ENDOSCOPY  10/09/2019       Review of patient's allergies indicates:   Allergen Reactions    Adhesive Other (See Comments)     Skin irritation  Paper tape okay to use    Pcn [penicillins] Rash     Happened as a baby       Family History       Problem Relation (Age of Onset)    Cancer Father, Maternal Grandfather    Diabetes Maternal Grandmother, Maternal Grandfather, Paternal Grandmother    Heart disease Father    Hypertension Mother, Father    Rectal cancer Father    Thyroid disease Father          Tobacco Use    Smoking status: Never    Smokeless tobacco: Never   Substance and Sexual Activity    Alcohol use: Never    Drug use: Never    Sexual activity: Not on file       PTA Medications   Medication Sig    ARIPiprazole (ABILIFY) 2 MG Tab Take 1 tablet (2 mg total) by mouth once daily.    aspirin (ECOTRIN) 81 MG EC tablet Take 1 tablet (81 mg total) by mouth once daily.    blood sugar diagnostic Strp 1 strip by Misc.(Non-Drug; Combo Route) route 4 (four) times daily.    blood-glucose meter Misc Use as  "instructed    calcium carbonate (OS-ORTIZ) 500 mg calcium (1,250 mg) tablet Take 1 tablet (500 mg total) by mouth once daily.    dapsone 100 MG Tab Take 1 tablet (100 mg total) by mouth once daily.    empagliflozin (JARDIANCE) 25 mg tablet Take 1 tablet (25 mg total) by mouth once daily.    EScitalopram oxalate (LEXAPRO) 20 MG tablet Take 1 tablet (20 mg total) by mouth once daily.    FREESTYLE LUZMA 14 DAY SENSOR Kit Apply topically.    HUMIRA PEN PnKt injection Inject into the skin.    hydrOXYchloroQUINE (PLAQUENIL) 200 mg tablet Take 200 mg by mouth 2 (two) times daily.    insulin degludec (TRESIBA FLEXTOUCH U-100) 100 unit/mL (3 mL) insulin pen Inject 45 Units into the skin once daily.    insulin lispro (HUMALOG KWIKPEN INSULIN) 100 unit/mL pen Inject 15 Units into the skin 3 (three) times daily before meals. Plus sliding scale insulin. Max insulin per day: 75 units    lancets 33 gauge Misc by Misc.(Non-Drug; Combo Route) route 4 (four) times daily.    multivitamin (THERAGRAN) per tablet Take 1 tablet by mouth once daily.    mycophenolate (CELLCEPT) 250 mg Cap Take 2 capsules (500 mg total) by mouth 2 (two) times daily.    oxyCODONE (ROXICODONE) 10 mg Tab immediate release tablet Take 1 tablet (10 mg total) by mouth every 8 (eight) hours as needed for Pain.    pantoprazole (PROTONIX) 40 MG tablet Take 1 tablet (40 mg total) by mouth once daily.    pen needle, diabetic 32 gauge x 5/32" Ndle 1 Dose by Misc.(Non-Drug; Combo Route) route 3 (three) times daily.    semaglutide (OZEMPIC) 2 mg/dose (8 mg/3 mL) PnIj Inject 2 mg into the skin every 7 days.    sodium bicarbonate 650 MG tablet Take 2 tablets (1,300 mg total) by mouth 2 (two) times daily.    sodium polystyrene (KAYEXALATE) powder Mix 30 grams in 120mL water and drink by mouth now for one dose (for high potassium)    tacrolimus (PROGRAF) 1 MG Cap Take 4 capsules (4 mg total) by mouth every 12 (twelve) hours.    traZODone (DESYREL) 150 MG " tablet Take 1 tablet (150 mg total) by mouth every evening.    valGANciclovir (VALCYTE) 450 mg Tab Take 1 tablet (450 mg total) by mouth once daily. STOP 7/5/23       Review of Systems   Constitutional:  Negative for activity change, appetite change, chills and fever.   Respiratory:  Negative for shortness of breath.    Cardiovascular:  Negative for leg swelling.   Gastrointestinal:  Negative for diarrhea.   Allergic/Immunologic: Positive for immunocompromised state.   Psychiatric/Behavioral:  The patient is nervous/anxious.    Objective:     Vital Signs (Most Recent):  Temp: 97.5 °F (36.4 °C) (05/20/23 1147)  Pulse: 79 (05/20/23 1147)  Resp: 20 (05/20/23 1147)  BP: (!) 99/58 (05/20/23 1147)  SpO2: 97 % (05/20/23 1147) Vital Signs (24h Range):  Temp:  [96.8 °F (36 °C)-98.9 °F (37.2 °C)] 97.5 °F (36.4 °C)  Pulse:  [70-94] 79  Resp:  [14-20] 20  SpO2:  [95 %-98 %] 97 %  BP: ()/(58-83) 99/58     Weight: 105.1 kg (231 lb 11.3 oz)  Body mass index is 34.72 kg/m².      Intake/Output Summary (Last 24 hours) at 5/20/2023 1203  Last data filed at 5/20/2023 0851  Gross per 24 hour   Intake 690 ml   Output 1375 ml   Net -685 ml        Physical Exam  Vitals and nursing note reviewed.   Cardiovascular:      Rate and Rhythm: Normal rate and regular rhythm.      Heart sounds: No murmur heard.    No gallop.   Pulmonary:      Effort: Pulmonary effort is normal.      Breath sounds: No wheezing or rales.   Neurological:      Mental Status: He is alert and oriented to person, place, and time.   Psychiatric:         Mood and Affect: Mood normal.         Behavior: Behavior normal.         Thought Content: Thought content normal.         Judgment: Judgment normal.        Laboratory:  CBC:   Recent Labs   Lab 05/20/23  0603   WBC 2.43*   RBC 3.77*   HGB 9.1*   HCT 30.6*   PLT 55*   MCV 81*   MCH 24.1*   MCHC 29.7*     CMP:   Recent Labs   Lab 05/20/23  0603 05/20/23  0817   *  --    CALCIUM 8.7  --    ALBUMIN 3.8  --    PROT  "6.0  --    *  --    K 6.0* 5.6*   CO2 17*  --      --    BUN 36*  --    CREATININE 1.6*  --    ALKPHOS 160*  --    *  --    *  --    BILITOT 1.1*  --      Labs within the past 24 hours have been reviewed.    Diagnostic Results:  None    Assessment/Plan:     * Acute rejection of liver transplant  - Moderate ACR on liver biopsy completed 5/18  - Admit for SMP therapy, plan for 3 doses  - Daily CMP      At risk for opportunistic infections  - Has not been taking valcyte due to inability to get medication filled x 2 months  -  CMV PCR PENDING      Prophylactic immunotherapy  - On prograf and cellcept  - Check prograf level daily and adjust for therapeutic dosage. Monitor for toxic side effects        Long-term use of immunosuppressant medication  - see "prophylactic immunotherapy"      Thrombocytopenia, unspecified  - due to chronic liver disease  - expect to improve eventually post-transplant      Anemia of chronic disease  - monitor daily CBC      S/P liver transplant  - S/p Oltxp 1/5/23  - Progressed well post-transplant  - Admit with elevated LFTs, see "acute liver rejection"      Steroid-induced hyperglycemia        Type 2 diabetes mellitus with hyperglycemia  - Endocrine consulted for management, appreciate assistance  - Monitor closely in setting of SMP therapy        VTE Risk Mitigation (From admission, onward)         Ordered     IP VTE LOW RISK PATIENT  Once         05/19/23 1657     Place ROX hose  Until discontinued         05/19/23 1657     Place sequential compression device  Until discontinued         05/19/23 1657                The patients clinical status was discussed at multidisplinary rounds, involving transplant surgery, transplant medicine, pharmacy, nursing, nutrition, and social work    Discharge Planning:  PharmD review: switch Ca carbonate to Ca 600 mg/vitaminD 800u daily, as vitD level is 22. [XH 3/3/23]      Angélica Ashley, FNP  Liver Transplant  [facility] - " Transplant Stepdown

## 2023-05-20 NOTE — ASSESSMENT & PLAN NOTE
BG goal: 140-180   T2DM.  Admitted for ACR.  Plan for SMP x3.  Endocrine consulted for bg management. BG above goal with steroids on board.   Will increase regimen and add HDC while on steroids.    - Increase Levemir to 25 units BID (increase to home regimen with 10% increase)  - Increase Novolog 22 units AC (20% increase)  - Adjust to Novolog HDC with ISF 16 starting at 150  - POCT Glucose before meals, at bedtime and at 2 am  - Hypoglycemia protocol in place      ** Please notify Endocrine for any change and/or advance in diet**  ** Please call Endocrine for any BG related issues **     Discharge Planning:   TBD. Please notify endocrinology prior to discharge.

## 2023-05-20 NOTE — SUBJECTIVE & OBJECTIVE
Interval HPI:   Overnight events: No acute events overnight. Patient in room 62774/17074 A. Blood glucose worsening. BG above goal on current insulin regimen (SSI ). Steroid use- Methylprednisolone  500 mg (pulse 1/3).    Renal function- Abnormal - Creatinine 1.6   Vasopressors-  None       Endocrine will continue to follow and manage insulin orders inpatient.         Diet diabetic [facility]; 2000 Calorie; Standard Tray     Eatin%  Nausea: No  Hypoglycemia and intervention: No  Fever: No  TPN and/or TF: No    PMH, PSH, FH, SH updated and reviewed     ROS:    Review of Systems   Constitutional:  Negative for unexpected weight change.   Eyes:  Negative for visual disturbance.   Respiratory:  Negative for cough.    Cardiovascular:  Negative for chest pain.   Gastrointestinal:  Negative for nausea and vomiting.   Endocrine: Negative for polydipsia and polyuria.   Musculoskeletal:  Negative for back pain.   Skin:  Negative for rash.   Allergic/Immunologic: Positive for immunocompromised state.   Neurological:  Negative for syncope.   Psychiatric/Behavioral:  Negative for agitation and dysphoric mood.      Current Medications and/or Treatments Impacting Glycemic Control  Immunotherapy:    Immunosuppressants           Stop Route Frequency     mycophenolate capsule 500 mg         -- Oral 2 times daily     tacrolimus capsule 5 mg         -- Oral 2 times daily          Steroids:   Hormones (From admission, onward)      Start     Stop Route Frequency Ordered    23 1715  methylPREDNISolone sodium succinate (SOLU-MEDROL) 500 mg in dextrose 5 % (D5W) 100 mL IVPB          0859 IV Daily 23 1711          Pressors:    Autonomic Drugs (From admission, onward)      None          Hyperglycemia/Diabetes Medications:   Antihyperglycemics (From admission, onward)      Start     Stop Route Frequency Ordered    23 0900  insulin detemir U-100 (Levemir) pen 35 Units         -- SubQ Daily 23 1923     05/20/23 0715  insulin aspart U-100 pen 18 Units         -- SubQ 3 times daily with meals 05/19/23 1923 05/19/23 2013  insulin aspart U-100 pen 1-10 Units         -- SubQ Before meals, nightly and at 0200 PRN 05/19/23 1923             PHYSICAL EXAMINATION:  Vitals:    05/20/23 0444   BP:    Pulse:    Resp:    Temp: 98.1 °F (36.7 °C)     Body mass index is 34.72 kg/m².     Physical Exam  Constitutional:       General: He is not in acute distress.     Appearance: Normal appearance. He is not ill-appearing.   HENT:      Head: Normocephalic and atraumatic.      Right Ear: External ear normal.      Left Ear: External ear normal.      Nose: Nose normal.   Cardiovascular:      Rate and Rhythm: Normal rate and regular rhythm.      Heart sounds: No murmur heard.  Pulmonary:      Effort: Pulmonary effort is normal. No respiratory distress.   Abdominal:      General: Bowel sounds are normal. There is no distension.      Tenderness: There is no abdominal tenderness.   Musculoskeletal:         General: No swelling.      Right lower leg: No edema.      Left lower leg: No edema.   Skin:     Findings: No erythema.   Neurological:      General: No focal deficit present.      Mental Status: He is alert and oriented to person, place, and time.   Psychiatric:         Mood and Affect: Mood normal.         Behavior: Behavior normal.

## 2023-05-20 NOTE — HOSPITAL COURSE
Liver biopsy done 5/18  resulted with moderate ACR.  SMP Given SMP X3 on days 5/19-5/21.  LFT's improving. Discharge instructions reviewed by pharmacist, and nursing   Patient  verbalized understanding and are in agreement with discharge from hospital today.  Patient is medically stable for discharge.  ** Patient will have longer Prednisone Taper of 2 WEEKS.    OF NOTE:   If Creat remains  elevated Dr Arnold states may want to consider Rapamune in future. During admission Lipid panel and Protien/creatinine ratio completed.  Also, please remember patient is on Ozempic   No valcyte x 2 months due to not being able to get filled at local pharmacy. CMV PCR pending, sending home on Valcyte ( medication given him  at discharge).

## 2023-05-20 NOTE — PLAN OF CARE
AAOx4. VSS. Afebrile. Visi in place. ACHS, 2am, SSI administered twice. Fall precautions maintained. Call light within reach. Pt verbalized understanding to call nurse as needed. No concerns voiced at this time.

## 2023-05-20 NOTE — ASSESSMENT & PLAN NOTE
Managed per primary team  Avoid hypoglycemia  Glucocorticoids markedly increase glucose levels. Expect the steroid taper will help glucose control.

## 2023-05-20 NOTE — CONSULTS
[facility] Transplant Stepdown  Endocrinology  Diabetes Consult Note    Consult Requested by: Abdirahman Anrold MD   Reason for admit: Acute rejection of liver transplant    HISTORY OF PRESENT ILLNESS:  Reason for Consult: Management of T2DM, Hyperglycemia      Surgical Procedure and Date: Liver transplant 1/5/2023     Diabetes diagnosis year: Over 5 years ago     Home Diabetes Medications:  Tresiba 45 units QD, Humalog 15 units TIDWM +SSI MDC (180/25), Jardiance 25 mg, Ozempic 2 mg    How often checking glucose at home? >4 x day   BG readings on regimen: 100-180  Hypoglycemia on the regimen?  No  Missed doses on regimen?  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 He progressed well post-op and was discharged POD#6. He has continued to do well post-transplant and is currently on tacrolimus and cellcept for immunosuppression. Recently, his LFTs have been elevated on outpatient labs. Liver US 5/10 with Interval increase in velocity in the extrahepatic main hepatic artery at the anastomosis with otherwise unchanged waveforms. Also with Interval decrease in caliber of the common bile duct. Liver biopsy done 5/18 now resulted with moderate ACR. 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 Patient on high doses of insulin at home.  Endocrine consulted to assist in management in setting of high dose steroid treatment.     Lab Results   Component Value Date    HGBA1C 4.7 05/19/2023           Interval HPI:   Overnight events: No acute events overnight. Patient in room 17700/90547 A. Blood glucose worsening. BG above goal on current insulin regimen (SSI ). Steroid use- Methylprednisolone   500 mg (pulse 1/3).    Renal function- Abnormal - Creatinine 1.6   Vasopressors-  None       Endocrine will continue to follow and manage insulin orders inpatient.         Diet diabetic [facility]; 2000 Calorie; Standard Tray     Eatin%  Nausea: No  Hypoglycemia and intervention: No  Fever: No  TPN and/or TF: No    PMH, PSH, FH, SH updated and reviewed     ROS:    Review of Systems   Constitutional:  Negative for unexpected weight change.   Eyes:  Negative for visual disturbance.   Respiratory:  Negative for cough.    Cardiovascular:  Negative for chest pain.   Gastrointestinal:  Negative for nausea and vomiting.   Endocrine: Negative for polydipsia and polyuria.   Musculoskeletal:  Negative for back pain.   Skin:  Negative for rash.   Allergic/Immunologic: Positive for immunocompromised state.   Neurological:  Negative for syncope.   Psychiatric/Behavioral:  Negative for agitation and dysphoric mood.      Current Medications and/or Treatments Impacting Glycemic Control  Immunotherapy:    Immunosuppressants           Stop Route Frequency     mycophenolate capsule 500 mg         -- Oral 2 times daily     tacrolimus capsule 5 mg         -- Oral 2 times daily          Steroids:   Hormones (From admission, onward)      Start     Stop Route Frequency Ordered    23 1715  methylPREDNISolone sodium succinate (SOLU-MEDROL) 500 mg in dextrose 5 % (D5W) 100 mL IVPB          0859 IV Daily 23 1711          Pressors:    Autonomic Drugs (From admission, onward)      None          Hyperglycemia/Diabetes Medications:   Antihyperglycemics (From admission, onward)      Start     Stop Route Frequency Ordered    23 0900  insulin detemir U-100 (Levemir) pen 35 Units         -- SubQ Daily 23 0715  insulin aspart U-100 pen 18 Units         -- SubQ 3 times daily with meals 23  insulin aspart U-100 pen 1-10 Units         -- SubQ Before meals, nightly and  at 0200 PRN 05/19/23 1923             PHYSICAL EXAMINATION:  Vitals:    05/20/23 0444   BP:    Pulse:    Resp:    Temp: 98.1 °F (36.7 °C)     Body mass index is 34.72 kg/m².     Physical Exam  Constitutional:       General: He is not in acute distress.     Appearance: Normal appearance. He is not ill-appearing.   HENT:      Head: Normocephalic and atraumatic.      Right Ear: External ear normal.      Left Ear: External ear normal.      Nose: Nose normal.   Cardiovascular:      Rate and Rhythm: Normal rate and regular rhythm.      Heart sounds: No murmur heard.  Pulmonary:      Effort: Pulmonary effort is normal. No respiratory distress.   Abdominal:      General: Bowel sounds are normal. There is no distension.      Tenderness: There is no abdominal tenderness.   Musculoskeletal:         General: No swelling.      Right lower leg: No edema.      Left lower leg: No edema.   Skin:     Findings: No erythema.   Neurological:      General: No focal deficit present.      Mental Status: He is alert and oriented to person, place, and time.   Psychiatric:         Mood and Affect: Mood normal.         Behavior: Behavior normal.            Labs Reviewed and Include   Recent Labs   Lab 05/20/23  0603 05/20/23  0817   *  --    CALCIUM 8.7  --    ALBUMIN 3.8  --    PROT 6.0  --    *  --    K 6.0* 5.6*   CO2 17*  --      --    BUN 36*  --    CREATININE 1.6*  --    ALKPHOS 160*  --    *  --    *  --    BILITOT 1.1*  --      Lab Results   Component Value Date    WBC 2.43 (L) 05/20/2023    HGB 9.1 (L) 05/20/2023    HCT 30.6 (L) 05/20/2023    MCV 81 (L) 05/20/2023    PLT 55 (L) 05/20/2023     No results for input(s): TSH, FREET4 in the last 168 hours.  Lab Results   Component Value Date    HGBA1C 4.7 05/19/2023       Nutritional status:   Body mass index is 34.72 kg/m².  Lab Results   Component Value Date    ALBUMIN 3.8 05/20/2023    ALBUMIN 3.9 05/19/2023    ALBUMIN 4.1 05/18/2023     No results found  for: PREALBUMIN    Estimated Creatinine Clearance: 66.9 mL/min (A) (based on SCr of 1.6 mg/dL (H)).    Accu-Checks  Recent Labs     05/18/23  1010 05/19/23  2218 05/20/23  0224 05/20/23  0843 05/20/23  1226   POCTGLUCOSE 132* 257* 280* 269* 285*        ASSESSMENT and PLAN    Oncology  Anemia of chronic disease  May impact the accuracy of the HbA1C. Changes in treatment should rely on BG logs rather than HbA1C levels.       Endocrine  Type 2 diabetes mellitus with hyperglycemia  BG goal: 140-180   T2DM.  Admitted for ACR.  Plan for SMP x3.  Endocrine consulted for bg management. BG above goal with steroids on board.   Will increase regimen and add HDC while on steroids.    - Increase Levemir to 25 units BID (increase to home regimen with 10% increase)  - Increase Novolog 22 units AC (20% increase)  - Adjust to Novolog HDC with ISF 16 starting at 150  - POCT Glucose before meals, at bedtime and at 2 am  - Hypoglycemia protocol in place      ** Please notify Endocrine for any change and/or advance in diet**  ** Please call Endocrine for any BG related issues **     Discharge Planning:   TBD. Please notify endocrinology prior to discharge.        GI  * Acute rejection of liver transplant  Managed per primary team  Avoid hypoglycemia  Glucocorticoids markedly increase glucose levels. Expect the steroid taper will help glucose control.         S/P liver transplant  Managed by primary.  Optimize bg control          Plan discussed with patient, family, and RN at bedside.     Unruly Jose PA-C  Endocrinology  [facility] - Transplant Stepdown

## 2023-05-20 NOTE — PROGRESS NOTES
Repeat K 5.6. Instructed per CYNTHIA Raman NP to hold insulin/D10 infusion, calcium gluconate, and sodium bicarb gtt for now. Awaiting additional orders.     Also instructed to hold Solumedrol dose until next potassium recheck - ordered for 1200.

## 2023-05-20 NOTE — HPI
Reason for Consult: Management of T2DM, Hyperglycemia      Surgical Procedure and Date: Liver transplant 1/5/2023     Diabetes diagnosis year: Over 5 years ago     Home Diabetes Medications:  Tresiba 45 units QD, Humalog 15 units TIDWM +SSI MDC (180/25), Jardiance 25 mg, Ozempic 2 mg    How often checking glucose at home? >4 x day   BG readings on regimen: 100-180  Hypoglycemia on the regimen?  No  Missed doses on regimen?  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 He progressed well post-op and was discharged POD#6. He has continued to do well post-transplant and is currently on tacrolimus and cellcept for immunosuppression. Recently, his LFTs have been elevated on outpatient labs. Liver US 5/10 with Interval increase in velocity in the extrahepatic main hepatic artery at the anastomosis with otherwise unchanged waveforms. Also with Interval decrease in caliber of the common bile duct. Liver biopsy done 5/18 now resulted with moderate ACR. 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 Patient on high doses of insulin at home.  Endocrine consulted to assist in management in setting of high dose steroid treatment.     Lab Results   Component Value Date    HGBA1C 4.7 05/19/2023

## 2023-05-20 NOTE — SUBJECTIVE & OBJECTIVE
Past Medical History:   Diagnosis Date    C. difficile diarrhea     Cirrhosis of liver with ascites 11/17/2022    Diabetes mellitus, type II, insulin dependent     Granulomatous colitis     Hepatic granuloma associated with sarcoidosis     Hepatic granuloma associated with sarcoidosis 11/17/2022    Hepatic granuloma associated with sarcoidosis 11/17/2022    Personal history of colonic polyps     Pre-op evaluation 1/4/2023    Sarcoidosis, unspecified     Thrombocytopenia, unspecified     Unspecified cirrhosis of liver        Past Surgical History:   Procedure Laterality Date    ADENOIDECTOMY      CARDIAC CATHETERIZATION Right     COLONOSCOPY  07/11/2018    HIP SURGERY      RIght removed ruptured cyst    LIVER TRANSPLANT N/A 1/5/2023    Procedure: TRANSPLANT, LIVER;  Surgeon: Rosa Foreman MD;  Location: [facility] OR [address] [place] [address];  Service: Transplant;  Laterality: N/A;    UPPER GASTROINTESTINAL ENDOSCOPY  10/09/2019       Review of patient's allergies indicates:   Allergen Reactions    Adhesive Other (See Comments)     Skin irritation  Paper tape okay to use    Pcn [penicillins] Rash     Happened as a baby       Family History       Problem Relation (Age of Onset)    Cancer Father, Maternal Grandfather    Diabetes Maternal Grandmother, Maternal Grandfather, Paternal Grandmother    Heart disease Father    Hypertension Mother, Father    Rectal cancer Father    Thyroid disease Father          Tobacco Use    Smoking status: Never    Smokeless tobacco: Never   Substance and Sexual Activity    Alcohol use: Never    Drug use: Never    Sexual activity: Not on file       PTA Medications   Medication Sig    ARIPiprazole (ABILIFY) 2 MG Tab Take 1 tablet (2 mg total) by mouth once daily.    aspirin (ECOTRIN) 81 MG EC tablet Take 1 tablet (81 mg total) by mouth once daily.    blood sugar diagnostic Strp 1 strip by Misc.(Non-Drug; Combo Route) route 4 (four) times daily.    blood-glucose meter Misc Use as instructed    calcium carbonate  "(OS-ORTIZ) 500 mg calcium (1,250 mg) tablet Take 1 tablet (500 mg total) by mouth once daily.    dapsone 100 MG Tab Take 1 tablet (100 mg total) by mouth once daily.    empagliflozin (JARDIANCE) 25 mg tablet Take 1 tablet (25 mg total) by mouth once daily.    EScitalopram oxalate (LEXAPRO) 20 MG tablet Take 1 tablet (20 mg total) by mouth once daily.    FREESTYLE LUZMA 14 DAY SENSOR Kit Apply topically.    HUMIRA PEN PnKt injection Inject into the skin.    hydrOXYchloroQUINE (PLAQUENIL) 200 mg tablet Take 200 mg by mouth 2 (two) times daily.    insulin degludec (TRESIBA FLEXTOUCH U-100) 100 unit/mL (3 mL) insulin pen Inject 45 Units into the skin once daily.    insulin lispro (HUMALOG KWIKPEN INSULIN) 100 unit/mL pen Inject 15 Units into the skin 3 (three) times daily before meals. Plus sliding scale insulin. Max insulin per day: 75 units    lancets 33 gauge Misc by Misc.(Non-Drug; Combo Route) route 4 (four) times daily.    multivitamin (THERAGRAN) per tablet Take 1 tablet by mouth once daily.    mycophenolate (CELLCEPT) 250 mg Cap Take 2 capsules (500 mg total) by mouth 2 (two) times daily.    oxyCODONE (ROXICODONE) 10 mg Tab immediate release tablet Take 1 tablet (10 mg total) by mouth every 8 (eight) hours as needed for Pain.    pantoprazole (PROTONIX) 40 MG tablet Take 1 tablet (40 mg total) by mouth once daily.    pen needle, diabetic 32 gauge x 5/32" Ndle 1 Dose by Misc.(Non-Drug; Combo Route) route 3 (three) times daily.    semaglutide (OZEMPIC) 2 mg/dose (8 mg/3 mL) PnIj Inject 2 mg into the skin every 7 days.    sodium bicarbonate 650 MG tablet Take 2 tablets (1,300 mg total) by mouth 2 (two) times daily.    sodium polystyrene (KAYEXALATE) powder Mix 30 grams in 120mL water and drink by mouth now for one dose (for high potassium)    tacrolimus (PROGRAF) 1 MG Cap Take 4 capsules (4 mg total) by mouth every 12 (twelve) hours.    traZODone (DESYREL) 150 MG tablet Take 1 tablet (150 mg total) by mouth every " evening.    valGANciclovir (VALCYTE) 450 mg Tab Take 1 tablet (450 mg total) by mouth once daily. STOP 7/5/23       Review of Systems   Constitutional:  Negative for activity change, appetite change, chills and fever.   Respiratory:  Negative for shortness of breath.    Cardiovascular:  Negative for leg swelling.   Gastrointestinal:  Negative for diarrhea.   Allergic/Immunologic: Positive for immunocompromised state.   Psychiatric/Behavioral:  The patient is nervous/anxious.    Objective:     Vital Signs (Most Recent):  Temp: 97.5 °F (36.4 °C) (05/20/23 1147)  Pulse: 79 (05/20/23 1147)  Resp: 20 (05/20/23 1147)  BP: (!) 99/58 (05/20/23 1147)  SpO2: 97 % (05/20/23 1147) Vital Signs (24h Range):  Temp:  [96.8 °F (36 °C)-98.9 °F (37.2 °C)] 97.5 °F (36.4 °C)  Pulse:  [70-94] 79  Resp:  [14-20] 20  SpO2:  [95 %-98 %] 97 %  BP: ()/(58-83) 99/58     Weight: 105.1 kg (231 lb 11.3 oz)  Body mass index is 34.72 kg/m².      Intake/Output Summary (Last 24 hours) at 5/20/2023 1203  Last data filed at 5/20/2023 0851  Gross per 24 hour   Intake 690 ml   Output 1375 ml   Net -685 ml        Physical Exam  Vitals and nursing note reviewed.   Cardiovascular:      Rate and Rhythm: Normal rate and regular rhythm.      Heart sounds: No murmur heard.    No gallop.   Pulmonary:      Effort: Pulmonary effort is normal.      Breath sounds: No wheezing or rales.   Neurological:      Mental Status: He is alert and oriented to person, place, and time.   Psychiatric:         Mood and Affect: Mood normal.         Behavior: Behavior normal.         Thought Content: Thought content normal.         Judgment: Judgment normal.        Laboratory:  CBC:   Recent Labs   Lab 05/20/23  0603   WBC 2.43*   RBC 3.77*   HGB 9.1*   HCT 30.6*   PLT 55*   MCV 81*   MCH 24.1*   MCHC 29.7*     CMP:   Recent Labs   Lab 05/20/23  0603 05/20/23  0817   *  --    CALCIUM 8.7  --    ALBUMIN 3.8  --    PROT 6.0  --    *  --    K 6.0* 5.6*   CO2 17*  --       --    BUN 36*  --    CREATININE 1.6*  --    ALKPHOS 160*  --    *  --    *  --    BILITOT 1.1*  --      Labs within the past 24 hours have been reviewed.    Diagnostic Results:  None

## 2023-05-20 NOTE — ASSESSMENT & PLAN NOTE
May impact the accuracy of the HbA1C. Changes in treatment should rely on BG logs rather than HbA1C levels.

## 2023-05-21 ENCOUNTER — PATIENT MESSAGE (OUTPATIENT)
Dept: ENDOCRINOLOGY | Facility: HOSPITAL | Age: 48
End: 2023-05-21
Payer: COMMERCIAL

## 2023-05-21 VITALS
HEART RATE: 65 BPM | OXYGEN SATURATION: 99 % | WEIGHT: 231.69 LBS | RESPIRATION RATE: 16 BRPM | TEMPERATURE: 98 F | HEIGHT: 69 IN | DIASTOLIC BLOOD PRESSURE: 58 MMHG | BODY MASS INDEX: 34.32 KG/M2 | SYSTOLIC BLOOD PRESSURE: 98 MMHG

## 2023-05-21 LAB
ALBUMIN SERPL BCP-MCNC: 3.9 G/DL (ref 3.5–5.2)
ALP SERPL-CCNC: 147 U/L (ref 55–135)
ALT SERPL W/O P-5'-P-CCNC: 469 U/L (ref 10–44)
ANION GAP SERPL CALC-SCNC: 10 MMOL/L (ref 8–16)
AST SERPL-CCNC: 103 U/L (ref 10–40)
BASOPHILS # BLD AUTO: 0 K/UL (ref 0–0.2)
BASOPHILS NFR BLD: 0 % (ref 0–1.9)
BILIRUB SERPL-MCNC: 1 MG/DL (ref 0.1–1)
BUN SERPL-MCNC: 44 MG/DL (ref 6–20)
CALCIUM SERPL-MCNC: 8.8 MG/DL (ref 8.7–10.5)
CHLORIDE SERPL-SCNC: 103 MMOL/L (ref 95–110)
CHOLEST SERPL-MCNC: 130 MG/DL (ref 120–199)
CHOLEST/HDLC SERPL: 5.2 {RATIO} (ref 2–5)
CO2 SERPL-SCNC: 19 MMOL/L (ref 23–29)
CREAT SERPL-MCNC: 1.7 MG/DL (ref 0.5–1.4)
DIFFERENTIAL METHOD: ABNORMAL
EOSINOPHIL # BLD AUTO: 0 K/UL (ref 0–0.5)
EOSINOPHIL NFR BLD: 0 % (ref 0–8)
ERYTHROCYTE [DISTWIDTH] IN BLOOD BY AUTOMATED COUNT: 15.9 % (ref 11.5–14.5)
EST. GFR  (NO RACE VARIABLE): 49.1 ML/MIN/1.73 M^2
GLUCOSE SERPL-MCNC: 282 MG/DL (ref 70–110)
HCT VFR BLD AUTO: 32.6 % (ref 40–54)
HDLC SERPL-MCNC: 25 MG/DL (ref 40–75)
HDLC SERPL: 19.2 % (ref 20–50)
HGB BLD-MCNC: 9.4 G/DL (ref 14–18)
IMM GRANULOCYTES # BLD AUTO: 0.01 K/UL (ref 0–0.04)
IMM GRANULOCYTES NFR BLD AUTO: 0.3 % (ref 0–0.5)
LDLC SERPL CALC-MCNC: 69.4 MG/DL (ref 63–159)
LYMPHOCYTES # BLD AUTO: 0.9 K/UL (ref 1–4.8)
LYMPHOCYTES NFR BLD: 28.8 % (ref 18–48)
MCH RBC QN AUTO: 23.4 PG (ref 27–31)
MCHC RBC AUTO-ENTMCNC: 28.8 G/DL (ref 32–36)
MCV RBC AUTO: 81 FL (ref 82–98)
MONOCYTES # BLD AUTO: 0.2 K/UL (ref 0.3–1)
MONOCYTES NFR BLD: 7.6 % (ref 4–15)
NEUTROPHILS # BLD AUTO: 1.9 K/UL (ref 1.8–7.7)
NEUTROPHILS NFR BLD: 63.3 % (ref 38–73)
NONHDLC SERPL-MCNC: 105 MG/DL
NRBC BLD-RTO: 0 /100 WBC
PLATELET # BLD AUTO: 74 K/UL (ref 150–450)
PMV BLD AUTO: 10.6 FL (ref 9.2–12.9)
POCT GLUCOSE: 256 MG/DL (ref 70–110)
POCT GLUCOSE: 264 MG/DL (ref 70–110)
POCT GLUCOSE: 408 MG/DL (ref 70–110)
POTASSIUM SERPL-SCNC: 5.2 MMOL/L (ref 3.5–5.1)
PROT SERPL-MCNC: 6.2 G/DL (ref 6–8.4)
RBC # BLD AUTO: 4.01 M/UL (ref 4.6–6.2)
SODIUM SERPL-SCNC: 132 MMOL/L (ref 136–145)
TACROLIMUS BLD-MCNC: 8.9 NG/ML (ref 5–15)
TRIGL SERPL-MCNC: 178 MG/DL (ref 30–150)
WBC # BLD AUTO: 3.02 K/UL (ref 3.9–12.7)

## 2023-05-21 PROCEDURE — 80197 ASSAY OF TACROLIMUS: CPT | Performed by: NURSE PRACTITIONER

## 2023-05-21 PROCEDURE — 99232 PR SUBSEQUENT HOSPITAL CARE,LEVL II: ICD-10-PCS | Mod: ,,, | Performed by: PHYSICIAN ASSISTANT

## 2023-05-21 PROCEDURE — 25000003 PHARM REV CODE 250: Performed by: NURSE PRACTITIONER

## 2023-05-21 PROCEDURE — 99238 HOSP IP/OBS DSCHRG MGMT 30/<: CPT | Mod: ,,, | Performed by: NURSE PRACTITIONER

## 2023-05-21 PROCEDURE — 25000003 PHARM REV CODE 250: Performed by: PHYSICIAN ASSISTANT

## 2023-05-21 PROCEDURE — 85025 COMPLETE CBC W/AUTO DIFF WBC: CPT | Performed by: NURSE PRACTITIONER

## 2023-05-21 PROCEDURE — 80053 COMPREHEN METABOLIC PANEL: CPT | Performed by: NURSE PRACTITIONER

## 2023-05-21 PROCEDURE — 63600175 PHARM REV CODE 636 W HCPCS: Performed by: PHYSICIAN ASSISTANT

## 2023-05-21 PROCEDURE — 63600175 PHARM REV CODE 636 W HCPCS: Performed by: NURSE PRACTITIONER

## 2023-05-21 PROCEDURE — 36415 COLL VENOUS BLD VENIPUNCTURE: CPT | Performed by: NURSE PRACTITIONER

## 2023-05-21 PROCEDURE — 99232 SBSQ HOSP IP/OBS MODERATE 35: CPT | Mod: ,,, | Performed by: PHYSICIAN ASSISTANT

## 2023-05-21 PROCEDURE — 80061 LIPID PANEL: CPT | Performed by: NURSE PRACTITIONER

## 2023-05-21 PROCEDURE — 99238 PR HOSPITAL DISCHARGE DAY,<30 MIN: ICD-10-PCS | Mod: ,,, | Performed by: NURSE PRACTITIONER

## 2023-05-21 RX ORDER — DAPSONE 100 MG/1
100 TABLET ORAL DAILY
Qty: 30 TABLET | Refills: 5 | Status: SHIPPED | OUTPATIENT
Start: 2023-05-21 | End: 2023-06-08 | Stop reason: SINTOL

## 2023-05-21 RX ORDER — PREDNISONE 5 MG/1
TABLET ORAL
Qty: 98 TABLET | Refills: 0 | Status: ON HOLD | OUTPATIENT
Start: 2023-05-21 | End: 2023-07-28 | Stop reason: SDUPTHER

## 2023-05-21 RX ORDER — SODIUM BICARBONATE 650 MG/1
1300 TABLET ORAL 3 TIMES DAILY
Qty: 180 TABLET | Refills: 11 | Status: SHIPPED | OUTPATIENT
Start: 2023-05-21 | End: 2023-07-10 | Stop reason: SDUPTHER

## 2023-05-21 RX ORDER — INSULIN ASPART 100 [IU]/ML
26 INJECTION, SOLUTION INTRAVENOUS; SUBCUTANEOUS
Status: DISCONTINUED | OUTPATIENT
Start: 2023-05-21 | End: 2023-05-21 | Stop reason: HOSPADM

## 2023-05-21 RX ORDER — VALGANCICLOVIR 450 MG/1
450 TABLET, FILM COATED ORAL DAILY
Qty: 30 TABLET | Refills: 5 | Status: SHIPPED | OUTPATIENT
Start: 2023-05-21 | End: 2023-05-30

## 2023-05-21 RX ORDER — INSULIN DEGLUDEC 100 U/ML
50 INJECTION, SOLUTION SUBCUTANEOUS DAILY
Qty: 5 PEN | Refills: 11 | Status: SHIPPED | OUTPATIENT
Start: 2023-05-21 | End: 2024-05-20

## 2023-05-21 RX ORDER — INSULIN LISPRO 100 [IU]/ML
26 INJECTION, SOLUTION INTRAVENOUS; SUBCUTANEOUS
Qty: 24 ML | Refills: 11 | Status: ON HOLD | OUTPATIENT
Start: 2023-05-21 | End: 2023-07-28 | Stop reason: SDUPTHER

## 2023-05-21 RX ORDER — TACROLIMUS 1 MG/1
5 CAPSULE ORAL EVERY 12 HOURS
Qty: 240 CAPSULE | Refills: 11 | Status: SHIPPED | OUTPATIENT
Start: 2023-05-21 | End: 2023-07-14 | Stop reason: SDUPTHER

## 2023-05-21 RX ADMIN — INSULIN ASPART 26 UNITS: 100 INJECTION, SOLUTION INTRAVENOUS; SUBCUTANEOUS at 08:05

## 2023-05-21 RX ADMIN — INSULIN ASPART 9 UNITS: 100 INJECTION, SOLUTION INTRAVENOUS; SUBCUTANEOUS at 08:05

## 2023-05-21 RX ADMIN — ESCITALOPRAM OXALATE 20 MG: 10 TABLET ORAL at 08:05

## 2023-05-21 RX ADMIN — SODIUM ZIRCONIUM CYCLOSILICATE 5 G: 5 POWDER, FOR SUSPENSION ORAL at 10:05

## 2023-05-21 RX ADMIN — INSULIN ASPART 6 UNITS: 100 INJECTION, SOLUTION INTRAVENOUS; SUBCUTANEOUS at 02:05

## 2023-05-21 RX ADMIN — TACROLIMUS 5 MG: 1 CAPSULE ORAL at 08:05

## 2023-05-21 RX ADMIN — INSULIN DETEMIR 25 UNITS: 100 INJECTION, SOLUTION SUBCUTANEOUS at 08:05

## 2023-05-21 RX ADMIN — SODIUM BICARBONATE 1300 MG: 650 TABLET ORAL at 08:05

## 2023-05-21 RX ADMIN — DEXTROSE MONOHYDRATE 500 MG: 50 INJECTION, SOLUTION INTRAVENOUS at 10:05

## 2023-05-21 RX ADMIN — HYDROXYCHLOROQUINE SULFATE 200 MG: 200 TABLET ORAL at 08:05

## 2023-05-21 RX ADMIN — VALGANCICLOVIR 450 MG: 450 TABLET, FILM COATED ORAL at 08:05

## 2023-05-21 RX ADMIN — PANTOPRAZOLE SODIUM 40 MG: 40 TABLET, DELAYED RELEASE ORAL at 08:05

## 2023-05-21 RX ADMIN — DAPSONE 100 MG: 100 TABLET ORAL at 08:05

## 2023-05-21 RX ADMIN — ASPIRIN 81 MG: 81 TABLET, COATED ORAL at 08:05

## 2023-05-21 RX ADMIN — MYCOPHENOLATE MOFETIL 500 MG: 250 CAPSULE ORAL at 08:05

## 2023-05-21 NOTE — NURSING
. Dr Jeong with endocrine notified. Told to tell pt not to eat or drink anything but water until breakfast. Diamond recheck BG at 0200. No further orders at this time.

## 2023-05-21 NOTE — PROGRESS NOTES
Discharge instructions given to and reviewed with patient. Reviewed when to call MD/coordinator and number for [place] on call. Updated blue card given to patient per Ph.D. Patient to  prescriptions from pharmacy. Ambulated off unit; in NAD.

## 2023-05-21 NOTE — PROGRESS NOTES
"Discharge Medication Note:    Hospital Course: Admitted for moderate ACR on bx 5/18, now s/p SMP x 3 doses and LFT down trending.     - Tacrolimus goal 8-10 (currently 5 mg BID),  mg BID (for low WBC), prednisone taper (2 weeks of 20 mg daily per Dr. Arnold)  - Extended dapsone to stop 11/17/2023  - Need to follow up CMV VL sent 5/19. Patient was not taking Valcyte for the past 2 months because his local pharmacy does not have it.      Met with Rosalio Mccarty at discharge to review discharge medications and to update the blue medication card.      Patsy "Denny Blackman  PGY2 Solid Organ Transplant Pharmacy Resident           Medication List        START taking these medications      predniSONE 5 MG tablet  Commonly known as: DELTASONE  Take 4 tablets (20mg) by mouth daily starting 5/22-6/4,THEN Take 3 tablets (15mg) by  mouth daily 6/5-6/11 15 mg,Take 2 tablets (10mg) by  mouth daily 6/12-6/18, THEN Take 1 tablet (5mg) by  mouth daily 6/19-6/25 then discontinue.            CHANGE how you take these medications      dapsone 100 MG Tab  Take 1 tablet (100 mg total) by mouth once daily. STOP 11/17/23  What changed: additional instructions     insulin degludec 100 unit/mL (3 mL) insulin pen  Commonly known as: TRESIBA FLEXTOUCH U-100  Inject 50 Units into the skin once daily.  What changed: how much to take     insulin lispro 100 unit/mL pen  Commonly known as: HumaLOG KwikPen Insulin  Inject 26 Units into the skin 3 times daily before meals. Plus sliding scale insulin: Blood sugar 150 - 200 = +2 units; 201 - 250 = +4 units; 251 - 300 = +6 units; 301 - 350 = +8 units; >350 = +10 units. Total daily dose: 108 units  What changed:   how much to take  additional instructions     sodium bicarbonate 650 MG tablet  Take 2 tablets (1,300 mg total) by mouth 3 (three) times daily.  What changed: when to take this     tacrolimus 1 MG Cap  Commonly known as: PROGRAF  Take 5 capsules (5 mg total) by mouth every 12 (twelve) " "hours.  What changed: how much to take            CONTINUE taking these medications      ARIPiprazole 2 MG Tab  Commonly known as: ABILIFY  Take 1 tablet (2 mg total) by mouth once daily.     aspirin 81 MG EC tablet  Commonly known as: ECOTRIN  Take 1 tablet (81 mg total) by mouth once daily.     BD ULTRA-FINE MELVINA PEN NEEDLE 32 gauge x 5/32" Ndle  Generic drug: pen needle, diabetic  1 Dose by Misc.(Non-Drug; Combo Route) route 3 (three) times daily.     calcium carbonate 500 mg calcium (1,250 mg) tablet  Commonly known as: OS-ORTIZ  Take 1 tablet (500 mg total) by mouth once daily.     empagliflozin 25 mg tablet  Commonly known as: JARDIANCE  Take 1 tablet (25 mg total) by mouth once daily.     EScitalopram oxalate 20 MG tablet  Commonly known as: LEXAPRO  Take 1 tablet (20 mg total) by mouth once daily.     FREESTYLE LUZMA 14 DAY SENSOR Kit  Generic drug: flash glucose sensor     HUMIRA PEN Pnkt injection  Generic drug: adalimumab     hydrOXYchloroQUINE 200 mg tablet  Commonly known as: PLAQUENIL     multivitamin per tablet  Commonly known as: THERAGRAN     mycophenolate 250 mg Cap  Commonly known as: CELLCEPT  Take 2 capsules (500 mg total) by mouth 2 (two) times daily.     ONETOUCH DELICA PLUS LANCET 33 gauge Misc  Generic drug: lancets  by Misc.(Non-Drug; Combo Route) route 4 (four) times daily.     ONETOUCH VERIO REFLECT METER Misc  Generic drug: blood-glucose meter  Use as instructed     ONETOUCH VERIO TEST STRIPS Strp  Generic drug: blood sugar diagnostic  1 strip by Misc.(Non-Drug; Combo Route) route 4 (four) times daily.     oxyCODONE 10 mg Tab immediate release tablet  Commonly known as: ROXICODONE  Take 1 tablet (10 mg total) by mouth every 8 (eight) hours as needed for Pain.     OZEMPIC 2 mg/dose (8 mg/3 mL) Pnij  Generic drug: semaglutide     pantoprazole 40 MG tablet  Commonly known as: PROTONIX  Take 1 tablet (40 mg total) by mouth once daily.     sodium polystyrene powder  Commonly known as: " KAYEXALATE  Mix 30 grams in 120mL water and drink by mouth now for one dose (for high potassium)     traZODone 150 MG tablet  Commonly known as: DESYREL  Take 1 tablet (150 mg total) by mouth every evening.     valGANciclovir 450 mg Tab  Commonly known as: VALCYTE  Take 1 tablet (450 mg total) by mouth once daily. STOP 7/5/23               Where to Get Your Medications        These medications were sent to [facility]  [address] [place], [place] [address] [facility] [address]      Hours: Mon-Fri 7a-7p, Sat-Sun 10a-4p Phone: [phone]   dapsone 100 MG Tab  insulin degludec 100 unit/mL (3 mL) insulin pen  insulin lispro 100 unit/mL pen  predniSONE 5 MG tablet  sodium bicarbonate 650 MG tablet  tacrolimus 1 MG Cap  valGANciclovir 450 mg Tab            The following medications have been placed on HOLD and should be restarted in the outpatient setting (when appropriate): none    Rosalio Mccarty verbalized understanding and had the opportunity to ask questions.

## 2023-05-21 NOTE — SUBJECTIVE & OBJECTIVE
"Interval HPI:   No acute events overnight. Patient in room 63572/70753 A. Blood glucose variable. BG above goal on current insulin regimen (SSI, prandial, and basal insulin ). Steroid use- Methylprednisolone  500 mg .      Renal function- Abnormal - Cr 1.7   Vasopressors-  None     Diet diabetic [facility]; 2000 Calorie; Standard Tray  Diet diabetic     Eatin%  Nausea: No  Hypoglycemia and intervention: No  Fever: No  TPN and/or TF: No    BP (!) 98/58 (BP Location: Left arm, Patient Position: Sitting)   Pulse 65   Temp 97.7 °F (36.5 °C) (Oral)   Resp 16   Ht 5' 8.5" (1.74 m)   Wt 105.1 kg (231 lb 11.3 oz)   SpO2 99%   BMI 34.72 kg/m²     Labs Reviewed and Include    Recent Labs   Lab 23  0654   *   CALCIUM 8.8   ALBUMIN 3.9   PROT 6.2   *   K 5.2*   CO2 19*      BUN 44*   CREATININE 1.7*   ALKPHOS 147*   *   *   BILITOT 1.0     Lab Results   Component Value Date    WBC 3.02 (L) 2023    HGB 9.4 (L) 2023    HCT 32.6 (L) 2023    MCV 81 (L) 2023    PLT 74 (L) 2023     No results for input(s): TSH, FREET4 in the last 168 hours.  Lab Results   Component Value Date    HGBA1C 4.7 2023       Nutritional status:   Body mass index is 34.72 kg/m².  Lab Results   Component Value Date    ALBUMIN 3.9 2023    ALBUMIN 3.8 2023    ALBUMIN 3.9 2023     No results found for: PREALBUMIN    Estimated Creatinine Clearance: 63 mL/min (A) (based on SCr of 1.7 mg/dL (H)).    Accu-Checks  Recent Labs     23  2218 23  0224 23  0843 23  1226 23  1725 23  2135 23  0204 23  0857   POCTGLUCOSE 257* 280* 269* 285* 336* 408* 256* 264*       Current Medications and/or Treatments Impacting Glycemic Control  Immunotherapy:    Immunosuppressants           Stop Route Frequency     mycophenolate capsule 500 mg         -- Oral 2 times daily     tacrolimus capsule 5 mg         -- Oral 2 times daily "          Steroids:   Hormones (From admission, onward)      None          Pressors:    Autonomic Drugs (From admission, onward)      None          Hyperglycemia/Diabetes Medications:   Antihyperglycemics (From admission, onward)      Start     Stop Route Frequency Ordered    05/21/23 0900  insulin detemir U-100 (Levemir) pen 25 Units         -- SubQ 2 times daily 05/20/23 1108    05/21/23 0715  insulin aspart U-100 pen 26 Units         -- SubQ 3 times daily with meals 05/21/23 0704    05/20/23 1846  insulin aspart U-100 pen 5 Units         -- SubQ As needed (PRN) 05/20/23 1748    05/20/23 1200  insulin aspart U-100 pen 0-15 Units         -- SubQ Before meals, nightly and at 0200 PRN 05/20/23 1100    05/20/23 0930  insulin regular injection 10 Units 0.1 mL  (Insulin and Dextrose 50% with IMPAIRED renal function (for patients with blood glucose < 250 mg/dL))        See Hyperspace for full Linked Orders Report.    -- IV Once 05/20/23 0821

## 2023-05-21 NOTE — ASSESSMENT & PLAN NOTE
BG goal: 140-180   T2DM.  Admitted for ACR.  Plan for SMP x3.  Endocrine consulted for bg management. BG above goal with steroids on board.   Will increase regimen and add HDC while on steroids.    - Continue Levemir to 25 units BID   - Increase Novolog 26 units AC   - Adjust to Novolog HDC with ISF 16 starting at 150  - POCT Glucose before meals, at bedtime and at 2 am  - Hypoglycemia protocol in place      ** Please notify Endocrine for any change and/or advance in diet**  ** Please call Endocrine for any BG related issues **     Discharge Planning:   Notified by team.  Plans to d/c today.  Completed SMP x3 with elevations in BG.  Will go home on pred taper.  Discussed with patient parameters to decrease insulin with steroids decreasing.  Pt understands and agreeable to plan.  - Tresiba 50 units daily  - Novolog 26 units AC  -Add correction scale if needed.  Blood sugar 150 to 200 add 2 units  Blood sugar 201 to 250 add 4 units  Blood sugar 251 to 300 add 6 units  Blood sugar 301 to 350 add 8 units  Blood sugar greater than 350 add 10 units  -He can restart home dm meds.  Ozempic and Jardiance.

## 2023-05-21 NOTE — PLAN OF CARE
Pt AAOx4, independent. C/o joint pain; Angélica Raman NP notified. Tramadol ordered and administered. VSS. Tele monitoring ongoing; NSR. Accu checks ACHS + 2am. See previous note concerning BG. Plan to receive Pulse dose 3 today and possibly d/c home. LFT trending down. Bed locked and low. Call bell in reach. Educated to call for assist if needed.

## 2023-05-21 NOTE — DISCHARGE SUMMARY
[facility]  Liver Transplant  Discharge Summary      Patient Name: Rosalio Mccarty  MRN: [id number]  Admission Date: 5/19/2023  Hospital Length of Stay: 2 days  Discharge Date and Time:  05/21/2023 11:57 AM  Attending Physician: Abdirahman Arnold MD   Discharging Provider: KEILY Reyna  Primary Care Provider: Primary Doctor Jennifer  Post-Operative Day: 136     ORGAN:   LIVER  Disease Etiology: Cirrhosis: Other, Specify - granulomatous hepatitis  Donor Type:   Donation after Brain Death  CDC High Risk:   Yes  Donor CMV Status:   Donor CMV Status: Positive  Donor HBcAB:   Negative  Donor HCV Status:   Positive  Whole or Partial: Whole Liver  Biliary Anastomosis: End to End  Arterial Anatomy: Replaced Right Hepatic from SMA    HPI:   Rosalio Mccarty is a 48yr old male with 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 He progressed well post-op and was discharged POD#6. He has continued to do well post-transplant and is currently on tacrolimus and cellcept for immunosuppression. Recently, his LFTs have been elevated on outpatient labs. Liver US 5/10 with Interval increase in velocity in the extrahepatic main hepatic artery at the anastomosis with otherwise unchanged waveforms. Also with Interval decrease in caliber of the common bile duct. Liver biopsy done 5/18 now resulted with moderate ACR. 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 Patient on high doses of insulin at home, will consult Endocrine to assist in management in setting of high dose steroid treatment. He reports feeling fine. Denies fever/chills. Has had mild diarrhea. Will check CMV PCR    Hospital Course:    Liver biopsy done 5/18  resulted with moderate ACR.  SMP Given SMP X3 on days 5/19-5/21.  LFT's improving. Discharge instructions reviewed by pharmacist, and  nursing   Patient  verbalized understanding and are in agreement with discharge from hospital today.  Patient is medically stable for discharge.   Patient will have longer Prednisone Taper of 2 WEEKS.    OF NOTE:   1. If Creat remains  elevated Dr Arnold states may want to consider Rapamune in future. During admission Lipid panel and Protien/creatinine ratio completed.  Also, please remember patient is on Ozempic   2. No valcyte x 2 months due to not being able to get filled at local pharmacy. CMV PCR pending, sending home on Valcyte ( medication given him  at discharge).      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] 05/19/2023 Yes    Anemia of chronic disease [D63.8] 01/09/2023 Yes    At risk for opportunistic infections [Z91.89] 01/09/2023 Yes    Long-term use of immunosuppressant medication [Z79.60] 01/09/2023 Not Applicable    Prophylactic immunotherapy [Z29.8] 01/09/2023 Not Applicable    Thrombocytopenia, unspecified [D69.6] 01/09/2023 Yes    S/P liver transplant [Z94.4] 01/07/2023 Not Applicable    Type 2 diabetes mellitus with hyperglycemia [E11.65] 01/06/2023 Yes      Problems Resolved During this Admission:       Consults (From admission, onward)        Status Ordering Provider     Inpatient consult to Endocrinology  Once        Provider:  (Not yet assigned)    Completed ARTURO GUTIERREZ          Pending Diagnostic Studies:     Procedure Component Value Units Date/Time    CMV DNA, quantitative, PCR [[id number]] Collected: 05/19/23 1814    Order Status: Sent Lab Status: In process Updated: 05/20/23 0056    Specimen: Blood         Significant Diagnostic Studies: Labs:   BMP:   Recent Labs   Lab 05/20/23  0603 05/20/23  0817 05/20/23  1324 05/21/23  0654   *  --  292* 282*   *  --  133* 132*   K 6.0* 5.6* 4.8  4.8 5.2*     --  105 103   CO2  17*  --  17* 19*   BUN 36*  --  40* 44*   CREATININE 1.6*  --  1.7* 1.7*   CALCIUM 8.7  --  8.4* 8.8   , CMP   Recent Labs   Lab 05/19/23  1814 05/20/23  0603 05/20/23  0817 05/20/23  1324 05/21/23  0654    131*  --  133* 132*   K 5.0 6.0* 5.6* 4.8  4.8 5.2*    107  --  105 103   CO2 17* 17*  --  17* 19*   * 302*  --  292* 282*   BUN 32* 36*  --  40* 44*   CREATININE 1.6* 1.6*  --  1.7* 1.7*   CALCIUM 8.9 8.7  --  8.4* 8.8   PROT 6.2 6.0  --   --  6.2   ALBUMIN 3.9 3.8  --   --  3.9   BILITOT 1.4* 1.1*  --   --  1.0   ALKPHOS 167* 160*  --   --  147*   * 194*  --   --  103*   * 543*  --   --  469*   ANIONGAP 14 7*  --  11 10   , CBC   Recent Labs   Lab 05/19/23  1814 05/20/23  0603 05/21/23  0654   WBC 3.80* 2.43* 3.02*   HGB 10.2* 9.1* 9.4*   HCT 34.3* 30.6* 32.6*   PLT 75* 55* 74*   , A1C:   Recent Labs   Lab 01/04/23  1607 02/13/23  0731 05/19/23  1814   HGBA1C 6.0* 4.5 4.7    and All labs within the past 24 hours have been reviewed    The patients clinical status was discussed at multidisplinary rounds, involving transplant surgery, transplant medicine, pharmacy, nursing, nutrition, and social work    Discharged Condition: stable    Disposition: Home or Self Care    Follow Up:    Patient Instructions:      Diet diabetic     Notify your health care provider if you experience any of the following:     Notify your health care provider if you experience any of the following:  increased confusion or weakness     Notify your health care provider if you experience any of the following:  persistent dizziness, light-headedness, or visual disturbances     Notify your health care provider if you experience any of the following:  worsening rash     Notify your health care provider if you experience any of the following:  severe persistent headache     Notify your health care provider if you experience any of the following:  difficulty breathing or increased cough     Notify your health  care provider if you experience any of the following:  redness, tenderness, or signs of infection (pain, swelling, redness, odor or green/yellow discharge around incision site)     Notify your health care provider if you experience any of the following:  severe uncontrolled pain     Notify your health care provider if you experience any of the following:  persistent nausea and vomiting or diarrhea     Notify your health care provider if you experience any of the following:  temperature >100.4     Activity as tolerated     Medications:  Reconciled Home Medications:      Medication List      START taking these medications    predniSONE 5 MG tablet  Commonly known as: DELTASONE  Take 4 tablets (20mg) by mouth daily starting 5/22-6/4,THEN Take 3 tablets (15mg) by  mouth daily 6/5-6/11 15 mg,Take 2 tablets (10mg) by  mouth daily 6/12-6/18, THEN Take 1 tablet (5mg) by  mouth daily 6/19-6/25 then discontinue.        CHANGE how you take these medications    dapsone 100 MG Tab  Take 1 tablet (100 mg total) by mouth once daily. STOP 11/17/23  What changed: additional instructions     insulin degludec 100 unit/mL (3 mL) insulin pen  Commonly known as: TRESIBA FLEXTOUCH U-100  Inject 50 Units into the skin once daily.  What changed: how much to take     insulin lispro 100 unit/mL pen  Commonly known as: HumaLOG KwikPen Insulin  Inject 26 Units into the skin 3 times daily before meals. Plus sliding scale insulin: Blood sugar 150 - 200 = +2 units; 201 - 250 = +4 units; 251 - 300 = +6 units; 301 - 350 = +8 units; >350 = +10 units. Total daily dose: 108 units  What changed:   · how much to take  · additional instructions     sodium bicarbonate 650 MG tablet  Take 2 tablets (1,300 mg total) by mouth 3 (three) times daily.  What changed: when to take this     tacrolimus 1 MG Cap  Commonly known as: PROGRAF  Take 5 capsules (5 mg total) by mouth every 12 (twelve) hours.  What changed: how much to take        CONTINUE taking these  "medications    ARIPiprazole 2 MG Tab  Commonly known as: ABILIFY  Take 1 tablet (2 mg total) by mouth once daily.     aspirin 81 MG EC tablet  Commonly known as: ECOTRIN  Take 1 tablet (81 mg total) by mouth once daily.     BD ULTRA-FINE MELVINA PEN NEEDLE 32 gauge x 5/32" Ndle  Generic drug: pen needle, diabetic  1 Dose by Misc.(Non-Drug; Combo Route) route 3 (three) times daily.     calcium carbonate 500 mg calcium (1,250 mg) tablet  Commonly known as: OS-ORTIZ  Take 1 tablet (500 mg total) by mouth once daily.     empagliflozin 25 mg tablet  Commonly known as: JARDIANCE  Take 1 tablet (25 mg total) by mouth once daily.     EScitalopram oxalate 20 MG tablet  Commonly known as: LEXAPRO  Take 1 tablet (20 mg total) by mouth once daily.     FREESTYLE LUZMA 14 DAY SENSOR Kit  Generic drug: flash glucose sensor  Apply topically.     HUMIRA PEN Pnkt injection  Generic drug: adalimumab  Inject into the skin.     hydrOXYchloroQUINE 200 mg tablet  Commonly known as: PLAQUENIL  Take 200 mg by mouth 2 (two) times daily.     multivitamin per tablet  Commonly known as: THERAGRAN  Take 1 tablet by mouth once daily.     mycophenolate 250 mg Cap  Commonly known as: CELLCEPT  Take 2 capsules (500 mg total) by mouth 2 (two) times daily.     ONETOUCH DELICA PLUS LANCET 33 gauge Misc  Generic drug: lancets  by Misc.(Non-Drug; Combo Route) route 4 (four) times daily.     ONETOUCH VERIO REFLECT METER Misc  Generic drug: blood-glucose meter  Use as instructed     ONETOUCH VERIO TEST STRIPS Strp  Generic drug: blood sugar diagnostic  1 strip by Misc.(Non-Drug; Combo Route) route 4 (four) times daily.     oxyCODONE 10 mg Tab immediate release tablet  Commonly known as: ROXICODONE  Take 1 tablet (10 mg total) by mouth every 8 (eight) hours as needed for Pain.     OZEMPIC 2 mg/dose (8 mg/3 mL) Pnij  Generic drug: semaglutide  Inject 2 mg into the skin every 7 days.     pantoprazole 40 MG tablet  Commonly known as: PROTONIX  Take 1 tablet (40 mg " total) by mouth once daily.     sodium polystyrene powder  Commonly known as: KAYEXALATE  Mix 30 grams in 120mL water and drink by mouth now for one dose (for high potassium)     traZODone 150 MG tablet  Commonly known as: DESYREL  Take 1 tablet (150 mg total) by mouth every evening.     valGANciclovir 450 mg Tab  Commonly known as: VALCYTE  Take 1 tablet (450 mg total) by mouth once daily. STOP 7/5/23          Time spent caring for patient (Greater than 1/2 spent in direct face-to-face contact): < 30 minutes    KEILY Reyna  Liver Transplant  [facility]

## 2023-05-21 NOTE — PROGRESS NOTES
[facility]  Endocrinology  Progress Note    Admit Date: 5/19/2023     Reason for Consult: Management of T2DM, Hyperglycemia      Surgical Procedure and Date: Liver transplant 1/5/2023     Diabetes diagnosis year: Over 5 years ago     Home Diabetes Medications:  Tresiba 45 units QD, Humalog 15 units TIDWM +SSI MDC (180/25), Jardiance 25 mg, Ozempic 2 mg    How often checking glucose at home? >4 x day   BG readings on regimen: 100-180  Hypoglycemia on the regimen?  No  Missed doses on regimen?  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 He progressed well post-op and was discharged POD#6. He has continued to do well post-transplant and is currently on tacrolimus and cellcept for immunosuppression. Recently, his LFTs have been elevated on outpatient labs. Liver US 5/10 with Interval increase in velocity in the extrahepatic main hepatic artery at the anastomosis with otherwise unchanged waveforms. Also with Interval decrease in caliber of the common bile duct. Liver biopsy done 5/18 now resulted with moderate ACR. 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 Patient on high doses of insulin at home.  Endocrine consulted to assist in management in setting of high dose steroid treatment.     Lab Results   Component Value Date    HGBA1C 4.7 05/19/2023           Interval HPI:   No acute events overnight. Patient in room 00158/72488 A. Blood glucose variable. BG above goal on current insulin regimen (SSI, prandial, and basal insulin ). Steroid use- Methylprednisolone  500 mg .      Renal function- Abnormal - Cr 1.7   Vasopressors-  None     Diet diabetic [facility]  "Facility;  Calorie; Standard Tray  Diet diabetic     Eatin%  Nausea: No  Hypoglycemia and intervention: No  Fever: No  TPN and/or TF: No    BP (!) 98/58 (BP Location: Left arm, Patient Position: Sitting)   Pulse 65   Temp 97.7 °F (36.5 °C) (Oral)   Resp 16   Ht 5' 8.5" (1.74 m)   Wt 105.1 kg (231 lb 11.3 oz)   SpO2 99%   BMI 34.72 kg/m²     Labs Reviewed and Include    Recent Labs   Lab 23  0654   *   CALCIUM 8.8   ALBUMIN 3.9   PROT 6.2   *   K 5.2*   CO2 19*      BUN 44*   CREATININE 1.7*   ALKPHOS 147*   *   *   BILITOT 1.0     Lab Results   Component Value Date    WBC 3.02 (L) 2023    HGB 9.4 (L) 2023    HCT 32.6 (L) 2023    MCV 81 (L) 2023    PLT 74 (L) 2023     No results for input(s): TSH, FREET4 in the last 168 hours.  Lab Results   Component Value Date    HGBA1C 4.7 2023       Nutritional status:   Body mass index is 34.72 kg/m².  Lab Results   Component Value Date    ALBUMIN 3.9 2023    ALBUMIN 3.8 2023    ALBUMIN 3.9 2023     No results found for: PREALBUMIN    Estimated Creatinine Clearance: 63 mL/min (A) (based on SCr of 1.7 mg/dL (H)).    Accu-Checks  Recent Labs     23  2218 23  0224 23  0843 23  1226 23  1725 23  2135 23  0204 23  0857   POCTGLUCOSE 257* 280* 269* 285* 336* 408* 256* 264*       Current Medications and/or Treatments Impacting Glycemic Control  Immunotherapy:    Immunosuppressants           Stop Route Frequency     mycophenolate capsule 500 mg         -- Oral 2 times daily     tacrolimus capsule 5 mg         -- Oral 2 times daily          Steroids:   Hormones (From admission, onward)      None          Pressors:    Autonomic Drugs (From admission, onward)      None          Hyperglycemia/Diabetes Medications:   Antihyperglycemics (From admission, onward)      Start     Stop Route Frequency Ordered    23 0900  insulin " detemir U-100 (Levemir) pen 25 Units         -- SubQ 2 times daily 05/20/23 1108    05/21/23 0715  insulin aspart U-100 pen 26 Units         -- SubQ 3 times daily with meals 05/21/23 0704    05/20/23 1846  insulin aspart U-100 pen 5 Units         -- SubQ As needed (PRN) 05/20/23 1748    05/20/23 1200  insulin aspart U-100 pen 0-15 Units         -- SubQ Before meals, nightly and at 0200 PRN 05/20/23 1100    05/20/23 0930  insulin regular injection 10 Units 0.1 mL  (Insulin and Dextrose 50% with IMPAIRED renal function (for patients with blood glucose < 250 mg/dL))        See [facility] for full Linked Orders Report.    -- IV Once 05/20/23 0821            ASSESSMENT and PLAN    Oncology  Anemia of chronic disease  May impact the accuracy of the HbA1C. Changes in treatment should rely on BG logs rather than HbA1C levels.       Endocrine  Type 2 diabetes mellitus with hyperglycemia  BG goal: 140-180   T2DM.  Admitted for ACR.  Plan for SMP x3.  Endocrine consulted for bg management. BG above goal with steroids on board.   Will increase regimen and add HDC while on steroids.    - Continue Levemir to 25 units BID   - Increase Novolog 26 units AC   - Adjust to Novolog HDC with ISF 16 starting at 150  - POCT Glucose before meals, at bedtime and at 2 am  - Hypoglycemia protocol in place      ** Please notify Endocrine for any change and/or advance in diet**  ** Please call Endocrine for any BG related issues **     Discharge Planning:   Notified by team.  Plans to d/c today.  Completed SMP x3 with elevations in BG.  Will go home on pred taper.  Discussed with patient parameters to decrease insulin with steroids decreasing.  Pt understands and agreeable to plan.  - Tresiba 50 units daily  - Novolog 26 units AC  -Add correction scale if needed.  Blood sugar 150 to 200 add 2 units  Blood sugar 201 to 250 add 4 units  Blood sugar 251 to 300 add 6 units  Blood sugar 301 to 350 add 8 units  Blood sugar greater than 350 add 10  units  -He can restart home dm meds.  Ozempic and Jardiance.          GI  * Acute rejection of liver transplant  Managed per primary team  Avoid hypoglycemia  Glucocorticoids markedly increase glucose levels. Expect the steroid taper will help glucose control.         S/P liver transplant  Managed by primary.  Optimize bg control          Unruly Jose PA-C  Endocrinology  [facility]

## 2023-05-22 ENCOUNTER — PATIENT MESSAGE (OUTPATIENT)
Dept: TRANSPLANT | Facility: CLINIC | Age: 48
End: 2023-05-22
Payer: COMMERCIAL

## 2023-05-22 LAB
CYTOMEGALOVIRUS LOG (IU/ML): 2.64 LOGIU/ML
CYTOMEGALOVIRUS PCR, QUANT: 432 IU/ML

## 2023-05-23 LAB
COMMENT: ABNORMAL
EXT ALBUMIN: 4.1
EXT ALKALINE PHOSPHATASE: 112
EXT ALT: 313
EXT AST: 45
EXT BASOPHIL%: 0.4
EXT BILIRUBIN TOTAL: 0.7
EXT BUN: 59
EXT CALCIUM: 9.5
EXT CHLORIDE: 105
EXT CO2: 23
EXT CREATININE: 1.87 MG/DL
EXT EGFR NO RACE VARIABLE: 44
EXT EOSINOPHIL%: 0
EXT GLUCOSE: 202
EXT HEMATOCRIT: 33.4
EXT HEMOGLOBIN: 9.8
EXT LYMPH%: 37.9
EXT MONOCYTES%: 12.8
EXT PLATELETS: 79
EXT POTASSIUM: 4.6
EXT PROTEIN TOTAL: 6.1
EXT SEGS%: 48.5
EXT SODIUM: 137 MMOL/L
EXT TACROLIMUS LVL: 6.5
EXT WBC: 2.3
FINAL PATHOLOGIC DIAGNOSIS: ABNORMAL
GROSS: ABNORMAL
Lab: ABNORMAL
SUPPLEMENTAL DIAGNOSIS: ABNORMAL

## 2023-05-29 ENCOUNTER — TELEPHONE (OUTPATIENT)
Dept: TRANSPLANT | Facility: CLINIC | Age: 48
End: 2023-05-29
Payer: COMMERCIAL

## 2023-05-29 ENCOUNTER — PATIENT MESSAGE (OUTPATIENT)
Dept: TRANSPLANT | Facility: CLINIC | Age: 48
End: 2023-05-29
Payer: COMMERCIAL

## 2023-05-29 DIAGNOSIS — Z94.4 LIVER REPLACED BY TRANSPLANT: Primary | ICD-10-CM

## 2023-05-29 NOTE — TELEPHONE ENCOUNTER
Portal message sent, repeat labs 6/12/23----- Message from Ranjith Aiken MD sent at 5/29/2023  9:39 AM CDT -----  Results reviewed. No action.

## 2023-05-30 DIAGNOSIS — Z94.4 S/P LIVER TRANSPLANT: ICD-10-CM

## 2023-05-30 DIAGNOSIS — Z29.89 PROPHYLACTIC IMMUNOTHERAPY: ICD-10-CM

## 2023-05-30 DIAGNOSIS — Z94.4 LIVER TRANSPLANTED: ICD-10-CM

## 2023-05-30 LAB
EXT ALBUMIN: 3.9
EXT ALKALINE PHOSPHATASE: 70
EXT ALT: 129
EXT AST: 47
EXT BASOPHIL%: 0.5
EXT BILIRUBIN TOTAL: 1.1
EXT BUN: 44
EXT CALCIUM: 9
EXT CHLORIDE: 106
EXT CO2: 25
EXT CREATININE: 1.49 MG/DL
EXT EGFR NO RACE VARIABLE: 57
EXT EOSINOPHIL%: 1.9
EXT GLUCOSE: 189
EXT HEMATOCRIT: 33.2
EXT HEMOGLOBIN: 10.1
EXT LYMPH%: 40
EXT MONOCYTES%: 12.1
EXT PLATELETS: 67
EXT POTASSIUM: 4.9
EXT PROTEIN TOTAL: 5.2
EXT SEGS%: 45.5
EXT SODIUM: 137 MMOL/L
EXT TACROLIMUS LVL: 6.3
EXT WBC: 2.2

## 2023-05-30 NOTE — TELEPHONE ENCOUNTER
Called patient to increase his valcyte to 450 mg BID.  He repeated labs today.----- Message from Ranjith Aiken MD sent at 5/30/2023  2:56 PM CDT -----  Yes. To 450 mg bid.  ----- Message -----  From: Ayala Pedraza RN  Sent: 5/30/2023   7:54 AM CDT  To: Ranjith Aiken MD    He is on valcyte 450 daily, do you want to increase to treatment dose?

## 2023-05-31 RX ORDER — VALGANCICLOVIR 450 MG/1
450 TABLET, FILM COATED ORAL 2 TIMES DAILY
Qty: 60 TABLET | Refills: 11 | Status: SHIPPED | OUTPATIENT
Start: 2023-05-31 | End: 2023-06-05 | Stop reason: SDUPTHER

## 2023-06-02 ENCOUNTER — TELEPHONE (OUTPATIENT)
Dept: TRANSPLANT | Facility: CLINIC | Age: 48
End: 2023-06-02
Payer: COMMERCIAL

## 2023-06-02 NOTE — TELEPHONE ENCOUNTER
Repeating labs 6/12/23----- Message from Ranjith Aiken MD sent at 6/2/2023  1:36 PM CDT -----  Results reviewed. No action.

## 2023-06-05 ENCOUNTER — PATIENT MESSAGE (OUTPATIENT)
Dept: TRANSPLANT | Facility: CLINIC | Age: 48
End: 2023-06-05
Payer: COMMERCIAL

## 2023-06-05 DIAGNOSIS — Z94.4 LIVER TRANSPLANTED: ICD-10-CM

## 2023-06-05 DIAGNOSIS — Z29.89 PROPHYLACTIC IMMUNOTHERAPY: ICD-10-CM

## 2023-06-05 DIAGNOSIS — Z94.4 S/P LIVER TRANSPLANT: ICD-10-CM

## 2023-06-05 RX ORDER — VALGANCICLOVIR 450 MG/1
900 TABLET, FILM COATED ORAL 2 TIMES DAILY
Qty: 120 TABLET | Refills: 11 | Status: SHIPPED | OUTPATIENT
Start: 2023-06-05 | End: 2023-06-08 | Stop reason: SINTOL

## 2023-06-06 ENCOUNTER — PATIENT MESSAGE (OUTPATIENT)
Dept: TRANSPLANT | Facility: CLINIC | Age: 48
End: 2023-06-06
Payer: COMMERCIAL

## 2023-06-06 ENCOUNTER — TELEPHONE (OUTPATIENT)
Dept: TRANSPLANT | Facility: CLINIC | Age: 48
End: 2023-06-06
Payer: COMMERCIAL

## 2023-06-06 LAB
EXT ALBUMIN: 4.1
EXT ALKALINE PHOSPHATASE: 60
EXT ALT: 181
EXT AST: 61
EXT BILIRUBIN TOTAL: 1.2
EXT BUN: 42
EXT CALCIUM: 9.2
EXT CHLORIDE: 108
EXT CO2: 24
EXT CREATININE: 1.47 MG/DL
EXT EOSINOPHIL%: 3
EXT GFR MDRD NON AF AMER: 58
EXT GLUCOSE: 104
EXT HEMATOCRIT: 34
EXT HEMOGLOBIN: 10.2
EXT LYMPH%: 66
EXT MONOCYTES%: 7
EXT PLATELETS: 50
EXT POTASSIUM: 4.6
EXT PROTEIN TOTAL: 6.2
EXT SEGS%: 18
EXT SODIUM: 138 MMOL/L
EXT TACROLIMUS LVL: 5.5
EXT WBC: 1.8

## 2023-06-06 NOTE — TELEPHONE ENCOUNTER
Called patient and informed him to stop cellcept due to neutropenia.  Setting up neupogen injection

## 2023-06-06 NOTE — LETTER
"LAB ORDERS    2023- 23      ORDERING MD:    Dr. Ranjith Aiken MD, PhD            Patient Name: Rosalio Mccarty               : 1975    [facility] Number: [id number]                  #:        Please draw the following labs:     Test Frequency  Diagnosis/ICD-10 Code     CBC/DIFF/PLT every 7 days  Z94.4 Liver Transplant       Complete Metabolic Panel every 7 days  Z94.4 Liver Transplant         Prograf level every 7 days  Z94.4 Liver Transplant     CMV PCR DNA every 7 days  Z94.4 Liver Transplant          FAX RESULTS [phone] "[facility] Liver Transplant Coordinator", and send the hard copy when completed. If you have any questions regarding this request or need additional information, please call [phone].      Blank Monroy [phone]  "

## 2023-06-07 ENCOUNTER — CLINICAL SUPPORT (OUTPATIENT)
Dept: TRANSPLANT | Facility: CLINIC | Age: 48
End: 2023-06-07
Payer: COMMERCIAL

## 2023-06-07 VITALS
SYSTOLIC BLOOD PRESSURE: 141 MMHG | HEIGHT: 68 IN | DIASTOLIC BLOOD PRESSURE: 86 MMHG | WEIGHT: 229.5 LBS | OXYGEN SATURATION: 94 % | TEMPERATURE: 97 F | RESPIRATION RATE: 18 BRPM | HEART RATE: 85 BPM | BODY MASS INDEX: 34.78 KG/M2

## 2023-06-07 DIAGNOSIS — Z94.4 S/P LIVER TRANSPLANT: Primary | ICD-10-CM

## 2023-06-07 PROCEDURE — 99999 PR PBB SHADOW E&M-EST. PATIENT-LVL III: ICD-10-PCS | Mod: PBBFAC,,,

## 2023-06-07 PROCEDURE — 99999 PR PBB SHADOW E&M-EST. PATIENT-LVL III: CPT | Mod: PBBFAC,,,

## 2023-06-07 NOTE — PROGRESS NOTES
Zarxio 480 mcg given per MD order to right posterior arm. Patient tolerated with no complaints. Patient instructed on possible side effects of this medication including pain. Verbalized understanding.

## 2023-06-08 ENCOUNTER — TELEPHONE (OUTPATIENT)
Dept: PHARMACY | Facility: CLINIC | Age: 48
End: 2023-06-08
Payer: COMMERCIAL

## 2023-06-08 ENCOUNTER — PATIENT MESSAGE (OUTPATIENT)
Dept: TRANSPLANT | Facility: CLINIC | Age: 48
End: 2023-06-08
Payer: COMMERCIAL

## 2023-06-08 ENCOUNTER — TELEPHONE (OUTPATIENT)
Dept: TRANSPLANT | Facility: CLINIC | Age: 48
End: 2023-06-08
Payer: COMMERCIAL

## 2023-06-08 DIAGNOSIS — D72.819 LEUKOPENIA, UNSPECIFIED TYPE: Primary | ICD-10-CM

## 2023-06-08 LAB
EXT ALBUMIN: 4
EXT ALKALINE PHOSPHATASE: 62
EXT ALT: 213
EXT AST: 77
EXT BILIRUBIN TOTAL: 1.8
EXT BUN: 38
EXT CALCIUM: 9.3
EXT CHLORIDE: 106
EXT CO2: 24
EXT CREATININE: 1.47 MG/DL
EXT EOSINOPHIL%: 3
EXT GFR MDRD NON AF AMER: 58
EXT GLUCOSE: 143
EXT HEMATOCRIT: 32.6
EXT HEMOGLOBIN: 10.1
EXT LYMPH%: 60
EXT MONOCYTES%: 8
EXT PLATELETS: 45
EXT POTASSIUM: 4.6
EXT PROTEIN TOTAL: 6
EXT SEGS%: 25
EXT SODIUM: 137 MMOL/L
EXT WBC: 1.8

## 2023-06-08 RX ORDER — FILGRASTIM-SNDZ 480 UG/.8ML
480 INJECTION, SOLUTION INTRAVENOUS; SUBCUTANEOUS DAILY PRN
Qty: 2.4 ML | Refills: 1 | Status: ACTIVE | COMMUNITY
Start: 2023-06-08 | End: 2023-12-07 | Stop reason: ALTCHOICE

## 2023-06-08 NOTE — TELEPHONE ENCOUNTER
Gracie, this is Dannielle Rojas, clinical pharmacist with [facility] that is part of your care team.  We have begun working on your prescription that your doctor has sent us. Our next steps include:     Working with your insurance company to obtain approval for your medication  Working with you to ensure your medication is affordable     We will be calling you along the way with updates on your medication but if you have any concerns or receive information that you would like to discuss please reach us at [phone].    Welcome call outcome: Patient/caregiver reached

## 2023-06-09 ENCOUNTER — LAB VISIT (OUTPATIENT)
Dept: LAB | Facility: HOSPITAL | Age: 48
End: 2023-06-09
Attending: INTERNAL MEDICINE
Payer: COMMERCIAL

## 2023-06-09 ENCOUNTER — PATIENT MESSAGE (OUTPATIENT)
Dept: TRANSPLANT | Facility: CLINIC | Age: 48
End: 2023-06-09

## 2023-06-09 ENCOUNTER — CLINICAL SUPPORT (OUTPATIENT)
Dept: TRANSPLANT | Facility: CLINIC | Age: 48
End: 2023-06-09
Payer: COMMERCIAL

## 2023-06-09 VITALS
WEIGHT: 230.38 LBS | SYSTOLIC BLOOD PRESSURE: 147 MMHG | DIASTOLIC BLOOD PRESSURE: 85 MMHG | HEIGHT: 68 IN | BODY MASS INDEX: 34.92 KG/M2

## 2023-06-09 DIAGNOSIS — D70.2 OTHER DRUG-INDUCED NEUTROPENIA: ICD-10-CM

## 2023-06-09 DIAGNOSIS — Z94.4 LIVER REPLACED BY TRANSPLANT: Primary | ICD-10-CM

## 2023-06-09 DIAGNOSIS — Z94.4 LIVER REPLACED BY TRANSPLANT: ICD-10-CM

## 2023-06-09 PROBLEM — D70.9 NEUTROPENIA, UNSPECIFIED: Status: ACTIVE | Noted: 2023-06-09

## 2023-06-09 LAB
EXT ALBUMIN: 3.8
EXT ALKALINE PHOSPHATASE: 63
EXT ALT: 213
EXT AST: 69
EXT BILIRUBIN TOTAL: 1.2
EXT BUN: 43
EXT CALCIUM: 8.8
EXT CHLORIDE: 108
EXT CO2: 25
EXT CREATININE: 1.48 MG/DL
EXT GFR MDRD NON AF AMER: 58
EXT GLUCOSE: 226
EXT HEMATOCRIT: 33.5
EXT HEMOGLOBIN: 10.1
EXT LYMPH%: 71
EXT MONOCYTES%: 6
EXT PLATELETS: 55
EXT POTASSIUM: 4.5
EXT PROTEIN TOTAL: 5.8
EXT SEGS%: 21
EXT SODIUM: 140 MMOL/L
EXT TACROLIMUS LVL: 6
EXT WBC: 2.2

## 2023-06-09 PROCEDURE — 99999 PR PBB SHADOW E&M-EST. PATIENT-LVL II: CPT | Mod: PBBFAC,,,

## 2023-06-09 PROCEDURE — 36415 COLL VENOUS BLD VENIPUNCTURE: CPT | Performed by: INTERNAL MEDICINE

## 2023-06-09 PROCEDURE — 99999 PR PBB SHADOW E&M-EST. PATIENT-LVL II: ICD-10-PCS | Mod: PBBFAC,,,

## 2023-06-12 ENCOUNTER — PATIENT MESSAGE (OUTPATIENT)
Dept: TRANSPLANT | Facility: CLINIC | Age: 48
End: 2023-06-12
Payer: COMMERCIAL

## 2023-06-12 LAB
CMV DNA SPEC QL NAA+PROBE: DETECTED
CYTOMEGALOVIRUS LOG (IU/ML): <1.7 LOGIU/ML
CYTOMEGALOVIRUS PCR, QUANT: <50 IU/ML
EXT ALBUMIN: 4.3
EXT ALKALINE PHOSPHATASE: 66
EXT ALT: 195
EXT AST: 46
EXT BANDS%: 3
EXT BILIRUBIN TOTAL: 1.2
EXT BUN: 37
EXT CALCIUM: 9.5
EXT CHLORIDE: 105
EXT CO2: 27
EXT CREATININE: 1.62 MG/DL
EXT EOSINOPHIL%: 1
EXT GFR MDRD NON AF AMER: 52
EXT GLUCOSE: 247
EXT HEMATOCRIT: 37.2
EXT HEMOGLOBIN: 11
EXT LYMPH%: 47
EXT MONOCYTES%: 12
EXT PLATELETS: 66
EXT POTASSIUM: 4.8
EXT PROTEIN TOTAL: 6.4
EXT SEGS%: 31
EXT SODIUM: 138 MMOL/L
EXT WBC: 3

## 2023-06-13 ENCOUNTER — PATIENT MESSAGE (OUTPATIENT)
Dept: TRANSPLANT | Facility: CLINIC | Age: 48
End: 2023-06-13
Payer: COMMERCIAL

## 2023-06-13 ENCOUNTER — TELEPHONE (OUTPATIENT)
Dept: TRANSPLANT | Facility: CLINIC | Age: 48
End: 2023-06-13
Payer: COMMERCIAL

## 2023-06-13 NOTE — TELEPHONE ENCOUNTER
Weekly labs due to post ACR, LFT are trending down but still elevated.  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  2 doses of neupogen have been given to patient.  His ANC is as of 6/12 labs >1000.  He is set up to [facility] neupogen at [facility] if another dose is needed.  Continue weekly labs----- Message from Ranjith Aiken MD sent at 6/13/2023  7:02 AM CDT -----  Results reviewed. No action.

## 2023-06-14 ENCOUNTER — TELEPHONE (OUTPATIENT)
Dept: TRANSPLANT | Facility: CLINIC | Age: 48
End: 2023-06-14
Payer: COMMERCIAL

## 2023-06-14 LAB — EXT CMV DNA QUANT. BY PCR: <200

## 2023-06-14 NOTE — TELEPHONE ENCOUNTER
----- Message from Ranjith Aiken MD sent at 6/12/2023 10:16 AM CDT -----  Results reviewed. No action.

## 2023-06-16 ENCOUNTER — PATIENT MESSAGE (OUTPATIENT)
Dept: TRANSPLANT | Facility: CLINIC | Age: 48
End: 2023-06-16
Payer: COMMERCIAL

## 2023-06-16 ENCOUNTER — TELEPHONE (OUTPATIENT)
Dept: TRANSPLANT | Facility: CLINIC | Age: 48
End: 2023-06-16
Payer: COMMERCIAL

## 2023-06-16 LAB — EXT TACROLIMUS LVL: 6

## 2023-06-16 NOTE — TELEPHONE ENCOUNTER
Post acr labs, improving.  Repeat 6/19/23----- Message from Ranjith Aiken MD sent at 6/16/2023  2:04 PM CDT -----  Results reviewed. No action.

## 2023-06-19 LAB
EXT ALBUMIN: 4.1
EXT ALKALINE PHOSPHATASE: 51
EXT ALT: 56
EXT AST: 17
EXT BASOPHIL%: 0.9
EXT BILIRUBIN TOTAL: 1
EXT BUN: 42
EXT CALCIUM: 9.2
EXT CHLORIDE: 105
EXT CMV DNA QUANT. BY PCR: NOT DETECTED
EXT CO2: 25
EXT CREATININE: 1.53 MG/DL
EXT EGFR NO RACE VARIABLE: >60
EXT EOSINOPHIL%: 1.8
EXT GLUCOSE: 225
EXT HEMATOCRIT: 33.8
EXT HEMOGLOBIN: 10.7
EXT LYMPH%: 45.2
EXT MONOCYTES%: 11.6
EXT PLATELETS: 51
EXT POTASSIUM: 5
EXT PROTEIN TOTAL: 6.1
EXT SEGS%: 39.9
EXT SODIUM: 137 MMOL/L
EXT TACROLIMUS LVL: 7.8
EXT WBC: 3.4

## 2023-06-27 ENCOUNTER — PATIENT MESSAGE (OUTPATIENT)
Dept: TRANSPLANT | Facility: CLINIC | Age: 48
End: 2023-06-27
Payer: COMMERCIAL

## 2023-06-27 ENCOUNTER — TELEPHONE (OUTPATIENT)
Dept: TRANSPLANT | Facility: CLINIC | Age: 48
End: 2023-06-27
Payer: COMMERCIAL

## 2023-06-27 NOTE — TELEPHONE ENCOUNTER
Stable labs, 6/19, no medication changes.  Ici Montreuil message sent to notify pt.  Next labs 6/26/23.  ----- Message from Ranjith Aiken MD sent at 6/27/2023  8:56 AM CDT -----  Results reviewed. No action.

## 2023-06-29 LAB
EXT ALBUMIN: 4.1
EXT ALKALINE PHOSPHATASE: 56
EXT ALT: 41
EXT AST: 22
EXT BASOPHIL%: 1.4
EXT BILIRUBIN TOTAL: 0.9
EXT BUN: 39
EXT CHLORIDE: 110
EXT CO2: 20
EXT CREATININE: 1.58 MG/DL
EXT EOSINOPHIL%: 1.8
EXT GFR MDRD NON AF AMER: 54
EXT GLUCOSE: 183
EXT HEMATOCRIT: 36.3
EXT HEMOGLOBIN: 11.1
EXT LYMPH%: 52.1
EXT MONOCYTES%: 17.9
EXT PLATELETS: 86
EXT POTASSIUM: 5.3
EXT PROTEIN TOTAL: 6.5
EXT SEGS%: 25.7
EXT SODIUM: 140 MMOL/L
EXT TACROLIMUS LVL: 7.5
EXT WBC: 2.8

## 2023-06-30 ENCOUNTER — TELEPHONE (OUTPATIENT)
Dept: TRANSPLANT | Facility: CLINIC | Age: 48
End: 2023-06-30
Payer: COMMERCIAL

## 2023-06-30 ENCOUNTER — PATIENT MESSAGE (OUTPATIENT)
Dept: TRANSPLANT | Facility: CLINIC | Age: 48
End: 2023-06-30
Payer: COMMERCIAL

## 2023-06-30 LAB
CMV LOG10: <2.3 COPIES/ML
EXT CMV DNA QUANT. BY PCR: <200

## 2023-06-30 NOTE — TELEPHONE ENCOUNTER
Portal message sent, repeat labs 7/10/23 with vit D and HCV labs----- Message from Ranjith Aiken MD sent at 6/30/2023  9:29 AM CDT -----  Results reviewed. No action.

## 2023-06-30 NOTE — LETTER
"LAB ORDERS    2023  Labs due 7/10/23    ORDERING MD:    Dr. Ranjith Aiken MD, PhD            Patient Name: Rosalio Mccarty               : 1975    [facility] Number: [id number]                  #:        Please draw the following labs:     Test Frequency  Diagnosis/ICD-10 Code     CBC/DIFF/PLT Every 2 weeks  Z94.4 Liver Transplant       Complete Metabolic Panel Every 2 weeks  Z94.4 Liver Transplant         Prograf level Every 2 weeks  Z94.4 Liver Transplant     Vit D once  Z94.4 Liver Transplant      HCV antibody Once   Z94.4 Liver Transplant     HCV PCR Qant Once  Z94.4 Liver Transplant         FAX RESULTS [phone] "[facility] Liver Transplant Coordinator", and send the hard copy when completed. If you have any questions regarding this request or need additional information, please call [phone].      Blank Monroy [phone]  "

## 2023-07-09 ENCOUNTER — PATIENT MESSAGE (OUTPATIENT)
Dept: TRANSPLANT | Facility: CLINIC | Age: 48
End: 2023-07-09
Payer: COMMERCIAL

## 2023-07-10 DIAGNOSIS — Z29.89 PROPHYLACTIC IMMUNOTHERAPY: ICD-10-CM

## 2023-07-10 DIAGNOSIS — Z94.4 S/P LIVER TRANSPLANT: ICD-10-CM

## 2023-07-10 DIAGNOSIS — Z94.4 LIVER TRANSPLANTED: ICD-10-CM

## 2023-07-10 RX ORDER — PANTOPRAZOLE SODIUM 40 MG/1
40 TABLET, DELAYED RELEASE ORAL DAILY
Qty: 30 TABLET | Refills: 1 | Status: SHIPPED | OUTPATIENT
Start: 2023-07-10 | End: 2024-07-09

## 2023-07-10 RX ORDER — SODIUM BICARBONATE 650 MG/1
1300 TABLET ORAL 3 TIMES DAILY
Qty: 180 TABLET | Refills: 11 | Status: SHIPPED | OUTPATIENT
Start: 2023-07-10 | End: 2024-07-09

## 2023-07-11 LAB
EXT ALBUMIN: 4.4
EXT ALKALINE PHOSPHATASE: 62
EXT ALT: 104
EXT AST: 52
EXT BASOPHIL%: 0.6
EXT BILIRUBIN TOTAL: 1.2
EXT BUN: 39
EXT CALCIUM: 9.3
EXT CHLORIDE: 107
EXT CO2: 22
EXT CREATININE: 1.71 MG/DL
EXT EOSINOPHIL%: 1.7
EXT GFR MDRD NON AF AMER: 49
EXT GLUCOSE: 189
EXT HCV RNA QUANT PCR: NOT DETECTED
EXT HEMATOCRIT: 35.9
EXT HEMOGLOBIN: 11.4
EXT LYMPH%: 50.1
EXT MONOCYTES%: 8.3
EXT PLATELETS: 53
EXT POTASSIUM: 5.2
EXT PROTEIN TOTAL: 6.9
EXT SEGS%: 39
EXT SODIUM: 136 MMOL/L
EXT TACROLIMUS LVL: 7.5
EXT VIT D 25 HYDROXY: 31.5
EXT WBC: 3.5

## 2023-07-12 ENCOUNTER — HOSPITAL ENCOUNTER (OUTPATIENT)
Dept: RADIOLOGY | Facility: HOSPITAL | Age: 48
Discharge: HOME | End: 2023-07-12
Attending: INTERNAL MEDICINE
Payer: COMMERCIAL

## 2023-07-12 DIAGNOSIS — Z94.4 LIVER REPLACED BY TRANSPLANT: ICD-10-CM

## 2023-07-12 PROCEDURE — 93976 VASCULAR STUDY: CPT | Mod: 26,,, | Performed by: RADIOLOGY

## 2023-07-12 PROCEDURE — 76705 ECHO EXAM OF ABDOMEN: CPT | Mod: 26,59,, | Performed by: RADIOLOGY

## 2023-07-12 PROCEDURE — 93976 US DOPPLER LIVER TRANSPLANT POST (XPD): ICD-10-PCS | Mod: 26,,, | Performed by: RADIOLOGY

## 2023-07-12 PROCEDURE — 93976 VASCULAR STUDY: CPT | Mod: TC

## 2023-07-12 PROCEDURE — 76705 US DOPPLER LIVER TRANSPLANT POST (XPD): ICD-10-PCS | Mod: 26,59,, | Performed by: RADIOLOGY

## 2023-07-13 ENCOUNTER — TELEPHONE (OUTPATIENT)
Dept: TRANSPLANT | Facility: CLINIC | Age: 48
End: 2023-07-13
Payer: COMMERCIAL

## 2023-07-13 DIAGNOSIS — Z94.4 LIVER REPLACED BY TRANSPLANT: Primary | ICD-10-CM

## 2023-07-13 NOTE — TELEPHONE ENCOUNTER
----- Message from Ranjith Aiken MD sent at 7/13/2023 12:04 PM CDT -----  Results reviewed. No action.

## 2023-07-14 ENCOUNTER — PATIENT MESSAGE (OUTPATIENT)
Dept: TRANSPLANT | Facility: CLINIC | Age: 48
End: 2023-07-14
Payer: COMMERCIAL

## 2023-07-14 DIAGNOSIS — Z94.4 LIVER TRANSPLANTED: ICD-10-CM

## 2023-07-14 NOTE — TELEPHONE ENCOUNTER
Portal message sent, repeat labs 7/17/23----- Message from Ranjith Aiken MD sent at 7/14/2023 10:40 AM CDT -----  Results reviewed.  Increase tacrolimus to 6 mg twice daily.  Repeat blood work on Monday.  We may have to consider a liver biopsy.

## 2023-07-15 DIAGNOSIS — Z94.4 LIVER TRANSPLANTED: ICD-10-CM

## 2023-07-15 RX ORDER — TACROLIMUS 1 MG/1
6 CAPSULE ORAL EVERY 12 HOURS
Qty: 360 CAPSULE | Refills: 11 | Status: SHIPPED | OUTPATIENT
Start: 2023-07-15 | End: 2023-07-15 | Stop reason: SDUPTHER

## 2023-07-16 RX ORDER — TACROLIMUS 1 MG/1
6 CAPSULE ORAL EVERY 12 HOURS
Qty: 360 CAPSULE | Refills: 11 | Status: ON HOLD | OUTPATIENT
Start: 2023-07-16 | End: 2023-07-28 | Stop reason: SDUPTHER

## 2023-07-17 ENCOUNTER — TELEPHONE (OUTPATIENT)
Dept: TRANSPLANT | Facility: CLINIC | Age: 48
End: 2023-07-17
Payer: COMMERCIAL

## 2023-07-17 LAB
EXT ALBUMIN: 4.2
EXT ALKALINE PHOSPHATASE: 58
EXT ALT: 93
EXT AST: 43
EXT BASOPHIL%: 0.8
EXT BILIRUBIN TOTAL: 1.2
EXT BUN: 51
EXT CALCIUM: 9
EXT CHLORIDE: 109
EXT CO2: 19
EXT CREATININE: 1.76 MG/DL
EXT EOSINOPHIL%: 1.8
EXT GFR MDRD NON AF AMER: 47
EXT GLUCOSE: 120
EXT HEMATOCRIT: 34.8
EXT HEMOGLOBIN: 11
EXT LYMPH%: 51.4
EXT MONOCYTES%: 8.6
EXT PLATELETS: 54
EXT POTASSIUM: 5
EXT PROTEIN TOTAL: 6.7
EXT SEGS%: 36.9
EXT SODIUM: 137 MMOL/L
EXT TACROLIMUS LVL: 16.7
EXT WBC: 4

## 2023-07-17 NOTE — TELEPHONE ENCOUNTER
Notified patient via portal, repeat labs 7/19/23----- Message from Ranjith Aiken MD sent at 7/17/2023  2:23 PM CDT -----  Results reviewed. Labs twice weekly.

## 2023-07-19 LAB
EXT ALBUMIN: 4.4
EXT ALKALINE PHOSPHATASE: 59
EXT ALT: 85
EXT AST: 37
EXT BASOPHIL%: 0.8
EXT BILIRUBIN TOTAL: 1.3
EXT BUN: 46
EXT CALCIUM: 9.5
EXT CHLORIDE: 109
EXT CO2: 21
EXT CREATININE: 1.85 MG/DL
EXT EOSINOPHIL%: 1.8
EXT GFR MDRD NON AF AMER: 44
EXT GLUCOSE: 93
EXT HEMATOCRIT: 35.3
EXT HEMOGLOBIN: 11.2
EXT LYMPH%: 50.1
EXT MONOCYTES%: 9.3
EXT PLATELETS: 65
EXT POTASSIUM: 5
EXT PROTEIN TOTAL: 7
EXT SEGS%: 37.5
EXT SODIUM: 138 MMOL/L
EXT WBC: 3.9

## 2023-07-20 ENCOUNTER — TELEPHONE (OUTPATIENT)
Dept: TRANSPLANT | Facility: CLINIC | Age: 48
End: 2023-07-20
Payer: COMMERCIAL

## 2023-07-20 ENCOUNTER — TELEPHONE (OUTPATIENT)
Dept: INTERVENTIONAL RADIOLOGY/VASCULAR | Facility: CLINIC | Age: 48
End: 2023-07-20
Payer: COMMERCIAL

## 2023-07-20 ENCOUNTER — PATIENT MESSAGE (OUTPATIENT)
Dept: TRANSPLANT | Facility: CLINIC | Age: 48
End: 2023-07-20
Payer: COMMERCIAL

## 2023-07-20 DIAGNOSIS — Z94.4 LIVER REPLACED BY TRANSPLANT: Primary | ICD-10-CM

## 2023-07-20 NOTE — TELEPHONE ENCOUNTER
Portal message sent, repeat labs 7/24/23, biopsy ordered----- Message from Ranjith Aiken MD sent at 7/20/2023 11:50 AM CDT -----  Results reviewed. Please proceed with biopsy

## 2023-07-20 NOTE — TELEPHONE ENCOUNTER
Spoke to pt on phone,  Pt is scheduled on 7/24/2023 for IR procedure at [facility] location.  Preop instructions given (NPO after midnight, MUST have a ride home, Nurse will call 1-2  days before to go over instructions and medications), pt verbally understood. Pt aware and confirmed, Thanks

## 2023-07-24 ENCOUNTER — TELEPHONE (OUTPATIENT)
Dept: TRANSPLANT | Facility: CLINIC | Age: 48
End: 2023-07-24
Payer: COMMERCIAL

## 2023-07-24 ENCOUNTER — HOSPITAL ENCOUNTER (OUTPATIENT)
Facility: OTHER | Age: 48
Discharge: HOME | End: 2023-07-24
Attending: RADIOLOGY | Admitting: RADIOLOGY
Payer: COMMERCIAL

## 2023-07-24 ENCOUNTER — PATIENT MESSAGE (OUTPATIENT)
Dept: TRANSPLANT | Facility: CLINIC | Age: 48
End: 2023-07-24
Payer: COMMERCIAL

## 2023-07-24 VITALS
WEIGHT: 230.38 LBS | OXYGEN SATURATION: 99 % | HEART RATE: 71 BPM | BODY MASS INDEX: 34.92 KG/M2 | RESPIRATION RATE: 18 BRPM | SYSTOLIC BLOOD PRESSURE: 134 MMHG | HEIGHT: 68 IN | DIASTOLIC BLOOD PRESSURE: 79 MMHG | TEMPERATURE: 98 F

## 2023-07-24 DIAGNOSIS — Z94.4 LIVER REPLACED BY TRANSPLANT: ICD-10-CM

## 2023-07-24 LAB
EXT ALBUMIN: 4.3
EXT ALKALINE PHOSPHATASE: 54
EXT ALT: 60
EXT AST: 27
EXT BASOPHIL%: 0.6
EXT BILIRUBIN TOTAL: 1.4
EXT BUN: 46
EXT CALCIUM: 9.4
EXT CHLORIDE: 107
EXT CMV ANTIGENEMIA: NOT DETECTED
EXT CO2: 20
EXT CREATININE: 2.02 MG/DL
EXT EOSINOPHIL%: 1.3
EXT GFR MDRD NON AF AMER: 40
EXT GLUCOSE: 256
EXT HEMATOCRIT: 32.8
EXT HEMOGLOBIN: 10.8
EXT LYMPH%: 49
EXT MONOCYTES%: 6.7
EXT PLATELETS: 51
EXT POTASSIUM: 5
EXT PROTEIN TOTAL: 6.9
EXT SEGS%: 42.1
EXT SODIUM: 138 MMOL/L
EXT TACROLIMUS LVL: 11.2
EXT TACROLIMUS LVL: 12.1
EXT WBC: 3.1

## 2023-07-24 PROCEDURE — 88342 IMHCHEM/IMCYTCHM 1ST ANTB: CPT | Performed by: PATHOLOGY

## 2023-07-24 PROCEDURE — 88365 INSITU HYBRIDIZATION (FISH): CPT | Mod: 26,,, | Performed by: PATHOLOGY

## 2023-07-24 PROCEDURE — 88342 CHG IMMUNOCYTOCHEMISTRY: ICD-10-PCS | Mod: 26,,, | Performed by: PATHOLOGY

## 2023-07-24 PROCEDURE — 88365 INSITU HYBRIDIZATION (FISH): CPT | Performed by: PATHOLOGY

## 2023-07-24 PROCEDURE — 99152 MOD SED SAME PHYS/QHP 5/>YRS: CPT | Performed by: RADIOLOGY

## 2023-07-24 PROCEDURE — 88307 PR  SURG PATH,LEVEL V: ICD-10-PCS | Mod: 26,,, | Performed by: PATHOLOGY

## 2023-07-24 PROCEDURE — 88342 IMHCHEM/IMCYTCHM 1ST ANTB: CPT | Mod: 26,,, | Performed by: PATHOLOGY

## 2023-07-24 PROCEDURE — 88313 PR  SPECIAL STAINS,GROUP II: ICD-10-PCS | Mod: 26,,, | Performed by: PATHOLOGY

## 2023-07-24 PROCEDURE — 63600175 PHARM REV CODE 636 W HCPCS: Performed by: RADIOLOGY

## 2023-07-24 PROCEDURE — 88365 PR  TISSUE HYBRIDIZATION: ICD-10-PCS | Mod: 26,,, | Performed by: PATHOLOGY

## 2023-07-24 PROCEDURE — 88307 TISSUE EXAM BY PATHOLOGIST: CPT | Mod: 26,,, | Performed by: PATHOLOGY

## 2023-07-24 PROCEDURE — 88307 TISSUE EXAM BY PATHOLOGIST: CPT | Performed by: PATHOLOGY

## 2023-07-24 PROCEDURE — 88313 SPECIAL STAINS GROUP 2: CPT | Performed by: PATHOLOGY

## 2023-07-24 PROCEDURE — 88313 SPECIAL STAINS GROUP 2: CPT | Mod: 26,,, | Performed by: PATHOLOGY

## 2023-07-24 RX ORDER — ONDANSETRON 2 MG/ML
4 INJECTION INTRAMUSCULAR; INTRAVENOUS EVERY 6 HOURS PRN
Status: DISCONTINUED | OUTPATIENT
Start: 2023-07-24 | End: 2023-07-24 | Stop reason: HOSPADM

## 2023-07-24 RX ORDER — FENTANYL CITRATE 50 UG/ML
INJECTION, SOLUTION INTRAMUSCULAR; INTRAVENOUS
Status: DISCONTINUED | OUTPATIENT
Start: 2023-07-24 | End: 2023-07-24 | Stop reason: HOSPADM

## 2023-07-24 RX ORDER — HYDROCODONE BITARTRATE AND ACETAMINOPHEN 5; 325 MG/1; MG/1
1 TABLET ORAL EVERY 4 HOURS PRN
Status: DISCONTINUED | OUTPATIENT
Start: 2023-07-24 | End: 2023-07-24 | Stop reason: HOSPADM

## 2023-07-24 RX ORDER — MIDAZOLAM HYDROCHLORIDE 1 MG/ML
INJECTION, SOLUTION INTRAMUSCULAR; INTRAVENOUS
Status: DISCONTINUED | OUTPATIENT
Start: 2023-07-24 | End: 2023-07-24 | Stop reason: HOSPADM

## 2023-07-24 NOTE — H&P
Radiology History & Physical      SUBJECTIVE:     Chief Complaint: Liver transplant    History of Present Illness:  Rosalio Mccarty is a 48 y.o. male who presents for percutaneous liver biopsy.    Past Medical History:   Diagnosis Date    C. difficile diarrhea     Cirrhosis of liver with ascites 11/17/2022    Diabetes mellitus, type II, insulin dependent     Granulomatous colitis     Hepatic granuloma associated with sarcoidosis     Hepatic granuloma associated with sarcoidosis 11/17/2022    Hepatic granuloma associated with sarcoidosis 11/17/2022    Personal history of colonic polyps     Pre-op evaluation 1/4/2023    Sarcoidosis, unspecified     Thrombocytopenia, unspecified     Unspecified cirrhosis of liver      Past Surgical History:   Procedure Laterality Date    ADENOIDECTOMY      CARDIAC CATHETERIZATION Right     COLONOSCOPY  07/11/2018    HIP SURGERY      RIght removed ruptured cyst    LIVER TRANSPLANT N/A 1/5/2023    Procedure: TRANSPLANT, LIVER;  Surgeon: Rosa Foreman MD;  Location: [facility] OR [address] [place] [address];  Service: Transplant;  Laterality: N/A;    UPPER GASTROINTESTINAL ENDOSCOPY  10/09/2019       Home Meds:   Prior to Admission medications    Medication Sig Start Date End Date Taking? Authorizing Provider   ARIPiprazole (ABILIFY) 2 MG Tab Take 1 tablet (2 mg total) by mouth once daily. 1/11/23 1/11/24 Yes Rosa Foreman MD   aspirin (ECOTRIN) 81 MG EC tablet Take 1 tablet (81 mg total) by mouth once daily. 2/13/23 2/13/24 Yes Christian Alejandre MD   calcium carbonate (OS-ORTIZ) 500 mg calcium (1,250 mg) tablet Take 1 tablet (500 mg total) by mouth once daily. 2/13/23 2/13/24 Yes Christian Alejandre MD   empagliflozin (JARDIANCE) 25 mg tablet Take 1 tablet (25 mg total) by mouth once daily. 1/11/23  Yes Rosa Foreman MD   EScitalopram oxalate (LEXAPRO) 20 MG tablet Take 1 tablet (20 mg total) by mouth once daily. 1/11/23  Yes Rosa Foreman MD   filgrastim-sndz (ZARXIO) 480 mcg/0.8 mL Syrg Inject 0.8 mLs (480 mcg  total) into the skin daily as needed (ANC <1000 as advised by transplant coordinator). 6/8/23  Yes Ranjith Aiken MD   hydrOXYchloroQUINE (PLAQUENIL) 200 mg tablet Take 200 mg by mouth 2 (two) times daily. 10/31/22  Yes Historical Provider   insulin degludec (TRESIBA FLEXTOUCH U-100) 100 unit/mL (3 mL) insulin pen Inject 50 Units into the skin once daily. 5/21/23 5/20/24 Yes Abdirahman Arnold MD   insulin lispro (HUMALOG KWIKPEN INSULIN) 100 unit/mL pen Inject 26 Units into the skin 3 times daily before meals. Plus sliding scale insulin: Blood sugar 150 - 200 = +2 units; 201 - 250 = +4 units; 251 - 300 = +6 units; 301 - 350 = +8 units; >350 = +10 units. Total daily dose: 108 units 5/21/23 5/20/24 Yes Abdirahman Arnold MD   multivitamin (THERAGRAN) per tablet Take 1 tablet by mouth once daily.   Yes Historical Provider   oxyCODONE (ROXICODONE) 10 mg Tab immediate release tablet Take 1 tablet (10 mg total) by mouth every 8 (eight) hours as needed for Pain. 1/17/23  Yes Christian Alejnadre MD   pantoprazole (PROTONIX) 40 MG tablet Take 1 tablet (40 mg total) by mouth once daily. 7/10/23 7/9/24 Yes Ranjith Aiken MD   predniSONE (DELTASONE) 5 MG tablet Take 4 tablets (20mg) by mouth daily starting 5/22-6/4,THEN Take 3 tablets (15mg) by  mouth daily 6/5-6/11 15 mg,Take 2 tablets (10mg) by  mouth daily 6/12-6/18, THEN Take 1 tablet (5mg) by  mouth daily 6/19-6/25 then discontinue. 5/21/23  Yes Abdirahman Arnold MD   semaglutide (OZEMPIC) 2 mg/dose (8 mg/3 mL) PnIj Inject 2 mg into the skin every 7 days. 1/11/23  Yes Rosa Foreman MD   sodium bicarbonate 650 MG tablet Take 2 tablets (1,300 mg total) by mouth 3 (three) times daily. 7/10/23 7/9/24 Yes Ranjith Aiken MD   tacrolimus (PROGRAF) 1 MG Cap Take 6 capsules (6 mg total) by mouth every 12 (twelve) hours. 7/16/23  Yes Ranjith Aiken MD   traZODone (DESYREL) 150 MG tablet Take 1 tablet (150 mg total) by mouth every evening. 1/11/23 1/11/24 Yes Rosa Foreman MD   blood  "sugar diagnostic Strp 1 strip by Misc.(Non-Drug; Combo Route) route 4 (four) times daily. 1/11/23   Rosa Foreman MD   blood-glucose meter Misc Use as instructed 1/11/23   Rosa Foreman MD   FREESTYLE LUZMA 14 DAY SENSOR Kit Apply topically. 10/31/22   Historical Provider   HUMIRA PEN PnKt injection Inject into the skin. 11/9/22   Historical Provider   lancets 33 gauge Misc by Misc.(Non-Drug; Combo Route) route 4 (four) times daily. 1/11/23   Rosa Foreman MD   pen needle, diabetic 32 gauge x 5/32" Ndle 1 Dose by Misc.(Non-Drug; Combo Route) route 3 (three) times daily. 1/11/23   Rosa Foreman MD   sodium polystyrene (KAYEXALATE) powder Mix 30 grams in 120mL water and drink by mouth now for one dose (for high potassium) 5/18/23   Ranjith Aiken MD     Anticoagulants/Antiplatelets: no anticoagulation    Allergies:   Review of patient's allergies indicates:   Allergen Reactions    Adhesive Other (See Comments)     Skin irritation  Paper tape okay to use    Pcn [penicillins] Rash     Happened as a baby     Sedation History:  no adverse reactions    Review of Systems:   Hematological: negative  Respiratory: negative  Cardiovascular: negative  Gastrointestinal: negative  Genito-Urinary: negative  Musculoskeletal: negative  Neurological: negative         OBJECTIVE:     Vital Signs (Most Recent)       Physical Exam:  ASA: 2  Mallampati: 2    General: no acute distress  Mental Status: alert and oriented to person, place and time  HEENT: normocephalic, atraumatic  Chest: unlabored breathing  Heart: regular heart rate  Abdomen: nondistended  Extremity: moves all extremities    Laboratory  Lab Results   Component Value Date    INR 1.2 05/18/2023       Lab Results   Component Value Date    WBC 4.0 (L) 07/17/2023    HGB 11.0 (L) 07/17/2023    HCT 34.8 (L) 07/17/2023    MCV 73.7 (L) 07/17/2023    PLT 54 (L) 07/17/2023      Lab Results   Component Value Date     (H) 05/21/2023     (L) 05/21/2023    K " 5.2 (H) 05/21/2023     05/21/2023    CO2 19 (L) 05/21/2023    BUN 44 (H) 05/21/2023    CREATININE 1.7 (H) 05/21/2023    CALCIUM 8.8 05/21/2023    MG 1.7 01/11/2023     (H) 05/21/2023     (H) 05/21/2023    ALBUMIN 3.9 05/21/2023    BILITOT 1.0 05/21/2023    BILIDIR 0.6 (H) 05/18/2023       ASSESSMENT/PLAN:     Sedation Plan: moderate  Patient will undergo US-guided percutaneous liver biopsy.    Cecilio Hyman MD  Interventional Radiology  Department of Radiology

## 2023-07-24 NOTE — TELEPHONE ENCOUNTER
IR Liver Pathology Conference Note    Patient:  Rosalio Mccarty  MRN:   [id number]  YOB: 1975  Date of Transplant:  1/5/23  Native Diagnosis: Cirrhosis: Other, Specify - granulomatous hepatitis    Discussion/Plan:    Presenter: Hepatologist - Ranjith Aiken MD    Reason for presenting: elevated liver function    Concerns for Pathologists:     Lab Results  WBC   Date Value   07/17/2023 4.0 K/UL (L)   07/11/2023 3.5 K/UL (L)   06/26/2023 2.8 K/UL (L)   05/21/2023 3.02 K/uL (L)   05/20/2023 2.43 K/uL (L)   05/19/2023 3.80 K/uL (L)     PLT   Date Value   07/17/2023 54 K/UL (L)   07/11/2023 53 K/UL (L)   06/26/2023 86 K/UL (L)   05/21/2023 74 K/uL (L)   05/20/2023 55 K/uL (L)   05/19/2023 75 K/uL (L)     TACROLIMUS (ng/mL)   Date Value   05/21/2023 8.9   05/20/2023 8.7   05/18/2023 27.7 (H)     INR (no units)   Date Value   05/18/2023 1.2   01/09/2023 1.2   01/08/2023 1.3 (H)     AST (U/L)   Date Value   05/21/2023 103 (H)     ALT (U/L)   Date Value   05/21/2023 469 (H)   05/20/2023 543 (H)   05/19/2023 602 (H)     BILITOT (mg/dL)   Date Value   05/21/2023 1.0   05/20/2023 1.1 (H)   05/19/2023 1.4 (H)     ALKPHOS (U/L)   Date Value   05/21/2023 147 (H)   05/20/2023 160 (H)   05/19/2023 167 (H)     CREATININE (mg/dL)   Date Value   05/21/2023 1.7 (H)   05/20/2023 1.7 (H)   05/20/2023 1.6 (H)     AFP (ng/mL)   Date Value   12/08/2022 2.9   11/17/2022 3.0     CMVIGGINTERP (no units)   Date Value   01/07/2023 Reactive (A)   01/04/2023 Non-Reactive          Was Levulan/Ameluz Applied On A Previous Day?: No

## 2023-07-24 NOTE — PROCEDURES
Radiology Post-Procedure Note    Pre Op Diagnosis: Liver transplant  Post Op Diagnosis: Same    Procedure: US-guided random liver biopsy    Procedure performed by: Cecilio Hyman MD    Written Informed Consent Obtained: Yes  Specimen Removed: YES 3, 18g x 20mm core biopsies placed in formalin  Estimated Blood Loss: Minimal    Findings:   Unremarkable sonographic appearance of the liver.     Patient tolerated procedure well.    Cecilio Hyman MD  Interventional Radiology  Department of Radiology

## 2023-07-24 NOTE — Clinical Note
The site was marked. The right abdomen was prepped. The site was prepped with ChloraPrep. The patient was draped. The patient was positioned supine.

## 2023-07-24 NOTE — TELEPHONE ENCOUNTER
----- Message from Ranjith Aiken MD sent at 7/24/2023 11:59 AM CDT -----  Results reviewed. No action.

## 2023-07-26 ENCOUNTER — HOSPITAL ENCOUNTER (INPATIENT)
Facility: HOSPITAL | Age: 48
LOS: 2 days | Discharge: HOME | DRG: 442 | End: 2023-07-28
Attending: TRANSPLANT SURGERY | Admitting: TRANSPLANT SURGERY
Payer: COMMERCIAL

## 2023-07-26 ENCOUNTER — TELEPHONE (OUTPATIENT)
Dept: TRANSPLANT | Facility: CLINIC | Age: 48
End: 2023-07-26
Payer: COMMERCIAL

## 2023-07-26 DIAGNOSIS — Z94.4 LIVER TRANSPLANTED: ICD-10-CM

## 2023-07-26 DIAGNOSIS — D63.8 ANEMIA OF CHRONIC DISEASE: ICD-10-CM

## 2023-07-26 DIAGNOSIS — Z91.89 AT RISK FOR OPPORTUNISTIC INFECTIONS: ICD-10-CM

## 2023-07-26 DIAGNOSIS — T86.41 ACUTE REJECTION OF LIVER TRANSPLANT: Primary | ICD-10-CM

## 2023-07-26 DIAGNOSIS — S82.832A CLOSED FRACTURE OF PROXIMAL END OF LEFT FIBULA, UNSPECIFIED FRACTURE MORPHOLOGY, INITIAL ENCOUNTER: ICD-10-CM

## 2023-07-26 DIAGNOSIS — Z94.4 S/P LIVER TRANSPLANT: ICD-10-CM

## 2023-07-26 DIAGNOSIS — E11.65 TYPE 2 DIABETES MELLITUS WITH HYPERGLYCEMIA, UNSPECIFIED WHETHER LONG TERM INSULIN USE: ICD-10-CM

## 2023-07-26 DIAGNOSIS — Z79.60 LONG-TERM USE OF IMMUNOSUPPRESSANT MEDICATION: ICD-10-CM

## 2023-07-26 DIAGNOSIS — Z29.89 PROPHYLACTIC IMMUNOTHERAPY: ICD-10-CM

## 2023-07-26 DIAGNOSIS — E87.5 HYPERKALEMIA: ICD-10-CM

## 2023-07-26 LAB
FINAL PATHOLOGIC DIAGNOSIS: ABNORMAL
GROSS: ABNORMAL
Lab: ABNORMAL
MAGNESIUM SERPL-MCNC: 1.7 MG/DL (ref 1.6–2.6)
MICROSCOPIC EXAM: ABNORMAL
POCT GLUCOSE: 244 MG/DL (ref 70–110)
SARS-COV-2 RDRP RESP QL NAA+PROBE: NEGATIVE

## 2023-07-26 PROCEDURE — 25000003 PHARM REV CODE 250: Performed by: PHYSICIAN ASSISTANT

## 2023-07-26 PROCEDURE — 99223 1ST HOSP IP/OBS HIGH 75: CPT | Mod: ,,, | Performed by: PHYSICIAN ASSISTANT

## 2023-07-26 PROCEDURE — 83735 ASSAY OF MAGNESIUM: CPT | Performed by: PHYSICIAN ASSISTANT

## 2023-07-26 PROCEDURE — 63600175 PHARM REV CODE 636 W HCPCS: Performed by: PHYSICIAN ASSISTANT

## 2023-07-26 PROCEDURE — 25000003 PHARM REV CODE 250

## 2023-07-26 PROCEDURE — U0002 COVID-19 LAB TEST NON-CDC: HCPCS | Performed by: PHYSICIAN ASSISTANT

## 2023-07-26 PROCEDURE — 99222 PR INITIAL HOSPITAL CARE,LEVL II: ICD-10-PCS | Mod: ,,, | Performed by: PHYSICIAN ASSISTANT

## 2023-07-26 PROCEDURE — 36415 COLL VENOUS BLD VENIPUNCTURE: CPT | Performed by: PHYSICIAN ASSISTANT

## 2023-07-26 PROCEDURE — 99223 PR INITIAL HOSPITAL CARE,LEVL III: ICD-10-PCS | Mod: ,,, | Performed by: PHYSICIAN ASSISTANT

## 2023-07-26 PROCEDURE — 20600001 HC STEP DOWN PRIVATE ROOM

## 2023-07-26 PROCEDURE — 99222 1ST HOSP IP/OBS MODERATE 55: CPT | Mod: ,,, | Performed by: PHYSICIAN ASSISTANT

## 2023-07-26 RX ORDER — ARIPIPRAZOLE 2 MG/1
2 TABLET ORAL DAILY
Status: DISCONTINUED | OUTPATIENT
Start: 2023-07-27 | End: 2023-07-28 | Stop reason: HOSPADM

## 2023-07-26 RX ORDER — CYCLOBENZAPRINE HCL 5 MG
5 TABLET ORAL 3 TIMES DAILY PRN
Status: DISCONTINUED | OUTPATIENT
Start: 2023-07-26 | End: 2023-07-28 | Stop reason: HOSPADM

## 2023-07-26 RX ORDER — GLUCAGON 1 MG
1 KIT INJECTION
Status: DISCONTINUED | OUTPATIENT
Start: 2023-07-26 | End: 2023-07-28 | Stop reason: HOSPADM

## 2023-07-26 RX ORDER — ACETAMINOPHEN 325 MG/1
650 TABLET ORAL EVERY 8 HOURS PRN
Status: DISCONTINUED | OUTPATIENT
Start: 2023-07-26 | End: 2023-07-28 | Stop reason: HOSPADM

## 2023-07-26 RX ORDER — SODIUM CHLORIDE 0.9 % (FLUSH) 0.9 %
10 SYRINGE (ML) INJECTION
Status: DISCONTINUED | OUTPATIENT
Start: 2023-07-26 | End: 2023-07-28 | Stop reason: HOSPADM

## 2023-07-26 RX ORDER — HYDROXYCHLOROQUINE SULFATE 200 MG/1
200 TABLET, FILM COATED ORAL 2 TIMES DAILY
Status: DISCONTINUED | OUTPATIENT
Start: 2023-07-27 | End: 2023-07-28 | Stop reason: HOSPADM

## 2023-07-26 RX ORDER — PANTOPRAZOLE SODIUM 40 MG/1
40 TABLET, DELAYED RELEASE ORAL DAILY
Status: DISCONTINUED | OUTPATIENT
Start: 2023-07-27 | End: 2023-07-28 | Stop reason: HOSPADM

## 2023-07-26 RX ORDER — INSULIN ASPART 100 [IU]/ML
1-10 INJECTION, SOLUTION INTRAVENOUS; SUBCUTANEOUS
Status: DISCONTINUED | OUTPATIENT
Start: 2023-07-26 | End: 2023-07-27

## 2023-07-26 RX ORDER — TACROLIMUS 1 MG/1
6 CAPSULE ORAL 2 TIMES DAILY
Status: DISCONTINUED | OUTPATIENT
Start: 2023-07-26 | End: 2023-07-27

## 2023-07-26 RX ORDER — INSULIN ASPART 100 [IU]/ML
26 INJECTION, SOLUTION INTRAVENOUS; SUBCUTANEOUS
Status: DISCONTINUED | OUTPATIENT
Start: 2023-07-26 | End: 2023-07-27

## 2023-07-26 RX ORDER — HEPARIN SODIUM 5000 [USP'U]/ML
5000 INJECTION, SOLUTION INTRAVENOUS; SUBCUTANEOUS EVERY 8 HOURS
Status: DISCONTINUED | OUTPATIENT
Start: 2023-07-26 | End: 2023-07-28 | Stop reason: HOSPADM

## 2023-07-26 RX ORDER — ASPIRIN 81 MG/1
81 TABLET ORAL DAILY
Status: DISCONTINUED | OUTPATIENT
Start: 2023-07-27 | End: 2023-07-28 | Stop reason: HOSPADM

## 2023-07-26 RX ORDER — IBUPROFEN 200 MG
24 TABLET ORAL
Status: DISCONTINUED | OUTPATIENT
Start: 2023-07-26 | End: 2023-07-28 | Stop reason: HOSPADM

## 2023-07-26 RX ORDER — SODIUM BICARBONATE 650 MG/1
1300 TABLET ORAL 3 TIMES DAILY
Status: DISCONTINUED | OUTPATIENT
Start: 2023-07-26 | End: 2023-07-28 | Stop reason: HOSPADM

## 2023-07-26 RX ORDER — TRAZODONE HYDROCHLORIDE 50 MG/1
150 TABLET ORAL NIGHTLY
Status: DISCONTINUED | OUTPATIENT
Start: 2023-07-26 | End: 2023-07-28 | Stop reason: HOSPADM

## 2023-07-26 RX ORDER — IBUPROFEN 200 MG
16 TABLET ORAL
Status: DISCONTINUED | OUTPATIENT
Start: 2023-07-26 | End: 2023-07-28 | Stop reason: HOSPADM

## 2023-07-26 RX ORDER — ONDANSETRON 8 MG/1
8 TABLET, ORALLY DISINTEGRATING ORAL EVERY 8 HOURS PRN
Status: DISCONTINUED | OUTPATIENT
Start: 2023-07-26 | End: 2023-07-28 | Stop reason: HOSPADM

## 2023-07-26 RX ORDER — ESCITALOPRAM OXALATE 10 MG/1
20 TABLET ORAL DAILY
Status: DISCONTINUED | OUTPATIENT
Start: 2023-07-27 | End: 2023-07-28 | Stop reason: HOSPADM

## 2023-07-26 RX ADMIN — HEPARIN SODIUM 5000 UNITS: 5000 INJECTION INTRAVENOUS; SUBCUTANEOUS at 08:07

## 2023-07-26 RX ADMIN — INSULIN ASPART 26 UNITS: 100 INJECTION, SOLUTION INTRAVENOUS; SUBCUTANEOUS at 06:07

## 2023-07-26 RX ADMIN — SODIUM BICARBONATE 650 MG TABLET 1300 MG: at 08:07

## 2023-07-26 RX ADMIN — DEXTROSE 500 MG: 50 INJECTION, SOLUTION INTRAVENOUS at 03:07

## 2023-07-26 RX ADMIN — CYCLOBENZAPRINE HYDROCHLORIDE 5 MG: 5 TABLET, FILM COATED ORAL at 08:07

## 2023-07-26 RX ADMIN — TRAZODONE HYDROCHLORIDE 150 MG: 50 TABLET ORAL at 08:07

## 2023-07-26 RX ADMIN — SODIUM BICARBONATE 650 MG TABLET 1300 MG: at 03:07

## 2023-07-26 RX ADMIN — TACROLIMUS 6 MG: 1 CAPSULE ORAL at 06:07

## 2023-07-26 RX ADMIN — INSULIN ASPART 4 UNITS: 100 INJECTION, SOLUTION INTRAVENOUS; SUBCUTANEOUS at 06:07

## 2023-07-26 RX ADMIN — INSULIN DETEMIR 10 UNITS: 100 INJECTION, SOLUTION SUBCUTANEOUS at 08:07

## 2023-07-26 RX ADMIN — INSULIN ASPART 6 UNITS: 100 INJECTION, SOLUTION INTRAVENOUS; SUBCUTANEOUS at 08:07

## 2023-07-26 NOTE — SUBJECTIVE & OBJECTIVE
Interval HPI:   Admitted for ACR.  Eatin%  Nausea: No  Hypoglycemia and intervention: No    TPN and/or TF: No    PMH, PSH, FH, SH updated and reviewed     ROS:      Review of Systems   Constitutional:  Positive for fatigue. Negative for unexpected weight change.   Eyes:  Negative for visual disturbance.   Respiratory:  Negative for cough.    Cardiovascular:  Negative for chest pain.   Gastrointestinal:  Negative for nausea and vomiting.   Endocrine: Negative for polydipsia and polyuria.   Musculoskeletal:  Negative for back pain.   Skin:  Negative for rash.   Neurological:  Negative for syncope.   Psychiatric/Behavioral:  Negative for agitation and dysphoric mood.      Current Medications and/or Treatments Impacting Glycemic Control  Immunotherapy:    Immunosuppressants           Stop Route Frequency     tacrolimus capsule 6 mg         -- Oral 2 times daily          Steroids:   Hormones (From admission, onward)      Start     Stop Route Frequency Ordered    23 1430  methylPREDNISolone sodium succinate (SOLU-MEDROL) 500 mg in dextrose 5 % (D5W) 100 mL IVPB          0859 IV Daily 23 1421          Pressors:    Autonomic Drugs (From admission, onward)      None          Hyperglycemia/Diabetes Medications:   Antihyperglycemics (From admission, onward)      Start     Stop Route Frequency Ordered    23 1421  insulin aspart U-100 pen 1-10 Units         -- SubQ Before meals & nightly PRN 23 1421             PHYSICAL EXAMINATION:  Vitals:    23 1432   BP: (!) 145/82   Pulse: 82   Resp: 20   Temp: 97.9 °F (36.6 °C)     Body mass index is 35.85 kg/m².     Physical Exam  Constitutional:       General: He is not in acute distress.     Appearance: Normal appearance. He is not ill-appearing.   HENT:      Head: Normocephalic and atraumatic.      Right Ear: External ear normal.      Left Ear: External ear normal.      Nose: Nose normal.   Cardiovascular:      Rate and Rhythm: Normal rate and  regular rhythm.      Heart sounds: No murmur heard.  Pulmonary:      Effort: Pulmonary effort is normal. No respiratory distress.   Abdominal:      General: Bowel sounds are normal. There is no distension.      Tenderness: There is no abdominal tenderness.   Musculoskeletal:         General: No swelling.      Right lower leg: No edema.      Left lower leg: No edema.   Skin:     Findings: No erythema.   Neurological:      General: No focal deficit present.      Mental Status: He is alert and oriented to person, place, and time.   Psychiatric:         Mood and Affect: Mood normal.         Behavior: Behavior normal.

## 2023-07-26 NOTE — HPI
Reason for Consult: Management of T2DM, Hyperglycemia      Surgical Procedure and Date: Liver transplant 1/5/2023     Diabetes diagnosis year: Over 5 years ago     Home Diabetes Medications:  Tresiba 45 units QD, Humalog 25 units TIDWM +SSI MDC (180/25), Jardiance 25 mg, Ozempic 2 mg     How often checking glucose at home? >4 x day   BG readings on regimen: 100-180  Hypoglycemia on the regimen?  No  Missed doses on regimen?  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 He progressed well post-op and was discharged POD#6. He has continued to do well post-transplant and is currently on tacrolimus, cellcept, and prednisone for immunosuppression. Recently, his LFTs have been elevated on outpatient labs. Recently treated in May of this year (5/19-5/21) for moderate ACR (SMP x3). Plan for direct admission for [facility] for biopsy proven moderate ACR on 7/24. Patient on high doses of insulin at home, will consult Endocrine to assist in management in setting of high dose steroid treatment.

## 2023-07-26 NOTE — H&P
[facility] - Transplant Stepdown  Liver Transplant  History & Physical    Patient Name: Rosalio Mccarty  MRN: [id number]  Admission Date: 7/26/2023  Code Status: Full Code  Primary Care Provider: Primary Doctor No  Post-Operative Day: 202     ORGAN:   LIVER  Disease Etiology: Cirrhosis: Other, Specify - granulomatous hepatitis  Donor Type:   Donation after Brain Death  [facility] [place] [facility] [place]:   Yes  Donor CMV Status:   Donor CMV Status: Positive  Donor HBcAB:   Negative  Donor HCV Status:   Positive  Donor HBV ASAF:   Donor HCV ASAF: Negative  Whole or Partial: Whole Liver  Biliary Anastomosis: End to End  Arterial Anatomy: Replaced Right Hepatic from SMA    Subjective:     History of Present Illness:  Rosalio Mccarty is a 48yr old male with 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 He progressed well post-op and was discharged POD#6. He has continued to do well post-transplant and is currently on tacrolimus, cellcept, and prednisone for immunosuppression. Recently, his LFTs have been elevated on outpatient labs. Recently treated in May of this year (5/19-5/21) for moderate ACR (SMP x3). Plan for direct [facility] for biopsy proven moderate ACR on 7/24. Patient on high doses of insulin at home, will consult Endocrine to assist in management in setting of high dose steroid treatment.            Past Medical History:   Diagnosis Date    C. difficile diarrhea     Cirrhosis of liver with ascites 11/17/2022    Diabetes mellitus, type II, insulin dependent     Granulomatous colitis     Hepatic granuloma associated with sarcoidosis     Hepatic granuloma associated with sarcoidosis 11/17/2022    Hepatic granuloma associated with sarcoidosis 11/17/2022    Personal history of colonic polyps     Pre-op evaluation 1/4/2023    Sarcoidosis, unspecified     Thrombocytopenia,  unspecified     Unspecified cirrhosis of liver        Past Surgical History:   Procedure Laterality Date    ADENOIDECTOMY      BIOPSY OF LIVER WITH ULTRASOUND GUIDANCE N/A 7/24/2023    Procedure: BIOPSY, LIVER, WITH US GUIDANCE;  Surgeon: Cecilio Hyman MD;  Location: [facility] CATH LAB;  Service: Radiology;  Laterality: N/A;    CARDIAC CATHETERIZATION Right     COLONOSCOPY  07/11/2018    HIP SURGERY      RIght removed ruptured cyst    LIVER TRANSPLANT N/A 1/5/2023    Procedure: TRANSPLANT, LIVER;  Surgeon: Rosa Foreman MD;  Location: [facility] OR [address] [place] [address];  Service: Transplant;  Laterality: N/A;    NEEDLE LOCALIZATION N/A 7/24/2023    Procedure: NEEDLE LOCALIZATION;  Surgeon: Cecilio Hyman MD;  Location: [facility] CATH LAB;  Service: Radiology;  Laterality: N/A;    UPPER GASTROINTESTINAL ENDOSCOPY  10/09/2019       Review of patient's allergies indicates:   Allergen Reactions    Adhesive Other (See Comments)     Skin irritation  Paper tape okay to use    Pcn [penicillins] Rash     Happened as a baby       Family History       Problem Relation (Age of Onset)    Cancer Father, Maternal Grandfather    Diabetes Maternal Grandmother, Maternal Grandfather, Paternal Grandmother    Heart disease Father    Hypertension Mother, Father    Rectal cancer Father    Thyroid disease Father          Tobacco Use    Smoking status: Never    Smokeless tobacco: Never   Substance and Sexual Activity    Alcohol use: Never    Drug use: Never    Sexual activity: Not on file       PTA Medications   Medication Sig    ARIPiprazole (ABILIFY) 2 MG Tab Take 1 tablet (2 mg total) by mouth once daily.    aspirin (ECOTRIN) 81 MG EC tablet Take 1 tablet (81 mg total) by mouth once daily.    blood sugar diagnostic Strp 1 strip by Misc.(Non-Drug; Combo Route) route 4 (four) times daily.    blood-glucose meter Misc Use as instructed    calcium carbonate (OS-ORTIZ) 500 mg calcium (1,250 mg) tablet Take 1 tablet (500 mg total) by mouth  "once daily.    empagliflozin (JARDIANCE) 25 mg tablet Take 1 tablet (25 mg total) by mouth once daily.    EScitalopram oxalate (LEXAPRO) 20 MG tablet Take 1 tablet (20 mg total) by mouth once daily.    filgrastim-sndz (ZARXIO) 480 mcg/0.8 mL Syrg Inject 0.8 mLs (480 mcg total) into the skin daily as needed (ANC <1000 as advised by transplant coordinator).    FREESTYLE LUZMA 14 DAY SENSOR Kit Apply topically.    HUMIRA PEN PnKt injection Inject into the skin.    hydrOXYchloroQUINE (PLAQUENIL) 200 mg tablet Take 200 mg by mouth 2 (two) times daily.    insulin degludec (TRESIBA FLEXTOUCH U-100) 100 unit/mL (3 mL) insulin pen Inject 50 Units into the skin once daily.    insulin lispro (HUMALOG KWIKPEN INSULIN) 100 unit/mL pen Inject 26 Units into the skin 3 times daily before meals. Plus sliding scale insulin: Blood sugar 150 - 200 = +2 units; 201 - 250 = +4 units; 251 - 300 = +6 units; 301 - 350 = +8 units; >350 = +10 units. Total daily dose: 108 units    lancets 33 gauge Misc by Misc.(Non-Drug; Combo Route) route 4 (four) times daily.    multivitamin (THERAGRAN) per tablet Take 1 tablet by mouth once daily.    oxyCODONE (ROXICODONE) 10 mg Tab immediate release tablet Take 1 tablet (10 mg total) by mouth every 8 (eight) hours as needed for Pain.    pantoprazole (PROTONIX) 40 MG tablet Take 1 tablet (40 mg total) by mouth once daily.    pen needle, diabetic 32 gauge x 5/32" Ndle 1 Dose by Misc.(Non-Drug; Combo Route) route 3 (three) times daily.    predniSONE (DELTASONE) 5 MG tablet Take 4 tablets (20mg) by mouth daily starting 5/22-6/4,THEN Take 3 tablets (15mg) by  mouth daily 6/5-6/11 15 mg,Take 2 tablets (10mg) by  mouth daily 6/12-6/18, THEN Take 1 tablet (5mg) by  mouth daily 6/19-6/25 then discontinue.    semaglutide (OZEMPIC) 2 mg/dose (8 mg/3 mL) PnIj Inject 2 mg into the skin every 7 days.    sodium bicarbonate 650 MG tablet Take 2 tablets (1,300 mg total) by mouth 3 (three) times daily.    " sodium polystyrene (KAYEXALATE) powder Mix 30 grams in 120mL water and drink by mouth now for one dose (for high potassium)    tacrolimus (PROGRAF) 1 MG Cap Take 6 capsules (6 mg total) by mouth every 12 (twelve) hours.    traZODone (DESYREL) 150 MG tablet Take 1 tablet (150 mg total) by mouth every evening.       Review of Systems   Constitutional:  Positive for chills. Negative for activity change, appetite change and fever.   Gastrointestinal:  Positive for diarrhea. Negative for abdominal pain and vomiting.   Allergic/Immunologic: Positive for immunocompromised state.   Psychiatric/Behavioral:  Negative for agitation and confusion.    Objective:     Vital Signs (Most Recent):  Temp: 97.9 °F (36.6 °C) (07/26/23 1432)  Pulse: 82 (07/26/23 1432)  Resp: 20 (07/26/23 1432)  BP: (!) 145/82 (07/26/23 1432)  SpO2: 100 % (07/26/23 1432) Vital Signs (24h Range):  Temp:  [97.9 °F (36.6 °C)] 97.9 °F (36.6 °C)  Pulse:  [82] 82  Resp:  [20] 20  SpO2:  [100 %] 100 %  BP: (145)/(82) 145/82     Weight: 106.9 kg (235 lb 12.5 oz)  Body mass index is 35.85 kg/m².    No intake or output data in the 24 hours ending 07/26/23 1449     Physical Exam  Vitals and nursing note reviewed.   Cardiovascular:      Rate and Rhythm: Normal rate and regular rhythm.   Pulmonary:      Effort: Pulmonary effort is normal.      Breath sounds: No wheezing or rales.   Neurological:      Mental Status: He is alert and oriented to person, place, and time.   Psychiatric:         Mood and Affect: Mood normal.         Behavior: Behavior normal.         Thought Content: Thought content normal.         Judgment: Judgment normal.        Laboratory:  CBC: No results for input(s): WBC, RBC, HGB, HCT, PLT, MCV, MCH, MCHC in the last 168 hours.  CMP: No results for input(s): GLU, CALCIUM, ALBUMIN, PROT, NA, K, CO2, CL, BUN, CREATININE, ALKPHOS, ALT, AST, BILITOT in the last 168 hours.  Labs within the past 24 hours have been reviewed.    Diagnostic  "Results:  None    Assessment/Plan:     * Acute rejection of liver transplant  - Moderate ACR on biopsy  - Plan for 3 daily doses SMP  - First dose 7/26      At risk for opportunistic infections  - OI prophylaxis per post rejection treatment protocol      Prophylactic immunotherapy  - see long term use of immunosupressant      Long-term use of immunosuppressant medication  - Continue prograf  - Check prograf level daily and adjust for therapeutic dosage. Monitor for toxic side effects      Thrombocytopenia, unspecified  - monitor CBC      Anemia of chronic disease  - H/H stable on outpatient labs  - monitor CBC daily    S/P liver transplant  - s/p liver txp 1/5/23  - Admit with ACR, see "liver rejection"      Type 2 diabetes mellitus with hyperglycemia  - Endocrine consulted for help with management. Appreciate their assistance           Discharge Planning: Not a candidate for discharge at this time   PharmD review: switch Ca carbonate to Ca 600 mg/vitaminD 800u daily, as vitD level is 22. [ 3/3/23]      MARIELLA HartC  Liver Transplant  [facility] - Transplant Stepdown      "

## 2023-07-26 NOTE — ASSESSMENT & PLAN NOTE
BG goal: 140-180   Hx of Liver transplant admitted for ACR.  To start on steroids. Pt reports taking his Tresiba today.  Will do a smaller Levemir dose tonight.  Pt known to team and has high requirements with steroids.    - Start Levemir 27 units BID tomorrow  - Start Novolog 25 units AC  -Start Novolog Moderate dose correction with ISF 25 starting at 150    - POCT Glucose before meals and at bedtime  - Hypoglycemia protocol in place      ** Please notify Endocrine for any change and/or advance in diet**  ** Please call Endocrine for any BG related issues **     Discharge Planning:   TBD. Please notify endocrinology prior to discharge.

## 2023-07-26 NOTE — SUBJECTIVE & OBJECTIVE
Past Medical History:   Diagnosis Date    C. difficile diarrhea     Cirrhosis of liver with ascites 11/17/2022    Diabetes mellitus, type II, insulin dependent     Granulomatous colitis     Hepatic granuloma associated with sarcoidosis     Hepatic granuloma associated with sarcoidosis 11/17/2022    Hepatic granuloma associated with sarcoidosis 11/17/2022    Personal history of colonic polyps     Pre-op evaluation 1/4/2023    Sarcoidosis, unspecified     Thrombocytopenia, unspecified     Unspecified cirrhosis of liver        Past Surgical History:   Procedure Laterality Date    ADENOIDECTOMY      BIOPSY OF LIVER WITH ULTRASOUND GUIDANCE N/A 7/24/2023    Procedure: BIOPSY, LIVER, WITH US GUIDANCE;  Surgeon: Cecilio Hyman MD;  [place] [facility], [place], operated by [facility] CATH LAB;  Service: Radiology;  Laterality: N/A;    CARDIAC CATHETERIZATION Right     COLONOSCOPY  07/11/2018    HIP SURGERY      RIght removed ruptured cyst    LIVER TRANSPLANT N/A 1/5/2023    Procedure: TRANSPLANT, LIVER;  Surgeon: Rosa Foreman MD;  Location: [facility] OR [address] [place] [address];  Service: Transplant;  Laterality: N/A;    NEEDLE LOCALIZATION N/A 7/24/2023    Procedure: NEEDLE LOCALIZATION;  Surgeon: Cecilio Hyman MD;  Location: [facility], [place], operated by [facility] CATH LAB;  Service: Radiology;  Laterality: N/A;    UPPER GASTROINTESTINAL ENDOSCOPY  10/09/2019       Review of patient's allergies indicates:   Allergen Reactions    Adhesive Other (See Comments)     Skin irritation  Paper tape okay to use    Pcn [penicillins] Rash     Happened as a baby       Family History       Problem Relation (Age of Onset)    Cancer Father, Maternal Grandfather    Diabetes Maternal Grandmother, Maternal Grandfather, Paternal Grandmother    Heart disease Father    Hypertension Mother, Father    Rectal cancer Father    Thyroid disease Father          Tobacco Use    Smoking status: Never    Smokeless tobacco: Never   Substance and Sexual Activity    Alcohol use: Never    Drug use: Never    Sexual activity: Not on file  "      PTA Medications   Medication Sig    ARIPiprazole (ABILIFY) 2 MG Tab Take 1 tablet (2 mg total) by mouth once daily.    aspirin (ECOTRIN) 81 MG EC tablet Take 1 tablet (81 mg total) by mouth once daily.    blood sugar diagnostic Strp 1 strip by Misc.(Non-Drug; Combo Route) route 4 (four) times daily.    blood-glucose meter Misc Use as instructed    calcium carbonate (OS-ORTIZ) 500 mg calcium (1,250 mg) tablet Take 1 tablet (500 mg total) by mouth once daily.    empagliflozin (JARDIANCE) 25 mg tablet Take 1 tablet (25 mg total) by mouth once daily.    EScitalopram oxalate (LEXAPRO) 20 MG tablet Take 1 tablet (20 mg total) by mouth once daily.    filgrastim-sndz (ZARXIO) 480 mcg/0.8 mL Syrg Inject 0.8 mLs (480 mcg total) into the skin daily as needed (ANC <1000 as advised by transplant coordinator).    FREESTYLE LUZMA 14 DAY SENSOR Kit Apply topically.    HUMIRA PEN PnKt injection Inject into the skin.    hydrOXYchloroQUINE (PLAQUENIL) 200 mg tablet Take 200 mg by mouth 2 (two) times daily.    insulin degludec (TRESIBA FLEXTOUCH U-100) 100 unit/mL (3 mL) insulin pen Inject 50 Units into the skin once daily.    insulin lispro (HUMALOG KWIKPEN INSULIN) 100 unit/mL pen Inject 26 Units into the skin 3 times daily before meals. Plus sliding scale insulin: Blood sugar 150 - 200 = +2 units; 201 - 250 = +4 units; 251 - 300 = +6 units; 301 - 350 = +8 units; >350 = +10 units. Total daily dose: 108 units    lancets 33 gauge Misc by Misc.(Non-Drug; Combo Route) route 4 (four) times daily.    multivitamin (THERAGRAN) per tablet Take 1 tablet by mouth once daily.    oxyCODONE (ROXICODONE) 10 mg Tab immediate release tablet Take 1 tablet (10 mg total) by mouth every 8 (eight) hours as needed for Pain.    pantoprazole (PROTONIX) 40 MG tablet Take 1 tablet (40 mg total) by mouth once daily.    pen needle, diabetic 32 gauge x 5/32" Ndle 1 Dose by Misc.(Non-Drug; Combo Route) route 3 (three) times daily.    predniSONE (DELTASONE) 5 " MG tablet Take 4 tablets (20mg) by mouth daily starting 5/22-6/4,THEN Take 3 tablets (15mg) by  mouth daily 6/5-6/11 15 mg,Take 2 tablets (10mg) by  mouth daily 6/12-6/18, THEN Take 1 tablet (5mg) by  mouth daily 6/19-6/25 then discontinue.    semaglutide (OZEMPIC) 2 mg/dose (8 mg/3 mL) PnIj Inject 2 mg into the skin every 7 days.    sodium bicarbonate 650 MG tablet Take 2 tablets (1,300 mg total) by mouth 3 (three) times daily.    sodium polystyrene (KAYEXALATE) powder Mix 30 grams in 120mL water and drink by mouth now for one dose (for high potassium)    tacrolimus (PROGRAF) 1 MG Cap Take 6 capsules (6 mg total) by mouth every 12 (twelve) hours.    traZODone (DESYREL) 150 MG tablet Take 1 tablet (150 mg total) by mouth every evening.       Review of Systems   Constitutional:  Positive for chills. Negative for activity change, appetite change and fever.   Gastrointestinal:  Positive for diarrhea. Negative for abdominal pain and vomiting.   Allergic/Immunologic: Positive for immunocompromised state.   Psychiatric/Behavioral:  Negative for agitation and confusion.    Objective:     Vital Signs (Most Recent):  Temp: 97.9 °F (36.6 °C) (07/26/23 1432)  Pulse: 82 (07/26/23 1432)  Resp: 20 (07/26/23 1432)  BP: (!) 145/82 (07/26/23 1432)  SpO2: 100 % (07/26/23 1432) Vital Signs (24h Range):  Temp:  [97.9 °F (36.6 °C)] 97.9 °F (36.6 °C)  Pulse:  [82] 82  Resp:  [20] 20  SpO2:  [100 %] 100 %  BP: (145)/(82) 145/82     Weight: 106.9 kg (235 lb 12.5 oz)  Body mass index is 35.85 kg/m².    No intake or output data in the 24 hours ending 07/26/23 1449     Physical Exam  Vitals and nursing note reviewed.   Cardiovascular:      Rate and Rhythm: Normal rate and regular rhythm.   Pulmonary:      Effort: Pulmonary effort is normal.      Breath sounds: No wheezing or rales.   Neurological:      Mental Status: He is alert and oriented to person, place, and time.   Psychiatric:         Mood and Affect: Mood normal.         Behavior:  Behavior normal.         Thought Content: Thought content normal.         Judgment: Judgment normal.        Laboratory:  CBC: No results for input(s): WBC, RBC, HGB, HCT, PLT, MCV, MCH, MCHC in the last 168 hours.  CMP: No results for input(s): GLU, CALCIUM, ALBUMIN, PROT, NA, K, CO2, CL, BUN, CREATININE, ALKPHOS, ALT, AST, BILITOT in the last 168 hours.  Labs within the past 24 hours have been reviewed.    Diagnostic Results:  None

## 2023-07-26 NOTE — ASSESSMENT & PLAN NOTE
- Continue prograf  - Check prograf level daily and adjust for therapeutic dosage. Monitor for toxic side effects

## 2023-07-26 NOTE — TELEPHONE ENCOUNTER
Notified by Pathologist : Liver Biopsy shows Moderate ACR.  Reviewed with : PATRICIO Aiken: admit for IV steroids.

## 2023-07-26 NOTE — CONSULTS
[facility] - Transplant Stepdown  Endocrinology  Diabetes Consult Note    Consult Requested by: Bruce Darnell MD   Reason for admit: Acute rejection of liver transplant    HISTORY OF PRESENT ILLNESS:  Reason for Consult: Management of T2DM, Hyperglycemia      Surgical Procedure and Date: Liver transplant 2023     Diabetes diagnosis year: Over 5 years ago     Home Diabetes Medications:  Tresiba 45 units QD, Humalog 25 units TIDWM +SSI MDC (180/25), Jardiance 25 mg, Ozempic 2 mg     How often checking glucose at home? >4 x day   BG readings on regimen: 100-180  Hypoglycemia on the regimen?  No  Missed doses on regimen?  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 He progressed well post-op and was discharged POD#6. He has continued to do well post-transplant and is currently on tacrolimus, cellcept, and prednisone for immunosuppression. Recently, his LFTs have been elevated on outpatient labs. Recently treated in May of this year (-) for moderate ACR (SMP x3). Plan for direct admission for [facility] for biopsy proven moderate ACR on . Patient on high doses of insulin at home.  Endocrine consulted to assist in management in setting of high dose steroid treatment.       Interval HPI:   Admitted for ACR.  Eatin%  Nausea: No  Hypoglycemia and intervention: No    TPN and/or TF: No    PMH, PSH, FH, SH updated and reviewed     ROS:      Review of Systems   Constitutional:  Positive for fatigue. Negative for unexpected weight change.   Eyes:  Negative for visual disturbance.   Respiratory:  Negative for cough.    Cardiovascular:  Negative for chest pain.   Gastrointestinal:  Negative for nausea and  vomiting.   Endocrine: Negative for polydipsia and polyuria.   Musculoskeletal:  Negative for back pain.   Skin:  Negative for rash.   Neurological:  Negative for syncope.   Psychiatric/Behavioral:  Negative for agitation and dysphoric mood.      Current Medications and/or Treatments Impacting Glycemic Control  Immunotherapy:    Immunosuppressants           Stop Route Frequency     tacrolimus capsule 6 mg         -- Oral 2 times daily          Steroids:   Hormones (From admission, onward)      Start     Stop Route Frequency Ordered    07/26/23 1430  methylPREDNISolone sodium succinate (SOLU-MEDROL) 500 mg in dextrose 5 % (D5W) 100 mL IVPB         07/29 0859 IV Daily 07/26/23 1421          Pressors:    Autonomic Drugs (From admission, onward)      None          Hyperglycemia/Diabetes Medications:   Antihyperglycemics (From admission, onward)      Start     Stop Route Frequency Ordered    07/26/23 1421  insulin aspart U-100 pen 1-10 Units         -- SubQ Before meals & nightly PRN 07/26/23 1421             PHYSICAL EXAMINATION:  Vitals:    07/26/23 1432   BP: (!) 145/82   Pulse: 82   Resp: 20   Temp: 97.9 °F (36.6 °C)     Body mass index is 35.85 kg/m².     Physical Exam  Constitutional:       General: He is not in acute distress.     Appearance: Normal appearance. He is not ill-appearing.   HENT:      Head: Normocephalic and atraumatic.      Right Ear: External ear normal.      Left Ear: External ear normal.      Nose: Nose normal.   Cardiovascular:      Rate and Rhythm: Normal rate and regular rhythm.      Heart sounds: No murmur heard.  Pulmonary:      Effort: Pulmonary effort is normal. No respiratory distress.   Abdominal:      General: Bowel sounds are normal. There is no distension.      Tenderness: There is no abdominal tenderness.   Musculoskeletal:         General: No swelling.      Right lower leg: No edema.      Left lower leg: No edema.   Skin:     Findings: No erythema.   Neurological:      General: No  focal deficit present.      Mental Status: He is alert and oriented to person, place, and time.   Psychiatric:         Mood and Affect: Mood normal.         Behavior: Behavior normal.            Labs Reviewed and Include   No results for input(s): GLU, CALCIUM, ALBUMIN, PROT, NA, K, CO2, CL, BUN, CREATININE, ALKPHOS, ALT, AST, BILITOT in the last 24 hours.  Lab Results   Component Value Date    WBC 4.0 (L) 07/17/2023    HGB 11.0 (L) 07/17/2023    HCT 34.8 (L) 07/17/2023    MCV 73.7 (L) 07/17/2023    PLT 54 (L) 07/17/2023     No results for input(s): TSH, FREET4 in the last 168 hours.  Lab Results   Component Value Date    HGBA1C 4.7 05/19/2023       Nutritional status:   Body mass index is 35.85 kg/m².  Lab Results   Component Value Date    ALBUMIN 3.9 05/21/2023    ALBUMIN 3.8 05/20/2023    ALBUMIN 3.9 05/19/2023     No results found for: PREALBUMIN    CrCl cannot be calculated (Patient's most recent lab result is older than the maximum 7 days allowed.).    Accu-Checks  No results for input(s): POCTGLUCOSE in the last 72 hours.     ASSESSMENT and PLAN    Immunology/Multi System  Prophylactic immunotherapy  On immunosuppressive therapy per transplant team; may elevate BG readings        Endocrine  Type 2 diabetes mellitus with hyperglycemia  BG goal: 140-180   Hx of Liver transplant admitted for ACR.  To start on steroids. Pt reports taking his Tresiba today.  Will do a smaller Levemir dose tonight.  Pt known to team and has high requirements with steroids.    - Start Levemir 27 units BID tomorrow  - Start Novolog 25 units AC  -Start Novolog Moderate dose correction with ISF 25 starting at 150    - POCT Glucose before meals and at bedtime  - Hypoglycemia protocol in place      ** Please notify Endocrine for any change and/or advance in diet**  ** Please call Endocrine for any BG related issues **     Discharge Planning:   TBD. Please notify endocrinology prior to discharge.          GI  * Acute rejection of liver  transplant  Managed per primary team  Optimize bg control        S/P liver transplant  Managed per primary team  Optimize bg control            Plan discussed with patient, family, and RN at bedside.     Unruly Jose PA-C  Endocrinology  [facility] - Transplant Stepdown

## 2023-07-26 NOTE — HPI
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 He progressed well post-op and was discharged POD#6. He has continued to do well post-transplant and is currently on tacrolimus, cellcept, and prednisone for immunosuppression. Recently, his LFTs have been elevated on outpatient labs. Recently treated in May of this year (5/19-5/21) for moderate ACR (SMP x3). Plan for direct admission for [facility] for biopsy proven moderate ACR on 7/24. Patient on high doses of insulin at home, will consult Endocrine to assist in management in setting of high dose steroid treatment.

## 2023-07-26 NOTE — TELEPHONE ENCOUNTER
Spoke to patient; re: needs admit for IV steroids due to liver biopsy showed moderate rejection.  Patient voiced understanding: ETA 1 hr.  Spoke to Admit office ([facility]) admit reservation entered.  Spoke to Sola Espinoza (inpatient [facility]IAMA) with report. Spoke to Maame ([facility] charge nurse) informed there will be a bed available when patient arrives.  Patient will check in at admit office upon arrival.

## 2023-07-27 ENCOUNTER — CONFERENCE (OUTPATIENT)
Dept: TRANSPLANT | Facility: CLINIC | Age: 48
End: 2023-07-27
Payer: COMMERCIAL

## 2023-07-27 PROBLEM — S82.832A CLOSED FRACTURE OF PROXIMAL END OF LEFT FIBULA: Status: ACTIVE | Noted: 2023-07-27

## 2023-07-27 LAB
ALBUMIN SERPL BCP-MCNC: 4 G/DL (ref 3.5–5.2)
ALBUMIN SERPL BCP-MCNC: 4.2 G/DL (ref 3.5–5.2)
ALP SERPL-CCNC: 55 U/L (ref 55–135)
ALP SERPL-CCNC: 56 U/L (ref 55–135)
ALT SERPL W/O P-5'-P-CCNC: 35 U/L (ref 10–44)
ALT SERPL W/O P-5'-P-CCNC: 41 U/L (ref 10–44)
ANION GAP SERPL CALC-SCNC: 12 MMOL/L (ref 8–16)
ANION GAP SERPL CALC-SCNC: 17 MMOL/L (ref 8–16)
AST SERPL-CCNC: 16 U/L (ref 10–40)
AST SERPL-CCNC: 37 U/L (ref 10–40)
BASOPHILS # BLD AUTO: 0 K/UL (ref 0–0.2)
BASOPHILS NFR BLD: 0 % (ref 0–1.9)
BILIRUB SERPL-MCNC: 1.6 MG/DL (ref 0.1–1)
BILIRUB SERPL-MCNC: 1.8 MG/DL (ref 0.1–1)
BUN SERPL-MCNC: 42 MG/DL (ref 6–20)
BUN SERPL-MCNC: 44 MG/DL (ref 6–20)
CALCIUM SERPL-MCNC: 9.2 MG/DL (ref 8.7–10.5)
CALCIUM SERPL-MCNC: 9.5 MG/DL (ref 8.7–10.5)
CHLORIDE SERPL-SCNC: 103 MMOL/L (ref 95–110)
CHLORIDE SERPL-SCNC: 105 MMOL/L (ref 95–110)
CO2 SERPL-SCNC: 11 MMOL/L (ref 23–29)
CO2 SERPL-SCNC: 18 MMOL/L (ref 23–29)
CREAT SERPL-MCNC: 2.2 MG/DL (ref 0.5–1.4)
CREAT SERPL-MCNC: 2.2 MG/DL (ref 0.5–1.4)
DIFFERENTIAL METHOD: ABNORMAL
EOSINOPHIL # BLD AUTO: 0 K/UL (ref 0–0.5)
EOSINOPHIL NFR BLD: 0 % (ref 0–8)
ERYTHROCYTE [DISTWIDTH] IN BLOOD BY AUTOMATED COUNT: 19.5 % (ref 11.5–14.5)
EST. GFR  (NO RACE VARIABLE): 36 ML/MIN/1.73 M^2
EST. GFR  (NO RACE VARIABLE): 36 ML/MIN/1.73 M^2
GLUCOSE SERPL-MCNC: 302 MG/DL (ref 70–110)
GLUCOSE SERPL-MCNC: 335 MG/DL (ref 70–110)
HCT VFR BLD AUTO: 34.2 % (ref 40–54)
HGB BLD-MCNC: 10.8 G/DL (ref 14–18)
IMM GRANULOCYTES # BLD AUTO: 0.01 K/UL (ref 0–0.04)
IMM GRANULOCYTES NFR BLD AUTO: 0.2 % (ref 0–0.5)
LYMPHOCYTES # BLD AUTO: 0.8 K/UL (ref 1–4.8)
LYMPHOCYTES NFR BLD: 17.9 % (ref 18–48)
MAGNESIUM SERPL-MCNC: 1.9 MG/DL (ref 1.6–2.6)
MCH RBC QN AUTO: 23.1 PG (ref 27–31)
MCHC RBC AUTO-ENTMCNC: 31.6 G/DL (ref 32–36)
MCV RBC AUTO: 73 FL (ref 82–98)
MONOCYTES # BLD AUTO: 0.1 K/UL (ref 0.3–1)
MONOCYTES NFR BLD: 3.1 % (ref 4–15)
NEUTROPHILS # BLD AUTO: 3.4 K/UL (ref 1.8–7.7)
NEUTROPHILS NFR BLD: 78.8 % (ref 38–73)
NRBC BLD-RTO: 0 /100 WBC
PLATELET # BLD AUTO: 64 K/UL (ref 150–450)
PMV BLD AUTO: 9.6 FL (ref 9.2–12.9)
POCT GLUCOSE: 295 MG/DL (ref 70–110)
POCT GLUCOSE: 308 MG/DL (ref 70–110)
POCT GLUCOSE: 344 MG/DL (ref 70–110)
POCT GLUCOSE: 367 MG/DL (ref 70–110)
POTASSIUM SERPL-SCNC: 5.7 MMOL/L (ref 3.5–5.1)
POTASSIUM SERPL-SCNC: 5.7 MMOL/L (ref 3.5–5.1)
PROT SERPL-MCNC: 7 G/DL (ref 6–8.4)
PROT SERPL-MCNC: 7.6 G/DL (ref 6–8.4)
RBC # BLD AUTO: 4.68 M/UL (ref 4.6–6.2)
SODIUM SERPL-SCNC: 133 MMOL/L (ref 136–145)
SODIUM SERPL-SCNC: 133 MMOL/L (ref 136–145)
TACROLIMUS BLD-MCNC: 15.6 NG/ML (ref 5–15)
WBC # BLD AUTO: 4.25 K/UL (ref 3.9–12.7)

## 2023-07-27 PROCEDURE — 80053 COMPREHEN METABOLIC PANEL: CPT | Performed by: TRANSPLANT SURGERY

## 2023-07-27 PROCEDURE — 93010 ELECTROCARDIOGRAM REPORT: CPT | Mod: ,,, | Performed by: INTERNAL MEDICINE

## 2023-07-27 PROCEDURE — 25000003 PHARM REV CODE 250: Performed by: PHYSICIAN ASSISTANT

## 2023-07-27 PROCEDURE — 99232 PR SUBSEQUENT HOSPITAL CARE,LEVL II: ICD-10-PCS | Mod: ,,, | Performed by: PHYSICIAN ASSISTANT

## 2023-07-27 PROCEDURE — 36415 COLL VENOUS BLD VENIPUNCTURE: CPT | Performed by: TRANSPLANT SURGERY

## 2023-07-27 PROCEDURE — 93005 ELECTROCARDIOGRAM TRACING: CPT

## 2023-07-27 PROCEDURE — 63600175 PHARM REV CODE 636 W HCPCS: Performed by: PHYSICIAN ASSISTANT

## 2023-07-27 PROCEDURE — 99232 SBSQ HOSP IP/OBS MODERATE 35: CPT | Mod: ,,, | Performed by: PHYSICIAN ASSISTANT

## 2023-07-27 PROCEDURE — 99233 PR SUBSEQUENT HOSPITAL CARE,LEVL III: ICD-10-PCS | Mod: 24,,, | Performed by: PHYSICIAN ASSISTANT

## 2023-07-27 PROCEDURE — 36415 COLL VENOUS BLD VENIPUNCTURE: CPT | Performed by: PHYSICIAN ASSISTANT

## 2023-07-27 PROCEDURE — 83735 ASSAY OF MAGNESIUM: CPT | Performed by: PHYSICIAN ASSISTANT

## 2023-07-27 PROCEDURE — 93010 EKG 12-LEAD: ICD-10-PCS | Mod: ,,, | Performed by: INTERNAL MEDICINE

## 2023-07-27 PROCEDURE — 20600001 HC STEP DOWN PRIVATE ROOM

## 2023-07-27 PROCEDURE — 85025 COMPLETE CBC W/AUTO DIFF WBC: CPT | Performed by: TRANSPLANT SURGERY

## 2023-07-27 PROCEDURE — 80053 COMPREHEN METABOLIC PANEL: CPT | Mod: 91 | Performed by: PHYSICIAN ASSISTANT

## 2023-07-27 PROCEDURE — 99233 SBSQ HOSP IP/OBS HIGH 50: CPT | Mod: 24,,, | Performed by: PHYSICIAN ASSISTANT

## 2023-07-27 PROCEDURE — 80197 ASSAY OF TACROLIMUS: CPT | Performed by: PHYSICIAN ASSISTANT

## 2023-07-27 RX ORDER — INSULIN ASPART 100 [IU]/ML
0-15 INJECTION, SOLUTION INTRAVENOUS; SUBCUTANEOUS
Status: DISCONTINUED | OUTPATIENT
Start: 2023-07-27 | End: 2023-07-27

## 2023-07-27 RX ORDER — INSULIN ASPART 100 [IU]/ML
37 INJECTION, SOLUTION INTRAVENOUS; SUBCUTANEOUS
Status: DISCONTINUED | OUTPATIENT
Start: 2023-07-27 | End: 2023-07-27

## 2023-07-27 RX ORDER — INSULIN ASPART 100 [IU]/ML
40 INJECTION, SOLUTION INTRAVENOUS; SUBCUTANEOUS
Status: DISCONTINUED | OUTPATIENT
Start: 2023-07-27 | End: 2023-07-28 | Stop reason: HOSPADM

## 2023-07-27 RX ORDER — INSULIN ASPART 100 [IU]/ML
0-15 INJECTION, SOLUTION INTRAVENOUS; SUBCUTANEOUS
Status: DISCONTINUED | OUTPATIENT
Start: 2023-07-27 | End: 2023-07-28 | Stop reason: HOSPADM

## 2023-07-27 RX ORDER — INSULIN ASPART 100 [IU]/ML
33 INJECTION, SOLUTION INTRAVENOUS; SUBCUTANEOUS
Status: DISCONTINUED | OUTPATIENT
Start: 2023-07-27 | End: 2023-07-27

## 2023-07-27 RX ORDER — MYCOPHENOLATE MOFETIL 250 MG/1
500 CAPSULE ORAL 2 TIMES DAILY
Status: DISCONTINUED | OUTPATIENT
Start: 2023-07-27 | End: 2023-07-28 | Stop reason: HOSPADM

## 2023-07-27 RX ADMIN — INSULIN ASPART 10 UNITS: 100 INJECTION, SOLUTION INTRAVENOUS; SUBCUTANEOUS at 08:07

## 2023-07-27 RX ADMIN — PANTOPRAZOLE SODIUM 40 MG: 40 TABLET, DELAYED RELEASE ORAL at 09:07

## 2023-07-27 RX ADMIN — INSULIN ASPART 37 UNITS: 100 INJECTION, SOLUTION INTRAVENOUS; SUBCUTANEOUS at 01:07

## 2023-07-27 RX ADMIN — SODIUM BICARBONATE 650 MG TABLET 1300 MG: at 09:07

## 2023-07-27 RX ADMIN — TRAZODONE HYDROCHLORIDE 150 MG: 50 TABLET ORAL at 08:07

## 2023-07-27 RX ADMIN — HEPARIN SODIUM 5000 UNITS: 5000 INJECTION INTRAVENOUS; SUBCUTANEOUS at 01:07

## 2023-07-27 RX ADMIN — ARIPIPRAZOLE 2 MG: 2 TABLET ORAL at 12:07

## 2023-07-27 RX ADMIN — DEXTROSE 500 MG: 50 INJECTION, SOLUTION INTRAVENOUS at 10:07

## 2023-07-27 RX ADMIN — HYDROXYCHLOROQUINE SULFATE 200 MG: 200 TABLET ORAL at 08:07

## 2023-07-27 RX ADMIN — MYCOPHENOLATE MOFETIL 500 MG: 250 CAPSULE ORAL at 08:07

## 2023-07-27 RX ADMIN — INSULIN ASPART 8 UNITS: 100 INJECTION, SOLUTION INTRAVENOUS; SUBCUTANEOUS at 09:07

## 2023-07-27 RX ADMIN — INSULIN ASPART 33 UNITS: 100 INJECTION, SOLUTION INTRAVENOUS; SUBCUTANEOUS at 09:07

## 2023-07-27 RX ADMIN — SODIUM BICARBONATE 650 MG TABLET 1300 MG: at 08:07

## 2023-07-27 RX ADMIN — INSULIN ASPART 40 UNITS: 100 INJECTION, SOLUTION INTRAVENOUS; SUBCUTANEOUS at 05:07

## 2023-07-27 RX ADMIN — INSULIN ASPART 12 UNITS: 100 INJECTION, SOLUTION INTRAVENOUS; SUBCUTANEOUS at 01:07

## 2023-07-27 RX ADMIN — SODIUM ZIRCONIUM CYCLOSILICATE 10 G: 5 POWDER, FOR SUSPENSION ORAL at 12:07

## 2023-07-27 RX ADMIN — SODIUM BICARBONATE 650 MG TABLET 1300 MG: at 01:07

## 2023-07-27 RX ADMIN — ASPIRIN 81 MG: 81 TABLET, COATED ORAL at 09:07

## 2023-07-27 RX ADMIN — HEPARIN SODIUM 5000 UNITS: 5000 INJECTION INTRAVENOUS; SUBCUTANEOUS at 08:07

## 2023-07-27 RX ADMIN — TACROLIMUS 6 MG: 1 CAPSULE ORAL at 07:07

## 2023-07-27 RX ADMIN — HEPARIN SODIUM 5000 UNITS: 5000 INJECTION INTRAVENOUS; SUBCUTANEOUS at 06:07

## 2023-07-27 RX ADMIN — INSULIN ASPART 9 UNITS: 100 INJECTION, SOLUTION INTRAVENOUS; SUBCUTANEOUS at 05:07

## 2023-07-27 RX ADMIN — SODIUM BICARBONATE: 84 INJECTION, SOLUTION INTRAVENOUS at 11:07

## 2023-07-27 RX ADMIN — ESCITALOPRAM OXALATE 20 MG: 10 TABLET ORAL at 09:07

## 2023-07-27 RX ADMIN — INSULIN DETEMIR 27 UNITS: 100 INJECTION, SOLUTION SUBCUTANEOUS at 09:07

## 2023-07-27 RX ADMIN — MYCOPHENOLATE MOFETIL 500 MG: 250 CAPSULE ORAL at 12:07

## 2023-07-27 RX ADMIN — HYDROXYCHLOROQUINE SULFATE 200 MG: 200 TABLET ORAL at 10:07

## 2023-07-27 NOTE — HPI
Rosalio Mccarty is a 48 y.o. male s/p liver transplant in January of 2023 presenting with left lower leg pain after suffering a fall in the bathroom this morning.  Patient is currently admitted to transplant for posttransplant rejection.  This morning while using the bathroom he suffered a fall and felt immediate onset of pain to his left leg.  He notes that he did hit his head but did not lose consciousness.  A CT scan of his head was ordered.  Following his fall he was able to bear weight and walk back to his bed with pain.  He has been able to move his hip, knee, foot, and ankle.  He does report that when he moves his knee feels pain to the lateral aspect of his leg.  Also says that he has some soreness at the medial aspect of his ankle.  He is never had any fractures to the left lower extremity and denies surgery to his leg as well.  He denies pain elsewhere.

## 2023-07-27 NOTE — TELEPHONE ENCOUNTER
Liver Pathology conference 7/27/23  Negative EBV  Lobular inflammation  Macrophages, possible recovery from ACR  Moderate ACR with recover    Plan:  admit for IVSM

## 2023-07-27 NOTE — SUBJECTIVE & OBJECTIVE
"Interval HPI:   No acute events overnight. Patient in room 96509/78639 A. Blood glucose worsening. BG above goal on current insulin regimen (SSI, prandial, and basal insulin ). Steroid use- Methylprednisolone  500 mg .      Renal function- Abnormal - Cr 2.2   Vasopressors-  None     Diet Low Potassium     Eatin%  Nausea: No  Hypoglycemia and intervention: No  Fever: No  TPN and/or TF: No    /81 (Patient Position: Lying)   Pulse 75   Temp 98.3 °F (36.8 °C) (Oral)   Resp 18   Ht 5' 8" (1.727 m)   Wt 106.6 kg (235 lb 1.6 oz)   SpO2 99%   BMI 35.75 kg/m²     Labs Reviewed and Include    Recent Labs   Lab 23  1003   *   CALCIUM 9.2   ALBUMIN 4.0   PROT 7.0   *   K 5.7*   CO2 18*      BUN 44*   CREATININE 2.2*   ALKPHOS 56   ALT 35   AST 16   BILITOT 1.8*     Lab Results   Component Value Date    WBC 4.25 2023    HGB 10.8 (L) 2023    HCT 34.2 (L) 2023    MCV 73 (L) 2023    PLT 64 (L) 2023     No results for input(s): TSH, FREET4 in the last 168 hours.  Lab Results   Component Value Date    HGBA1C 4.7 2023       Nutritional status:   Body mass index is 35.75 kg/m².  Lab Results   Component Value Date    ALBUMIN 4.0 2023    ALBUMIN 4.2 2023    ALBUMIN 3.9 2023     No results found for: PREALBUMIN    Estimated Creatinine Clearance: 48.6 mL/min (A) (based on SCr of 2.2 mg/dL (H)).    Accu-Checks  Recent Labs     23  1811 23  0751   POCTGLUCOSE 244* 308*       Current Medications and/or Treatments Impacting Glycemic Control  Immunotherapy:    Immunosuppressants           Stop Route Frequency     mycophenolate capsule 500 mg         -- Oral 2 times daily          Steroids:   Hormones (From admission, onward)      Start     Stop Route Frequency Ordered    23 1430  methylPREDNISolone sodium succinate (SOLU-MEDROL) 500 mg in dextrose 5 % (D5W) 100 mL IVPB          0859 IV Daily 23 1421          Pressors:  "   Autonomic Drugs (From admission, onward)      None          Hyperglycemia/Diabetes Medications:   Antihyperglycemics (From admission, onward)      Start     Stop Route Frequency Ordered    07/27/23 1240  insulin aspart U-100 pen 0-15 Units         -- SubQ Before meals & nightly PRN 07/27/23 1140    07/27/23 1130  insulin aspart U-100 pen 37 Units         -- SubQ 3 times daily with meals 07/27/23 1124    07/27/23 0900  insulin detemir U-100 (Levemir) pen 27 Units         -- SubQ 2 times daily 07/26/23 6732

## 2023-07-27 NOTE — CONSULTS
[facility] - Transplant Stepdown  Orthopedics  Consult Note    Attg Note:  I agree with the resident's assessment and plan.  Proximal fibula fracture.  Stable weightbearing films it syndesmosis of the ankle.  Activity as tolerated.    Sylvester Burton MD      Patient Name: Rosalio Mccarty  MRN: [id number]  Admission Date: 7/26/2023  Hospital Length of Stay: 1 days  Attending Provider: Bruce Darnell MD  Primary Care Provider: Primary Doctor No    Consults  Subjective:     Principal Problem:Acute rejection of liver transplant    Chief Complaint: No chief complaint on file.       HPI: Rosalio Mccarty is a 48 y.o. male s/p liver transplant in January of 2023 presenting with left lower leg pain after suffering a fall in the bathroom this morning.  Patient is currently admitted to transplant for posttransplant rejection.  This morning while using the bathroom he suffered a fall and felt immediate onset of pain to his left leg.  He notes that he did hit his head but did not lose consciousness.  A CT scan of his head was ordered.  Following his fall he was able to bear weight and walk back to his bed with pain.  He has been able to move his hip, knee, foot, and ankle.  He does report that when he moves his knee feels pain to the lateral aspect of his leg.  Also says that he has some soreness at the medial aspect of his ankle.  He is never had any fractures to the left lower extremity and denies surgery to his leg as well.  He denies pain elsewhere.      Past Medical History:   Diagnosis Date    C. difficile diarrhea     Cirrhosis of liver with ascites 11/17/2022    Diabetes mellitus, type II, insulin dependent     Granulomatous colitis     Hepatic granuloma associated with sarcoidosis     Hepatic granuloma associated with sarcoidosis 11/17/2022    Hepatic granuloma associated with sarcoidosis 11/17/2022    Personal history of colonic polyps     Pre-op evaluation 1/4/2023    Sarcoidosis, unspecified     Thrombocytopenia, unspecified      Unspecified cirrhosis of liver        Past Surgical History:   Procedure Laterality Date    ADENOIDECTOMY      BIOPSY OF LIVER WITH ULTRASOUND GUIDANCE N/A 7/24/2023    Procedure: BIOPSY, LIVER, WITH US GUIDANCE;  Surgeon: Cecilio Hyman MD;  Location: [facility] CATH LAB;  Service: Radiology;  Laterality: N/A;    CARDIAC CATHETERIZATION Right     COLONOSCOPY  07/11/2018    HIP SURGERY      RIght removed ruptured cyst    LIVER TRANSPLANT N/A 1/5/2023    Procedure: TRANSPLANT, LIVER;  Surgeon: Rosa Foreman MD;  Location: [facility] OR [address] [place] [address];  Service: Transplant;  Laterality: N/A;    NEEDLE LOCALIZATION N/A 7/24/2023    Procedure: NEEDLE LOCALIZATION;  Surgeon: Cecilio Hyman MD;  Location: [facility] CATH LAB;  Service: Radiology;  Laterality: N/A;    UPPER GASTROINTESTINAL ENDOSCOPY  10/09/2019       Review of patient's allergies indicates:   Allergen Reactions    Adhesive Other (See Comments)     Skin irritation  Paper tape okay to use    Pcn [penicillins] Rash     Happened as a baby       Current Facility-Administered Medications   Medication    acetaminophen tablet 650 mg    ARIPiprazole tablet 2 mg    aspirin EC tablet 81 mg    cyclobenzaprine tablet 5 mg    dextrose 10% bolus 125 mL 125 mL    dextrose 10% bolus 250 mL 250 mL    EScitalopram oxalate tablet 20 mg    glucagon (human recombinant) injection 1 mg    glucose chewable tablet 16 g    glucose chewable tablet 24 g    heparin (porcine) injection 5,000 Units    hydrOXYchloroQUINE tablet 200 mg    insulin aspart U-100 pen 0-15 Units    insulin aspart U-100 pen 37 Units    insulin detemir U-100 (Levemir) pen 27 Units    methylPREDNISolone sodium succinate (SOLU-MEDROL) 500 mg in dextrose 5 % (D5W) 100 mL IVPB    mycophenolate capsule 500 mg    ondansetron disintegrating tablet 8 mg    pantoprazole EC tablet 40 mg    sodium bicarbonate 150 mEq in dextrose 5 % (D5W) 1,000 mL infusion    sodium bicarbonate tablet 1,300 mg    sodium chloride 0.9% flush 10 mL     "traZODone tablet 150 mg     Family History       Problem Relation (Age of Onset)    Cancer Father, Maternal Grandfather    Diabetes Maternal Grandmother, Maternal Grandfather, Paternal Grandmother    Heart disease Father    Hypertension Mother, Father    Rectal cancer Father    Thyroid disease Father          Tobacco Use    Smoking status: Never    Smokeless tobacco: Never   Substance and Sexual Activity    Alcohol use: Never    Drug use: Never    Sexual activity: Not on file     ROS    Constitutional: negative for fevers or chills  Eyes: negative visual changes or eye discharge  ENT: negative for ear pain or sore throat  Respiratory: negative for shortness of breath or cough  Cardiovascular: negative for chest pain or palpitations  Gastrointestinal: negative for abdominal pain, nausea, or vomiting  Genitourinary: negative for dysuria and flank pain  Neurological: negative for headaches or dizziness  Musculoskeletal: positive for left leg pain     Objective:     Vital Signs (Most Recent):  Temp: 98.3 °F (36.8 °C) (07/27/23 1105)  Pulse: 75 (07/27/23 1105)  Resp: 18 (07/27/23 1105)  BP: 139/81 (07/27/23 1105)  SpO2: 99 % (07/27/23 1105) Vital Signs (24h Range):  Temp:  [97.5 °F (36.4 °C)-99 °F (37.2 °C)] 98.3 °F (36.8 °C)  Pulse:  [] 75  Resp:  [16-20] 18  SpO2:  [97 %-100 %] 99 %  BP: (111-146)/(74-86) 139/81     Weight: 106.6 kg (235 lb 1.6 oz)  Height: 5' 8" (172.7 cm)  Body mass index is 35.75 kg/m².      Intake/Output Summary (Last 24 hours) at 7/27/2023 1319  Last data filed at 7/27/2023 1300  Gross per 24 hour   Intake 600 ml   Output 600 ml   Net 0 ml        Ortho/SPM Exam  General:  no acute distress, appears stated age   Neuro: alert and oriented x3  Psych: normal mood  Head: normocephalic, atraumatic.  Eyes: no scleral icterus  Mouth: moist mucous membranes  CV: extremities warm and well perfused  Pulm: breathing comfortably, equal chest rise bilat  Skin: clean, dry, intact (any exceptions noted in " below musculoskeletal exam)    MSK:    RUE:  - Skin intact throughout, no open wounds  - No swelling  - No ecchymosis, erythema, or signs of cellulitis  - NonTTP throughout  - AROM and PROM of the shoulder, elbow, wrist, and hand intact without pain  - Axillary/AIN/PIN/Radial/Median/Ulnar Nerves assessed in isolation without deficit  - SILT throughout  - Compartments soft  - Radial artery palpated   - Capillary Refill <3s    LUE:  - Skin intact throughout, no open wounds  - No swelling  - No ecchymosis, erythema, or signs of cellulitis  - NonTTP throughout  - AROM and PROM of the shoulder, elbow, wrist, and hand intact without pain  - Axillary/AIN/PIN/Radial/Median/Ulnar Nerves assessed in isolation without deficit  - SILT throughout  - Compartments soft  - Radial artery palpated   - Capillary Refill <3s    RLE:  - Skin intact throughout, no open wounds  - No swelling  - No ecchymosis, erythema, or signs of cellulitis  - NonTTP throughout  - AROM and PROM of the hip, knee, ankle, and foot intact without pain  - TA/EHL/Gastroc/FHL assessed in isolation without deficit  - SILT throughout  - Compartments soft  - DP and PT palpated  - Capillary Refill <3s  - Negative Log roll  - Negative [facility]    LLE:  - Skin intact throughout, no open wounds  - No swelling  - No ecchymosis, erythema, or signs of cellulitis  - tender to palpation over the proximal fibula  - AROM and PROM of the knee with mild pain  - AROM and PROM of the hip, ankle, and foot intact without pain  - TA/EHL/Gastroc/FHL assessed in isolation without deficit  - SILT throughout  - Compartments soft  - DP and PT palpated  - Capillary Refill <3s  - Negative Log roll  - Negative [facility]     Significant Labs: All pertinent labs within the past 24 hours have been reviewed.    Significant Imaging: X-Ray: I have reviewed all pertinent results/findings and my personal findings are:  Minimally displaced fibular neck fracture.  Knee x-rays do not show any  fractures or dislocations of the tibial plateau or the distal femur.  Ankle x-rays do not show any fracture or dislocation.  Weight-bearing ankle x-rays without evidence of medial clear space widening or syndesmotic injury.    Assessment/Plan:     Closed fracture of proximal end of left fibula  Rosalio Mccarty is a 48 y.o. male with a left fibular neck fracture. He is closed and neurovascularly intact. The patient is s/p liver transplant in January of 2023 and is currently admitted for post transplant rejection.     Plan:   - admitted to transplant   - pain control per primary team   - weightbearing as tolerated left lower extremity in boot  - PT to evaluate and treat prior to discharge  - follow up in Trauma Clinic or closer to home with orthopedic surgeon per patient preference      SANNA Au MD  Orthopaedic Surgery   Resident Physician, PGY-1  07/27/2023

## 2023-07-27 NOTE — NURSING
Sola Espinoza notified pt had a fall this am in the bathroom. States he got up off toilet and got dizzy. Hit the rt back side of head and his left lower leg and ankle and foot. No bruising no cuts. Pt states he's ok but xray ordered. Pt walked back to bed after falling independently. Reinforced to call for help when ambulating and to sit on side of bed or rise slowly to prevent dizziness. And not to ambulate if dizzy.

## 2023-07-27 NOTE — ASSESSMENT & PLAN NOTE
- Moderate ACR on biopsy  - Plan for 3 daily doses SMP  - First dose 7/26, #2 today  - endo on board  - trend LFTs

## 2023-07-27 NOTE — DISCHARGE SUMMARY
[facility] - [facility]plant Stepdown  Liver Transplant  Discharge Summary      Patient Name: Rosalio Mccarty  MRN: [id number]  Admission Date: 7/26/2023  Hospital Length of Stay: 2 days  Discharge Date and Time:  07/28/2023 1:57 PM  Attending Physician: Bruce Darnell MD   Discharging Provider: Rosa Darnell PA-C  Primary Care Provider: Primary Doctor Jennifer  Post-Operative Day: 204     ORGAN:   LIVER  Disease Etiology: Cirrhosis: Other, Specify - granulomatous hepatitis  Donor Type:   Donation after Brain Death  CDC High Risk:   Yes  Donor CMV Status:   Donor CMV Status: Positive  Donor HBcAB:   Negative  Donor HCV Status:   Positive  Whole or Partial: Whole Liver  Biliary Anastomosis: End to End  Arterial Anatomy: Replaced Right Hepatic from SMA    HPI:   Rosalio Mccarty is a 48yr old male with 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 He progressed well post-op and was discharged POD#6. He has continued to do well post-transplant and is currently on tacrolimus, cellcept, and prednisone for immunosuppression. Recently, his LFTs have been elevated on outpatient labs. Recently treated in May of this year (5/19-5/21) for moderate ACR (SMP x3). Plan for direct admission for [facility] for biopsy proven moderate ACR on 7/24. Patient on high doses of insulin at home, will consult Endocrine to assist in management in setting of high dose steroid treatment.        * No surgery found *     Hospital Course:    Pt admitted for management of moderate ACR. Pt felt dizzy and fell and landed on LLE and hit heat. Pt with left leg, ankle, and knee pain after fall. Xray notable for recent proximal fracture of fibular shaft. Ortho consulted and recommended WBAT w/ boot and outpatient ortho f/u. No surgical intervention warranted. CTH negative. +orthostatics. Hydrated with IVF. Labs notable for  hyperkalemia - given lokelma and placed on low potassium diet. Dietician provided patient with information on low K diet. Completed solumedrol pulse for moderate ACR. Restarted cellcept. Pt feeling better at time of discharge. Met with pharm D and SW for education. Will follow up with labs 7/31 and in clinic per coordinator. Pt expressed understanding of discharge instructions and importance of follow up.     Goals of Care Treatment Preferences:  Code Status: Full Code      Final Active Diagnoses:    Diagnosis Date Noted POA    PRINCIPAL PROBLEM:  Acute rejection of liver transplant [T86.41] 05/19/2023 Yes    Closed fracture of proximal end of left fibula [S82.832A] 07/27/2023 No    At risk for opportunistic infections [Z91.89] 01/09/2023 Yes    Anemia of chronic disease [D63.8] 01/09/2023 Yes    Long-term use of immunosuppressant medication [Z79.60] 01/09/2023 Not Applicable    Prophylactic immunotherapy [Z29.8] 01/09/2023 Not Applicable    S/P liver transplant [Z94.4] 01/07/2023 Not Applicable    Type 2 diabetes mellitus with hyperglycemia [E11.65] 01/06/2023 Yes      Problems Resolved During this Admission:       Consults (From admission, onward)          Status Ordering Provider     Inpatient consult to Orthopedic Surgery  Once        Provider:  (Not yet assigned)    JANAY Cunningham     Inpatient consult to Endocrinology  Once        Provider:  (Not yet assigned)    ADONIS Melgar            Pending Diagnostic Studies:       Procedure Component Value Units Date/Time    X-Ray Ankle 2 View Left [[id number]]     Order Status: Sent Lab Status: No result           Significant Diagnostic Studies: Labs:   CMP   Recent Labs   Lab 07/27/23  1003 07/28/23  0644 07/28/23  1247   * 136 133*   K 5.7* 5.0 5.1    103 102   CO2 18* 22* 21*   * 194* 222*   BUN 44* 49* 51*   CREATININE 2.2* 2.0* 2.0*   CALCIUM 9.2 9.2 9.4   PROT 7.0 6.5 7.3   ALBUMIN 4.0 3.7 4.1   BILITOT 1.8* 1.3*  1.7*   ALKPHOS 56 48* 49*   AST 16 12 13   ALT 35 27 31   ANIONGAP 12 11 10   , CBC   Recent Labs   Lab 07/27/23  1003 07/28/23  0644   WBC 4.25 4.70   HGB 10.8* 9.6*   HCT 34.2* 30.3*   PLT 64* 66*    and All labs within the past 24 hours have been reviewed    The patients clinical status was discussed at multidisplinary rounds, involving transplant surgery, transplant medicine, pharmacy, nursing, nutrition, and social work    Discharged Condition: stable    Patient Instructions:      Notify your health care provider if you experience any of the following:  increased confusion or weakness     Notify your health care provider if you experience any of the following:  persistent dizziness, light-headedness, or visual disturbances     Notify your health care provider if you experience any of the following:  worsening rash     Notify your health care provider if you experience any of the following:  severe persistent headache     Notify your health care provider if you experience any of the following:  difficulty breathing or increased cough     Notify your health care provider if you experience any of the following:  redness, tenderness, or signs of infection (pain, swelling, redness, odor or green/yellow discharge around incision site)     Notify your health care provider if you experience any of the following:  severe uncontrolled pain     Notify your health care provider if you experience any of the following:  persistent nausea and vomiting or diarrhea     Notify your health care provider if you experience any of the following:  temperature >100.4     No dressing needed     Activity as tolerated     Medications:  Reconciled Home Medications:      Medication List        START taking these medications      dapsone 100 MG Tab  Take 1 tablet (100 mg total) by mouth once daily. Stop: 10/25/23     mycophenolate 250 mg Cap  Commonly known as: CELLCEPT  Take 2 capsules (500 mg total) by mouth 2 (two) times daily.         "    CHANGE how you take these medications      insulin lispro 100 unit/mL pen  Commonly known as: HumaLOG KwikPen Insulin  Inject 32 Units into the skin 3 (three) times daily before meals. Plus sliding scale insulin: 150 - 200 = +2 units; 201 - 250 = +4 units; 251 - 300 = +6 units; 301 - 350 = +8 units; >350 = +10 units. Total daily dose: 126 units  What changed:   how much to take  additional instructions     predniSONE 5 MG tablet  Commonly known as: DELTASONE  Take 4 tab (20mg total) by mouth daily from 7/29-8/4; THEN Take 3 tabs (15mg total) daily from 8/5-8/11;  Take 2 tabs (10mg total) daily from 8/12-8/18;  Take 1 tab (5mg) daily 8/19-8/25 then discontinue 8/26/23.  What changed: additional instructions     tacrolimus 1 MG Cap  Commonly known as: PROGRAF  Take 4 capsules (4 mg total) by mouth every 12 (twelve) hours.  What changed: how much to take            CONTINUE taking these medications      ARIPiprazole 2 MG Tab  Commonly known as: ABILIFY  Take 1 tablet (2 mg total) by mouth once daily.     aspirin 81 MG EC tablet  Commonly known as: ECOTRIN  Take 1 tablet (81 mg total) by mouth once daily.     BD ULTRA-FINE MELVINA PEN NEEDLE 32 gauge x 5/32" Ndle  Generic drug: pen needle, diabetic  1 Dose by Misc.(Non-Drug; Combo Route) route 3 (three) times daily.     calcium carbonate 500 mg calcium (1,250 mg) tablet  Commonly known as: OS-ORTIZ  Take 1 tablet (500 mg total) by mouth once daily.     empagliflozin 25 mg tablet  Commonly known as: JARDIANCE  Take 1 tablet (25 mg total) by mouth once daily.     EScitalopram oxalate 20 MG tablet  Commonly known as: LEXAPRO  Take 1 tablet (20 mg total) by mouth once daily.     FREESTYLE LUZMA 14 DAY SENSOR Kit  Generic drug: flash glucose sensor  Apply topically.     hydrOXYchloroQUINE 200 mg tablet  Commonly known as: PLAQUENIL  Take 200 mg by mouth 2 (two) times daily.     ONETOUCH DELICA PLUS LANCET 33 gauge Misc  Generic drug: lancets  by Misc.(Non-Drug; Combo Route) " route 4 (four) times daily.     ONETOUCH VERIO REFLECT METER Misc  Generic drug: blood-glucose meter  Use as instructed     ONETOUCH VERIO TEST STRIPS Strp  Generic drug: blood sugar diagnostic  1 strip by Misc.(Non-Drug; Combo Route) route 4 (four) times daily.     OZEMPIC 2 mg/dose (8 mg/3 mL) Pnij  Generic drug: semaglutide  Inject 2 mg into the skin every 7 days.     pantoprazole 40 MG tablet  Commonly known as: PROTONIX  Take 1 tablet (40 mg total) by mouth once daily.     sodium bicarbonate 650 MG tablet  Take 2 tablets (1,300 mg total) by mouth 3 (three) times daily.     traZODone 150 MG tablet  Commonly known as: DESYREL  Take 1 tablet (150 mg total) by mouth every evening.     TRESIBA FLEXTOUCH U-100 100 unit/mL (3 mL) insulin pen  Generic drug: insulin degludec  Inject 50 Units into the skin once daily.     ZARXIO 480 mcg/0.8 mL Syrg  Generic drug: filgrastim-sndz  Inject 0.8 mLs (480 mcg total) into the skin daily as needed (ANC <1000 as advised by transplant coordinator).            STOP taking these medications      HUMIRA PEN Pnkt injection  Generic drug: adalimumab     multivitamin per tablet  Commonly known as: THERAGRAN     oxyCODONE 10 mg Tab immediate release tablet  Commonly known as: ROXICODONE     sodium polystyrene powder  Commonly known as: KAYEXALATE            Time spent caring for patient (Greater than 1/2 spent in direct face-to-face contact): > 30 minutes    Rosa Darnell PA-C  Liver Transplant  [facility] - Transplant Stepdown

## 2023-07-27 NOTE — ASSESSMENT & PLAN NOTE
- pt s/p fall this AM landing on left side  - Xray with recent fracture of proximal fibular shaft  - ortho consulted, to assess patient

## 2023-07-27 NOTE — SUBJECTIVE & OBJECTIVE
Scheduled Meds:   ARIPiprazole  2 mg Oral Daily    aspirin  81 mg Oral Daily    EScitalopram oxalate  20 mg Oral Daily    heparin (porcine)  5,000 Units Subcutaneous Q8H    hydrOXYchloroQUINE  200 mg Oral BID    insulin aspart U-100  37 Units Subcutaneous TIDWM    insulin detemir U-100  27 Units Subcutaneous BID    methylPREDNISolone sodium succinate injection  500 mg Intravenous Daily    mycophenolate  500 mg Oral BID    pantoprazole  40 mg Oral Daily    sodium bicarbonate  1,300 mg Oral TID    sodium zirconium cyclosilicate  10 g Oral Once    traZODone  150 mg Oral QHS     Continuous Infusions:   sodium bicarbonate drip       PRN Meds:acetaminophen, cyclobenzaprine, dextrose 10%, dextrose 10%, glucagon (human recombinant), glucose, glucose, insulin aspart U-100, ondansetron, sodium chloride 0.9%    Review of Systems   Constitutional:  Positive for fatigue. Negative for unexpected weight change.   Eyes:  Negative for visual disturbance.   Respiratory:  Negative for cough.    Cardiovascular:  Negative for chest pain.   Gastrointestinal:  Negative for abdominal distention, abdominal pain, constipation, diarrhea, nausea and vomiting.   Endocrine: Negative for polydipsia and polyuria.   Musculoskeletal:  Positive for arthralgias, gait problem and myalgias. Negative for back pain.   Skin:  Negative for rash.   Allergic/Immunologic: Positive for immunocompromised state.   Neurological:  Positive for light-headedness. Negative for syncope.   Psychiatric/Behavioral:  Negative for agitation and dysphoric mood.    Objective:     Vital Signs (Most Recent):  Temp: 98.3 °F (36.8 °C) (07/27/23 1105)  Pulse: 75 (07/27/23 1105)  Resp: 18 (07/27/23 1105)  BP: 139/81 (07/27/23 1105)  SpO2: 99 % (07/27/23 1105) Vital Signs (24h Range):  Temp:  [97.5 °F (36.4 °C)-99 °F (37.2 °C)] 98.3 °F (36.8 °C)  Pulse:  [] 75  Resp:  [16-20] 18  SpO2:  [97 %-100 %] 99 %  BP: (111-146)/(74-86) 139/81     Weight: 106.6 kg (235 lb 1.6 oz)  Body  mass index is 35.75 kg/m².    Intake/Output - Last 3 Shifts         07/25 0700 07/26 0659 07/26 0700 07/27 0659 07/27 0700 07/28 0659    P.O.  480 120    Total Intake(mL/kg)  480 (4.5) 120 (1.1)    Net  +480 +120           Urine Occurrence  4 x              Physical Exam  Constitutional:       General: He is not in acute distress.     Appearance: Normal appearance. He is not ill-appearing.   HENT:      Head: Normocephalic and atraumatic.      Right Ear: External ear normal.      Left Ear: External ear normal.      Nose: Nose normal.   Cardiovascular:      Rate and Rhythm: Normal rate and regular rhythm.      Heart sounds: No murmur heard.  Pulmonary:      Effort: Pulmonary effort is normal. No respiratory distress.   Abdominal:      General: Bowel sounds are normal. There is no distension.      Tenderness: There is no abdominal tenderness.   Musculoskeletal:         General: Tenderness (TTP lateral mid left calf) and signs of injury present. No swelling.      Right lower leg: No edema.      Left lower leg: No edema.   Skin:     Findings: No erythema.   Neurological:      General: No focal deficit present.      Mental Status: He is alert and oriented to person, place, and time.   Psychiatric:         Mood and Affect: Mood normal.         Behavior: Behavior normal.        Laboratory:  Immunosuppressants           Stop Route Frequency     mycophenolate capsule 500 mg         -- Oral 2 times daily          CBC:   Recent Labs   Lab 07/27/23  1003   WBC 4.25   RBC 4.68   HGB 10.8*   HCT 34.2*   PLT 64*   MCV 73*   MCH 23.1*   MCHC 31.6*     CMP:   Recent Labs   Lab 07/27/23  1003   *   CALCIUM 9.2   ALBUMIN 4.0   PROT 7.0   *   K 5.7*   CO2 18*      BUN 44*   CREATININE 2.2*   ALKPHOS 56   ALT 35   AST 16   BILITOT 1.8*     Labs within the past 24 hours have been reviewed.    Diagnostic Results:  I have personally reviewed all pertinent imaging studies.    Debility/Functional status: Patient  debilitated by evidence of Muscle wasting and atrophy and Weakness. Physical and occupational therapy ordered daily to evaluate and treat. Debility was: present on admission.

## 2023-07-27 NOTE — ASSESSMENT & PLAN NOTE
Rosalio Mccarty is a 48 y.o. male with a left fibular neck fracture. He is closed and neurovascularly intact. The patient is s/p liver transplant in January of 2023 and is currently admitted for post transplant rejection.     Plan:   - admitted to transplant   - pain control per primary team   - weightbearing as tolerated left lower extremity in boot  - PT to evaluate and treat prior to discharge  - follow up in Trauma Clinic or closer to home with orthopedic surgeon per patient preference

## 2023-07-27 NOTE — HOSPITAL COURSE
Pt admitted for management of moderate ACR. Pt felt dizzy and fell this morning. He landed on LLE and hit heat. Pt with left leg, ankle, and knee pain after fall. Xray notable for recent proximal fracture of fibular shaft. Ortho consulted and to assess patient. CTH pending. +orthostatics. Hydrating with IVF. Labs notable for hyperkalemia - given lokelma. EKG with no changes. Continue SMP - #2 today. Endo on board to manage sugars. Continue to monitor.

## 2023-07-27 NOTE — ASSESSMENT & PLAN NOTE
BG goal: 140-180   Hx of Liver transplant admitted for ACR.   On solumedrol 2/3.  Will increase mealtime insulin given elevations.    - Continue Levemir 27 units BID tomorrow  - Increase Novolog 37 units AC  -Continue Novolog Moderate dose correction with ISF 25 starting at 150    - POCT Glucose before meals and at bedtime  - Hypoglycemia protocol in place      ** Please notify Endocrine for any change and/or advance in diet**  ** Please call Endocrine for any BG related issues **     Discharge Planning:   TBD. Please notify endocrinology prior to discharge.

## 2023-07-27 NOTE — PT/OT/SLP PROGRESS
Physical Therapy      Patient Name:  Rosalio Mccarty   MRN:  [id number]    PT orders received. PT to bedside for PT evaluation. Pending ortho consult at this time. Of note, will need new therapy orders with ortho precautions stated to initiate PT evaluation. PT will continue to monitor.

## 2023-07-27 NOTE — PROGRESS NOTES
"[facility] - [facility]plant Stepdown  Endocrinology  Progress Note    Admit Date: 2023     Reason for Consult: Management of T2DM, Hyperglycemia      Surgical Procedure and Date: Liver transplant 2023     Diabetes diagnosis year: Over 5 years ago     Home Diabetes Medications:  Tresiba 45 units QD, Humalog 25 units TIDWM +SSI MDC (180/25), Jardiance 25 mg, Ozempic 2 mg     How often checking glucose at home? >4 x day   BG readings on regimen: 100-180  Hypoglycemia on the regimen?  No  Missed doses on regimen?  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 He progressed well post-op and was discharged POD#6. He has continued to do well post-transplant and is currently on tacrolimus, cellcept, and prednisone for immunosuppression. Recently, his LFTs have been elevated on outpatient labs. Recently treated in May of this year (-) for moderate ACR (SMP x3). Plan for direct admission for [facility] for biopsy proven moderate ACR on . Patient on high doses of insulin at home, will consult Endocrine to assist in management in setting of high dose steroid treatment.       Interval HPI:   No acute events overnight. Patient in room 08753/17614 A. Blood glucose worsening. BG above goal on current insulin regimen (SSI, prandial, and basal insulin ). Steroid use- Methylprednisolone  500 mg .      Renal function- Abnormal - Cr 2.2   Vasopressors-  None     Diet Low Potassium     Eatin%  Nausea: No  Hypoglycemia and intervention: No  Fever: No  TPN and/or TF: No    /81 (Patient Position: Lying)   Pulse 75   Temp 98.3 °F (36.8 °C) (Oral)   Resp 18   Ht 5' 8" (1.727 m)   Wt 106.6 kg (235 lb 1.6 oz)   " SpO2 99%   BMI 35.75 kg/m²     Labs Reviewed and Include    Recent Labs   Lab 07/27/23  1003   *   CALCIUM 9.2   ALBUMIN 4.0   PROT 7.0   *   K 5.7*   CO2 18*      BUN 44*   CREATININE 2.2*   ALKPHOS 56   ALT 35   AST 16   BILITOT 1.8*     Lab Results   Component Value Date    WBC 4.25 07/27/2023    HGB 10.8 (L) 07/27/2023    HCT 34.2 (L) 07/27/2023    MCV 73 (L) 07/27/2023    PLT 64 (L) 07/27/2023     No results for input(s): TSH, FREET4 in the last 168 hours.  Lab Results   Component Value Date    HGBA1C 4.7 05/19/2023       Nutritional status:   Body mass index is 35.75 kg/m².  Lab Results   Component Value Date    ALBUMIN 4.0 07/27/2023    ALBUMIN 4.2 07/27/2023    ALBUMIN 3.9 05/21/2023     No results found for: PREALBUMIN    Estimated Creatinine Clearance: 48.6 mL/min (A) (based on SCr of 2.2 mg/dL (H)).    Accu-Checks  Recent Labs     07/26/23  1811 07/27/23  0751   POCTGLUCOSE 244* 308*       Current Medications and/or Treatments Impacting Glycemic Control  Immunotherapy:    Immunosuppressants           Stop Route Frequency     mycophenolate capsule 500 mg         -- Oral 2 times daily          Steroids:   Hormones (From admission, onward)      Start     Stop Route Frequency Ordered    07/26/23 1430  methylPREDNISolone sodium succinate (SOLU-MEDROL) 500 mg in dextrose 5 % (D5W) 100 mL IVPB         07/29 0859 IV Daily 07/26/23 1421          Pressors:    Autonomic Drugs (From admission, onward)      None          Hyperglycemia/Diabetes Medications:   Antihyperglycemics (From admission, onward)      Start     Stop Route Frequency Ordered    07/27/23 1240  insulin aspart U-100 pen 0-15 Units         -- SubQ Before meals & nightly PRN 07/27/23 1140    07/27/23 1130  insulin aspart U-100 pen 37 Units         -- SubQ 3 times daily with meals 07/27/23 1124    07/27/23 0900  insulin detemir U-100 (Levemir) pen 27 Units         -- SubQ 2 times daily 07/26/23 0067            ASSESSMENT and  PLAN    Immunology/Multi System  Prophylactic immunotherapy  On immunosuppressive therapy per transplant team; may elevate BG readings        Endocrine  Type 2 diabetes mellitus with hyperglycemia  BG goal: 140-180   Hx of Liver transplant admitted for ACR.   On solumedrol 2/3.  Will increase mealtime insulin given elevations.    - Continue Levemir 27 units BID   - Increase Novolog 37 units AC  -Continue Novolog Moderate dose correction with ISF 25 starting at 150    - POCT Glucose before meals and at bedtime  - Hypoglycemia protocol in place      ** Please notify Endocrine for any change and/or advance in diet**  ** Please call Endocrine for any BG related issues **     Discharge Planning:   TBD. Please notify endocrinology prior to discharge.          GI  * Acute rejection of liver transplant  Managed per primary team  Optimize bg control        S/P liver transplant  Managed per primary team  Optimize bg control      -Addendum- BG remains above goal on steroids.  Switched to HDC and increased Levemir as well.    Unruly Jose PA-C  Endocrinology  [facility] - Transplant Stepdown

## 2023-07-27 NOTE — PLAN OF CARE
Pt fell this am in BR after feeling dizzy . Stated hit head and LLE. CT head completed. Pt limping when ambulates. X-rays completed to LLE. Fibula fracture noted. Orthopedics consulted. Additional x-rays ordered and waiting to be completed. AST & ALT WDL. TB inc to 1.8 from 1.0. Solumedrol dose #2 given. K+=5.7 and repeat=5.7. Lokelma given. Prograf lvl=15.6. Prograf discontinued. Cr=2.2 inc from 1.7. CO2=11 repeat=18. Sodium bicarb drip started at 100ml/hr. Glucoses elevated. Endocrine following. Insulin doses increased today. Afebrile. C/O discomfort to L calf area. Stated did not need pain meds.

## 2023-07-27 NOTE — PROGRESS NOTES
[facility] - [facility]  Liver Transplant  Progress Note    Patient Name: Rosalio Mccarty  MRN: [id number]  Admission Date: 7/26/2023  Hospital Length of Stay: 1 days  Code Status: Full Code  Primary Care Provider: Primary Doctor No  Post-Operative Day: 203    ORGAN:   LIVER  Disease Etiology: Cirrhosis: Other, Specify - granulomatous hepatitis  Donor Type:   Donation after Brain Death  [facility] High Risk:   Yes  Donor CMV Status:   Donor CMV Status: Positive  Donor HBcAB:   Negative  Donor HCV Status:   Positive  Donor HBV ASAF:   Donor HCV ASAF: Negative  Whole or Partial: Whole Liver  Biliary Anastomosis: End to End  Arterial Anatomy: Replaced Right Hepatic from SMA  Subjective:     History of Present Illness:  Rosalio Mccarty is a 48yr old male with 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 He progressed well post-op and was discharged POD#6. He has continued to do well post-transplant and is currently on tacrolimus, cellcept, and prednisone for immunosuppression. Recently, his LFTs have been elevated on outpatient labs. Recently treated in May of this year (5/19-5/21) for moderate ACR (SMP x3). Plan for direct admission for [facility] for biopsy proven moderate ACR on 7/24. Patient on high doses of insulin at home, will consult Endocrine to assist in management in setting of high dose steroid treatment.      Hospital Course:  Pt admitted for management of moderate ACR. Pt felt dizzy and fell this morning. He landed on LLE and hit heat. Pt with left leg, ankle, and knee pain after fall. Xray notable for recent proximal fracture of fibular shaft. Ortho consulted and to assess patient. CTH pending. +orthostatics. Hydrating with IVF. Labs notable for hyperkalemia - given lokelma. EKG with no changes. Continue SMP - #2 today. Endo on board to manage sugars. Continue to monitor.        Scheduled Meds:   ARIPiprazole  2 mg Oral Daily    aspirin  81 mg Oral Daily    EScitalopram oxalate  20 mg Oral Daily    heparin (porcine)  5,000 Units Subcutaneous Q8H    hydrOXYchloroQUINE  200 mg Oral BID    insulin aspart U-100  37 Units Subcutaneous TIDWM    insulin detemir U-100  27 Units Subcutaneous BID    methylPREDNISolone sodium succinate injection  500 mg Intravenous Daily    mycophenolate  500 mg Oral BID    pantoprazole  40 mg Oral Daily    sodium bicarbonate  1,300 mg Oral TID    sodium zirconium cyclosilicate  10 g Oral Once    traZODone  150 mg Oral QHS     Continuous Infusions:   sodium bicarbonate drip       PRN Meds:acetaminophen, cyclobenzaprine, dextrose 10%, dextrose 10%, glucagon (human recombinant), glucose, glucose, insulin aspart U-100, ondansetron, sodium chloride 0.9%    Review of Systems   Constitutional:  Positive for fatigue. Negative for unexpected weight change.   Eyes:  Negative for visual disturbance.   Respiratory:  Negative for cough.    Cardiovascular:  Negative for chest pain.   Gastrointestinal:  Negative for abdominal distention, abdominal pain, constipation, diarrhea, nausea and vomiting.   Endocrine: Negative for polydipsia and polyuria.   Musculoskeletal:  Positive for arthralgias, gait problem and myalgias. Negative for back pain.   Skin:  Negative for rash.   Allergic/Immunologic: Positive for immunocompromised state.   Neurological:  Positive for light-headedness. Negative for syncope.   Psychiatric/Behavioral:  Negative for agitation and dysphoric mood.    Objective:     Vital Signs (Most Recent):  Temp: 98.3 °F (36.8 °C) (07/27/23 1105)  Pulse: 75 (07/27/23 1105)  Resp: 18 (07/27/23 1105)  BP: 139/81 (07/27/23 1105)  SpO2: 99 % (07/27/23 1105) Vital Signs (24h Range):  Temp:  [97.5 °F (36.4 °C)-99 °F (37.2 °C)] 98.3 °F (36.8 °C)  Pulse:  [] 75  Resp:  [16-20] 18  SpO2:  [97 %-100 %] 99 %  BP: (111-146)/(74-86) 139/81     Weight: 106.6 kg  (235 lb 1.6 oz)  Body mass index is 35.75 kg/m².    Intake/Output - Last 3 Shifts         07/25 0700 07/26 0659 07/26 0700 07/27 0659 07/27 0700 07/28 0659    P.O.  480 120    Total Intake(mL/kg)  480 (4.5) 120 (1.1)    Net  +480 +120           Urine Occurrence  4 x              Physical Exam  Constitutional:       General: He is not in acute distress.     Appearance: Normal appearance. He is not ill-appearing.   HENT:      Head: Normocephalic and atraumatic.      Right Ear: External ear normal.      Left Ear: External ear normal.      Nose: Nose normal.   Cardiovascular:      Rate and Rhythm: Normal rate and regular rhythm.      Heart sounds: No murmur heard.  Pulmonary:      Effort: Pulmonary effort is normal. No respiratory distress.   Abdominal:      General: Bowel sounds are normal. There is no distension.      Tenderness: There is no abdominal tenderness.   Musculoskeletal:         General: Tenderness (TTP lateral mid left calf) and signs of injury present. No swelling.      Right lower leg: No edema.      Left lower leg: No edema.   Skin:     Findings: No erythema.   Neurological:      General: No focal deficit present.      Mental Status: He is alert and oriented to person, place, and time.   Psychiatric:         Mood and Affect: Mood normal.         Behavior: Behavior normal.        Laboratory:  Immunosuppressants           Stop Route Frequency     mycophenolate capsule 500 mg         -- Oral 2 times daily          CBC:   Recent Labs   Lab 07/27/23  1003   WBC 4.25   RBC 4.68   HGB 10.8*   HCT 34.2*   PLT 64*   MCV 73*   MCH 23.1*   MCHC 31.6*     CMP:   Recent Labs   Lab 07/27/23  1003   *   CALCIUM 9.2   ALBUMIN 4.0   PROT 7.0   *   K 5.7*   CO2 18*      BUN 44*   CREATININE 2.2*   ALKPHOS 56   ALT 35   AST 16   BILITOT 1.8*     Labs within the past 24 hours have been reviewed.    Diagnostic Results:  I have personally reviewed all pertinent imaging studies.    Debility/Functional  "status: Patient debilitated by evidence of Muscle wasting and atrophy and Weakness. Physical and occupational therapy ordered daily to evaluate and treat. Debility was: present on admission.    Assessment/Plan:     * Acute rejection of liver transplant  - Moderate ACR on biopsy  - Plan for 3 daily doses SMP  - First dose 7/26, #2 today  - endo on board  - trend LFTs      Closed fracture of proximal end of left fibula  - pt s/p fall this AM landing on left side  - Xray with recent fracture of proximal fibular shaft  - ortho consulted, to assess patient      At risk for opportunistic infections  - OI prophylaxis per post rejection treatment protocol      Prophylactic immunotherapy  - see long term use of immunosupressant      Long-term use of immunosuppressant medication  - Continue prograf  - Check prograf level daily and adjust for therapeutic dosage. Monitor for toxic side effects      Thrombocytopenia, unspecified  - monitor CBC      Anemia of chronic disease  - H/H stable on outpatient labs  - monitor CBC daily    S/P liver transplant  - s/p liver txp 1/5/23  - Admit with ACR, see "liver rejection"      Type 2 diabetes mellitus with hyperglycemia  - Endocrine consulted for help with management. Appreciate their assistance           VTE Risk Mitigation (From admission, onward)         Ordered     heparin (porcine) injection 5,000 Units  Every 8 hours         07/26/23 1421     IP VTE HIGH RISK PATIENT  Once         07/26/23 1421     Place sequential compression device  Until discontinued         07/26/23 1421                The patients clinical status was discussed at multidisplinary rounds, involving transplant surgery, transplant medicine, pharmacy, nursing, nutrition, and social work    Discharge Planning: Not      Jolynn Jose PA-C  Liver Transplant  [facility] - Trans[facility]  "

## 2023-07-27 NOTE — SUBJECTIVE & OBJECTIVE
Past Medical History:   Diagnosis Date    C. difficile diarrhea     Cirrhosis of liver with ascites 11/17/2022    Diabetes mellitus, type II, insulin dependent     Granulomatous colitis     Hepatic granuloma associated with sarcoidosis     Hepatic granuloma associated with sarcoidosis 11/17/2022    Hepatic granuloma associated with sarcoidosis 11/17/2022    Personal history of colonic polyps     Pre-op evaluation 1/4/2023    Sarcoidosis, unspecified     Thrombocytopenia, unspecified     Unspecified cirrhosis of liver        Past Surgical History:   Procedure Laterality Date    ADENOIDECTOMY      BIOPSY OF LIVER WITH ULTRASOUND GUIDANCE N/A 7/24/2023    Procedure: BIOPSY, LIVER, WITH US GUIDANCE;  Surgeon: Cecilio Hyman MD;  Location: [facility] CATH LAB;  Service: Radiology;  Laterality: N/A;    CARDIAC CATHETERIZATION Right     COLONOSCOPY  07/11/2018    HIP SURGERY      RIght removed ruptured cyst    LIVER TRANSPLANT N/A 1/5/2023    Procedure: TRANSPLANT, LIVER;  Surgeon: Rosa Foreman MD;  Location: [facility] OR [address] [place] [address];  Service: Transplant;  Laterality: N/A;    NEEDLE LOCALIZATION N/A 7/24/2023    Procedure: NEEDLE LOCALIZATION;  Surgeon: Cecilio Hyman MD;  Location: [facility] CATH LAB;  Service: Radiology;  Laterality: N/A;    UPPER GASTROINTESTINAL ENDOSCOPY  10/09/2019       Review of patient's allergies indicates:   Allergen Reactions    Adhesive Other (See Comments)     Skin irritation  Paper tape okay to use    Pcn [penicillins] Rash     Happened as a baby       Current Facility-Administered Medications   Medication    acetaminophen tablet 650 mg    ARIPiprazole tablet 2 mg    aspirin EC tablet 81 mg    cyclobenzaprine tablet 5 mg    dextrose 10% bolus 125 mL 125 mL    dextrose 10% bolus 250 mL 250 mL    EScitalopram oxalate tablet 20 mg    glucagon (human recombinant) injection 1 mg    glucose chewable tablet 16 g    glucose chewable tablet 24 g    heparin (porcine) injection 5,000 Units     "hydrOXYchloroQUINE tablet 200 mg    insulin aspart U-100 pen 0-15 Units    insulin aspart U-100 pen 37 Units    insulin detemir U-100 (Levemir) pen 27 Units    methylPREDNISolone sodium succinate (SOLU-MEDROL) 500 mg in dextrose 5 % (D5W) 100 mL IVPB    mycophenolate capsule 500 mg    ondansetron disintegrating tablet 8 mg    pantoprazole EC tablet 40 mg    sodium bicarbonate 150 mEq in dextrose 5 % (D5W) 1,000 mL infusion    sodium bicarbonate tablet 1,300 mg    sodium chloride 0.9% flush 10 mL    traZODone tablet 150 mg     Family History       Problem Relation (Age of Onset)    Cancer Father, Maternal Grandfather    Diabetes Maternal Grandmother, Maternal Grandfather, Paternal Grandmother    Heart disease Father    Hypertension Mother, Father    Rectal cancer Father    Thyroid disease Father          Tobacco Use    Smoking status: Never    Smokeless tobacco: Never   Substance and Sexual Activity    Alcohol use: Never    Drug use: Never    Sexual activity: Not on file     ROS    Constitutional: negative for fevers or chills  Eyes: negative visual changes or eye discharge  ENT: negative for ear pain or sore throat  Respiratory: negative for shortness of breath or cough  Cardiovascular: negative for chest pain or palpitations  Gastrointestinal: negative for abdominal pain, nausea, or vomiting  Genitourinary: negative for dysuria and flank pain  Neurological: negative for headaches or dizziness  Musculoskeletal: positive for left leg pain     Objective:     Vital Signs (Most Recent):  Temp: 98.3 °F (36.8 °C) (07/27/23 1105)  Pulse: 75 (07/27/23 1105)  Resp: 18 (07/27/23 1105)  BP: 139/81 (07/27/23 1105)  SpO2: 99 % (07/27/23 1105) Vital Signs (24h Range):  Temp:  [97.5 °F (36.4 °C)-99 °F (37.2 °C)] 98.3 °F (36.8 °C)  Pulse:  [] 75  Resp:  [16-20] 18  SpO2:  [97 %-100 %] 99 %  BP: (111-146)/(74-86) 139/81     Weight: 106.6 kg (235 lb 1.6 oz)  Height: 5' 8" (172.7 cm)  Body mass index is 35.75 " kg/m².      Intake/Output Summary (Last 24 hours) at 7/27/2023 1319  Last data filed at 7/27/2023 1300  Gross per 24 hour   Intake 600 ml   Output 600 ml   Net 0 ml        Ortho/SPM Exam  General:  no acute distress, appears stated age   Neuro: alert and oriented x3  Psych: normal mood  Head: normocephalic, atraumatic.  Eyes: no scleral icterus  Mouth: moist mucous membranes  CV: extremities warm and well perfused  Pulm: breathing comfortably, equal chest rise bilat  Skin: clean, dry, intact (any exceptions noted in below musculoskeletal exam)    MSK:    RUE:  - Skin intact throughout, no open wounds  - No swelling  - No ecchymosis, erythema, or signs of cellulitis  - NonTTP throughout  - AROM and PROM of the shoulder, elbow, wrist, and hand intact without pain  - Axillary/AIN/PIN/Radial/Median/Ulnar Nerves assessed in isolation without deficit  - SILT throughout  - Compartments soft  - Radial artery palpated   - Capillary Refill <3s    LUE:  - Skin intact throughout, no open wounds  - No swelling  - No ecchymosis, erythema, or signs of cellulitis  - NonTTP throughout  - AROM and PROM of the shoulder, elbow, wrist, and hand intact without pain  - Axillary/AIN/PIN/Radial/Median/Ulnar Nerves assessed in isolation without deficit  - SILT throughout  - Compartments soft  - Radial artery palpated   - Capillary Refill <3s    RLE:  - Skin intact throughout, no open wounds  - No swelling  - No ecchymosis, erythema, or signs of cellulitis  - NonTTP throughout  - AROM and PROM of the hip, knee, ankle, and foot intact without pain  - TA/EHL/Gastroc/FHL assessed in isolation without deficit  - SILT throughout  - Compartments soft  - DP and PT palpated  - Capillary Refill <3s  - Negative Log roll  - Negative [facility]    LLE:  - Skin intact throughout, no open wounds  - No swelling  - No ecchymosis, erythema, or signs of cellulitis  - tender to palpation over the proximal fibula  - AROM and PROM of the knee with mild pain  -  AROM and PROM of the hip, ankle, and foot intact without pain  - TA/EHL/Gastroc/FHL assessed in isolation without deficit  - SILT throughout  - Compartments soft  - DP and PT palpated  - Capillary Refill <3s  - Negative Log roll  - [facility]     Significant Labs: All pertinent labs within the past 24 hours have been reviewed.    Significant Imaging: X-Ray: I have reviewed all pertinent results/findings and my personal findings are:  Minimally displaced fibular neck fracture.  Knee x-rays do not show any fractures or dislocations of the tibial plateau or the distal femur.  Ankle x-rays do not show any fracture or dislocation.  Weight-bearing ankle x-rays without evidence of medial clear space widening or syndesmotic injury.

## 2023-07-28 ENCOUNTER — TELEPHONE (OUTPATIENT)
Dept: TRANSPLANT | Facility: CLINIC | Age: 48
End: 2023-07-28
Payer: COMMERCIAL

## 2023-07-28 VITALS
OXYGEN SATURATION: 96 % | TEMPERATURE: 98 F | HEART RATE: 88 BPM | SYSTOLIC BLOOD PRESSURE: 124 MMHG | DIASTOLIC BLOOD PRESSURE: 71 MMHG | RESPIRATION RATE: 18 BRPM | HEIGHT: 68 IN | BODY MASS INDEX: 35.72 KG/M2 | WEIGHT: 235.69 LBS

## 2023-07-28 DIAGNOSIS — Z94.4 LIVER REPLACED BY TRANSPLANT: Primary | ICD-10-CM

## 2023-07-28 PROBLEM — E87.5 HYPERKALEMIA: Status: ACTIVE | Noted: 2023-07-28

## 2023-07-28 LAB
ALBUMIN SERPL BCP-MCNC: 3.7 G/DL (ref 3.5–5.2)
ALBUMIN SERPL BCP-MCNC: 4.1 G/DL (ref 3.5–5.2)
ALP SERPL-CCNC: 48 U/L (ref 55–135)
ALP SERPL-CCNC: 49 U/L (ref 55–135)
ALT SERPL W/O P-5'-P-CCNC: 27 U/L (ref 10–44)
ALT SERPL W/O P-5'-P-CCNC: 31 U/L (ref 10–44)
ANION GAP SERPL CALC-SCNC: 10 MMOL/L (ref 8–16)
ANION GAP SERPL CALC-SCNC: 11 MMOL/L (ref 8–16)
AST SERPL-CCNC: 12 U/L (ref 10–40)
AST SERPL-CCNC: 13 U/L (ref 10–40)
BASOPHILS # BLD AUTO: 0 K/UL (ref 0–0.2)
BASOPHILS NFR BLD: 0 % (ref 0–1.9)
BILIRUB SERPL-MCNC: 1.3 MG/DL (ref 0.1–1)
BILIRUB SERPL-MCNC: 1.7 MG/DL (ref 0.1–1)
BUN SERPL-MCNC: 49 MG/DL (ref 6–20)
BUN SERPL-MCNC: 51 MG/DL (ref 6–20)
CALCIUM SERPL-MCNC: 9.2 MG/DL (ref 8.7–10.5)
CALCIUM SERPL-MCNC: 9.4 MG/DL (ref 8.7–10.5)
CHLORIDE SERPL-SCNC: 102 MMOL/L (ref 95–110)
CHLORIDE SERPL-SCNC: 103 MMOL/L (ref 95–110)
CO2 SERPL-SCNC: 21 MMOL/L (ref 23–29)
CO2 SERPL-SCNC: 22 MMOL/L (ref 23–29)
CREAT SERPL-MCNC: 2 MG/DL (ref 0.5–1.4)
CREAT SERPL-MCNC: 2 MG/DL (ref 0.5–1.4)
DIFFERENTIAL METHOD: ABNORMAL
EOSINOPHIL # BLD AUTO: 0 K/UL (ref 0–0.5)
EOSINOPHIL NFR BLD: 0 % (ref 0–8)
ERYTHROCYTE [DISTWIDTH] IN BLOOD BY AUTOMATED COUNT: 19.4 % (ref 11.5–14.5)
EST. GFR  (NO RACE VARIABLE): 40.4 ML/MIN/1.73 M^2
EST. GFR  (NO RACE VARIABLE): 40.4 ML/MIN/1.73 M^2
GLUCOSE SERPL-MCNC: 194 MG/DL (ref 70–110)
GLUCOSE SERPL-MCNC: 222 MG/DL (ref 70–110)
HCT VFR BLD AUTO: 30.3 % (ref 40–54)
HGB BLD-MCNC: 9.6 G/DL (ref 14–18)
IMM GRANULOCYTES # BLD AUTO: 0.02 K/UL (ref 0–0.04)
IMM GRANULOCYTES NFR BLD AUTO: 0.4 % (ref 0–0.5)
LYMPHOCYTES # BLD AUTO: 0.8 K/UL (ref 1–4.8)
LYMPHOCYTES NFR BLD: 16.6 % (ref 18–48)
MAGNESIUM SERPL-MCNC: 1.9 MG/DL (ref 1.6–2.6)
MCH RBC QN AUTO: 23.5 PG (ref 27–31)
MCHC RBC AUTO-ENTMCNC: 31.7 G/DL (ref 32–36)
MCV RBC AUTO: 74 FL (ref 82–98)
MONOCYTES # BLD AUTO: 0.3 K/UL (ref 0.3–1)
MONOCYTES NFR BLD: 5.7 % (ref 4–15)
NEUTROPHILS # BLD AUTO: 3.6 K/UL (ref 1.8–7.7)
NEUTROPHILS NFR BLD: 77.3 % (ref 38–73)
NRBC BLD-RTO: 0 /100 WBC
PLATELET # BLD AUTO: 66 K/UL (ref 150–450)
PMV BLD AUTO: 9.6 FL (ref 9.2–12.9)
POCT GLUCOSE: 178 MG/DL (ref 70–110)
POCT GLUCOSE: 215 MG/DL (ref 70–110)
POCT GLUCOSE: 266 MG/DL (ref 70–110)
POTASSIUM SERPL-SCNC: 5 MMOL/L (ref 3.5–5.1)
POTASSIUM SERPL-SCNC: 5.1 MMOL/L (ref 3.5–5.1)
PROT SERPL-MCNC: 6.5 G/DL (ref 6–8.4)
PROT SERPL-MCNC: 7.3 G/DL (ref 6–8.4)
RBC # BLD AUTO: 4.08 M/UL (ref 4.6–6.2)
SODIUM SERPL-SCNC: 133 MMOL/L (ref 136–145)
SODIUM SERPL-SCNC: 136 MMOL/L (ref 136–145)
TACROLIMUS BLD-MCNC: 8.5 NG/ML (ref 5–15)
WBC # BLD AUTO: 4.7 K/UL (ref 3.9–12.7)

## 2023-07-28 PROCEDURE — 25000003 PHARM REV CODE 250: Performed by: PHYSICIAN ASSISTANT

## 2023-07-28 PROCEDURE — 63600175 PHARM REV CODE 636 W HCPCS: Performed by: TRANSPLANT SURGERY

## 2023-07-28 PROCEDURE — 97162 PT EVAL MOD COMPLEX 30 MIN: CPT

## 2023-07-28 PROCEDURE — 99239 PR HOSPITAL DISCHARGE DAY,>30 MIN: ICD-10-PCS | Mod: ,,, | Performed by: PHYSICIAN ASSISTANT

## 2023-07-28 PROCEDURE — 99239 HOSP IP/OBS DSCHRG MGMT >30: CPT | Mod: ,,, | Performed by: PHYSICIAN ASSISTANT

## 2023-07-28 PROCEDURE — 80197 ASSAY OF TACROLIMUS: CPT | Performed by: PHYSICIAN ASSISTANT

## 2023-07-28 PROCEDURE — 97116 GAIT TRAINING THERAPY: CPT

## 2023-07-28 PROCEDURE — 36415 COLL VENOUS BLD VENIPUNCTURE: CPT | Performed by: PHYSICIAN ASSISTANT

## 2023-07-28 PROCEDURE — 63600175 PHARM REV CODE 636 W HCPCS: Performed by: PHYSICIAN ASSISTANT

## 2023-07-28 PROCEDURE — 80053 COMPREHEN METABOLIC PANEL: CPT | Mod: 91 | Performed by: PHYSICIAN ASSISTANT

## 2023-07-28 PROCEDURE — 80053 COMPREHEN METABOLIC PANEL: CPT | Performed by: PHYSICIAN ASSISTANT

## 2023-07-28 PROCEDURE — 85025 COMPLETE CBC W/AUTO DIFF WBC: CPT | Performed by: PHYSICIAN ASSISTANT

## 2023-07-28 PROCEDURE — 83735 ASSAY OF MAGNESIUM: CPT | Performed by: PHYSICIAN ASSISTANT

## 2023-07-28 RX ORDER — TACROLIMUS 1 MG/1
4 CAPSULE ORAL EVERY 12 HOURS
Qty: 240 CAPSULE | Refills: 11 | Status: SHIPPED | OUTPATIENT
Start: 2023-07-28 | End: 2023-12-07 | Stop reason: SDUPTHER

## 2023-07-28 RX ORDER — DAPSONE 100 MG/1
100 TABLET ORAL DAILY
Qty: 30 TABLET | Refills: 2 | Status: SHIPPED | OUTPATIENT
Start: 2023-07-27 | End: 2023-11-01 | Stop reason: ALTCHOICE

## 2023-07-28 RX ORDER — DAPSONE 100 MG/1
100 TABLET ORAL DAILY
Status: DISCONTINUED | OUTPATIENT
Start: 2023-07-28 | End: 2023-07-28 | Stop reason: HOSPADM

## 2023-07-28 RX ORDER — TACROLIMUS 1 MG/1
4 CAPSULE ORAL ONCE
Status: COMPLETED | OUTPATIENT
Start: 2023-07-28 | End: 2023-07-28

## 2023-07-28 RX ORDER — INSULIN LISPRO 100 [IU]/ML
32 INJECTION, SOLUTION INTRAVENOUS; SUBCUTANEOUS
Qty: 24 ML | Refills: 11 | Status: SHIPPED | OUTPATIENT
Start: 2023-07-28

## 2023-07-28 RX ORDER — TACROLIMUS 1 MG/1
4 CAPSULE ORAL 2 TIMES DAILY
Status: DISCONTINUED | OUTPATIENT
Start: 2023-07-28 | End: 2023-07-28 | Stop reason: HOSPADM

## 2023-07-28 RX ORDER — PREDNISONE 20 MG/1
20 TABLET ORAL DAILY
Status: DISCONTINUED | OUTPATIENT
Start: 2023-07-29 | End: 2023-07-28 | Stop reason: HOSPADM

## 2023-07-28 RX ORDER — PREDNISONE 5 MG/1
TABLET ORAL
Qty: 70 TABLET | Refills: 0 | Status: SHIPPED | OUTPATIENT
Start: 2023-07-28 | End: 2023-12-07 | Stop reason: ALTCHOICE

## 2023-07-28 RX ORDER — MYCOPHENOLATE MOFETIL 250 MG/1
500 CAPSULE ORAL 2 TIMES DAILY
Qty: 120 CAPSULE | Refills: 11 | Status: ON HOLD | OUTPATIENT
Start: 2023-07-28 | End: 2024-02-09 | Stop reason: HOSPADM

## 2023-07-28 RX ADMIN — PANTOPRAZOLE SODIUM 40 MG: 40 TABLET, DELAYED RELEASE ORAL at 08:07

## 2023-07-28 RX ADMIN — MYCOPHENOLATE MOFETIL 500 MG: 250 CAPSULE ORAL at 08:07

## 2023-07-28 RX ADMIN — INSULIN ASPART 40 UNITS: 100 INJECTION, SOLUTION INTRAVENOUS; SUBCUTANEOUS at 09:07

## 2023-07-28 RX ADMIN — INSULIN ASPART 40 UNITS: 100 INJECTION, SOLUTION INTRAVENOUS; SUBCUTANEOUS at 01:07

## 2023-07-28 RX ADMIN — SODIUM BICARBONATE 650 MG TABLET 1300 MG: at 03:07

## 2023-07-28 RX ADMIN — ASPIRIN 81 MG: 81 TABLET, COATED ORAL at 08:07

## 2023-07-28 RX ADMIN — INSULIN ASPART 3 UNITS: 100 INJECTION, SOLUTION INTRAVENOUS; SUBCUTANEOUS at 01:07

## 2023-07-28 RX ADMIN — DEXTROSE 500 MG: 50 INJECTION, SOLUTION INTRAVENOUS at 08:07

## 2023-07-28 RX ADMIN — DAPSONE 100 MG: 100 TABLET ORAL at 11:07

## 2023-07-28 RX ADMIN — TACROLIMUS 4 MG: 1 CAPSULE ORAL at 01:07

## 2023-07-28 RX ADMIN — HEPARIN SODIUM 5000 UNITS: 5000 INJECTION INTRAVENOUS; SUBCUTANEOUS at 01:07

## 2023-07-28 RX ADMIN — HYDROXYCHLOROQUINE SULFATE 200 MG: 200 TABLET ORAL at 08:07

## 2023-07-28 RX ADMIN — ARIPIPRAZOLE 2 MG: 2 TABLET ORAL at 08:07

## 2023-07-28 RX ADMIN — ESCITALOPRAM OXALATE 20 MG: 10 TABLET ORAL at 08:07

## 2023-07-28 RX ADMIN — SODIUM BICARBONATE 650 MG TABLET 1300 MG: at 08:07

## 2023-07-28 RX ADMIN — INSULIN ASPART 3 UNITS: 100 INJECTION, SOLUTION INTRAVENOUS; SUBCUTANEOUS at 09:07

## 2023-07-28 RX ADMIN — INSULIN ASPART 6 UNITS: 100 INJECTION, SOLUTION INTRAVENOUS; SUBCUTANEOUS at 01:07

## 2023-07-28 RX ADMIN — HEPARIN SODIUM 5000 UNITS: 5000 INJECTION INTRAVENOUS; SUBCUTANEOUS at 05:07

## 2023-07-28 NOTE — PLAN OF CARE
PT eval complete and plan of care established.      Problem: Physical Therapy  Goal: Physical Therapy Goal  Description: Goals to be met by: 2023     Patient will increase functional independence with mobility by performin. Gait  x 100 feet with [place] using No Assistive Device.   2. Ascend/descend 1 flight of stairs with right Handrails Tillman using No Assistive Device.     Outcome: Ongoing, Progressing

## 2023-07-28 NOTE — PROGRESS NOTES
Discharge Note       presented to patient bedside to discuss discharge plans, as well as assess any concerns or needs.     Patient was alert and oriented x 4 and communicative. Patient will discharge to home today with no needs.  Patient's wife will transport patient upon discharge as patient is unable to drive himself due to a broken leg. Per patient, he is going on a weeklong trip to [place] on Sunday and will have friends check on him through the day. Patient is coping well with support from family.      Patient reports agreeing with the discharge plan and has no other psychosocial concerns. Patient and caregiver verbalize understanding and are involved in treatment planning and discharge process. Patient nor caregiver had any further concerns or questions at this time.     SW remains available and will continue to follow, providing psychosocial support, education and discharge planning as indicated. Transplant team remains available and patient states understanding of how to access team and resources as needed.

## 2023-07-28 NOTE — PT/OT/SLP EVAL
Physical Therapy Evaluation    Patient Name:  Rosalio Mccarty   MRN:  [id number]    Recommendations:     Discharge Recommendations: other (see comments)   Discharge Equipment Recommendations: none   Barriers to discharge: None    Assessment:     Rosalio Mccarty is a 48 y.o. male admitted with a medical diagnosis of Acute rejection of liver transplant.  He presents with the following impairments/functional limitations: pain, impaired functional mobility, impaired balance, impaired endurance, gait instability. Pt agreeable to participate. Pt had no reports of dizziness throughout today's session, but noted that he will occasionally start to feel dizziness coming on after he stands for a while since he has been in the hospital. Pt completed bed mobility and STS with independence with no AD. Pt ambulated approximately 50' with supervision and utilizing IV pole for balance. Pt was mildly unsteady and ambulated with decreased gait speed due to pain in LLE and using the walking boot for the first time/height difference between walking boot and R sandal but no LOB noted.    Rehab Prognosis: Good; patient would benefit from acute skilled PT services to address these deficits and reach maximum level of function.    Recent Surgery: * No surgery found *      Plan:     During this hospitalization, patient to be seen 3 x/week to address the identified rehab impairments via gait training, therapeutic activities, therapeutic exercises, neuromuscular re-education and progress toward the following goals:    Plan of Care Expires:  08/27/23    Subjective     Chief Complaint: pain in LLE with walking  Patient/Family Comments/goals: Pt reports he has a trip planned soon and needs to be able to don walking boot independently without wife's assistance.  Pain/Comfort:  Pain Rating 1: 5/10  Location - Side 1: Left  Location 1: leg (with ambulation)  Pain Addressed 1: Reposition, Distraction, Cessation of Activity  Pain Rating Post-Intervention 1:  (pt  did not rate)    Patients cultural, spiritual, Evangelical conflicts given the current situation: no    Living Environment:  Pt lives with wife and 4 kids in a 2SH with bedroom and bathroom on 1st floor. Pt has a walk in shower.   Prior to admission, patients level of function was independent with ADLs and functional mobility.  Equipment used at home: cane, straight (straight cane PRN for gout flare ups).  DME owned (not currently used): none.  Upon discharge, patient will have assistance from wife and kids.    Objective:     Communicated with RN prior to session.  Patient found supine with HOB elevated with peripheral IV  upon PT entry to room.    General Precautions: Standard, fall  Orthopedic Precautions:LLE weight bearing as tolerated (LLE WBAT in walking boot)   Braces:  (walking boot)  Respiratory Status: Room air    Exams:  Cognitive Exam:  Patient is oriented to Person, Place, Time, and Situation  Postural Exam:  Patient presented with the following abnormalities:    -     no postural abnormalities noted  Sensation:    -       Pt denies numbness/tingling in BLE.  RLE ROM: WFL  RLE Strength: grossly 5/5  LLE ROM: WFL  LLE Strength: grossly 4+/5, limited by pain    Functional Mobility:  Bed Mobility:     Scooting: independence  Supine to Sit: independence  Transfers:     Sit to Stand:  independence with no AD  Gait: approximately 50' with supervision and utilizing IV pole for balance. Pt was mildly unsteady and ambulated with decreased gait speed due to pain in LLE and using the walking boot for the first time/height difference between walking boot and R sandal but no LOB noted.  Balance:   Static sitting: independent  Dynamic sitting: independent  Static standing: supervision with no AD  Dynamic standing: supervision with no AD      AM-PAC 6 CLICK MOBILITY  Total Score:21       Treatment & Education:  Pt educated on WBAT status in walking boot for LLE. Pt educated on donning walking boot and donned  independently 2x to ensure understanding. Pt educated on role of therapy, goals of session, and benefits of mobilizing. Pt educated on calling for assistance. All questions answered within PT scope of practice.    Patient left sitting edge of bed with all lines intact and call button in reach.    GOALS:   Multidisciplinary Problems       Physical Therapy Goals          Problem: Physical Therapy    Goal Priority Disciplines Outcome Goal Variances Interventions   Physical Therapy Goal     PT, PT/OT Ongoing, Progressing     Description: Goals to be met by: 2023     Patient will increase functional independence with mobility by performin. Gait  x 100 feet with [place] using No Assistive Device.   2. Ascend/descend 1 flight of stairs with right Handrails [place] using No Assistive Device.                          History:     Past Medical History:   Diagnosis Date    C. difficile diarrhea     Cirrhosis of liver with ascites 2022    Diabetes mellitus, type II, insulin dependent     Granulomatous colitis     Hepatic granuloma associated with sarcoidosis     Hepatic granuloma associated with sarcoidosis 2022    Hepatic granuloma associated with sarcoidosis 2022    Personal history of colonic polyps     Pre-op evaluation 2023    Sarcoidosis, unspecified     Thrombocytopenia, unspecified     Unspecified cirrhosis of liver        Past Surgical History:   Procedure Laterality Date    ADENOIDECTOMY      BIOPSY OF LIVER WITH ULTRASOUND GUIDANCE N/A 2023    Procedure: BIOPSY, LIVER, WITH US GUIDANCE;  Surgeon: Cecilio Hyman MD;  Location: [facility] CATH LAB;  Service: Radiology;  Laterality: N/A;    CARDIAC CATHETERIZATION Right     COLONOSCOPY  2018    HIP SURGERY      RIght removed ruptured cyst    LIVER TRANSPLANT N/A 2023    Procedure: TRANSPLANT, LIVER;  Surgeon: Rosa Foreman MD;  Location: [facility] OR [address] [place] [address];  Service: Transplant;  Laterality: N/A;    NEEDLE  LOCALIZATION N/A 7/24/2023    Procedure: NEEDLE LOCALIZATION;  Surgeon: Cecilio Hyman MD;  Location: [facility] CATH LAB;  Service: Radiology;  Laterality: N/A;    UPPER GASTROINTESTINAL ENDOSCOPY  10/09/2019       Time Tracking:     PT Received On: 07/28/23  PT Start Time: 0831     PT Stop Time: 0851  PT Total Time (min): 20 min     Billable Minutes: Evaluation 10 min and Gait Training 10 min      07/28/2023

## 2023-07-28 NOTE — PLAN OF CARE
Pt AAOx4, VSS, afebrile. Call light within reach. Bedside commode in use. Fall risk precautions maintained. NaHCO3 gtt d/c'd o/n. SM 2 of 3 given. BS monitored ac/hs/2am. Pt voids per urinal. AM labs ordered.

## 2023-07-28 NOTE — PROGRESS NOTES
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 Patient received 3 doses of solumedrol.  Patient felt dizzy and fell yesterday - xray notable for proximal fracture of fibular shaft.  Ortho consulted and recommended boot with outpatient follow up and therapy.  Patient back on prednisone taper.  Resumed half dose MMF (due to WBC - was off in June receiving filgrastim injections at home intermittently).  Resumed dapsone until 10/25/23. Patient with diabetes - endo consulted and insulin doses changed - should have follow up outpatient.     Met with Rosalio Mccarty at discharge to review discharge medications and to update the blue medication card.           Medication List        START taking these medications      dapsone 100 MG Tab  Take 1 tablet (100 mg total) by mouth once daily. Stop: 10/25/23     mycophenolate 250 mg Cap  Commonly known as: CELLCEPT  Take 2 capsules (500 mg total) by mouth 2 (two) times daily.            CHANGE how you take these medications      insulin lispro 100 unit/mL pen  Commonly known as: HumaLOG KwikPen Insulin  Inject 32 Units into the skin 3 (three) times daily before meals. Plus sliding scale insulin: 150 - 200 = +2 units; 201 - 250 = +4 units; 251 - 300 = +6 units; 301 - 350 = +8 units; >350 = +10 units. Total daily dose: 126 units  What changed:   how much to take  additional instructions     predniSONE 5 MG tablet  Commonly known as: DELTASONE  Take 4 tab (20mg total) by mouth daily from 7/29-8/4; THEN Take 3 tabs (15mg total) daily from 8/5-8/11;  Take 2 tabs (10mg total) daily from 8/12-8/18;  Take 1 tab (5mg) daily 8/19-8/25 then discontinue 8/26/23.  What changed: additional instructions     tacrolimus 1 MG Cap  Commonly known as: PROGRAF  Take 4 capsules (4 mg total) by mouth every 12 (twelve) hours.  What changed: how much to  "take            CONTINUE taking these medications      ARIPiprazole 2 MG Tab  Commonly known as: ABILIFY  Take 1 tablet (2 mg total) by mouth once daily.     aspirin 81 MG EC tablet  Commonly known as: ECOTRIN  Take 1 tablet (81 mg total) by mouth once daily.     BD ULTRA-FINE MELVINA PEN NEEDLE 32 gauge x 5/32" Ndle  Generic drug: pen needle, diabetic  1 Dose by Misc.(Non-Drug; Combo Route) route 3 (three) times daily.     calcium carbonate 500 mg calcium (1,250 mg) tablet  Commonly known as: OS-ORTIZ  Take 1 tablet (500 mg total) by mouth once daily.     empagliflozin 25 mg tablet  Commonly known as: JARDIANCE  Take 1 tablet (25 mg total) by mouth once daily.     EScitalopram oxalate 20 MG tablet  Commonly known as: LEXAPRO  Take 1 tablet (20 mg total) by mouth once daily.     FREESTYLE LUZMA 14 DAY SENSOR Kit  Generic drug: flash glucose sensor     hydrOXYchloroQUINE 200 mg tablet  Commonly known as: PLAQUENIL     ONETOUCH DELICA PLUS LANCET 33 gauge Misc  Generic drug: lancets  by Misc.(Non-Drug; Combo Route) route 4 (four) times daily.     ONETOUCH VERIO REFLECT METER Misc  Generic drug: blood-glucose meter  Use as instructed     ONETOUCH VERIO TEST STRIPS Strp  Generic drug: blood sugar diagnostic  1 strip by Misc.(Non-Drug; Combo Route) route 4 (four) times daily.     OZEMPIC 2 mg/dose (8 mg/3 mL) Pnij  Generic drug: semaglutide     pantoprazole 40 MG tablet  Commonly known as: PROTONIX  Take 1 tablet (40 mg total) by mouth once daily.     sodium bicarbonate 650 MG tablet  Take 2 tablets (1,300 mg total) by mouth 3 (three) times daily.     traZODone 150 MG tablet  Commonly known as: DESYREL  Take 1 tablet (150 mg total) by mouth every evening.     TRESIBA FLEXTOUCH U-100 100 unit/mL (3 mL) insulin pen  Generic drug: insulin degludec  Inject 50 Units into the skin once daily.     ZARXIO 480 mcg/0.8 mL Syrg  Generic drug: filgrastim-sndz  Inject 0.8 mLs (480 mcg total) into the skin daily as needed (ANC <1000 as " advised by transplant coordinator).            STOP taking these medications      HUMIRA PEN Pnkt injection  Generic drug: adalimumab     multivitamin per tablet  Commonly known as: THERAGRAN     oxyCODONE 10 mg Tab immediate release tablet  Commonly known as: ROXICODONE     sodium polystyrene powder  Commonly known as: KAYEXALATE               Where to Get Your Medications        These medications were sent to [facility]  [address] [address]      Hours: Mon-Fri 7a-7p, Sat-Sun 10a-4p Phone: [phone]   dapsone 100 MG Tab  insulin lispro 100 unit/mL pen  mycophenolate 250 mg Cap  predniSONE 5 MG tablet  tacrolimus 1 MG Cap            The following medications have been placed on HOLD and should be restarted in the outpatient setting (when appropriate): N/A    Rosalio Mccarty verbalized understanding and had the opportunity to ask questions.

## 2023-07-28 NOTE — CONSULTS
Food and Nutrition Education - Low Potassium Diet      Time Spent: 10 min    Learners: Pt     Nutrition Education with handouts: Educated pt on potassium content in food for people. Discussed food with higher/lower potassium. Explained the needs to avoid higher potassium food. Pt is allowed to have more serving of lower potassium food but it will build up to be high potassium if eating in a large amount. Pt verbalized understanding. Emphasized the importance of diet adherence. Pt with no additional questions. No other needs identified. Left all education material with pt at bedside.    Comments: Pt reports good PO intake. Wt stable. No indicators of malnutrition.    Barriers to Learning: No    Follow up: Yes    Please consult as needed.  Thank you!

## 2023-07-28 NOTE — PLAN OF CARE
AST & ALT WDL. CO2=22 and repeat=21. Sodium Bicarbonate po continues. K+=5.0. MG+2=1.9. Tolerating po well. UOP adequate. Glucoses elevated. Insulin given as indicated. To be discharged on Pred 20mg. Afebrile. Needed boot for ambulation delivered to room and PT provided instructions on use. Dietitian provided information on low K+ diet. Denies need for pain meds. Plan to discharge home today.

## 2023-07-28 NOTE — TELEPHONE ENCOUNTER
Handoff report called to me by Rosa Darnell, Transplnat [facility].  Liver transplant from 1/5/23 being discharged today.  Issues are moderate ACR treated with IVSM.  Next labs 7/31/23

## 2023-07-28 NOTE — NURSING
Discharge instructions given and reviewed with pt and family. Needed meds for home delivered to room. Pt wearing boot to LLE. Verbalized understanding.

## 2023-08-02 ENCOUNTER — PATIENT MESSAGE (OUTPATIENT)
Dept: TRANSPLANT | Facility: CLINIC | Age: 48
End: 2023-08-02
Payer: COMMERCIAL

## 2023-08-04 ENCOUNTER — PATIENT MESSAGE (OUTPATIENT)
Dept: TRANSPLANT | Facility: CLINIC | Age: 48
End: 2023-08-04
Payer: COMMERCIAL

## 2023-08-04 DIAGNOSIS — Z94.4 LIVER REPLACED BY TRANSPLANT: Primary | ICD-10-CM

## 2023-08-04 LAB
EXT ALBUMIN: 4.1
EXT ALBUMIN: 4.3
EXT ALKALINE PHOSPHATASE: 39
EXT ALKALINE PHOSPHATASE: 40
EXT ALT: 15
EXT ALT: 20
EXT AST: 13
EXT AST: 14
EXT BASOPHIL%: 0.2
EXT BASOPHIL%: 1.2
EXT BILIRUBIN TOTAL: 1.3
EXT BILIRUBIN TOTAL: 1.8
EXT BUN: 50
EXT BUN: 56
EXT CALCIUM: 9.3
EXT CALCIUM: 9.3
EXT CHLORIDE: 103
EXT CHLORIDE: 104
EXT CO2: 21
EXT CO2: 23
EXT CREATININE: 1.56 MG/DL
EXT CREATININE: 1.81 MG/DL
EXT EOSINOPHIL%: 1.2
EXT EOSINOPHIL%: 1.4
EXT GFR MDRD NON AF AMER: 45
EXT GFR MDRD NON AF AMER: 54
EXT GLUCOSE: 159
EXT GLUCOSE: 172
EXT HEMATOCRIT: 33.9
EXT HEMATOCRIT: 37.1
EXT HEMOGLOBIN: 10.8
EXT HEMOGLOBIN: 11.8
EXT LYMPH%: 27.4
EXT LYMPH%: 29.6
EXT MONOCYTES%: 10
EXT MONOCYTES%: 10.7
EXT PLATELETS: 67
EXT PLATELETS: 82
EXT POTASSIUM: 4.6
EXT POTASSIUM: 4.9
EXT PROTEIN TOTAL: 6.5
EXT PROTEIN TOTAL: 6.9
EXT SEGS%: 58.6
EXT SEGS%: 59.3
EXT SODIUM: 135 MMOL/L
EXT SODIUM: 135 MMOL/L
EXT TACROLIMUS LVL: 4.3
EXT TACROLIMUS LVL: 4.5
EXT WBC: 3.2
EXT WBC: 4.2

## 2023-08-07 ENCOUNTER — TELEPHONE (OUTPATIENT)
Dept: TRANSPLANT | Facility: CLINIC | Age: 48
End: 2023-08-07
Payer: COMMERCIAL

## 2023-08-07 NOTE — TELEPHONE ENCOUNTER
8/1/23 labs reviewed - stable.  Prograf pending from 8/4/23.  Labs stable now post ACR, can return to monthly labs.  Next lab 9/4/23----- Message from Ranjith Aiken MD sent at 8/7/2023  9:16 AM CDT -----  Results reviewed. No action.

## 2023-08-09 ENCOUNTER — TELEPHONE (OUTPATIENT)
Dept: TRANSPLANT | Facility: CLINIC | Age: 48
End: 2023-08-09
Payer: COMMERCIAL

## 2023-08-09 ENCOUNTER — PATIENT MESSAGE (OUTPATIENT)
Dept: TRANSPLANT | Facility: CLINIC | Age: 48
End: 2023-08-09
Payer: COMMERCIAL

## 2023-08-09 NOTE — TELEPHONE ENCOUNTER
Portal message sent, repeat labs 9/5/23----- Message from Ranjith Aiken MD sent at 8/9/2023  3:36 PM CDT -----  Results reviewed. No action.

## 2023-09-07 ENCOUNTER — PATIENT MESSAGE (OUTPATIENT)
Dept: TRANSPLANT | Facility: CLINIC | Age: 48
End: 2023-09-07

## 2023-09-08 LAB
EXT ALBUMIN: 4.1
EXT ALKALINE PHOSPHATASE: 64
EXT ALT: 21
EXT AST: 18
EXT BASOPHIL%: 0.8
EXT BILIRUBIN TOTAL: 2
EXT BUN: 30
EXT CALCIUM: 9.1
EXT CHLORIDE: 106
EXT CO2: 24
EXT CREATININE: 1.56 MG/DL
EXT EOSINOPHIL%: 1.9
EXT GFR MDRD NON AF AMER: 54
EXT GLUCOSE: 203
EXT HEMATOCRIT: 33.8
EXT HEMOGLOBIN: 10.5
EXT LYMPH%: 43.5
EXT MONOCYTES%: 10.8
EXT PLATELETS: 71
EXT POTASSIUM: 5.6
EXT PROTEIN TOTAL: 6.2
EXT SEGS%: 42.2
EXT SODIUM: 137 MMOL/L
EXT TACROLIMUS LVL: 7.9
EXT WBC: 2.6

## 2023-09-11 ENCOUNTER — TELEPHONE (OUTPATIENT)
Dept: TRANSPLANT | Facility: CLINIC | Age: 48
End: 2023-09-11

## 2023-09-11 NOTE — TELEPHONE ENCOUNTER
Portal message sent, he is repeating potassium and prograf this week----- Message from Ranjith Aiken MD sent at 9/11/2023  1:35 PM CDT -----  Results reviewed. Please follow hyperkalemia protocol.

## 2023-10-03 ENCOUNTER — PATIENT MESSAGE (OUTPATIENT)
Dept: TRANSPLANT | Facility: CLINIC | Age: 48
End: 2023-10-03

## 2023-10-03 LAB
EXT ALBUMIN: 4.4
EXT ALKALINE PHOSPHATASE: 60
EXT ALT: 30
EXT AST: 25
EXT BASOPHIL%: 0.8
EXT BILIRUBIN TOTAL: 2
EXT BUN: 42
EXT CALCIUM: 9.4
EXT CHLORIDE: 106
EXT CHOLESTEROL: 137
EXT CO2: 23
EXT CREATININE: 1.8 MG/DL
EXT EOSINOPHIL%: 2
EXT FERRITIN: 19.3
EXT GFR MDRD NON AF AMER: 46
EXT GLUCOSE: 127
EXT HDL: 32
EXT HEMATOCRIT: 34.6
EXT HEMOGLOBIN A1C: 5.1 %
EXT HEMOGLOBIN: 11.1
EXT LDL CHOLESTEROL: 77
EXT LYMPH%: 35.2
EXT MONOCYTES%: 11.3
EXT PLATELETS: 65
EXT POTASSIUM: 5.3
EXT PROTEIN TOTAL: 6.6
EXT SEGS%: 50.3
EXT SODIUM: 138 MMOL/L
EXT TACROLIMUS LVL: 7.5
EXT TRIGLYCERIDES: 142
EXT TSH: 3.32
EXT VIT D 25 HYDROXY: 27.8
EXT WBC: 2.5

## 2023-10-06 ENCOUNTER — PATIENT MESSAGE (OUTPATIENT)
Dept: TRANSPLANT | Facility: CLINIC | Age: 48
End: 2023-10-06

## 2023-10-06 LAB — EXT CMV DNA QUANT. BY PCR: NOT DETECTED

## 2023-10-09 ENCOUNTER — PATIENT MESSAGE (OUTPATIENT)
Dept: TRANSPLANT | Facility: CLINIC | Age: 48
End: 2023-10-09

## 2023-10-09 ENCOUNTER — TELEPHONE (OUTPATIENT)
Dept: TRANSPLANT | Facility: CLINIC | Age: 48
End: 2023-10-09

## 2023-10-09 NOTE — TELEPHONE ENCOUNTER
Portal message sent, repeat labs 11/6/23----- Message from Ranjith Aiken MD sent at 10/9/2023  9:20 AM CDT -----  Results reviewed. No action.

## 2023-10-26 NOTE — ASSESSMENT & PLAN NOTE
----- Message from Evelin Ghotra MD sent at 10/26/2023 11:23 AM EDT -----  Please inform the pt that her recent blood work showed abnormal thyroid gland function, elevated BG level, elevated cholesterol level, decreased PLT.  Pt should scheduled the OV to discuss the further treatment options.  Thank you   BG goal: 140-180   Hx of Liver transplant admitted for ACR.   On solumedrol 2/3.  Will increase mealtime insulin given elevations.    - Continue Levemir 27 units BID tomorrow  - Increase Novolog 37 units AC  -Continue Novolog Moderate dose correction with ISF 25 starting at 150    - POCT Glucose before meals and at bedtime  - Hypoglycemia protocol in place      ** Please notify Endocrine for any change and/or advance in diet**  ** Please call Endocrine for any BG related issues **     Discharge Planning:   TBD. Please notify endocrinology prior to discharge.

## 2023-10-31 ENCOUNTER — OFFICE VISIT (OUTPATIENT)
Dept: TRANSPLANT | Facility: CLINIC | Age: 48
End: 2023-10-31
Payer: COMMERCIAL

## 2023-10-31 VITALS
RESPIRATION RATE: 18 BRPM | HEART RATE: 77 BPM | DIASTOLIC BLOOD PRESSURE: 80 MMHG | SYSTOLIC BLOOD PRESSURE: 155 MMHG | BODY MASS INDEX: 37.89 KG/M2 | WEIGHT: 250 LBS | HEIGHT: 68 IN | TEMPERATURE: 97 F | OXYGEN SATURATION: 97 %

## 2023-10-31 DIAGNOSIS — Z94.4 S/P LIVER TRANSPLANT: ICD-10-CM

## 2023-10-31 DIAGNOSIS — Z79.60 LONG-TERM USE OF IMMUNOSUPPRESSANT MEDICATION: ICD-10-CM

## 2023-10-31 DIAGNOSIS — T86.41 ACUTE REJECTION OF LIVER TRANSPLANT: ICD-10-CM

## 2023-10-31 DIAGNOSIS — Z91.89 AT RISK FOR OPPORTUNISTIC INFECTIONS: ICD-10-CM

## 2023-10-31 PROCEDURE — 99999 PR PBB SHADOW E&M-EST. PATIENT-LVL V: ICD-10-PCS | Mod: PBBFAC,,, | Performed by: INTERNAL MEDICINE

## 2023-10-31 PROCEDURE — 99213 PR OFFICE/OUTPT VISIT, EST, LEVL III, 20-29 MIN: ICD-10-PCS | Mod: S$GLB,,, | Performed by: INTERNAL MEDICINE

## 2023-10-31 PROCEDURE — 99999 PR PBB SHADOW E&M-EST. PATIENT-LVL V: CPT | Mod: PBBFAC,,, | Performed by: INTERNAL MEDICINE

## 2023-10-31 PROCEDURE — 99213 OFFICE O/P EST LOW 20 MIN: CPT | Mod: S$GLB,,, | Performed by: INTERNAL MEDICINE

## 2023-10-31 RX ORDER — CHOLECALCIFEROL (VITAMIN D3) 25 MCG
1000 TABLET ORAL DAILY
COMMUNITY

## 2023-10-31 RX ORDER — HYDROCODONE BITARTRATE AND ACETAMINOPHEN 5; 325 MG/1; MG/1
1 TABLET ORAL EVERY 8 HOURS
COMMUNITY
Start: 2023-08-14

## 2023-10-31 RX ORDER — INSULIN ASPART 100 [IU]/ML
INJECTION, SOLUTION INTRAVENOUS; SUBCUTANEOUS
Status: ON HOLD | COMMUNITY
End: 2024-02-09 | Stop reason: HOSPADM

## 2023-10-31 RX ORDER — IRON,CARBONYL/ASCORBIC ACID 100-250 MG
1 TABLET ORAL EVERY MORNING
COMMUNITY
Start: 2023-10-03

## 2023-10-31 NOTE — PROGRESS NOTES
Transplant Hepatology  Liver Transplant Recipient Follow-up    Transplant Date: 1/5/2023  UNOS Native Liver Dx: Cirrhosis: Other, Specify - granulomatous hepatitis    Rosalio is here for follow up of his liver transplant.    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     Subjective:     Interval History:  Rosalio Mccarty is a 48-year-old gentleman who underwent liver transplantation on 1/5/2023 for 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 Feels well today. Reports weakness and fatigue, which is slowly improving. Continues on Tac 4/4 and MMF 500mg BID. He has completed steroid taper a while ago, and continues on dapsone.     He noted his blood pressure is high today. He states his blood pressure has not been elevated before. He does not take blood pressure medication.     Reports recent weight gain after being off Ozempic, and having dietary indiscretions. Reports intermittent ankle and lower extremity swelling.     Denies fevers, chills, tremors, new headaches.    Objective:     Vitals:    10/31/23 0953   BP: (!) 155/80   Pulse: 77   Resp: 18   Temp: 97 °F (36.1 °C)   Body mass index is 38.01 kg/m².    Physical Exam  Constitutional:       Appearance: He is well-developed. He is obese.   Eyes:      General: No scleral icterus.  Cardiovascular:      Rate and Rhythm: Normal rate and regular rhythm.   Pulmonary:      Effort: Pulmonary effort is normal. No respiratory distress.       Breath sounds: Normal breath sounds. No wheezing.   Abdominal:      General: Abdomen is protuberant. Bowel sounds are normal. There is no distension.      Palpations: Abdomen is soft. There is no mass.      Tenderness: There is no abdominal tenderness. There is no rebound.          Comments: Well-healed surgical scar.   Musculoskeletal:         General: Normal range of motion.      Right lower leg: No swelling.      Left lower leg: No swelling.   Lymphadenopathy:      Cervical: No cervical adenopathy.   Skin:     General: Skin is warm and dry.   Neurological:      Mental Status: He is alert and oriented to person, place, and time.       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 Post-operative course has been complicated by two episodes of moderate T-cell mediated rejection requiring admission for pulse dose steroids.    1. S/P liver transplant    2. Acute rejection of liver transplant    3. Long-term use of immunosuppressant medication    4. At risk for opportunistic infections      1. Liver Transplant. Excellent allograft function. Mild hyperbilirubinemia likely secondary to Gilbert's.     2. Immunosuppression. History of T-cell mediated rejections correlate with prior attempts at decreasing IS. Continue Tac 4/4, MMF 500mg BID. No changes at this time.    3. Blood Pressure. /80 during today's visit. Patient states BP is  usually normal. Recommend monitoring BP daily at home, and reaching out to PCP is persistently high.     4. Renal. Cr ~2. Plan minimize CNI as able.     5. CMV Prophylaxis. Completed.    6. PCP Prophylaxis. On dapsone while receiving steroids for rejection. Now off steroids. Stop dapsone.     7. Fungal Prophylaxis. Completed.    8. Fluid Overload. Reports intermittent ankle edema. Recommend low sodium diet, and staying hydrated. Can consider diuresis if persistent/worsens.    9. Aspirin. The patient will continue to take aspirin as prophylaxis against post-operative hepatic artery thrombosis and as a cardioprotective agent post-transplantation.    10. Explant Pathology. No evidence of neoplasia.    UNOS Patient Status  Functional Status: 80% - Normal activity with effort: some symptoms of disease  Physical Capacity: No Limitations  Working for income: yes  New diabetes onset between last follow-up to the current follow-up: No  Did patient have any acute rejection episodes during the follow-up period: No  Post transplant malignancy: No    Yaa Jackson MD  Staff Physician  Hepatology and Liver Transplant  [facility]  [facility]

## 2023-10-31 NOTE — Clinical Note
Doing well. No changes in IS at this time. Ok to stop dapsone now he has completed steroid taper for rejection. Recommended he monitors BP daily at home, and to reach out to PCP if persistently hypertensive.   Labs and follow up per protocol.  Please let me know if you have questions.  Thanks! MZ

## 2023-10-31 NOTE — LETTER
October 31, 2023        Alan Chinchilla  [address] [place] [id number]  Phone: [phone]  Fax: [phone]             [facility]  [address]  [facility] [address]  Phone: [phone]   Patient: Rosalio Mccarty   MR Number: [id number]   YOB: 1975   Date of Visit: 10/31/2023       Dear Dr. Alan Chinchilla    Thank you for referring Rosalio Mccarty to me for evaluation. Attached you will find relevant portions of my assessment and plan of care.    If you have questions, please do not hesitate to call me. I look forward to following Rosalio Mccarty along with you.    Sincerely,    Yaa Jackson MD    Enclosure    If you would like to receive this communication electronically, please contact [email or web address] or [phone] to request [employer] access.    [employer] is a tool which provides read-only access to select patient information with whom you have a relationship. Its easy to use and provides real time access to review your patients record including encounter summaries, notes, results, and demographic information.    If you feel you have received this communication in error or would no longer like to receive these types of communications, please e-mail [email or web address]

## 2023-11-01 ENCOUNTER — TELEPHONE (OUTPATIENT)
Dept: TRANSPLANT | Facility: CLINIC | Age: 48
End: 2023-11-01
Payer: COMMERCIAL

## 2023-11-06 NOTE — DISCHARGE INSTRUCTIONS
Discharge Instructions for Liver Biopsy    Home Care:  Go home and rest for 24 to 48 hours. Get up only to use the bathroom.   Don't drive for 24 following your procedure.  Don't shower for 24 hours following your procedure. If you wish, you may wash yourself with a sponge or washcloth. When you are able to shower, don't scrub the site. Gently wash area and pat it dry.   You may remove the bandage covering the biopsy site 24 hours to 48 hours after the procedure.   Don't lift anything heavier than 10 pounds for 3 days to 4 days after the procedure.  Ask your healthcare provider when you when you can return to work. Be sure to tell your healthcare provider if your job involves heavy lifting.   If you normally take blood thinners (anticoagulants or antiplatelet medications) and you stopped taking them a few days before your procedure, ask your healthcare provider when to start taking them again.  You may take Tylenol (acetaminophen) as needed for pain.  Ice may be applied intermittently for pain relief.    When to seek medical care:  Call your healthcare provider right away if you have any of the following:  Blood in your urine.  Exhaustion or extreme weakness  Dizziness or lightheadedness  Sudden or increased shortness of breath  Sudden chest pain  Fever of 100.4 F (38 C) or higher, or chills  Increased redness, tenderness, or swelling at the biopsy site  Opening of or drainage or bleeding from the biopsy site  Increasing pain, with or without activity  Severe pain and difficulty urinating    GOAL: Patient will demonstrate an increase in static/dynamic balance in sitting/standing where deficient by at least 1 grade within 2 weeks to facilitate greater independence during functional mobility and ADL's.

## 2023-11-07 ENCOUNTER — PATIENT MESSAGE (OUTPATIENT)
Dept: TRANSPLANT | Facility: CLINIC | Age: 48
End: 2023-11-07
Payer: COMMERCIAL

## 2023-11-07 LAB
EXT ALBUMIN: 4.7
EXT ALKALINE PHOSPHATASE: 54
EXT ALT: 26
EXT AST: 20
EXT BASOPHIL%: 0.9
EXT BILIRUBIN TOTAL: 2.1
EXT BUN: 34
EXT CALCIUM: 9.8
EXT CHLORIDE: 104
EXT CO2: 24
EXT CREATININE: 1.93 MG/DL
EXT EOSINOPHIL%: 2.7
EXT GFR MDRD NON AF AMER: 42
EXT GLUCOSE: 204
EXT HEMATOCRIT: 38.3
EXT HEMOGLOBIN: 12.4
EXT LYMPH%: 30.2
EXT MONOCYTES%: 11.3
EXT PLATELETS: 68
EXT POTASSIUM: 5.4
EXT PROTEIN TOTAL: 7
EXT SEGS%: 54.4
EXT SODIUM: 137 MMOL/L
EXT TACROLIMUS LVL: 10.3
EXT WBC: 2.2

## 2023-11-13 ENCOUNTER — TELEPHONE (OUTPATIENT)
Dept: TRANSPLANT | Facility: CLINIC | Age: 48
End: 2023-11-13
Payer: COMMERCIAL

## 2023-11-13 ENCOUNTER — PATIENT MESSAGE (OUTPATIENT)
Dept: TRANSPLANT | Facility: CLINIC | Age: 48
End: 2023-11-13
Payer: COMMERCIAL

## 2023-11-13 NOTE — TELEPHONE ENCOUNTER
Portal message sent, repeat labs 12/8/23----- Message from Yaa Jackson MD sent at 11/13/2023 10:11 AM CST -----  Labs are within acceptable ranges. No changes at this time.

## 2023-11-28 ENCOUNTER — OFFICE VISIT (OUTPATIENT)
Dept: NEPHROLOGY | Facility: CLINIC | Age: 48
End: 2023-11-28
Payer: COMMERCIAL

## 2023-11-28 VITALS — BODY MASS INDEX: 37.89 KG/M2 | WEIGHT: 250 LBS | HEIGHT: 68 IN

## 2023-11-28 DIAGNOSIS — Z79.60 LONG-TERM USE OF IMMUNOSUPPRESSANT MEDICATION: ICD-10-CM

## 2023-11-28 DIAGNOSIS — N18.30 STAGE 3 CHRONIC KIDNEY DISEASE, UNSPECIFIED WHETHER STAGE 3A OR 3B CKD: Primary | ICD-10-CM

## 2023-11-28 DIAGNOSIS — R73.9 STEROID-INDUCED HYPERGLYCEMIA: ICD-10-CM

## 2023-11-28 DIAGNOSIS — I50.22 CHRONIC SYSTOLIC HEART FAILURE: ICD-10-CM

## 2023-11-28 DIAGNOSIS — E11.9 DIABETES MELLITUS, TYPE II, INSULIN DEPENDENT: ICD-10-CM

## 2023-11-28 DIAGNOSIS — E66.09 NON MORBID OBESITY DUE TO EXCESS CALORIES: ICD-10-CM

## 2023-11-28 DIAGNOSIS — M10.9 GOUT, UNSPECIFIED CAUSE, UNSPECIFIED CHRONICITY, UNSPECIFIED SITE: ICD-10-CM

## 2023-11-28 DIAGNOSIS — Z94.4 LIVER REPLACED BY TRANSPLANT: ICD-10-CM

## 2023-11-28 DIAGNOSIS — Z94.4 S/P LIVER TRANSPLANT: ICD-10-CM

## 2023-11-28 DIAGNOSIS — T38.0X5A STEROID-INDUCED HYPERGLYCEMIA: ICD-10-CM

## 2023-11-28 DIAGNOSIS — Z79.4 DIABETES MELLITUS, TYPE II, INSULIN DEPENDENT: ICD-10-CM

## 2023-11-28 DIAGNOSIS — E11.65 TYPE 2 DIABETES MELLITUS WITH HYPERGLYCEMIA, UNSPECIFIED WHETHER LONG TERM INSULIN USE: ICD-10-CM

## 2023-11-28 PROCEDURE — 99204 OFFICE O/P NEW MOD 45 MIN: CPT | Mod: 95,,, | Performed by: INTERNAL MEDICINE

## 2023-11-28 PROCEDURE — 99204 PR OFFICE/OUTPT VISIT, NEW, LEVL IV, 45-59 MIN: ICD-10-PCS | Mod: 95,,, | Performed by: INTERNAL MEDICINE

## 2023-11-28 RX ORDER — SODIUM FLUORIDE 6 MG/ML
PASTE, DENTIFRICE DENTAL
COMMUNITY
Start: 2023-11-09

## 2023-11-28 NOTE — PATIENT INSTRUCTIONS
Decrease sodium bicarbonate to 650mg one twice a day   Check home BP top# 115-135 bottom <80/85  Avoiding NSAIDs

## 2023-12-01 PROBLEM — D71 HYPERCALCEMIA DUE TO GRANULOMATOUS DISEASE: Status: ACTIVE | Noted: 2018-08-09

## 2023-12-01 PROBLEM — Z79.4 DIABETES MELLITUS, TYPE II, INSULIN DEPENDENT: Status: ACTIVE | Noted: 2018-05-24

## 2023-12-01 PROBLEM — E83.52 HYPERCALCEMIA DUE TO GRANULOMATOUS DISEASE: Status: ACTIVE | Noted: 2018-08-09

## 2023-12-01 PROBLEM — E11.9 DIABETES MELLITUS, TYPE II, INSULIN DEPENDENT: Status: ACTIVE | Noted: 2018-05-24

## 2023-12-01 PROBLEM — E83.52 HYPERCALCEMIA DUE TO SARCOIDOSIS: Status: ACTIVE | Noted: 2018-08-09

## 2023-12-01 PROBLEM — D84.9 IMMUNOSUPPRESSION: Status: ACTIVE | Noted: 2018-12-27

## 2023-12-01 PROBLEM — M10.9 GOUT: Status: ACTIVE | Noted: 2018-12-27

## 2023-12-01 PROBLEM — D86.9 HYPERCALCEMIA DUE TO SARCOIDOSIS: Status: ACTIVE | Noted: 2018-08-09

## 2023-12-01 PROBLEM — E66.09 NON MORBID OBESITY DUE TO EXCESS CALORIES: Status: ACTIVE | Noted: 2018-05-24

## 2023-12-01 PROBLEM — I10 HYPERTENSION, ESSENTIAL, BENIGN: Status: ACTIVE | Noted: 2018-05-24

## 2023-12-07 ENCOUNTER — PATIENT MESSAGE (OUTPATIENT)
Dept: TRANSPLANT | Facility: CLINIC | Age: 48
End: 2023-12-07
Payer: COMMERCIAL

## 2023-12-07 DIAGNOSIS — Z94.4 LIVER REPLACED BY TRANSPLANT: Primary | ICD-10-CM

## 2023-12-07 DIAGNOSIS — Z94.4 LIVER TRANSPLANTED: ICD-10-CM

## 2023-12-07 LAB
EXT ALBUMIN: 5
EXT ALKALINE PHOSPHATASE: 60
EXT ALT: 74
EXT AST: 47
EXT BASOPHIL%: 0.9
EXT BILIRUBIN TOTAL: 3.6
EXT BUN: 33
EXT CALCIUM: 10.2
EXT CHLORIDE: 103
EXT CO2: 25
EXT CREATININE: 1.81 MG/DL
EXT EOSINOPHIL%: 1.5
EXT GFR MDRD NON AF AMER: 45
EXT GLUCOSE: 189
EXT HEMATOCRIT: 40
EXT HEMOGLOBIN: 13.3
EXT LYMPH%: 25.1
EXT MONOCYTES%: 8.8
EXT PLATELETS: 67
EXT POTASSIUM: 5.2
EXT PROTEIN TOTAL: 7.2
EXT SEGS%: 62.2
EXT SODIUM: 137 MMOL/L
EXT TACROLIMUS LVL: 7.1
EXT WBC: 3.4

## 2023-12-07 RX ORDER — TACROLIMUS 1 MG/1
5 CAPSULE ORAL EVERY 12 HOURS
Qty: 300 CAPSULE | Refills: 11 | Status: SHIPPED | OUTPATIENT
Start: 2023-12-07 | End: 2023-12-13

## 2023-12-07 NOTE — TELEPHONE ENCOUNTER
Notified Dr Jackson of elevated LFT and increase in bilirubin.  Plan is to increase prograf to 5 mg every 12 hours starting now, repeat labs tomorrow, and get a liver US.  Plan for liver biopsy next week.  If levels stil elevated tomorrow, will consider prednisone taper. Patient has been notified.

## 2023-12-08 ENCOUNTER — HOSPITAL ENCOUNTER (OUTPATIENT)
Dept: RADIOLOGY | Facility: HOSPITAL | Age: 48
Discharge: HOME | End: 2023-12-08
Attending: INTERNAL MEDICINE
Payer: COMMERCIAL

## 2023-12-08 ENCOUNTER — PATIENT MESSAGE (OUTPATIENT)
Dept: TRANSPLANT | Facility: CLINIC | Age: 48
End: 2023-12-08
Payer: COMMERCIAL

## 2023-12-08 DIAGNOSIS — Z94.4 LIVER REPLACED BY TRANSPLANT: ICD-10-CM

## 2023-12-08 LAB
EXT ALBUMIN: 4.8
EXT ALKALINE PHOSPHATASE: 59
EXT ALT: 86
EXT AST: 48
EXT BASOPHIL%: 0.6
EXT BILIRUBIN TOTAL: 2.8
EXT BUN: 35
EXT CALCIUM: 9.9
EXT CHLORIDE: 104
EXT CO2: 25
EXT CREATININE: 1.85 MG/DL
EXT EOSINOPHIL%: 2.2
EXT GFR MDRD NON AF AMER: 44
EXT GLUCOSE: 205
EXT HEMATOCRIT: 40.2
EXT HEMOGLOBIN: 13.5
EXT LYMPH%: 31.6
EXT MONOCYTES%: 9.7
EXT PLATELETS: 63
EXT POTASSIUM: 4.9
EXT PROTEIN TOTAL: 7.5
EXT SEGS%: 55
EXT SODIUM: 139 MMOL/L
EXT TACROLIMUS LVL: ABNORMAL
EXT WBC: 3.2

## 2023-12-08 PROCEDURE — 76705 ECHO EXAM OF ABDOMEN: CPT | Mod: TC

## 2023-12-08 PROCEDURE — 76705 US DOPPLER LIVER TRANSPLANT POST (XPD): ICD-10-PCS | Mod: 26,59,, | Performed by: RADIOLOGY

## 2023-12-08 PROCEDURE — 93976 US DOPPLER LIVER TRANSPLANT POST (XPD): ICD-10-PCS | Mod: 26,,, | Performed by: RADIOLOGY

## 2023-12-08 PROCEDURE — 76705 ECHO EXAM OF ABDOMEN: CPT | Mod: 26,59,, | Performed by: RADIOLOGY

## 2023-12-08 PROCEDURE — 93976 VASCULAR STUDY: CPT | Mod: 26,,, | Performed by: RADIOLOGY

## 2023-12-08 RX ORDER — PREDNISONE 10 MG/1
TABLET ORAL
Qty: 81 TABLET | Refills: 0 | Status: SHIPPED | OUTPATIENT
Start: 2023-12-08 | End: 2024-01-19

## 2023-12-12 ENCOUNTER — TELEPHONE (OUTPATIENT)
Dept: TRANSPLANT | Facility: CLINIC | Age: 48
End: 2023-12-12
Payer: COMMERCIAL

## 2023-12-12 ENCOUNTER — PATIENT MESSAGE (OUTPATIENT)
Dept: TRANSPLANT | Facility: CLINIC | Age: 48
End: 2023-12-12
Payer: COMMERCIAL

## 2023-12-12 LAB
EXT ALBUMIN: 4.8
EXT ALKALINE PHOSPHATASE: 48
EXT ALT: 62
EXT AST: 19
EXT BASOPHIL%: 0.3
EXT BILIRUBIN TOTAL: 1.6
EXT BUN: 43
EXT CALCIUM: 10.1
EXT CHLORIDE: 105
EXT CO2: 25
EXT CREATININE: 1.87 MG/DL
EXT EOSINOPHIL%: 0.5
EXT GLUCOSE: 167
EXT HEMATOCRIT: 40.7
EXT HEMOGLOBIN: 13.6
EXT LYMPH%: 26.2
EXT MONOCYTES%: 9.6
EXT PLATELETS: 64
EXT POTASSIUM: 4.1
EXT PROTEIN TOTAL: 6.9
EXT SEGS%: 62.6
EXT SODIUM: 138 MMOL/L
EXT TACROLIMUS LVL: 6.9
EXT WBC: 3.9

## 2023-12-12 NOTE — TELEPHONE ENCOUNTER
Portal message sent, repeat labs 12/18/23----- Message from Yaa Jackson MD sent at 12/12/2023  2:18 PM CST -----  Liver enzymes have improved on prednisone. Let's plan to complete prednisone taper, and we can cancel liver biopsy.     Thank you! MZ

## 2023-12-13 DIAGNOSIS — Z94.4 LIVER TRANSPLANTED: ICD-10-CM

## 2023-12-13 RX ORDER — TACROLIMUS 1 MG/1
CAPSULE ORAL
Qty: 300 CAPSULE | Refills: 11 | Status: SHIPPED | OUTPATIENT
Start: 2023-12-13 | End: 2023-12-18

## 2023-12-17 DIAGNOSIS — Z94.4 LIVER TRANSPLANTED: ICD-10-CM

## 2023-12-18 RX ORDER — TACROLIMUS 1 MG/1
CAPSULE ORAL
Qty: 300 CAPSULE | Refills: 11 | Status: ON HOLD | OUTPATIENT
Start: 2023-12-18 | End: 2024-02-09

## 2023-12-19 LAB
EXT ALBUMIN: 4.8
EXT ALKALINE PHOSPHATASE: 39
EXT ALT: 42
EXT AST: 18
EXT BASOPHIL%: 0.2
EXT BILIRUBIN TOTAL: 2.7
EXT BUN: 55
EXT CALCIUM: 9.7
EXT CHLORIDE: 107
EXT CO2: 22
EXT CREATININE: 1.69 MG/DL
EXT EOSINOPHIL%: 1
EXT GFR MDRD NON AF AMER: 49
EXT GLUCOSE: 100
EXT HEMATOCRIT: 45
EXT HEMOGLOBIN: 14.7
EXT LYMPH%: 32.6
EXT MONOCYTES%: 10.3
EXT PLATELETS: 68
EXT POTASSIUM: 4.8
EXT PROTEIN TOTAL: 7
EXT SEGS%: 55.2
EXT SODIUM: 139 MMOL/L
EXT TACROLIMUS LVL: 6.8
EXT WBC: 5.7

## 2023-12-20 ENCOUNTER — TELEPHONE (OUTPATIENT)
Dept: TRANSPLANT | Facility: CLINIC | Age: 48
End: 2023-12-20
Payer: COMMERCIAL

## 2023-12-20 ENCOUNTER — PATIENT MESSAGE (OUTPATIENT)
Dept: TRANSPLANT | Facility: CLINIC | Age: 48
End: 2023-12-20
Payer: COMMERCIAL

## 2023-12-20 NOTE — TELEPHONE ENCOUNTER
Portal message sent, repeat labs 1/2/24----- Message from Yaa Jackson MD sent at 12/19/2023  8:27 PM CST -----  Liver enzymes have normalized on steroid taper. Continue prednisone taper, no changes to [place] for now. Thank you!

## 2023-12-21 ENCOUNTER — PATIENT MESSAGE (OUTPATIENT)
Dept: TRANSPLANT | Facility: CLINIC | Age: 48
End: 2023-12-21
Payer: COMMERCIAL

## 2024-01-04 ENCOUNTER — PATIENT MESSAGE (OUTPATIENT)
Dept: TRANSPLANT | Facility: CLINIC | Age: 49
End: 2024-01-04
Payer: COMMERCIAL

## 2024-01-05 ENCOUNTER — TELEPHONE (OUTPATIENT)
Dept: BARIATRICS | Facility: CLINIC | Age: 49
End: 2024-01-05
Payer: COMMERCIAL

## 2024-01-05 ENCOUNTER — OFFICE VISIT (OUTPATIENT)
Dept: TRANSPLANT | Facility: CLINIC | Age: 49
End: 2024-01-05
Payer: COMMERCIAL

## 2024-01-05 DIAGNOSIS — E66.01 CLASS 2 SEVERE OBESITY DUE TO EXCESS CALORIES WITH SERIOUS COMORBIDITY AND BODY MASS INDEX (BMI) OF 38.0 TO 38.9 IN ADULT: ICD-10-CM

## 2024-01-05 DIAGNOSIS — Z79.60 LONG-TERM USE OF IMMUNOSUPPRESSANT MEDICATION: ICD-10-CM

## 2024-01-05 DIAGNOSIS — T86.41 ACUTE REJECTION OF LIVER TRANSPLANT: ICD-10-CM

## 2024-01-05 DIAGNOSIS — E80.6 HYPERBILIRUBINEMIA: ICD-10-CM

## 2024-01-05 DIAGNOSIS — Z94.4 S/P LIVER TRANSPLANT: ICD-10-CM

## 2024-01-05 PROBLEM — E66.812 CLASS 2 OBESITY DUE TO EXCESS CALORIES WITH BODY MASS INDEX (BMI) OF 38.0 TO 38.9 IN ADULT: Status: ACTIVE | Noted: 2018-05-24

## 2024-01-05 PROCEDURE — 99214 OFFICE O/P EST MOD 30 MIN: CPT | Mod: 95,,, | Performed by: INTERNAL MEDICINE

## 2024-01-05 NOTE — LETTER
January 5, 2024        Alan Chinchilla  [address] [place] [address]  Phone: [phone]  Fax: [phone]             [facility]  [address]  [facility] [address]  Phone: [phone]   Patient: Rosalio Mccarty   MR Number: [id number]   YOB: 1975   Date of Visit: 1/5/2024       Dear Dr. Alan Chinchilla    Thank you for referring Rosalio Mccarty to me for evaluation. Attached you will find relevant portions of my assessment and plan of care.    If you have questions, please do not hesitate to call me. I look forward to following Rosalio Mccarty along with you.    Sincerely,    Yaa Jackson MD    Enclosure    If you would like to receive this communication electronically, please contact [email or web address] or [phone] to request [employer] access.    [employer] is a tool which provides read-only access to select patient information with whom you have a relationship. Its easy to use and provides real time access to review your patients record including encounter summaries, notes, results, and demographic information.    If you feel you have received this communication in error or would no longer like to receive these types of communications, please e-mail [email or web address]

## 2024-01-05 NOTE — Clinical Note
Doing well! Plan to increase MMF to 1000mg BID. Will monitor WBC closely on higher doses of MMF. Ok to stop PPI. Pt will discuss HTN with PCP. I placed referral for bariatric medicine. Labs per protocol. RTC in 3 months.  Thank you! MZ

## 2024-01-05 NOTE — PROGRESS NOTES
Transplant Hepatology  Liver Transplant Recipient Follow-up    Transplant Date: 1/5/2023  UNOS Native Liver Dx: Cirrhosis: Other, Specify - granulomatous hepatitis    The chief complaint leading to consultation is: follow up of his liver transplant.    Visit type: audiovisual    Face to Face time with patient: 17 minutes.  30 minutes of total time spent on the encounter, which includes face to face time and non-face to face time preparing to see the patient (eg, review of tests), Obtaining and/or reviewing separately obtained history, Documenting clinical information in the electronic or other health record, Independently interpreting results (not separately reported) and communicating results to the patient/family/caregiver, or Care coordination (not separately reported).     Each patient to whom he or she provides medical services by telemedicine is:  (1) informed of the relationship between the physician and patient and the respective role of any other health care provider with respect to management of the patient; and (2) notified that he or she may decline to receive medical services by telemedicine and may withdraw from such care at any time.      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 Presumed T-cell mediated rejection in Dec 2023 treated with oral prednisone in the outpatient setting.    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 Feels well today. Continues on Tac 5/5 and MMF 500mg BID.     Reports BP remains elevated, and plans to discuss starting BP medication with PCP. Continues to struggle with weight loss despite being on Ozempic. He thinks he may have gained some weight since the last clinic visit.    Denies fevers, chills, tremors, new headaches.    Objective:     There were no vitals filed for this visit.  There is no height or weight on file to calculate BMI.    Physical Exam  Constitutional:       General: He is not in acute distress.     Appearance: He is not toxic-appearing.   Eyes:      General: No scleral icterus.  Skin:     Coloration: Skin is not jaundiced.   Neurological:      General: No focal deficit present.      Mental Status: He is alert.   Psychiatric:         Thought Content: Thought content normal.         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 Post-operative course has been complicated by two episodes of moderate T-cell mediated rejection requiring admission for pulse dose steroids.    1. S/P liver transplant    2. Acute rejection of liver transplant    3. Long-term use of immunosuppressant medication    4. Class 2 severe obesity due to excess calories with serious comorbidity and body mass index (BMI) of 38.0 to 38.9 in adult    5.  Hyperbilirubinemia      1. Liver Transplant. Excellent allograft function. Mild hyperbilirubinemia likely secondary to Gilbert syndrome.    2. Immunosuppression. History of T-cell mediated rejections correlate with prior attempts at decreasing IS. Continue Tac 5/5, and increase MMF to 1000mg BID.     3. Blood Pressure. Recommend discussing initiation of anti-hypertensive therapy with PCP.    4. Renal. Cr 1.69 12/18/23. Plan minimize CNI as able.     5. CMV Prophylaxis. Completed.    6. PCP Prophylaxis. Completed.     7. Fungal Prophylaxis. Completed.    8. Fluid Overload. Reports intermittent ankle edema. Recommend low sodium diet, and staying hydrated. Can consider diuresis if persistent/worsens.    9. Aspirin. The patient will continue to take aspirin as prophylaxis against post-operative hepatic artery thrombosis and as a cardioprotective agent post-transplantation.    10. Explant Pathology. No evidence of neoplasia.    11. Obesity: struggling with weight loss despite taking Ozempic. The patient is amenable to referral to Bariatric Medicine for further guidance with management.    UNOS Patient Status  Functional Status: 80% - Normal activity with effort: some symptoms of disease  Physical Capacity: No Limitations  Working for income: yes  New diabetes onset between last follow-up to the current follow-up: No  Did patient have any acute rejection episodes during the follow-up period: No  Post transplant malignancy: No    Yaa Jackson MD  Staff Physician  Hepatology and Liver Transplant  [facility]  [facility]

## 2024-01-08 LAB
EXT ALBUMIN: 4.5
EXT ALKALINE PHOSPHATASE: 38
EXT ALT: 20
EXT AST: 15
EXT BASOPHIL%: 0.5
EXT BILIRUBIN TOTAL: 2.6
EXT BUN: 43
EXT CALCIUM: 9.74
EXT CHLORIDE: 104
EXT CO2: 24
EXT CREATININE: 1.6 MG/DL
EXT EOSINOPHIL%: 2
EXT GFR MDRD NON AF AMER: 53
EXT GLUCOSE: 177
EXT HCV QUANT: NOT DETECTED
EXT HEMATOCRIT: 39.8
EXT HEMOGLOBIN: 13.5
EXT LYMPH%: 25.4
EXT MONOCYTES%: 7.4
EXT PLATELETS: 53
EXT POTASSIUM: 5.1
EXT PROTEIN TOTAL: 6.5
EXT SEGS%: 64
EXT SODIUM: 138 MMOL/L
EXT TACROLIMUS LVL: 7.2
EXT VIT D 25 HYDROXY: 34.8
EXT WBC: 4.1
HCV AB SER QL: NEGATIVE

## 2024-01-09 ENCOUNTER — TELEPHONE (OUTPATIENT)
Dept: TRANSPLANT | Facility: CLINIC | Age: 49
End: 2024-01-09
Payer: COMMERCIAL

## 2024-01-09 ENCOUNTER — PATIENT MESSAGE (OUTPATIENT)
Dept: TRANSPLANT | Facility: CLINIC | Age: 49
End: 2024-01-09
Payer: COMMERCIAL

## 2024-01-09 NOTE — LETTER
January 9, 2024    Rosalio Mccarty  [address]  [place] [address]      Happy 1 Year Anniversary!    Congratulations on reaching your 1 year anniversary of your liver transplant.  We are sure the last year has been quite an adventure.  We would now like to let you know what to expect for the coming years.    Your Responsibilities through your local Health Care Providers include:  Office appointments every 2 to 3 months or as they indicate  Laboratory tests every 2 to 3 months or as they indicate  Prescription refills including non-transplant medications such as BP meds, Insulin, sleep aids, etc.   Handles problems you are having or emergencies that arise    We, your Liver Transplant Team will:  Schedule appointments with us at 12 months, and then yearly.  Of course, we will also see you whenever your local providers request consultation with us.  Monitor progress by reviewing routine lab tests received from your personal doctor.    Consult with your local health care providers if problems arise.  Send copies of all lab and diagnostic tests as well as transplant physician reports to your local health care providers. If requested - please provide name,  number, and fax number.  Refill and manage your immunosuppressant medications     Your Personal Responsibilities continue to include:  Arrange for your lab/doctor to fax lab results to us at [phone]. This is not necessary if your lab is done at an [facility] location.  Drink adequate amounts of water.  Take medication on time and dont miss any doses  Monitor blood pressure regularly  See your doctor and have blood and urine checked routinely  Report problems to your doctor in a timely manner  Write a thank you note to your donor or donor family    Please keep us informed of any important issues that may occur.  We are always available for questions you may have, because the health of you and your liver are important to  us.      Sincerely,            [facility]   [facility] [place] [address]  [phone]

## 2024-01-09 NOTE — TELEPHONE ENCOUNTER
Portal message sent, repeat labs 1/29/24 with 1 year labs. ----- Message from Yaa Jackson MD sent at 1/9/2024  8:58 AM CST -----  Labs reviewed. No changes to immunosuppression recommended at this time. Can we please check direct bilirubin with next set of labs? Thank you! MZ

## 2024-01-09 NOTE — LETTER
"   LAB ORDERS    2024      ORDERING MD:   Yaa Jackson MD - NPI #[id number]         Patient Name: Rosalio Mccarty               : 1975    [facility] Number: [id number]                  #:        Please draw the following labs:     Test Frequency  Diagnosis/ICD-10 Code     CBC/DIFF/PLT every month  Z94.4 Liver Transplant       Complete Metabolic Panel every month  [facility]94.[facility] Liver Transplant         Prograf level every month  Z94.4 Liver Transplant     Direct Bilirubin every month  Z94.4 Liver Transplant      HBV DNA PCR Quantitative Once (due 24)  Z94.4 Liver Transplant         FAX RESULTS [phone] "Attention Liver Transplant Coordinator", and send the hard copy when completed. If you have any questions regarding this request or need additional information, please call [phone].      [employer]  Fax [phone]  "

## 2024-01-24 ENCOUNTER — TELEPHONE (OUTPATIENT)
Dept: TRANSPLANT | Facility: CLINIC | Age: 49
End: 2024-01-24
Payer: COMMERCIAL

## 2024-01-24 ENCOUNTER — PATIENT MESSAGE (OUTPATIENT)
Dept: TRANSPLANT | Facility: CLINIC | Age: 49
End: 2024-01-24
Payer: COMMERCIAL

## 2024-01-24 LAB
EXT ALBUMIN: 4.8
EXT ALKALINE PHOSPHATASE: 43
EXT ALT: 17
EXT AST: 16
EXT BASOPHIL%: 0.7
EXT BILIRUBIN DIRECT: 1.1 MG/DL
EXT BILIRUBIN TOTAL: 3.3
EXT BUN: 37
EXT CALCIUM: 10
EXT CHLORIDE: 107
EXT CO2: 21
EXT CREATININE: 2.01 MG/DL
EXT EOSINOPHIL%: 1.1
EXT GFR MDRD NON AF AMER: 40
EXT GLUCOSE: 157
EXT HBV DNA PCR QUANT: NOT DETECTED
EXT HEMATOCRIT: 30.3
EXT HEMOGLOBIN: 10.3
EXT LYMPH%: 26.6
EXT MONOCYTES%: 6.9
EXT PLATELETS: 78
EXT POTASSIUM: 5.6
EXT PROTEIN TOTAL: 6.7
EXT SEGS%: 64
EXT SODIUM: 138 MMOL/L
EXT TACROLIMUS LVL: 7.3
EXT WBC: 2.7

## 2024-01-29 ENCOUNTER — TELEPHONE (OUTPATIENT)
Dept: SURGERY | Facility: CLINIC | Age: 49
End: 2024-01-29
Payer: COMMERCIAL

## 2024-01-30 ENCOUNTER — TELEPHONE (OUTPATIENT)
Dept: TRANSPLANT | Facility: CLINIC | Age: 49
End: 2024-01-30
Payer: COMMERCIAL

## 2024-01-30 ENCOUNTER — PATIENT MESSAGE (OUTPATIENT)
Dept: TRANSPLANT | Facility: CLINIC | Age: 49
End: 2024-01-30
Payer: COMMERCIAL

## 2024-01-30 NOTE — TELEPHONE ENCOUNTER
----- Message from Yaa Jackson MD sent at 1/30/2024  4:09 PM CST -----  Liver enzymes are normal. Tac at goal. Creatinine similar to prior checks. Please follow hyperK protocol. No other recommendations at this time. Thank you!

## 2024-02-05 ENCOUNTER — TELEPHONE (OUTPATIENT)
Dept: TRANSPLANT | Facility: CLINIC | Age: 49
End: 2024-02-05
Payer: COMMERCIAL

## 2024-02-05 ENCOUNTER — HOSPITAL ENCOUNTER (INPATIENT)
Facility: HOSPITAL | Age: 49
LOS: 4 days | Discharge: HOME | DRG: 392 | End: 2024-02-09
Attending: TRANSPLANT SURGERY | Admitting: TRANSPLANT SURGERY
Payer: COMMERCIAL

## 2024-02-05 DIAGNOSIS — D61.818 PANCYTOPENIA: ICD-10-CM

## 2024-02-05 DIAGNOSIS — D69.6 THROMBOCYTOPENIA, UNSPECIFIED: ICD-10-CM

## 2024-02-05 DIAGNOSIS — Z91.89 AT RISK FOR OPPORTUNISTIC INFECTIONS: ICD-10-CM

## 2024-02-05 DIAGNOSIS — E87.20 ACIDOSIS: ICD-10-CM

## 2024-02-05 DIAGNOSIS — Z29.89 PROPHYLACTIC IMMUNOTHERAPY: ICD-10-CM

## 2024-02-05 DIAGNOSIS — R19.7 DIARRHEA, UNSPECIFIED TYPE: ICD-10-CM

## 2024-02-05 DIAGNOSIS — Z79.60 LONG-TERM USE OF IMMUNOSUPPRESSANT MEDICATION: ICD-10-CM

## 2024-02-05 DIAGNOSIS — E66.01 CLASS 2 SEVERE OBESITY DUE TO EXCESS CALORIES WITH SERIOUS COMORBIDITY AND BODY MASS INDEX (BMI) OF 38.0 TO 38.9 IN ADULT: ICD-10-CM

## 2024-02-05 DIAGNOSIS — B33.8 RSV (RESPIRATORY SYNCYTIAL VIRUS INFECTION): Primary | ICD-10-CM

## 2024-02-05 DIAGNOSIS — D86.9 SARCOIDOSIS: ICD-10-CM

## 2024-02-05 DIAGNOSIS — Z94.4 LIVER TRANSPLANTED: ICD-10-CM

## 2024-02-05 DIAGNOSIS — D63.8 ANEMIA OF CHRONIC DISEASE: ICD-10-CM

## 2024-02-05 DIAGNOSIS — E11.65 TYPE 2 DIABETES MELLITUS WITH HYPERGLYCEMIA, UNSPECIFIED WHETHER LONG TERM INSULIN USE: ICD-10-CM

## 2024-02-05 DIAGNOSIS — K21.9 GERD WITHOUT ESOPHAGITIS: ICD-10-CM

## 2024-02-05 DIAGNOSIS — D70.2 OTHER DRUG-INDUCED NEUTROPENIA: ICD-10-CM

## 2024-02-05 DIAGNOSIS — F41.9 ANXIETY: ICD-10-CM

## 2024-02-05 DIAGNOSIS — Z94.4 S/P LIVER TRANSPLANT: ICD-10-CM

## 2024-02-05 DIAGNOSIS — A41.9 SEPSIS, DUE TO UNSPECIFIED ORGANISM, UNSPECIFIED WHETHER ACUTE ORGAN DYSFUNCTION PRESENT: ICD-10-CM

## 2024-02-05 DIAGNOSIS — A41.9 SEPSIS: ICD-10-CM

## 2024-02-05 DIAGNOSIS — I50.22 CHRONIC SYSTOLIC HEART FAILURE: ICD-10-CM

## 2024-02-05 LAB
EXT ALBUMIN: 5.2
EXT ALKALINE PHOSPHATASE: 45
EXT ALT: 11
EXT AST: 14
EXT BASOPHIL%: 1
EXT BILIRUBIN DIRECT: 1.3 MG/DL
EXT BILIRUBIN TOTAL: 3.6
EXT BUN: 51
EXT CALCIUM: 10
EXT CHLORIDE: 108
EXT CO2: 169
EXT CREATININE: 3.07 MG/DL
EXT EOSINOPHIL%: 1.6
EXT GFR MDRD NON AF AMER: 24
EXT GLUCOSE: 215
EXT HEMATOCRIT: 30.3
EXT HEMOGLOBIN: 9.9
EXT LYMPH%: 29.3
EXT MONOCYTES%: 7.2
EXT PLATELETS: 87
EXT POTASSIUM: 5.8
EXT PROTEIN TOTAL: 7.2
EXT SEGS%: 60.2
EXT SODIUM: 136 MMOL/L
EXT TACROLIMUS LVL: ABNORMAL
EXT WBC: 3.1
SARS-COV-2 RDRP RESP QL NAA+PROBE: NEGATIVE

## 2024-02-05 PROCEDURE — 20600001 HC STEP DOWN PRIVATE ROOM

## 2024-02-05 PROCEDURE — U0002 COVID-19 LAB TEST NON-CDC: HCPCS | Performed by: TRANSPLANT SURGERY

## 2024-02-05 RX ORDER — ONDANSETRON 8 MG/1
8 TABLET, ORALLY DISINTEGRATING ORAL EVERY 6 HOURS PRN
Status: DISCONTINUED | OUTPATIENT
Start: 2024-02-06 | End: 2024-02-09 | Stop reason: HOSPADM

## 2024-02-05 RX ORDER — ESCITALOPRAM OXALATE 10 MG/1
20 TABLET ORAL DAILY
Status: DISCONTINUED | OUTPATIENT
Start: 2024-02-06 | End: 2024-02-09 | Stop reason: HOSPADM

## 2024-02-05 RX ORDER — INSULIN ASPART 100 [IU]/ML
0-10 INJECTION, SOLUTION INTRAVENOUS; SUBCUTANEOUS
Status: DISCONTINUED | OUTPATIENT
Start: 2024-02-06 | End: 2024-02-09 | Stop reason: HOSPADM

## 2024-02-05 RX ORDER — TALC
6 POWDER (GRAM) TOPICAL NIGHTLY PRN
Status: DISCONTINUED | OUTPATIENT
Start: 2024-02-06 | End: 2024-02-09 | Stop reason: HOSPADM

## 2024-02-05 RX ORDER — SODIUM CHLORIDE 0.9 % (FLUSH) 0.9 %
10 SYRINGE (ML) INJECTION
Status: DISCONTINUED | OUTPATIENT
Start: 2024-02-06 | End: 2024-02-09 | Stop reason: HOSPADM

## 2024-02-05 RX ORDER — PANTOPRAZOLE SODIUM 40 MG/1
40 TABLET, DELAYED RELEASE ORAL DAILY
Status: DISCONTINUED | OUTPATIENT
Start: 2024-02-06 | End: 2024-02-09 | Stop reason: HOSPADM

## 2024-02-05 RX ORDER — INSULIN ASPART 100 [IU]/ML
24 INJECTION, SOLUTION INTRAVENOUS; SUBCUTANEOUS
Status: DISCONTINUED | OUTPATIENT
Start: 2024-02-06 | End: 2024-02-06

## 2024-02-05 RX ORDER — TACROLIMUS 5 MG/1
5 CAPSULE ORAL 2 TIMES DAILY
Status: DISCONTINUED | OUTPATIENT
Start: 2024-02-06 | End: 2024-02-06

## 2024-02-05 RX ORDER — HYDROXYCHLOROQUINE SULFATE 200 MG/1
200 TABLET, FILM COATED ORAL 2 TIMES DAILY
Status: DISCONTINUED | OUTPATIENT
Start: 2024-02-06 | End: 2024-02-09 | Stop reason: HOSPADM

## 2024-02-05 RX ORDER — IBUPROFEN 200 MG
16 TABLET ORAL
Status: DISCONTINUED | OUTPATIENT
Start: 2024-02-06 | End: 2024-02-09 | Stop reason: HOSPADM

## 2024-02-05 RX ORDER — PROCHLORPERAZINE EDISYLATE 5 MG/ML
5 INJECTION INTRAMUSCULAR; INTRAVENOUS EVERY 6 HOURS PRN
Status: DISCONTINUED | OUTPATIENT
Start: 2024-02-06 | End: 2024-02-09 | Stop reason: HOSPADM

## 2024-02-05 RX ORDER — ACETAMINOPHEN 325 MG/1
650 TABLET ORAL EVERY 4 HOURS PRN
Status: DISCONTINUED | OUTPATIENT
Start: 2024-02-06 | End: 2024-02-09 | Stop reason: HOSPADM

## 2024-02-05 RX ORDER — GLUCAGON 1 MG
1 KIT INJECTION
Status: DISCONTINUED | OUTPATIENT
Start: 2024-02-06 | End: 2024-02-09 | Stop reason: HOSPADM

## 2024-02-05 RX ORDER — HEPARIN SODIUM 5000 [USP'U]/ML
5000 INJECTION, SOLUTION INTRAVENOUS; SUBCUTANEOUS EVERY 8 HOURS
Status: DISCONTINUED | OUTPATIENT
Start: 2024-02-06 | End: 2024-02-09 | Stop reason: HOSPADM

## 2024-02-05 RX ORDER — SODIUM BICARBONATE 650 MG/1
1300 TABLET ORAL 3 TIMES DAILY
Status: DISCONTINUED | OUTPATIENT
Start: 2024-02-06 | End: 2024-02-09 | Stop reason: HOSPADM

## 2024-02-05 RX ORDER — IBUPROFEN 200 MG
24 TABLET ORAL
Status: DISCONTINUED | OUTPATIENT
Start: 2024-02-06 | End: 2024-02-09 | Stop reason: HOSPADM

## 2024-02-05 NOTE — TELEPHONE ENCOUNTER
Received labs and called patient to go to ER.  Potassium 5.8, cr 3.07.  liver function is normal.  Patient is also dropping off a stool sample for possible c diff.  He has had diarrhea for 2 weeks now.  Patient states he will go to ER.

## 2024-02-06 PROBLEM — A41.9 SEPSIS: Status: ACTIVE | Noted: 2024-02-06

## 2024-02-06 PROBLEM — D69.6 THROMBOCYTOPENIA, UNSPECIFIED: Status: RESOLVED | Noted: 2023-01-09 | Resolved: 2024-02-06

## 2024-02-06 PROBLEM — I85.10 SECONDARY ESOPHAGEAL VARICES WITHOUT BLEEDING: Chronic | Status: RESOLVED | Noted: 2022-11-17 | Resolved: 2024-02-06

## 2024-02-06 PROBLEM — I10 HYPERTENSION, ESSENTIAL, BENIGN: Status: RESOLVED | Noted: 2018-05-24 | Resolved: 2024-02-06

## 2024-02-06 PROBLEM — F41.9 ANXIETY: Status: ACTIVE | Noted: 2024-02-06

## 2024-02-06 PROBLEM — E44.1 MILD MALNUTRITION: Status: RESOLVED | Noted: 2023-01-09 | Resolved: 2024-02-06

## 2024-02-06 PROBLEM — S82.832A CLOSED FRACTURE OF PROXIMAL END OF LEFT FIBULA: Status: RESOLVED | Noted: 2023-07-27 | Resolved: 2024-02-06

## 2024-02-06 PROBLEM — K21.9 GERD WITHOUT ESOPHAGITIS: Status: ACTIVE | Noted: 2024-02-06

## 2024-02-06 PROBLEM — E87.20 ACIDOSIS: Status: ACTIVE | Noted: 2024-02-06

## 2024-02-06 PROBLEM — D62 ACUTE BLOOD LOSS ANEMIA: Status: RESOLVED | Noted: 2023-01-09 | Resolved: 2024-02-06

## 2024-02-06 PROBLEM — E87.5 HYPERKALEMIA: Status: RESOLVED | Noted: 2023-07-28 | Resolved: 2024-02-06

## 2024-02-06 PROBLEM — T86.41 ACUTE REJECTION OF LIVER TRANSPLANT: Status: RESOLVED | Noted: 2023-05-19 | Resolved: 2024-02-06

## 2024-02-06 PROBLEM — M10.9 GOUT: Status: RESOLVED | Noted: 2018-12-27 | Resolved: 2024-02-06

## 2024-02-06 PROBLEM — D86.9 HYPERCALCEMIA DUE TO SARCOIDOSIS: Status: RESOLVED | Noted: 2018-08-09 | Resolved: 2024-02-06

## 2024-02-06 PROBLEM — R73.9 STEROID-INDUCED HYPERGLYCEMIA: Status: RESOLVED | Noted: 2023-01-06 | Resolved: 2024-02-06

## 2024-02-06 PROBLEM — E83.52 HYPERCALCEMIA DUE TO SARCOIDOSIS: Status: RESOLVED | Noted: 2018-08-09 | Resolved: 2024-02-06

## 2024-02-06 PROBLEM — U07.1 COVID: Status: RESOLVED | Noted: 2023-01-12 | Resolved: 2024-02-06

## 2024-02-06 PROBLEM — Z99.11 ENCOUNTER FOR WEANING FROM VENTILATOR: Status: RESOLVED | Noted: 2023-01-28 | Resolved: 2024-02-06

## 2024-02-06 PROBLEM — T38.0X5A STEROID-INDUCED HYPERGLYCEMIA: Status: RESOLVED | Noted: 2023-01-06 | Resolved: 2024-02-06

## 2024-02-06 PROBLEM — D86.9 SARCOIDOSIS: Status: ACTIVE | Noted: 2022-11-17

## 2024-02-06 LAB
ALBUMIN SERPL BCP-MCNC: 4 G/DL (ref 3.5–5.2)
ALBUMIN SERPL BCP-MCNC: 4.1 G/DL (ref 3.5–5.2)
ALP SERPL-CCNC: 37 U/L (ref 55–135)
ALP SERPL-CCNC: 38 U/L (ref 55–135)
ALT SERPL W/O P-5'-P-CCNC: 8 U/L (ref 10–44)
ALT SERPL W/O P-5'-P-CCNC: 9 U/L (ref 10–44)
ANION GAP SERPL CALC-SCNC: 9 MMOL/L (ref 8–16)
ANION GAP SERPL CALC-SCNC: 9 MMOL/L (ref 8–16)
AST SERPL-CCNC: 11 U/L (ref 10–40)
AST SERPL-CCNC: 12 U/L (ref 10–40)
BASOPHILS # BLD AUTO: 0.01 K/UL (ref 0–0.2)
BASOPHILS # BLD AUTO: 0.02 K/UL (ref 0–0.2)
BASOPHILS NFR BLD: 0.4 % (ref 0–1.9)
BASOPHILS NFR BLD: 0.7 % (ref 0–1.9)
BILIRUB SERPL-MCNC: 3 MG/DL (ref 0.1–1)
BILIRUB SERPL-MCNC: 3.3 MG/DL (ref 0.1–1)
BUN SERPL-MCNC: 43 MG/DL (ref 6–20)
BUN SERPL-MCNC: 45 MG/DL (ref 6–20)
C DIFF GDH STL QL: NEGATIVE
C DIFF TOX A+B STL QL IA: NEGATIVE
CALCIUM SERPL-MCNC: 9.3 MG/DL (ref 8.7–10.5)
CALCIUM SERPL-MCNC: 9.5 MG/DL (ref 8.7–10.5)
CHLORIDE SERPL-SCNC: 112 MMOL/L (ref 95–110)
CHLORIDE SERPL-SCNC: 113 MMOL/L (ref 95–110)
CMV DNA SPEC QL NAA+PROBE: NORMAL
CO2 SERPL-SCNC: 13 MMOL/L (ref 23–29)
CO2 SERPL-SCNC: 14 MMOL/L (ref 23–29)
CREAT SERPL-MCNC: 2.7 MG/DL (ref 0.5–1.4)
CREAT SERPL-MCNC: 2.9 MG/DL (ref 0.5–1.4)
CYTOMEGALOVIRUS PCR, QUANT: NOT DETECTED IU/ML
DIFFERENTIAL METHOD BLD: ABNORMAL
DIFFERENTIAL METHOD BLD: ABNORMAL
EOSINOPHIL # BLD AUTO: 0 K/UL (ref 0–0.5)
EOSINOPHIL # BLD AUTO: 0.1 K/UL (ref 0–0.5)
EOSINOPHIL NFR BLD: 1.8 % (ref 0–8)
EOSINOPHIL NFR BLD: 2 % (ref 0–8)
ERYTHROCYTE [DISTWIDTH] IN BLOOD BY AUTOMATED COUNT: 19 % (ref 11.5–14.5)
ERYTHROCYTE [DISTWIDTH] IN BLOOD BY AUTOMATED COUNT: 19.1 % (ref 11.5–14.5)
EST. GFR  (NO RACE VARIABLE): 25.9 ML/MIN/1.73 M^2
EST. GFR  (NO RACE VARIABLE): 28.2 ML/MIN/1.73 M^2
ESTIMATED AVG GLUCOSE: 94 MG/DL (ref 68–131)
GLUCOSE SERPL-MCNC: 150 MG/DL (ref 70–110)
GLUCOSE SERPL-MCNC: 165 MG/DL (ref 70–110)
HBA1C MFR BLD: 4.9 % (ref 4–5.6)
HCT VFR BLD AUTO: 23.9 % (ref 40–54)
HCT VFR BLD AUTO: 27.3 % (ref 40–54)
HGB BLD-MCNC: 8.1 G/DL (ref 14–18)
HGB BLD-MCNC: 9.1 G/DL (ref 14–18)
IMM GRANULOCYTES # BLD AUTO: 0.01 K/UL (ref 0–0.04)
IMM GRANULOCYTES # BLD AUTO: 0.01 K/UL (ref 0–0.04)
IMM GRANULOCYTES NFR BLD AUTO: 0.3 % (ref 0–0.5)
IMM GRANULOCYTES NFR BLD AUTO: 0.4 % (ref 0–0.5)
LYMPHOCYTES # BLD AUTO: 0.7 K/UL (ref 1–4.8)
LYMPHOCYTES # BLD AUTO: 1.1 K/UL (ref 1–4.8)
LYMPHOCYTES NFR BLD: 29.3 % (ref 18–48)
LYMPHOCYTES NFR BLD: 37.5 % (ref 18–48)
MAGNESIUM SERPL-MCNC: 1.5 MG/DL (ref 1.6–2.6)
MAGNESIUM SERPL-MCNC: 1.5 MG/DL (ref 1.6–2.6)
MCH RBC QN AUTO: 29.4 PG (ref 27–31)
MCH RBC QN AUTO: 29.6 PG (ref 27–31)
MCHC RBC AUTO-ENTMCNC: 33.3 G/DL (ref 32–36)
MCHC RBC AUTO-ENTMCNC: 33.9 G/DL (ref 32–36)
MCV RBC AUTO: 87 FL (ref 82–98)
MCV RBC AUTO: 88 FL (ref 82–98)
MONOCYTES # BLD AUTO: 0.2 K/UL (ref 0.3–1)
MONOCYTES # BLD AUTO: 0.2 K/UL (ref 0.3–1)
MONOCYTES NFR BLD: 8.2 % (ref 4–15)
MONOCYTES NFR BLD: 9.3 % (ref 4–15)
NEUTROPHILS # BLD AUTO: 1.3 K/UL (ref 1.8–7.7)
NEUTROPHILS # BLD AUTO: 1.5 K/UL (ref 1.8–7.7)
NEUTROPHILS NFR BLD: 51.3 % (ref 38–73)
NEUTROPHILS NFR BLD: 58.8 % (ref 38–73)
NRBC BLD-RTO: 0 /100 WBC
NRBC BLD-RTO: 0 /100 WBC
PHOSPHATE SERPL-MCNC: 3.7 MG/DL (ref 2.7–4.5)
PLATELET # BLD AUTO: 71 K/UL (ref 150–450)
PLATELET # BLD AUTO: 76 K/UL (ref 150–450)
PMV BLD AUTO: 9.6 FL (ref 9.2–12.9)
PMV BLD AUTO: 9.8 FL (ref 9.2–12.9)
POCT GLUCOSE: 165 MG/DL (ref 70–110)
POCT GLUCOSE: 185 MG/DL (ref 70–110)
POCT GLUCOSE: 202 MG/DL (ref 70–110)
POCT GLUCOSE: 85 MG/DL (ref 70–110)
POTASSIUM SERPL-SCNC: 4.9 MMOL/L (ref 3.5–5.1)
POTASSIUM SERPL-SCNC: 5.1 MMOL/L (ref 3.5–5.1)
PROT SERPL-MCNC: 5.8 G/DL (ref 6–8.4)
PROT SERPL-MCNC: 6 G/DL (ref 6–8.4)
RBC # BLD AUTO: 2.74 M/UL (ref 4.6–6.2)
RBC # BLD AUTO: 3.1 M/UL (ref 4.6–6.2)
SODIUM SERPL-SCNC: 134 MMOL/L (ref 136–145)
SODIUM SERPL-SCNC: 136 MMOL/L (ref 136–145)
TACROLIMUS BLD-MCNC: 14 NG/ML (ref 5–15)
WBC # BLD AUTO: 2.25 K/UL (ref 3.9–12.7)
WBC # BLD AUTO: 2.93 K/UL (ref 3.9–12.7)
WBC #/AREA STL HPF: NORMAL /[HPF]

## 2024-02-06 PROCEDURE — 80053 COMPREHEN METABOLIC PANEL: CPT

## 2024-02-06 PROCEDURE — 89055 LEUKOCYTE ASSESSMENT FECAL: CPT

## 2024-02-06 PROCEDURE — 83036 HEMOGLOBIN GLYCOSYLATED A1C: CPT

## 2024-02-06 PROCEDURE — 85025 COMPLETE CBC W/AUTO DIFF WBC: CPT | Mod: 91

## 2024-02-06 PROCEDURE — 63600175 PHARM REV CODE 636 W HCPCS: Performed by: PHYSICIAN ASSISTANT

## 2024-02-06 PROCEDURE — 80053 COMPREHEN METABOLIC PANEL: CPT | Mod: 91

## 2024-02-06 PROCEDURE — 87040 BLOOD CULTURE FOR BACTERIA: CPT

## 2024-02-06 PROCEDURE — 25000003 PHARM REV CODE 250

## 2024-02-06 PROCEDURE — 99223 1ST HOSP IP/OBS HIGH 75: CPT | Mod: ,,,

## 2024-02-06 PROCEDURE — 87046 STOOL CULTR AEROBIC BACT EA: CPT

## 2024-02-06 PROCEDURE — 87329 GIARDIA AG IA: CPT

## 2024-02-06 PROCEDURE — 84100 ASSAY OF PHOSPHORUS: CPT

## 2024-02-06 PROCEDURE — 99222 1ST HOSP IP/OBS MODERATE 55: CPT | Mod: ,,, | Performed by: NURSE PRACTITIONER

## 2024-02-06 PROCEDURE — 83735 ASSAY OF MAGNESIUM: CPT

## 2024-02-06 PROCEDURE — 99233 SBSQ HOSP IP/OBS HIGH 50: CPT | Mod: ,,, | Performed by: PHYSICIAN ASSISTANT

## 2024-02-06 PROCEDURE — 87449 NOS EACH ORGANISM AG IA: CPT

## 2024-02-06 PROCEDURE — 87045 FECES CULTURE AEROBIC BACT: CPT

## 2024-02-06 PROCEDURE — 63600175 PHARM REV CODE 636 W HCPCS

## 2024-02-06 PROCEDURE — 87209 SMEAR COMPLEX STAIN: CPT

## 2024-02-06 PROCEDURE — 87427 SHIGA-LIKE TOXIN AG IA: CPT | Mod: 59

## 2024-02-06 PROCEDURE — 87425 ROTAVIRUS AG IA: CPT

## 2024-02-06 PROCEDURE — 83735 ASSAY OF MAGNESIUM: CPT | Mod: 91

## 2024-02-06 PROCEDURE — 36415 COLL VENOUS BLD VENIPUNCTURE: CPT

## 2024-02-06 PROCEDURE — 20600001 HC STEP DOWN PRIVATE ROOM

## 2024-02-06 PROCEDURE — 27000207 HC ISOLATION

## 2024-02-06 PROCEDURE — 87449 NOS EACH ORGANISM AG IA: CPT | Mod: 91

## 2024-02-06 PROCEDURE — 80197 ASSAY OF TACROLIMUS: CPT

## 2024-02-06 RX ORDER — MAGNESIUM SULFATE HEPTAHYDRATE 40 MG/ML
2 INJECTION, SOLUTION INTRAVENOUS ONCE
Status: COMPLETED | OUTPATIENT
Start: 2024-02-06 | End: 2024-02-06

## 2024-02-06 RX ORDER — INSULIN ASPART 100 [IU]/ML
26 INJECTION, SOLUTION INTRAVENOUS; SUBCUTANEOUS
Status: DISCONTINUED | OUTPATIENT
Start: 2024-02-06 | End: 2024-02-09

## 2024-02-06 RX ORDER — INSULIN ASPART 100 [IU]/ML
26 INJECTION, SOLUTION INTRAVENOUS; SUBCUTANEOUS
Status: DISCONTINUED | OUTPATIENT
Start: 2024-02-07 | End: 2024-02-06

## 2024-02-06 RX ADMIN — INSULIN DETEMIR 38 UNITS: 100 INJECTION, SOLUTION SUBCUTANEOUS at 09:02

## 2024-02-06 RX ADMIN — INSULIN ASPART 4 UNITS: 100 INJECTION, SOLUTION INTRAVENOUS; SUBCUTANEOUS at 09:02

## 2024-02-06 RX ADMIN — HYDROXYCHLOROQUINE SULFATE 200 MG: 200 TABLET, FILM COATED ORAL at 08:02

## 2024-02-06 RX ADMIN — HEPARIN SODIUM 5000 UNITS: 5000 INJECTION INTRAVENOUS; SUBCUTANEOUS at 05:02

## 2024-02-06 RX ADMIN — SODIUM BICARBONATE: 84 INJECTION, SOLUTION INTRAVENOUS at 09:02

## 2024-02-06 RX ADMIN — SODIUM BICARBONATE 650 MG TABLET 1300 MG: at 08:02

## 2024-02-06 RX ADMIN — MAGNESIUM SULFATE HEPTAHYDRATE 2 G: 40 INJECTION, SOLUTION INTRAVENOUS at 07:02

## 2024-02-06 RX ADMIN — HEPARIN SODIUM 5000 UNITS: 5000 INJECTION INTRAVENOUS; SUBCUTANEOUS at 03:02

## 2024-02-06 RX ADMIN — SODIUM BICARBONATE: 84 INJECTION, SOLUTION INTRAVENOUS at 02:02

## 2024-02-06 RX ADMIN — INSULIN ASPART 24 UNITS: 100 INJECTION, SOLUTION INTRAVENOUS; SUBCUTANEOUS at 12:02

## 2024-02-06 RX ADMIN — SODIUM BICARBONATE 650 MG TABLET 1300 MG: at 03:02

## 2024-02-06 RX ADMIN — SODIUM BICARBONATE: 84 INJECTION, SOLUTION INTRAVENOUS at 11:02

## 2024-02-06 RX ADMIN — ACETAMINOPHEN 650 MG: 325 TABLET ORAL at 08:02

## 2024-02-06 RX ADMIN — TACROLIMUS 5 MG: 5 CAPSULE ORAL at 07:02

## 2024-02-06 RX ADMIN — INSULIN ASPART 2 UNITS: 100 INJECTION, SOLUTION INTRAVENOUS; SUBCUTANEOUS at 05:02

## 2024-02-06 RX ADMIN — PANTOPRAZOLE SODIUM 40 MG: 40 TABLET, DELAYED RELEASE ORAL at 08:02

## 2024-02-06 RX ADMIN — HEPARIN SODIUM 5000 UNITS: 5000 INJECTION INTRAVENOUS; SUBCUTANEOUS at 08:02

## 2024-02-06 RX ADMIN — INSULIN ASPART 26 UNITS: 100 INJECTION, SOLUTION INTRAVENOUS; SUBCUTANEOUS at 05:02

## 2024-02-06 RX ADMIN — INSULIN ASPART 2 UNITS: 100 INJECTION, SOLUTION INTRAVENOUS; SUBCUTANEOUS at 12:02

## 2024-02-06 RX ADMIN — ESCITALOPRAM OXALATE 20 MG: 10 TABLET ORAL at 08:02

## 2024-02-06 RX ADMIN — INSULIN ASPART 24 UNITS: 100 INJECTION, SOLUTION INTRAVENOUS; SUBCUTANEOUS at 09:02

## 2024-02-06 RX ADMIN — ACETAMINOPHEN 650 MG: 325 TABLET ORAL at 03:02

## 2024-02-06 NOTE — HPI
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 Surgery with replaced R HA reconstruction. Did well post transplant, post op course:  Moderate ACR: bx 5/18. SMP x 3.   Moderate ACR: bx 7/24. SMP x 3.   Fall: 7/2023, proximal fracture of fibular shaft, no surgery required   Persistent mild hyperbilirubinemia likely secondary to Gilbert syndrome (per Dr. Orta's note 1/5/24)  Presents to [facility] as a transfer from OSH for increased weakness and diarrhea x 1 month. Endorses decreased appetite, 10 lb weight loss in 1 week, NVD, dyspnea on exertion, decreased UOP. Denies fever, chills, CP, dysuria. Routine labs also noted to be abnormal, coordinator directed patient to go to ED for further investigation. In ED, labs notable for BARBARA (Cr 3.1 from BL 1.98-2.0), hyperkalemia (K 5.8), acidosis (bicarb 16). On arrival to TSU, he is resting comfortably, no TTP of abdomen, no edema noted. Plan for IVF, stool studies, CMV PCR, BC, UA. MMF held for diarrhea.

## 2024-02-06 NOTE — HOSPITAL COURSE
Transferred from OSH ED ([facility] in [place]) on 2/5 for n/v/d. Labs notable for BARBARA and acidosis. Hydrated with bicarb gtt. Infectious workup sent. Bicarb gtt discontinued 2/7; encouraged oral hydration. Ua neg for infection. Blood cx 2/7 NGTD. CMV PCR neg. C. Diff neg. WBC stool no neutrophils seen. Rotavirus negative. Giardia/Crypto negative. E. Coli antigen negative. Stool culture pending. EBV pending. Parvovirous pending.    Wbc, h/h, and plt decreased. In reviewing symptoms of fatigue, GALLEGOS, n/v/d, cytopenias, (pt reports sx slowly started after steroid taper ended), thought could be component of sarcoidosis flare. D/w Rheum who reviewed chart and stated less likely. Did add G6PD (pending), vit D/calcitriol (pending), lysoyme (pending), and urine calcium (wnl) to AM labs as recommended by Rhem in case team circles back to rheum later. Patient reports following with Dr. Dempsey in [place], [place] for sarcoidosis.    Initial anemia workup notable for elevated LDH, retic, and low hapto which could be consistent with hemolysis. Iron panel reviewed (iron/ferritin wnl). Heme consulted as recommended by Rheum. Heme ordered additional labs. JOSE negative. Peripheral blood smear pending heme review.    Interval History: Renal function continues to improve with hydration and LFTs remain stable. Patient no longer having n/v/d, but continues to report fatigue and lightheadedness when walking. H/H 7/20. Will transfuse 1 unit of pRBC for symptomatic anemia. No overt s/s bleeding. WBC also decreasing. . Hold off on Neupogen today per heme. Cellcept remains on hold. Plan to start 5 mg Prednisone daily while off Cellcept waiting for wbc to improve. Of note, respiratory viral panel sent this AM 2/8 positive for RSV; likely cause of GALLEGOS/fatigue and could be contributing to cytopenias per heme. Treatment is supportive. If patient feeling well tomorrow and labs are stable/improved, may discharge patient home with close  follow up (his hepatologist is Dr. Jackson). WCTM.

## 2024-02-06 NOTE — HPI
Reason for Consult: Management of T2DM, Hyperglycemia      Surgical Procedure and Date: Liver transplant 1/5/2023     Diabetes diagnosis year: Over 5 years ago     Home Diabetes Medications:  Tresiba 45 units QD, Humalog 25 units TIDWM +SSI MDC (180/25), Jardiance 25 mg, Ozempic 2 mg     How often checking glucose at home? >4 x day   BG readings on regimen: 100-180  Hypoglycemia on the regimen?  No  Missed doses on regimen?  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 Surgery with replaced R HA reconstruction. Endocrine consulted to manage hyperglycemia and type 2 diabetes.     Lab Results   Component Value Date    HGBA1C 4.9 02/06/2024

## 2024-02-06 NOTE — ASSESSMENT & PLAN NOTE
- Stool studies pending   - MMF held   - IV hydration (bicarb gtt)  - CMV pending   - UA neg for infection  - Blood cx NGTD

## 2024-02-06 NOTE — SUBJECTIVE & OBJECTIVE
Past Medical History:   Diagnosis Date    C. difficile diarrhea     Cirrhosis of liver with ascites 11/17/2022    Diabetes mellitus, type II, insulin dependent     Granulomatous colitis     Hepatic granuloma associated with sarcoidosis     Hepatic granuloma associated with sarcoidosis 11/17/2022    Hepatic granuloma associated with sarcoidosis 11/17/2022    Personal history of colonic polyps     Pre-op evaluation 1/4/2023    Sarcoidosis, unspecified     Thrombocytopenia, unspecified     Unspecified cirrhosis of liver        Past Surgical History:   Procedure Laterality Date    ADENOIDECTOMY      BIOPSY OF LIVER WITH ULTRASOUND GUIDANCE N/A 7/24/2023    Procedure: BIOPSY, LIVER, WITH US GUIDANCE;  Surgeon: Cecilio Hyman MD;  Location: [facility] CATH LAB;  Service: Radiology;  Laterality: N/A;    CARDIAC CATHETERIZATION Right     COLONOSCOPY  07/11/2018    HIP SURGERY      RIght removed ruptured cyst    LIVER TRANSPLANT N/A 1/5/2023    Procedure: TRANSPLANT, LIVER;  Surgeon: Rosa Foreman MD;  Location: [facility] OR [address] [place] [address];  Service: Transplant;  Laterality: N/A;    NEEDLE LOCALIZATION N/A 7/24/2023    Procedure: NEEDLE LOCALIZATION;  Surgeon: Cecilio Hyman MD;  Location: [facility] CATH LAB;  Service: Radiology;  Laterality: N/A;    UPPER GASTROINTESTINAL ENDOSCOPY  10/09/2019       Review of patient's allergies indicates:   Allergen Reactions    Adhesive Other (See Comments)     Skin irritation  Paper tape okay to use    Pcn [penicillins] Rash     Happened as a baby       Family History       Problem Relation (Age of Onset)    Cancer Father, Maternal Grandfather    Diabetes Maternal Grandmother, Maternal Grandfather, Paternal Grandmother    Heart disease Father    Hypertension Mother, Father    Rectal cancer Father    Thyroid disease Father          Tobacco Use    Smoking status: Never    Smokeless tobacco: Never   Substance and Sexual Activity    Alcohol use: Never    Drug use: Never    Sexual activity: Not on file  "      PTA Medications   Medication Sig    aspirin (ECOTRIN) 81 MG EC tablet Take 1 tablet (81 mg total) by mouth once daily.    blood sugar diagnostic Strp 1 strip by Misc.(Non-Drug; Combo Route) route 4 (four) times daily.    blood-glucose meter Misc Use as instructed    calcium carbonate (OS-ORTIZ) 500 mg calcium (1,250 mg) tablet Take 1 tablet (500 mg total) by mouth once daily.    DOCOSAHEXAENOIC ACID ORAL Take 2 g by mouth once daily.    empagliflozin (JARDIANCE) 25 mg tablet Take 1 tablet (25 mg total) by mouth once daily.    EScitalopram oxalate (LEXAPRO) 20 MG tablet Take 1 tablet (20 mg total) by mouth once daily.    fluoride, sodium, (PREVIDENT 5000) 1.1 % Pste USE TWICE DAILY IN REPLACEMENT OF NON-PRESCRIBED TOOTHPASTE    FREESTYLE LUZMA 14 DAY SENSOR Kit Apply topically.    HYDROcodone-acetaminophen (NORCO) 5-325 mg per tablet Take 1 tablet by mouth every 8 (eight) hours.    hydrOXYchloroQUINE (PLAQUENIL) 200 mg tablet Take 200 mg by mouth 2 (two) times daily.    iron-vitamin C 100-250 mg, ICAR-C, 100-250 mg Tab Take 1 tablet by mouth every morning.    lancets 33 gauge Misc by Misc.(Non-Drug; Combo Route) route 4 (four) times daily.    mycophenolate (CELLCEPT) 250 mg Cap Take 2 capsules (500 mg total) by mouth 2 (two) times daily.    pantoprazole (PROTONIX) 40 MG tablet Take 1 tablet (40 mg total) by mouth once daily.    pen needle, diabetic 32 gauge x 5/32" Ndle 1 Dose by Misc.(Non-Drug; Combo Route) route 3 (three) times daily.    semaglutide (OZEMPIC) 2 mg/dose (8 mg/3 mL) PnIj Inject 2 mg into the skin every 7 days.    sodium bicarbonate 650 MG tablet Take 2 tablets (1,300 mg total) by mouth 3 (three) times daily.    tacrolimus (PROGRAF) 1 MG Cap TAKE 5 CAPSULES EVERY 12 HOURS    vitamin D (VITAMIN D3) 1000 units Tab Take 1,000 Units by mouth once daily.    ARIPiprazole (ABILIFY) 2 MG Tab Take 1 tablet (2 mg total) by mouth once daily.    insulin aspart U-100 (NOVOLOG) 100 unit/mL (3 mL) InPn pen " Inject into the skin.    insulin degludec (TRESIBA FLEXTOUCH U-100) 100 unit/mL (3 mL) insulin pen Inject 50 Units into the skin once daily.    insulin lispro (HUMALOG KWIKPEN INSULIN) 100 unit/mL pen Inject 32 Units into the skin 3 (three) times daily before meals. Plus sliding scale insulin: 150 - 200 = +2 units; 201 - 250 = +4 units; 251 - 300 = +6 units; 301 - 350 = +8 units; >350 = +10 units. Total daily dose: 126 units    traZODone (DESYREL) 150 MG tablet Take 1 tablet (150 mg total) by mouth every evening.       Review of Systems   Constitutional:  Positive for activity change, appetite change, fatigue and unexpected weight change (~10 lbs in 1 week). Negative for fever.   HENT:  Negative for congestion, sore throat and trouble swallowing.    Respiratory:  Positive for shortness of breath (with exertion). Negative for cough.    Cardiovascular:  Negative for chest pain and leg swelling.   Gastrointestinal:  Positive for diarrhea, nausea and vomiting. Negative for abdominal distention and abdominal pain.   Genitourinary:  Positive for decreased urine volume. Negative for difficulty urinating and dysuria.   Skin:  Negative for wound.   Allergic/Immunologic: Positive for immunocompromised state.   Neurological:  Positive for weakness.   Psychiatric/Behavioral:  Negative for confusion.      Objective:     Vital Signs (Most Recent):  Temp: 98.6 °F (37 °C) (02/05/24 2300)  Pulse: 95 (02/05/24 2300)  Resp: 18 (02/05/24 2300)  BP: (!) 168/87 (02/05/24 2300)  SpO2: 100 % (02/05/24 2300) Vital Signs (24h Range):  Temp:  [97.7 °F (36.5 °C)-98.6 °F (37 °C)] 98.6 °F (37 °C)  Pulse:  [92-95] 95  Resp:  [16-18] 18  SpO2:  [100 %] 100 %  BP: (130-168)/(71-87) 168/87     Weight: 112.1 kg (247 lb 0.4 oz)  Body mass index is 37.56 kg/m².    No intake or output data in the 24 hours ending 02/06/24 0117     Physical Exam  Vitals and nursing note reviewed.   Constitutional:       General: He is not in acute distress.      Appearance: He is not ill-appearing.   HENT:      Head: Normocephalic.      Mouth/Throat:      Mouth: Mucous membranes are moist.   Eyes:      Extraocular Movements: Extraocular movements intact.      Conjunctiva/sclera: Conjunctivae normal.   Cardiovascular:      Rate and Rhythm: Normal rate and regular rhythm.      Pulses: Normal pulses.      Heart sounds: Normal heart sounds. No murmur heard.  Pulmonary:      Effort: Pulmonary effort is normal. No respiratory distress.      Breath sounds: Normal breath sounds. No wheezing or rales.   Abdominal:      General: Bowel sounds are normal. There is no distension.      Palpations: Abdomen is soft.      Tenderness: There is no abdominal tenderness. There is no guarding.      Hernia: No hernia is present.   Musculoskeletal:         General: No swelling. Normal range of motion.      Cervical back: Normal range of motion.   Skin:     General: Skin is warm and dry.   Neurological:      Mental Status: He is alert and oriented to person, place, and time.   Psychiatric:         Mood and Affect: Mood normal.         Behavior: Behavior normal.         Thought Content: Thought content normal.         Judgment: Judgment normal.          Laboratory:  Labs within the past 24 hours have been reviewed in Care Everywhere.    Diagnostic Results:  I have personally reviewed all pertinent imaging studies.

## 2024-02-06 NOTE — PLAN OF CARE
Patient admitted overnight for nausea/vomiting/diarrhea. Creatinine 2.9 on admit - trending down to 2.7 this AM. Bicarb gtt infusing at 100cc/hr - CO2 13 on AM labs. Reports diarrhea is improving - stool studies in process. Magnesium 1.5 - replaced with 2gm IV. Endocrine consulted for BG management. Patient up ad lauren in room.

## 2024-02-06 NOTE — SUBJECTIVE & OBJECTIVE
Interval HPI:   Overnight events: No acute events overnight. Patient in room 33663/90959 A. Blood glucose stable. BG at and above goal on current insulin regimen (SSI, prandial, and basal insulin ). Steroid use- None.    Renal function- Abnormal - Creatinine 2.7   Vasopressors-  None       Endocrine will continue to follow and manage insulin orders inpatient.       Diet diabetic  Calorie     Eatin%  Nausea: No  Hypoglycemia and intervention: No  Fever: No  TPN and/or TF: No    PMH, PSH, FH, SH updated and reviewed     ROS:  Review of Systems   Constitutional:  Negative for unexpected weight change.   Eyes:  Negative for visual disturbance.   Respiratory:  Negative for cough.    Cardiovascular:  Negative for chest pain.   Gastrointestinal:  Negative for nausea and vomiting.   Endocrine: Negative for polydipsia and polyuria.   Musculoskeletal:  Negative for back pain.   Skin:  Negative for rash.   Neurological:  Negative for syncope.   Psychiatric/Behavioral:  Negative for agitation and dysphoric mood.        Current Medications and/or Treatments Impacting Glycemic Control  Immunotherapy:    Immunosuppressants           Stop Route Frequency     tacrolimus capsule 5 mg         -- Oral 2 times daily          Steroids:   Hormones (From admission, onward)      Start     Stop Route Frequency Ordered    24 0039  melatonin tablet 6 mg         -- Oral Nightly PRN 24 2343          Pressors:    Autonomic Drugs (From admission, onward)      None          Hyperglycemia/Diabetes Medications:   Antihyperglycemics (From admission, onward)      Start     Stop Route Frequency Ordered    24 0900  insulin detemir U-100 (Levemir) pen 38 Units         -- SubQ Daily 24 2343    24 0645  insulin aspart U-100 pen 24 Units         -- SubQ 3 times daily before meals 24 2343    24 0040  insulin aspart U-100 pen 0-10 Units         -- SubQ Before meals & nightly PRN 24 2343              PHYSICAL EXAMINATION:  Vitals:    02/06/24 1547   BP: 105/61   Pulse: 81   Resp: 18   Temp: 97.9 °F (36.6 °C)     Body mass index is 37.56 kg/m².     Physical Exam  Constitutional:       General: He is not in acute distress.     Appearance: Normal appearance. He is not ill-appearing.   HENT:      Head: Normocephalic and atraumatic.      Right Ear: External ear normal.      Left Ear: External ear normal.      Nose: Nose normal.   Cardiovascular:      Rate and Rhythm: Normal rate and regular rhythm.      Heart sounds: No murmur heard.  Pulmonary:      Effort: Pulmonary effort is normal. No respiratory distress.   Abdominal:      General: Bowel sounds are normal. There is no distension.      Tenderness: There is no abdominal tenderness.   Musculoskeletal:         General: No swelling.      Right lower leg: No edema.      Left lower leg: No edema.   Skin:     Findings: No erythema.   Neurological:      General: No focal deficit present.      Mental Status: He is alert and oriented to person, place, and time.   Psychiatric:         Mood and Affect: Mood normal.         Behavior: Behavior normal.

## 2024-02-06 NOTE — ASSESSMENT & PLAN NOTE
- Continue Prograf. Monitor trough daily and adjust dose as needed to achieve therapeutic level.  - Cellcept held for infection/GI issues.

## 2024-02-06 NOTE — ASSESSMENT & PLAN NOTE
- AST/ALT WNL   - mild hyperbilirubinemia, poss Gilbert's from allograft   - cont to monitor with CMP

## 2024-02-06 NOTE — SUBJECTIVE & OBJECTIVE
Scheduled Meds:   EScitalopram oxalate  20 mg Oral Daily    heparin (porcine)  5,000 Units Subcutaneous Q8H    hydroxychloroquine  200 mg Oral BID    insulin aspart U-100  24 Units Subcutaneous TID AC    insulin detemir U-100 (Levemir)  38 Units Subcutaneous Daily    pantoprazole  40 mg Oral Daily    sodium bicarbonate  1,300 mg Oral TID    tacrolimus  5 mg Oral BID     Continuous Infusions:   sodium bicarbonate 150 mEq in dextrose 5 % (D5W) 1,000 mL infusion 100 mL/hr at 02/06/24 1153     PRN Meds:acetaminophen, dextrose 10%, dextrose 10%, glucagon (human recombinant), glucose, glucose, insulin aspart U-100, melatonin, ondansetron, prochlorperazine, sodium chloride 0.9%    Review of Systems   Constitutional:  Positive for activity change, appetite change, diaphoresis, fatigue and unexpected weight change (~10 lbs in 1 week). Negative for fever.   HENT:  Negative for congestion, sore throat and trouble swallowing.    Respiratory:  Positive for shortness of breath (with exertion). Negative for cough, wheezing and stridor.    Cardiovascular:  Negative for chest pain and leg swelling.   Gastrointestinal:  Positive for diarrhea, nausea and vomiting. Negative for abdominal distention and abdominal pain.   Genitourinary:  Positive for decreased urine volume. Negative for difficulty urinating and dysuria.   Skin:  Negative for wound.   Allergic/Immunologic: Positive for immunocompromised state.   Neurological:  Positive for weakness.   Psychiatric/Behavioral:  Negative for confusion.      Objective:     Vital Signs (Most Recent):  Temp: 98.2 °F (36.8 °C) (02/06/24 1109)  Pulse: 83 (02/06/24 1109)  Resp: 18 (02/06/24 1109)  BP: 116/68 (02/06/24 1109)  SpO2: 96 % (02/06/24 1109) Vital Signs (24h Range):  Temp:  [97.7 °F (36.5 °C)-98.6 °F (37 °C)] 98.2 °F (36.8 °C)  Pulse:  [83-95] 83  Resp:  [16-18] 18  SpO2:  [95 %-100 %] 96 %  BP: (116-168)/(68-87) 116/68     Weight: 112.1 kg (247 lb 0.4 oz)  Body mass index is 37.56  kg/m².    Intake/Output - Last 3 Shifts         02/04 0700  02/05 0659 02/05 0700  02/06 0659 02/06 0700  02/07 0659    I.V. (mL/kg)  351.7 (3.1)     Total Intake(mL/kg)  351.7 (3.1)     Urine (mL/kg/hr)   125 (0.2)    Total Output   125    Net  +351.7 -125           Urine Occurrence   1 x             Physical Exam  Vitals and nursing note reviewed.   Constitutional:       General: He is not in acute distress.     Appearance: He is diaphoretic.   HENT:      Head: Normocephalic.      Mouth/Throat:      Mouth: Mucous membranes are moist.   Eyes:      General: No scleral icterus.        Right eye: No discharge.         Left eye: No discharge.      Extraocular Movements: Extraocular movements intact.      Conjunctiva/sclera: Conjunctivae normal.   Cardiovascular:      Rate and Rhythm: Normal rate and regular rhythm.      Pulses: Normal pulses.      Heart sounds: Normal heart sounds. No murmur heard.  Pulmonary:      Effort: Pulmonary effort is normal. No respiratory distress.      Breath sounds: No wheezing or rales.   Abdominal:      General: Bowel sounds are normal. There is no distension.      Palpations: Abdomen is soft.      Tenderness: There is no abdominal tenderness. There is no guarding.      Hernia: No hernia is present.      Comments: Well healed chevron   Musculoskeletal:         General: No swelling. Normal range of motion.      Cervical back: Normal range of motion.      Right lower leg: No edema.      Left lower leg: No edema.   Skin:     General: Skin is warm.      Capillary Refill: Capillary refill takes less than 2 seconds.   Neurological:      Mental Status: He is alert and oriented to person, place, and time.   Psychiatric:         Mood and Affect: Mood normal.         Behavior: Behavior normal.         Thought Content: Thought content normal.         Judgment: Judgment normal.          Laboratory:  Immunosuppressants           Stop Route Frequency     tacrolimus capsule 5 mg         -- Oral 2 times  daily          CBC:   Recent Labs   Lab 02/06/24  0554   WBC 2.93*   RBC 3.10*   HGB 9.1*   HCT 27.3*   PLT 76*   MCV 88   MCH 29.4   MCHC 33.3     CMP:   Recent Labs   Lab 02/06/24  0554   *   CALCIUM 9.3   ALBUMIN 4.1   PROT 6.0   *   K 4.9   CO2 13*   *   BUN 43*   CREATININE 2.7*   ALKPHOS 38*   ALT 9*   AST 12   BILITOT 3.3*     Coagulation:   Recent Labs   Lab 02/05/24  1323   INR 1.15   APTT 31.5     Labs within the past 24 hours have been reviewed.    Diagnostic Results:  I have personally reviewed all pertinent imaging studies.    Debility/Functional status: Patient debilitated by evidence of Other malaise. Physical and occupational therapy ordered daily to evaluate and treat. Debility was: present on admission.

## 2024-02-06 NOTE — H&P
[facility] Transplant Step[facility]  Liver Transplant  History & Physical    Patient Name: Rosalio Mccarty  MRN: [id number]  Admission Date: 2/5/2024  Code Status: Full Code  Primary Care Provider: Osorio Loredo Jr., MD  Post-Operative Day: 397     ORGAN:   LIVER  Disease Etiology: Cirrhosis: Other, Specify - granulomatous hepatitis  Donor Type:   Donation after Brain Death  [place] High Risk:   Yes  Donor CMV Status:   Donor CMV Status: Positive  Donor HBcAB:   Negative  Donor HCV Status:   Positive  Donor HBV ASAF:   Donor HCV ASAF: Negative  Whole or Partial: Whole Liver  Biliary Anastomosis: End to End  Arterial Anatomy: Replaced Right Hepatic from SMA    Subjective:     History of Present Illness:  Rosalio Mccarty is a 48yr old man who 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 Surgery with replaced R HA reconstruction. Did well post transplant, post op course:  Moderate ACR: bx 5/18. SMP x 3.   Moderate ACR: bx 7/24. SMP x 3.   Fall: 7/2023, proximal fracture of fibular shaft, no surgery required   Persistent mild hyperbilirubinemia likely secondary to Gilbert syndrome (per Dr. Orta's note 1/5/24)  Presents to [facility] as a transfer from OSH for increased weakness and diarrhea x 1 month. Endorses decreased appetite, 10 lb weight loss in 1 week, NVD, dyspnea on exertion, decreased UOP. Denies fever, chills, CP, dysuria. Routine labs also noted to be abnormal, coordinator directed patient to go to ED for further investigation. In ED, labs notable for BARBARA (Cr 3.1 from BL 1.98-2.0), hyperkalemia (K 5.8), acidosis (bicarb 16). On arrival to [facility], he is resting comfortably, no TTP of abdomen, no edema noted. Plan for IVF, stool studies, CMV PCR, BC, UA. MMF held for diarrhea.    Past Medical History:   Diagnosis Date    C. difficile diarrhea     Cirrhosis of liver with ascites 11/17/2022    Diabetes mellitus, type II, insulin dependent     Granulomatous colitis     Hepatic granuloma associated  with sarcoidosis     Hepatic granuloma associated with sarcoidosis 11/17/2022    Hepatic granuloma associated with sarcoidosis 11/17/2022    Personal history of colonic polyps     Pre-op evaluation 1/4/2023    Sarcoidosis, unspecified     Thrombocytopenia, unspecified     Unspecified cirrhosis of liver        Past Surgical History:   Procedure Laterality Date    ADENOIDECTOMY      BIOPSY OF LIVER WITH ULTRASOUND GUIDANCE N/A 7/24/2023    Procedure: BIOPSY, LIVER, WITH US GUIDANCE;  Surgeon: Cecilio Hyman MD;  Location: [facility] CATH LAB;  Service: Radiology;  Laterality: N/A;    CARDIAC CATHETERIZATION Right     COLONOSCOPY  07/11/2018    HIP SURGERY      RIght removed ruptured cyst    LIVER TRANSPLANT N/A 1/5/2023    Procedure: TRANSPLANT, LIVER;  Surgeon: Rosa Foreman MD;  Location: [facility] OR [address] [place] [address];  Service: Transplant;  Laterality: N/A;    NEEDLE LOCALIZATION N/A 7/24/2023    Procedure: NEEDLE LOCALIZATION;  Surgeon: Cecilio Hyman MD;  Location: [facility] CATH LAB;  Service: Radiology;  Laterality: N/A;    UPPER GASTROINTESTINAL ENDOSCOPY  10/09/2019       Review of patient's allergies indicates:   Allergen Reactions    Adhesive Other (See Comments)     Skin irritation  Paper tape okay to use    Pcn [penicillins] Rash     Happened as a baby       Family History       Problem Relation (Age of Onset)    Cancer Father, Maternal Grandfather    Diabetes Maternal Grandmother, Maternal Grandfather, Paternal Grandmother    Heart disease Father    Hypertension Mother, Father    Rectal cancer Father    Thyroid disease Father          Tobacco Use    Smoking status: Never    Smokeless tobacco: Never   Substance and Sexual Activity    Alcohol use: Never    Drug use: Never    Sexual activity: Not on file       PTA Medications   Medication Sig    aspirin (ECOTRIN) 81 MG EC tablet Take 1 tablet (81 mg total) by mouth once daily.    blood sugar diagnostic Strp 1 strip by Misc.(Non-Drug; Combo Route) route 4 (four) times  "daily.    blood-glucose meter Misc Use as instructed    calcium carbonate (OS-ORTIZ) 500 mg calcium (1,250 mg) tablet Take 1 tablet (500 mg total) by mouth once daily.    DOCOSAHEXAENOIC ACID ORAL Take 2 g by mouth once daily.    empagliflozin (JARDIANCE) 25 mg tablet Take 1 tablet (25 mg total) by mouth once daily.    EScitalopram oxalate (LEXAPRO) 20 MG tablet Take 1 tablet (20 mg total) by mouth once daily.    fluoride, sodium, (PREVIDENT 5000) 1.1 % Pste USE TWICE DAILY IN REPLACEMENT OF NON-PRESCRIBED TOOTHPASTE    FREESTYLE LUZMA 14 DAY SENSOR Kit Apply topically.    HYDROcodone-acetaminophen (NORCO) 5-325 mg per tablet Take 1 tablet by mouth every 8 (eight) hours.    hydrOXYchloroQUINE (PLAQUENIL) 200 mg tablet Take 200 mg by mouth 2 (two) times daily.    iron-vitamin C 100-250 mg, ICAR-C, 100-250 mg Tab Take 1 tablet by mouth every morning.    lancets 33 gauge Misc by Misc.(Non-Drug; Combo Route) route 4 (four) times daily.    mycophenolate (CELLCEPT) 250 mg Cap Take 2 capsules (500 mg total) by mouth 2 (two) times daily.    pantoprazole (PROTONIX) 40 MG tablet Take 1 tablet (40 mg total) by mouth once daily.    pen needle, diabetic 32 gauge x 5/32" Ndle 1 Dose by Misc.(Non-Drug; Combo Route) route 3 (three) times daily.    semaglutide (OZEMPIC) 2 mg/dose (8 mg/3 mL) PnIj Inject 2 mg into the skin every 7 days.    sodium bicarbonate 650 MG tablet Take 2 tablets (1,300 mg total) by mouth 3 (three) times daily.    tacrolimus (PROGRAF) 1 MG Cap TAKE 5 CAPSULES EVERY 12 HOURS    vitamin D (VITAMIN D3) 1000 units Tab Take 1,000 Units by mouth once daily.    ARIPiprazole (ABILIFY) 2 MG Tab Take 1 tablet (2 mg total) by mouth once daily.    insulin aspart U-100 (NOVOLOG) 100 unit/mL (3 mL) InPn pen Inject into the skin.    insulin degludec (TRESIBA FLEXTOUCH U-100) 100 unit/mL (3 mL) insulin pen Inject 50 Units into the skin once daily.    insulin lispro (HUMALOG KWIKPEN INSULIN) 100 unit/mL pen Inject 32 Units into " the skin 3 (three) times daily before meals. Plus sliding scale insulin: 150 - 200 = +2 units; 201 - 250 = +4 units; 251 - 300 = +6 units; 301 - 350 = +8 units; >350 = +10 units. Total daily dose: 126 units    traZODone (DESYREL) 150 MG tablet Take 1 tablet (150 mg total) by mouth every evening.       Review of Systems   Constitutional:  Positive for activity change, appetite change, fatigue and unexpected weight change (~10 lbs in 1 week). Negative for fever.   HENT:  Negative for congestion, sore throat and trouble swallowing.    Respiratory:  Positive for shortness of breath (with exertion). Negative for cough.    Cardiovascular:  Negative for chest pain and leg swelling.   Gastrointestinal:  Positive for diarrhea, nausea and vomiting. Negative for abdominal distention and abdominal pain.   Genitourinary:  Positive for decreased urine volume. Negative for difficulty urinating and dysuria.   Skin:  Negative for wound.   Allergic/Immunologic: Positive for immunocompromised state.   Neurological:  Positive for weakness.   Psychiatric/Behavioral:  Negative for confusion.      Objective:     Vital Signs (Most Recent):  Temp: 98.6 °F (37 °C) (02/05/24 2300)  Pulse: 95 (02/05/24 2300)  Resp: 18 (02/05/24 2300)  BP: (!) 168/87 (02/05/24 2300)  SpO2: 100 % (02/05/24 2300) Vital Signs (24h Range):  Temp:  [97.7 °F (36.5 °C)-98.6 °F (37 °C)] 98.6 °F (37 °C)  Pulse:  [92-95] 95  Resp:  [16-18] 18  SpO2:  [100 %] 100 %  BP: (130-168)/(71-87) 168/87     Weight: 112.1 kg (247 lb 0.4 oz)  Body mass index is 37.56 kg/m².    No intake or output data in the 24 hours ending 02/06/24 0117     Physical Exam  Vitals and nursing note reviewed.   Constitutional:       General: He is not in acute distress.     Appearance: He is not ill-appearing.   HENT:      Head: Normocephalic.      Mouth/Throat:      Mouth: Mucous membranes are moist.   Eyes:      Extraocular Movements: Extraocular movements intact.      Conjunctiva/sclera:  Conjunctivae normal.   Cardiovascular:      Rate and Rhythm: Normal rate and regular rhythm.      Pulses: Normal pulses.      Heart sounds: Normal heart sounds. No murmur heard.  Pulmonary:      Effort: Pulmonary effort is normal. No respiratory distress.      Breath sounds: Normal breath sounds. No wheezing or rales.   Abdominal:      General: Bowel sounds are normal. There is no distension.      Palpations: Abdomen is soft.      Tenderness: There is no abdominal tenderness. There is no guarding.      Hernia: No hernia is present.   Musculoskeletal:         General: No swelling. Normal range of motion.      Cervical back: Normal range of motion.   Skin:     General: Skin is warm and dry.   Neurological:      Mental Status: He is alert and oriented to person, place, and time.   Psychiatric:         Mood and Affect: Mood normal.         Behavior: Behavior normal.         Thought Content: Thought content normal.         Judgment: Judgment normal.          Laboratory:  Labs within the past 24 hours have been reviewed in Care Everywhere.    Diagnostic Results:  I have personally reviewed all pertinent imaging studies.  Assessment/Plan:     * Diarrhea  - stool studies pending   - patient reports c diff negative at OSH  - MMF held   - IV hydration   - CMV pending   - BC pending       Prophylactic immunotherapy  - see long term use IS meds       Long-term use of immunosuppressant medication  - cont tac  - monitor levels for toxicity and therapeutic efforts       Anemia of chronic disease  - h/h stable   - monitor with CBC      Chronic systolic heart failure        S/P liver transplant  - AST/ALT WNL   - mild hyperbilirubinemia, poss Gilbert's from allograft   - cont to monitor with CMP      Type 2 diabetes mellitus with hyperglycemia  - home insulin ordered at 75% with SSI   - endocrine consulted, appreciate assistance             Medical decision making for this encounter includes review of pertinent labs and diagnostic  studies, assessment and planning, discussions with consulting providers, discussion with patient/family, and participation in multidisciplinary rounds. Time spent caring for patient: 60 minutes    Sheryl Erwin PA-C  Liver Transplant  [facility] - Transplant Stepdown

## 2024-02-06 NOTE — H&P
[facility]  Liver Transplant  History & Physical    Patient Name: Rosalio Mccarty  MRN: [id number]  Admission Date: 2/5/2024  Code Status: Full Code  Primary Care Provider: Osorio Loredo Jr., MD  Post-Operative Day: 397     ORGAN:   LIVER  Disease Etiology: Cirrhosis: Other, Specify - granulomatous hepatitis  Donor Type:   Donation after Brain Death  [facility] High Risk:   Yes  Donor CMV Status:   Donor CMV Status: Positive  Donor HBcAB:   Negative  Donor HCV Status:   Positive  Donor HBV ASAF:   Donor HCV ASAF: Negative  Whole or Partial: Whole Liver  Biliary Anastomosis: End to End  Arterial Anatomy: Replaced Right Hepatic from SMA    Subjective:     History of Present Illness:  Rosalio Mccarty is a 48yr old man who 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 Surgery with replaced R HA reconstruction. Did well post transplant, post op course:  Moderate ACR: bx 5/18. SMP x 3.   Moderate ACR: bx 7/24. SMP x 3.   Fall: 7/2023, proximal fracture of fibular shaft, no surgery required   Persistent mild hyperbilirubinemia likely secondary to Gilbert syndrome (per Dr. Orta's note 1/5/24)  Presents to [facility] as a transfer from OSH for increased weakness and diarrhea x 1 month. Endorses decreased appetite, 10 lb weight loss in 1 week, NVD, dyspnea on exertion, decreased UOP. Denies fever, chills, CP, dysuria. Routine labs also noted to be abnormal, coordinator directed patient to go to ED for further investigation. In ED, labs notable for BARBARA (Cr 3.1 from BL 1.98-2.0), hyperkalemia (K 5.8), acidosis (bicarb 16). On arrival to TSU, he is resting comfortably, no TTP of abdomen, no edema noted. Plan for IVF, stool studies, CMV PCR, BC, UA. MMF held for diarrhea.    Interval History: Transferred last night for n/v/d. Reports feeling slightly better this AM. Cr elevated, slowly improving with IVF. Continue Bicarb gtt for Co2 13. Also replace Mg. UA neg for infection. Blood cx NGTD.  CMV PCR and stool studies are pending. Cellcept held. WC.      Scheduled Meds:   EScitalopram oxalate  20 mg Oral Daily    heparin (porcine)  5,000 Units Subcutaneous Q8H    hydroxychloroquine  200 mg Oral BID    insulin aspart U-100  24 Units Subcutaneous TID AC    insulin detemir U-100 (Levemir)  38 Units Subcutaneous Daily    pantoprazole  40 mg Oral Daily    sodium bicarbonate  1,300 mg Oral TID    tacrolimus  5 mg Oral BID     Continuous Infusions:   sodium bicarbonate 150 mEq in dextrose 5 % (D5W) 1,000 mL infusion 100 mL/hr at 02/06/24 1153     PRN Meds:acetaminophen, dextrose 10%, dextrose 10%, glucagon (human recombinant), glucose, glucose, insulin aspart U-100, melatonin, ondansetron, prochlorperazine, sodium chloride 0.9%    Review of Systems   Constitutional:  Positive for activity change, appetite change, diaphoresis, fatigue and unexpected weight change (~10 lbs in 1 week). Negative for fever.   HENT:  Negative for congestion, sore throat and trouble swallowing.    Respiratory:  Positive for shortness of breath (with exertion). Negative for cough, wheezing and stridor.    Cardiovascular:  Negative for chest pain and leg swelling.   Gastrointestinal:  Positive for diarrhea, nausea and vomiting. Negative for abdominal distention and abdominal pain.   Genitourinary:  Positive for decreased urine volume. Negative for difficulty urinating and dysuria.   Skin:  Negative for wound.   Allergic/Immunologic: Positive for immunocompromised state.   Neurological:  Positive for weakness.   Psychiatric/Behavioral:  Negative for confusion.      Objective:     Vital Signs (Most Recent):  Temp: 98.2 °F (36.8 °C) (02/06/24 1109)  Pulse: 83 (02/06/24 1109)  Resp: 18 (02/06/24 1109)  BP: 116/68 (02/06/24 1109)  SpO2: 96 % (02/06/24 1109) Vital Signs (24h Range):  Temp:  [97.7 °F (36.5 °C)-98.6 °F (37 °C)] 98.2 °F (36.8 °C)  Pulse:  [83-95] 83  Resp:  [16-18] 18  SpO2:  [95 %-100 %] 96 %  BP: (116-168)/(68-87) 116/68      Weight: 112.1 kg (247 lb 0.4 oz)  Body mass index is 37.56 kg/m².    Intake/Output - Last 3 Shifts         02/04 0700  02/05 0659 02/05 0700  02/06 0659 02/06 0700  02/07 0659    I.V. (mL/kg)  351.7 (3.1)     Total Intake(mL/kg)  351.7 (3.1)     Urine (mL/kg/hr)   125 (0.2)    Total Output   125    Net  +351.7 -125           Urine Occurrence   1 x             Physical Exam  Vitals and nursing note reviewed.   Constitutional:       General: He is not in acute distress.     Appearance: He is diaphoretic.   HENT:      Head: Normocephalic.      Mouth/Throat:      Mouth: Mucous membranes are moist.   Eyes:      General: No scleral icterus.        Right eye: No discharge.         Left eye: No discharge.      Extraocular Movements: Extraocular movements intact.      Conjunctiva/sclera: Conjunctivae normal.   Cardiovascular:      Rate and Rhythm: Normal rate and regular rhythm.      Pulses: Normal pulses.      Heart sounds: Normal heart sounds. No murmur heard.  Pulmonary:      Effort: Pulmonary effort is normal. No respiratory distress.      Breath sounds: No wheezing or rales.   Abdominal:      General: Bowel sounds are normal. There is no distension.      Palpations: Abdomen is soft.      Tenderness: There is no abdominal tenderness. There is no guarding.      Hernia: No hernia is present.      Comments: Well healed chevron   Musculoskeletal:         General: No swelling. Normal range of motion.      Cervical back: Normal range of motion.      Right lower leg: No edema.      Left lower leg: No edema.   Skin:     General: Skin is warm.      Capillary Refill: Capillary refill takes less than 2 seconds.   Neurological:      Mental Status: He is alert and oriented to person, place, and time.   Psychiatric:         Mood and Affect: Mood normal.         Behavior: Behavior normal.         Thought Content: Thought content normal.         Judgment: Judgment normal.          Laboratory:  Immunosuppressants           Stop Route  "Frequency     tacrolimus capsule 5 mg         -- Oral 2 times daily          CBC:   Recent Labs   Lab 02/06/24  0554   WBC 2.93*   RBC 3.10*   HGB 9.1*   HCT 27.3*   PLT 76*   MCV 88   MCH 29.4   MCHC 33.3     CMP:   Recent Labs   Lab 02/06/24  0554   *   CALCIUM 9.3   ALBUMIN 4.1   PROT 6.0   *   K 4.9   CO2 13*   *   BUN 43*   CREATININE 2.7*   ALKPHOS 38*   ALT 9*   AST 12   BILITOT 3.3*     Coagulation:   Recent Labs   Lab 02/05/24  1323   INR 1.15   APTT 31.5     Labs within the past 24 hours have been reviewed.    Diagnostic Results:  I have personally reviewed all pertinent imaging studies.    Debility/Functional status: Patient debilitated by evidence of Other malaise. Physical and occupational therapy ordered daily to evaluate and treat. Debility was: present on admission.        Assessment/Plan:     * Diarrhea  - Stool studies pending   - MMF held   - IV hydration (bicarb gtt)  - CMV pending   - UA neg for infection  - Blood cx NGTD      GERD without esophagitis  - Continue Protonix.      Anxiety  - Continue Lexapro (home med).      Acidosis  - Continue bicarb gtt and oral bicarb at this time for Co2 13; next check 2/7 labs.      At risk for opportunistic infections  - OI ppx per protocol.  - CMV PCR (D+/R-) pending.      Prophylactic immunotherapy  - See "long term use of immuno."      Long-term use of immunosuppressant medication  - Continue Prograf. Monitor trough daily and adjust dose as needed to achieve therapeutic level.  - Cellcept held for infection/GI issues.    Anemia of chronic disease  - H/H stable.  - monitor with CBC      Chronic systolic heart failure  - Monitor fluid status closely.      S/P liver transplant  - AST/ALT WNL   - Mild hyperbilirubinemia, poss Gilbert's from allograft   - Continue to monitor with daily CMP.      Type 2 diabetes mellitus with hyperglycemia  - Home insulin ordered at 75% with SSI on admit  - Endocrine consulted, appreciate assistance "       Sarcoidosis  - Continue Plaquenil.          Discharge Planning:  Not today.    Medical decision making for this encounter includes review of pertinent labs and diagnostic studies, assessment and planning, discussions with consulting providers, discussion with patient/family, and participation in multidisciplinary rounds. Time spent caring for patient: 60 minutes    Sheryl Wolf PA-C  Liver Transplant  [facility] - [facility]

## 2024-02-06 NOTE — PROGRESS NOTES
Admit Note     SW met with patient to assess needs. Patient is a 48 y.o.  male, admitted for Sepsis [A41.9]    Patient admitted from outside hospital on 2/5/2024 .  At this time, patient presents as alert and oriented x 4, pleasant, and asking and answering questions appropriately.  At this time, patients caregiver is at home with their minor children.    Household/Family Systems     Patient resides with patient's wife and child(david), age(s) 11, 14 and 17 , at [address] [facility] [place] [address].  Support system includes patient's wife, Triston, mother and son.  Patient does have dependents that are in need of being cared for. Patient's children are being cared for by his wife.     Patients primary caregiver is Triston Mccarty, patients wife, phone number [phone].  Confirmed patients contact information is [phone] (home);   Telephone Information:   Mobile [phone]   .    During admission, patient's caregiver plans to stay at home.  Confirmed patient and patients caregivers do have access to reliable transportation.    Cognitive Status/Learning     Patient reports reading ability as college and states patient does not have difficulty with N/A.  Patient reports patient learns best by reading.   Needed: No.   Highest education level: Post-College Graduate Degree    Vocation/Disability   .  Working for Income: yes  If yes, working activity level: Working Full Time  Patient is employed as a neuro-physiologist.    Adherence     Patient reports a high level of adherence to patients health care regimen.  Adherence counseling and education provided. Patient verbalizes understanding.    Substance Use    Patient reports the following substance usage.    Tobacco: none, patient denies any use.  Alcohol: none, patient denies any use.  Illicit Drugs/Non-prescribed Medications: none, patient denies any use.  Patient states clear understanding of the potential impact of substance use.   Substance abstinence/cessation counseling, education and resources provided and reviewed.     Services Utilizing/ADLS    Infusion Service: Prior to admission, patient utilizing? no  Home Health: Prior to admission, patient utilizing? no  DME: Prior to admission, no  Pulmonary/Cardiac Rehab: Prior to admission, no  Dialysis:  Prior to admission, no  Transplant Specialty Pharmacy:  Prior to admission, yes; [facility].    Prior to admission, patient reports patient was independent with ADLS and was driving.  Patient reports patient is able to care for self at this time..  Patient indicates a willingness to care for self once medically cleared to do so.    Insurance/Medications    Insured by   Payer/Plan Subscr  Sex Relation Sub. Ins. ID Effective Group Num   1. CIGNA - CIGNA* ERICA ODELL 1975 Male Self [id number]                                    [address], [place] [address]   2. ALLIED BENEFI* ERICA ODELL 1975 Male Self [id number]                                   [address]      Primary Insurance (for UNOS reporting): Private Insurance, Cigna.  Secondary Insurance (for UNOS reporting): None    Patient reports patient is able to obtain and afford medications at this time and at time of discharge.    Living Will/Healthcare Power of     Patient states patient does not have a LW and/or HCPA.   provided education regarding LW and HCPA and the completion of forms.    Coping/Mental Health    Patient is coping adequately with the aid of  family members and friends.  Patient denies mental health difficulties.     Discharge Planning    At time of discharge, patient plans to return to patient's home under the care of self and wife, Triston.  Patient's wife Triston will transport patient home upon discharge.  Per rounds today, expected discharge date has not been medically determined at this time. Patient and patients caregiver  verbalize understanding  and are involved in treatment planning and discharge process.    Additional Concerns    Patient is being followed for needs, education, resources, information, emotional support, supportive counseling, and for supportive and skilled discharge plan of care.  providing ongoing psychosocial support, education, resources and d/c planning as needed.  SW remains available.  remains available.

## 2024-02-06 NOTE — ASSESSMENT & PLAN NOTE
Endocrinology consulted for BG management.   BG goal 140-180    20% reduction in home regimen   - Levemir (Insulin Detemir) 48 units daily  - Novolog (Insulin Aspart) 26 units TIDWM and prn for BG excursions [facility] SSI (150/25)  - BG checks AC/HS  - Hypoglycemia protocol in place  - If blood glucose greater than 300, please ask patient not to eat food or drink anything other than water until correctional insulin has brought it back below 250    ** Please notify Endocrine for any change and/or advance in diet**  ** Please call Endocrine for any BG related issues **    Discharge Planning:   TBD. Please notify endocrinology prior to discharge.

## 2024-02-06 NOTE — ASSESSMENT & PLAN NOTE
- stool studies pending   - patient reports c diff negative at OSH  - MMF held   - IV hydration   - CMV pending   - BC pending

## 2024-02-06 NOTE — PLAN OF CARE
Pt. Arrived to [facility] rm 28442 via stretcher.   VSS, spo2 >94% on room air.   TIANA BEST notified of patients arrival--orders place  Sodium Bicarb gtt to be started--awaiting delivery from pharmacy  Skin CDI.   Bed in low locked position, call light within reach, pt instructed to call for assistance as needed, WCTM.    Nurses Note -- 4 Eyes      2/6/2024   1:58 AM      Skin assessed during: Admit      [x] No Altered Skin Integrity Present    []Prevention Measures Documented      [] Yes- Altered Skin Integrity Present or Discovered   [] LDA Added if Not in Epic (Describe Wound)   [] New Altered Skin Integrity was Present on Admit and Documented in LDA   [] Wound Image Taken    Wound Care Consulted? Yes    Attending Nurse:  Dayana REYNOLDS    Second RN/Staff Member: Destiny REYNOLDS

## 2024-02-06 NOTE — CONSULTS
[facility] - Transplant Stepdown  Endocrinology  Diabetes Consult Note    Consult Requested by: Jasvir Travis MD   Reason for admit: Diarrhea    HISTORY OF PRESENT ILLNESS:  Reason for Consult: Management of T2DM, Hyperglycemia      Surgical Procedure and Date: Liver transplant 2023     Diabetes diagnosis year: Over 5 years ago     Home Diabetes Medications:  Tresiba 45 units QD, Humalog 25 units TIDWM +SSI MDC (180/25), Jardiance 25 mg, Ozempic 2 mg     How often checking glucose at home? >4 x day   BG readings on regimen: 100-180  Hypoglycemia on the regimen?  No  Missed doses on regimen?  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 Surgery with replaced R HA reconstruction. Endocrine consulted to manage hyperglycemia and type 2 diabetes.     Lab Results   Component Value Date    HGBA1C 4.9 2024         Interval HPI:   Overnight events: No acute events overnight. Patient in room 50313/08270 A. Blood glucose stable. BG at and above goal on current insulin regimen (SSI, prandial, and basal insulin ). Steroid use- None.    Renal function- Abnormal - Creatinine 2.7   Vasopressors-  None       Endocrine will continue to follow and manage insulin orders inpatient.       Diet diabetic  Calorie     Eatin%  Nausea: No  Hypoglycemia and intervention: No  Fever: No  TPN and/or TF: No    PMH, PSH, FH, SH updated and reviewed     ROS:  Review of Systems   Constitutional:  Negative for unexpected weight change.   Eyes:  Negative for visual disturbance.   Respiratory:  Negative for cough.    Cardiovascular:  Negative for chest pain.   Gastrointestinal:  Negative for nausea and vomiting.   Endocrine: Negative for polydipsia and polyuria.   Musculoskeletal:  Negative for back pain.   Skin:  Negative for rash.    Neurological:  Negative for syncope.   Psychiatric/Behavioral:  Negative for agitation and dysphoric mood.        Current Medications and/or Treatments Impacting Glycemic Control  Immunotherapy:    Immunosuppressants           Stop Route Frequency     tacrolimus capsule 5 mg         -- Oral 2 times daily          Steroids:   Hormones (From admission, onward)      Start     Stop Route Frequency Ordered    02/06/24 0039  melatonin tablet 6 mg         -- Oral Nightly PRN 02/05/24 2343          Pressors:    Autonomic Drugs (From admission, onward)      None          Hyperglycemia/Diabetes Medications:   Antihyperglycemics (From admission, onward)      Start     Stop Route Frequency Ordered    02/06/24 0900  insulin detemir U-100 (Levemir) pen 38 Units         -- SubQ Daily 02/05/24 2343 02/06/24 0645  insulin aspart U-100 pen 24 Units         -- SubQ 3 times daily before meals 02/05/24 2343 02/06/24 0040  insulin aspart U-100 pen 0-10 Units         -- SubQ Before meals & nightly PRN 02/05/24 2343             PHYSICAL EXAMINATION:  Vitals:    02/06/24 1547   BP: 105/61   Pulse: 81   Resp: 18   Temp: 97.9 °F (36.6 °C)     Body mass index is 37.56 kg/m².     Physical Exam  Constitutional:       General: He is not in acute distress.     Appearance: Normal appearance. He is not ill-appearing.   HENT:      Head: Normocephalic and atraumatic.      Right Ear: External ear normal.      Left Ear: External ear normal.      Nose: Nose normal.   Cardiovascular:      Rate and Rhythm: Normal rate and regular rhythm.      Heart sounds: No murmur heard.  Pulmonary:      Effort: Pulmonary effort is normal. No respiratory distress.   Abdominal:      General: Bowel sounds are normal. There is no distension.      Tenderness: There is no abdominal tenderness.   Musculoskeletal:         General: No swelling.      Right lower leg: No edema.      Left lower leg: No edema.   Skin:     Findings: No erythema.   Neurological:      General:  "No focal deficit present.      Mental Status: He is alert and oriented to person, place, and time.   Psychiatric:         Mood and Affect: Mood normal.         Behavior: Behavior normal.            Labs Reviewed and Include   Recent Labs   Lab 02/06/24  0554   *   CALCIUM 9.3   ALBUMIN 4.1   PROT 6.0   *   K 4.9   CO2 13*   *   BUN 43*   CREATININE 2.7*   ALKPHOS 38*   ALT 9*   AST 12   BILITOT 3.3*     Lab Results   Component Value Date    WBC 2.93 (L) 02/06/2024    HGB 9.1 (L) 02/06/2024    HCT 27.3 (L) 02/06/2024    MCV 88 02/06/2024    PLT 76 (L) 02/06/2024     No results for input(s): "TSH", "FREET4" in the last 168 hours.  Lab Results   Component Value Date    HGBA1C 4.9 02/06/2024       Nutritional status:   Body mass index is 37.56 kg/m².  Lab Results   Component Value Date    ALBUMIN 4.1 02/06/2024    ALBUMIN 4.0 02/06/2024    ALBUMIN 4.1 07/28/2023     No results found for: "PREALBUMIN"    Estimated Creatinine Clearance: 40.7 mL/min (A) (based on SCr of 2.7 mg/dL (H)).    Accu-Checks  Recent Labs     02/06/24  0908 02/06/24  1248   POCTGLUCOSE 202* 185*        ASSESSMENT and PLAN    Oncology  Anemia of chronic disease  May impact the accuracy of the HbA1C      Endocrine  Type 2 diabetes mellitus with hyperglycemia  Endocrinology consulted for BG management.   BG goal 140-180    20% reduction in home regimen   - Levemir (Insulin Detemir) 48 units daily  - Novolog (Insulin Aspart) 26 units TIDWM and prn for BG excursions [facility] SSI (150/25)  - BG checks AC/HS  - Hypoglycemia protocol in place  - If blood glucose greater than 300, please ask patient not to eat food or drink anything other than water until correctional insulin has brought it back below 250    ** Please notify Endocrine for any change and/or advance in diet**  ** Please call Endocrine for any BG related issues **    Discharge Planning:   TBD. Please notify endocrinology prior to discharge.        GI  * Diarrhea  Managed per " primary team  Avoid hypoglycemia            Plan discussed with patient, family, and RN at bedside.        Jimi Sunshine, INDIRA, FNP  Endocrinology  [facility] - Transplant Stepdown

## 2024-02-06 NOTE — ASSESSMENT & PLAN NOTE
- AST/ALT WNL   - Mild hyperbilirubinemia, poss Gilbert's from allograft   - Continue to monitor with daily CMP.

## 2024-02-07 PROBLEM — E83.52 HYPERCALCEMIA DUE TO GRANULOMATOUS DISEASE: Status: RESOLVED | Noted: 2018-08-09 | Resolved: 2024-02-07

## 2024-02-07 PROBLEM — D61.818 PANCYTOPENIA: Status: ACTIVE | Noted: 2024-02-07

## 2024-02-07 PROBLEM — D71 HYPERCALCEMIA DUE TO GRANULOMATOUS DISEASE: Status: RESOLVED | Noted: 2018-08-09 | Resolved: 2024-02-07

## 2024-02-07 PROBLEM — E87.20 ACIDOSIS: Status: RESOLVED | Noted: 2024-02-06 | Resolved: 2024-02-07

## 2024-02-07 LAB
ALBUMIN SERPL BCP-MCNC: 3.5 G/DL (ref 3.5–5.2)
ALP SERPL-CCNC: 28 U/L (ref 55–135)
ALT SERPL W/O P-5'-P-CCNC: 6 U/L (ref 10–44)
ANION GAP SERPL CALC-SCNC: 8 MMOL/L (ref 8–16)
ANISOCYTOSIS BLD QL SMEAR: SLIGHT
AST SERPL-CCNC: 12 U/L (ref 10–40)
BASOPHILS # BLD AUTO: 0.01 K/UL (ref 0–0.2)
BASOPHILS # BLD AUTO: 0.02 K/UL (ref 0–0.2)
BASOPHILS # BLD AUTO: ABNORMAL K/UL (ref 0–0.2)
BASOPHILS NFR BLD: 0 % (ref 0–1.9)
BASOPHILS NFR BLD: 0.5 % (ref 0–1.9)
BASOPHILS NFR BLD: 0.7 % (ref 0–1.9)
BILIRUB DIRECT SERPL-MCNC: 0.7 MG/DL (ref 0.1–0.3)
BILIRUB SERPL-MCNC: 1.8 MG/DL (ref 0.1–1)
BUN SERPL-MCNC: 38 MG/DL (ref 6–20)
CALCIUM SERPL-MCNC: 8.2 MG/DL (ref 8.7–10.5)
CALCIUM UR-MCNC: <2 MG/DL (ref 0–15)
CHLORIDE SERPL-SCNC: 104 MMOL/L (ref 95–110)
CO2 SERPL-SCNC: 24 MMOL/L (ref 23–29)
CREAT SERPL-MCNC: 2.3 MG/DL (ref 0.5–1.4)
CRYPTOSP AG STL QL IA: NEGATIVE
DAT IGG-SP REAG RBC-IMP: NORMAL
DIFFERENTIAL METHOD BLD: ABNORMAL
EOSINOPHIL # BLD AUTO: 0 K/UL (ref 0–0.5)
EOSINOPHIL # BLD AUTO: 0 K/UL (ref 0–0.5)
EOSINOPHIL # BLD AUTO: ABNORMAL K/UL (ref 0–0.5)
EOSINOPHIL NFR BLD: 1.1 % (ref 0–8)
EOSINOPHIL NFR BLD: 1.5 % (ref 0–8)
EOSINOPHIL NFR BLD: 2 % (ref 0–8)
ERYTHROCYTE [DISTWIDTH] IN BLOOD BY AUTOMATED COUNT: 18.1 % (ref 11.5–14.5)
ERYTHROCYTE [DISTWIDTH] IN BLOOD BY AUTOMATED COUNT: 18.3 % (ref 11.5–14.5)
ERYTHROCYTE [DISTWIDTH] IN BLOOD BY AUTOMATED COUNT: 18.4 % (ref 11.5–14.5)
EST. GFR  (NO RACE VARIABLE): 34.2 ML/MIN/1.73 M^2
FERRITIN SERPL-MCNC: 258 NG/ML (ref 20–300)
FOLATE SERPL-MCNC: 8.6 NG/ML (ref 4–24)
G LAMBLIA AG STL QL IA: NEGATIVE
GLUCOSE SERPL-MCNC: 125 MG/DL (ref 70–110)
HAPTOGLOB SERPL-MCNC: <10 MG/DL (ref 30–250)
HBV SURFACE AG SERPL QL IA: NORMAL
HCT VFR BLD AUTO: 20.9 % (ref 40–54)
HCT VFR BLD AUTO: 24.2 % (ref 40–54)
HCT VFR BLD AUTO: 25 % (ref 40–54)
HCV AB SERPL QL IA: NORMAL
HGB BLD-MCNC: 7.1 G/DL (ref 14–18)
HGB BLD-MCNC: 8.3 G/DL (ref 14–18)
HGB BLD-MCNC: 8.7 G/DL (ref 14–18)
HIV 1+2 AB+HIV1 P24 AG SERPL QL IA: NORMAL
IMM GRANULOCYTES # BLD AUTO: 0.01 K/UL (ref 0–0.04)
IMM GRANULOCYTES # BLD AUTO: 0.01 K/UL (ref 0–0.04)
IMM GRANULOCYTES # BLD AUTO: ABNORMAL K/UL (ref 0–0.04)
IMM GRANULOCYTES NFR BLD AUTO: 0.4 % (ref 0–0.5)
IMM GRANULOCYTES NFR BLD AUTO: 0.5 % (ref 0–0.5)
IMM GRANULOCYTES NFR BLD AUTO: ABNORMAL % (ref 0–0.5)
IRON SERPL-MCNC: 54 UG/DL (ref 45–160)
LDH SERPL L TO P-CCNC: 389 U/L (ref 110–260)
LYMPHOCYTES # BLD AUTO: 0.6 K/UL (ref 1–4.8)
LYMPHOCYTES # BLD AUTO: 1 K/UL (ref 1–4.8)
LYMPHOCYTES # BLD AUTO: ABNORMAL K/UL (ref 1–4.8)
LYMPHOCYTES NFR BLD: 28.6 % (ref 18–48)
LYMPHOCYTES NFR BLD: 35 % (ref 18–48)
LYMPHOCYTES NFR BLD: 35.7 % (ref 18–48)
MAGNESIUM SERPL-MCNC: 1.8 MG/DL (ref 1.6–2.6)
MCH RBC QN AUTO: 29.2 PG (ref 27–31)
MCH RBC QN AUTO: 29.3 PG (ref 27–31)
MCH RBC QN AUTO: 29.4 PG (ref 27–31)
MCHC RBC AUTO-ENTMCNC: 34 G/DL (ref 32–36)
MCHC RBC AUTO-ENTMCNC: 34.3 G/DL (ref 32–36)
MCHC RBC AUTO-ENTMCNC: 34.8 G/DL (ref 32–36)
MCV RBC AUTO: 85 FL (ref 82–98)
MCV RBC AUTO: 86 FL (ref 82–98)
MCV RBC AUTO: 86 FL (ref 82–98)
MONOCYTES # BLD AUTO: 0.2 K/UL (ref 0.3–1)
MONOCYTES # BLD AUTO: 0.2 K/UL (ref 0.3–1)
MONOCYTES # BLD AUTO: ABNORMAL K/UL (ref 0.3–1)
MONOCYTES NFR BLD: 4 % (ref 4–15)
MONOCYTES NFR BLD: 7 % (ref 4–15)
MONOCYTES NFR BLD: 8.3 % (ref 4–15)
NEUTROPHILS # BLD AUTO: 1.3 K/UL (ref 1.8–7.7)
NEUTROPHILS # BLD AUTO: 1.5 K/UL (ref 1.8–7.7)
NEUTROPHILS # BLD AUTO: ABNORMAL K/UL (ref 1.8–7.7)
NEUTROPHILS NFR BLD: 55.1 % (ref 38–73)
NEUTROPHILS NFR BLD: 58 % (ref 38–73)
NEUTROPHILS NFR BLD: 60.6 % (ref 38–73)
NEUTS BAND NFR BLD MANUAL: 1 %
NRBC BLD-RTO: 0 /100 WBC
OVALOCYTES BLD QL SMEAR: ABNORMAL
PATH REV BLD -IMP: NORMAL
PHOSPHATE SERPL-MCNC: 3.2 MG/DL (ref 2.7–4.5)
PLATELET # BLD AUTO: 54 K/UL (ref 150–450)
PLATELET # BLD AUTO: 59 K/UL (ref 150–450)
PLATELET # BLD AUTO: 70 K/UL (ref 150–450)
PLATELET BLD QL SMEAR: ABNORMAL
PMV BLD AUTO: 10.4 FL (ref 9.2–12.9)
PMV BLD AUTO: 9.8 FL (ref 9.2–12.9)
PMV BLD AUTO: 9.9 FL (ref 9.2–12.9)
POCT GLUCOSE: 118 MG/DL (ref 70–110)
POCT GLUCOSE: 134 MG/DL (ref 70–110)
POCT GLUCOSE: 183 MG/DL (ref 70–110)
POCT GLUCOSE: 91 MG/DL (ref 70–110)
POIKILOCYTOSIS BLD QL SMEAR: SLIGHT
POTASSIUM SERPL-SCNC: 4.1 MMOL/L (ref 3.5–5.1)
PROT SERPL-MCNC: 5.1 G/DL (ref 6–8.4)
RBC # BLD AUTO: 2.43 M/UL (ref 4.6–6.2)
RBC # BLD AUTO: 2.83 M/UL (ref 4.6–6.2)
RBC # BLD AUTO: 2.96 M/UL (ref 4.6–6.2)
RETICS/RBC NFR AUTO: 3.8 % (ref 0.4–2)
RV AG STL QL IA.RAPID: NEGATIVE
SATURATED IRON: 19 % (ref 20–50)
SODIUM SERPL-SCNC: 136 MMOL/L (ref 136–145)
TACROLIMUS BLD-MCNC: 5.9 NG/ML (ref 5–15)
TOTAL IRON BINDING CAPACITY: 287 UG/DL (ref 250–450)
TRANSFERRIN SERPL-MCNC: 194 MG/DL (ref 200–375)
VIT B12 SERPL-MCNC: 393 PG/ML (ref 210–950)
WBC # BLD AUTO: 1.51 K/UL (ref 3.9–12.7)
WBC # BLD AUTO: 2.06 K/UL (ref 3.9–12.7)
WBC # BLD AUTO: 2.72 K/UL (ref 3.9–12.7)

## 2024-02-07 PROCEDURE — 36415 COLL VENOUS BLD VENIPUNCTURE: CPT

## 2024-02-07 PROCEDURE — 87340 HEPATITIS B SURFACE AG IA: CPT | Performed by: INTERNAL MEDICINE

## 2024-02-07 PROCEDURE — 99223 1ST HOSP IP/OBS HIGH 75: CPT | Mod: ,,, | Performed by: INTERNAL MEDICINE

## 2024-02-07 PROCEDURE — 87799 DETECT AGENT NOS DNA QUANT: CPT | Mod: 91 | Performed by: INTERNAL MEDICINE

## 2024-02-07 PROCEDURE — 80197 ASSAY OF TACROLIMUS: CPT

## 2024-02-07 PROCEDURE — 82164 ANGIOTENSIN I ENZYME TEST: CPT | Performed by: PHYSICIAN ASSISTANT

## 2024-02-07 PROCEDURE — 87799 DETECT AGENT NOS DNA QUANT: CPT | Performed by: PHYSICIAN ASSISTANT

## 2024-02-07 PROCEDURE — 63600175 PHARM REV CODE 636 W HCPCS: Performed by: PHYSICIAN ASSISTANT

## 2024-02-07 PROCEDURE — 25000003 PHARM REV CODE 250: Performed by: PHYSICIAN ASSISTANT

## 2024-02-07 PROCEDURE — 20600001 HC STEP DOWN PRIVATE ROOM

## 2024-02-07 PROCEDURE — 85045 AUTOMATED RETICULOCYTE COUNT: CPT | Performed by: PHYSICIAN ASSISTANT

## 2024-02-07 PROCEDURE — 85060 BLOOD SMEAR INTERPRETATION: CPT | Mod: ,,, | Performed by: PATHOLOGY

## 2024-02-07 PROCEDURE — 86803 HEPATITIS C AB TEST: CPT | Performed by: INTERNAL MEDICINE

## 2024-02-07 PROCEDURE — 87389 HIV-1 AG W/HIV-1&-2 AB AG IA: CPT | Performed by: INTERNAL MEDICINE

## 2024-02-07 PROCEDURE — 83735 ASSAY OF MAGNESIUM: CPT

## 2024-02-07 PROCEDURE — 25000003 PHARM REV CODE 250

## 2024-02-07 PROCEDURE — 82340 ASSAY OF CALCIUM IN URINE: CPT | Performed by: PHYSICIAN ASSISTANT

## 2024-02-07 PROCEDURE — 36415 COLL VENOUS BLD VENIPUNCTURE: CPT | Mod: XB | Performed by: INTERNAL MEDICINE

## 2024-02-07 PROCEDURE — 85025 COMPLETE CBC W/AUTO DIFF WBC: CPT | Mod: 91 | Performed by: INTERNAL MEDICINE

## 2024-02-07 PROCEDURE — 83540 ASSAY OF IRON: CPT

## 2024-02-07 PROCEDURE — 82607 VITAMIN B-12: CPT | Performed by: PHYSICIAN ASSISTANT

## 2024-02-07 PROCEDURE — 99232 SBSQ HOSP IP/OBS MODERATE 35: CPT | Mod: ,,, | Performed by: NURSE PRACTITIONER

## 2024-02-07 PROCEDURE — 83010 ASSAY OF HAPTOGLOBIN QUANT: CPT | Performed by: PHYSICIAN ASSISTANT

## 2024-02-07 PROCEDURE — 85025 COMPLETE CBC W/AUTO DIFF WBC: CPT | Performed by: PHYSICIAN ASSISTANT

## 2024-02-07 PROCEDURE — 63600175 PHARM REV CODE 636 W HCPCS

## 2024-02-07 PROCEDURE — 80053 COMPREHEN METABOLIC PANEL: CPT

## 2024-02-07 PROCEDURE — 83615 LACTATE (LD) (LDH) ENZYME: CPT | Performed by: PHYSICIAN ASSISTANT

## 2024-02-07 PROCEDURE — 86880 COOMBS TEST DIRECT: CPT | Performed by: INTERNAL MEDICINE

## 2024-02-07 PROCEDURE — 82746 ASSAY OF FOLIC ACID SERUM: CPT | Performed by: PHYSICIAN ASSISTANT

## 2024-02-07 PROCEDURE — 36415 COLL VENOUS BLD VENIPUNCTURE: CPT | Mod: XB | Performed by: PHYSICIAN ASSISTANT

## 2024-02-07 PROCEDURE — 85027 COMPLETE CBC AUTOMATED: CPT

## 2024-02-07 PROCEDURE — 82728 ASSAY OF FERRITIN: CPT

## 2024-02-07 PROCEDURE — 85007 BL SMEAR W/DIFF WBC COUNT: CPT

## 2024-02-07 PROCEDURE — 82248 BILIRUBIN DIRECT: CPT | Performed by: INTERNAL MEDICINE

## 2024-02-07 PROCEDURE — 99233 SBSQ HOSP IP/OBS HIGH 50: CPT | Mod: ,,, | Performed by: PHYSICIAN ASSISTANT

## 2024-02-07 PROCEDURE — 84100 ASSAY OF PHOSPHORUS: CPT | Performed by: PHYSICIAN ASSISTANT

## 2024-02-07 RX ORDER — ARIPIPRAZOLE 2 MG/1
2 TABLET ORAL DAILY
Status: DISCONTINUED | OUTPATIENT
Start: 2024-02-07 | End: 2024-02-09 | Stop reason: HOSPADM

## 2024-02-07 RX ORDER — PREDNISONE 10 MG/1
10 TABLET ORAL DAILY
Status: CANCELLED | OUTPATIENT
Start: 2024-02-07

## 2024-02-07 RX ORDER — TACROLIMUS 1 MG/1
4 CAPSULE ORAL 2 TIMES DAILY
Status: DISCONTINUED | OUTPATIENT
Start: 2024-02-07 | End: 2024-02-08

## 2024-02-07 RX ADMIN — SODIUM BICARBONATE 650 MG TABLET 1300 MG: at 04:02

## 2024-02-07 RX ADMIN — INSULIN ASPART 2 UNITS: 100 INJECTION, SOLUTION INTRAVENOUS; SUBCUTANEOUS at 08:02

## 2024-02-07 RX ADMIN — TACROLIMUS 4 MG: 1 CAPSULE ORAL at 06:02

## 2024-02-07 RX ADMIN — SODIUM BICARBONATE: 84 INJECTION, SOLUTION INTRAVENOUS at 07:02

## 2024-02-07 RX ADMIN — ESCITALOPRAM OXALATE 20 MG: 10 TABLET ORAL at 08:02

## 2024-02-07 RX ADMIN — HEPARIN SODIUM 5000 UNITS: 5000 INJECTION INTRAVENOUS; SUBCUTANEOUS at 05:02

## 2024-02-07 RX ADMIN — HYDROXYCHLOROQUINE SULFATE 200 MG: 200 TABLET, FILM COATED ORAL at 08:02

## 2024-02-07 RX ADMIN — HEPARIN SODIUM 5000 UNITS: 5000 INJECTION INTRAVENOUS; SUBCUTANEOUS at 09:02

## 2024-02-07 RX ADMIN — ARIPIPRAZOLE 2 MG: 2 TABLET ORAL at 11:02

## 2024-02-07 RX ADMIN — HEPARIN SODIUM 5000 UNITS: 5000 INJECTION INTRAVENOUS; SUBCUTANEOUS at 01:02

## 2024-02-07 RX ADMIN — INSULIN ASPART 26 UNITS: 100 INJECTION, SOLUTION INTRAVENOUS; SUBCUTANEOUS at 01:02

## 2024-02-07 RX ADMIN — HYDROXYCHLOROQUINE SULFATE 200 MG: 200 TABLET, FILM COATED ORAL at 09:02

## 2024-02-07 RX ADMIN — INSULIN ASPART 26 UNITS: 100 INJECTION, SOLUTION INTRAVENOUS; SUBCUTANEOUS at 05:02

## 2024-02-07 RX ADMIN — SODIUM BICARBONATE 650 MG TABLET 1300 MG: at 09:02

## 2024-02-07 RX ADMIN — PANTOPRAZOLE SODIUM 40 MG: 40 TABLET, DELAYED RELEASE ORAL at 08:02

## 2024-02-07 RX ADMIN — SODIUM BICARBONATE 650 MG TABLET 1300 MG: at 08:02

## 2024-02-07 RX ADMIN — INSULIN ASPART 26 UNITS: 100 INJECTION, SOLUTION INTRAVENOUS; SUBCUTANEOUS at 08:02

## 2024-02-07 RX ADMIN — ACETAMINOPHEN 650 MG: 325 TABLET ORAL at 07:02

## 2024-02-07 NOTE — HPI
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 [facility] [place] [facility].     Patient reports 4-5 watery bowel movements/day, non bloody, improving. He denies fevers, chills, abd pain. He has dogs at home, no new pets. Travel to cities, no hiking or exposure to fresh water.     Labs with leukopenia, hgb 8.7 g/dL, plt 70K, MCV 85, Cr 2.3, elevated bilirubin 1.8, direct bili 0.7, , tacro level 14 yesterday, dose reduced.     Normal iron panel  Normal b12 and folate  Haptoglobin < 10  RCI 1.51 indicative of hypoproliferation    CMV PCR negative  C diff negative  WBC stool no neutrophils seen   Rotavirus negative   Parvovirus pending  JOSE pending  Peripheral smear pending

## 2024-02-07 NOTE — CARE UPDATE
I have reviewed the patient and his labs with my attending, Dr. Handley. With Low haptoglobin, elevated LDH, and elevated retic count we have low suspicion that his pancytopenia is related to sarcoidosis. Would consider hematology consult. Feel free to re consult us if you have any further questions.

## 2024-02-07 NOTE — CONSULTS
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 [facility] [place] [facility]/v/diarrhea.     Patient reports 4-5 watery bowel movements/day, non bloody, improving. He denies fevers, chills, abd pain. He has dogs at home, no new pets. Travel to cities, no hiking or exposure to fresh water.     Labs with leukopenia, hgb 8.7 g/dL, plt 70K, MCV 85, Cr 2.3, elevated bilirubin 1.8, direct bili 0.7, , tacro level 14 yesterday, dose reduced.     Normal iron panel  Normal b12 and folate  Haptoglobin < 10  RCI 1.51 indicative of hypoproliferation    CMV PCR negative  C diff negative  WBC stool no neutrophils seen   Rotavirus negative   Parvovirus pending  JOSE pending  Peripheral smear pending          Medications:  Continuous Infusions:  Scheduled Meds:   ARIPiprazole  2 mg Oral Daily    EScitalopram oxalate  20 mg Oral Daily    heparin (porcine)  5,000 Units Subcutaneous Q8H    hydroxychloroquine  200 mg Oral BID    insulin aspart U-100  26 Units Subcutaneous TID AC    insulin detemir U-100 (Levemir)  48 Units Subcutaneous Daily    pantoprazole  40 mg Oral Daily    sodium bicarbonate  1,300 mg Oral TID    tacrolimus  4 mg Oral BID     PRN Meds:acetaminophen, dextrose 10%, dextrose 10%, glucagon (human recombinant), glucose, glucose, insulin aspart U-100, melatonin, ondansetron, prochlorperazine, sodium chloride 0.9%     Review  of patient's allergies indicates:   Allergen Reactions    Adhesive Other (See Comments)     Skin irritation  Paper tape okay to use    Pcn [penicillins] Rash     Happened as a baby        Past Medical History:   Diagnosis Date    C. difficile diarrhea     Cirrhosis of liver with ascites 11/17/2022    Diabetes mellitus, type II, insulin dependent     Granulomatous colitis     Hepatic granuloma associated with sarcoidosis     Hepatic granuloma associated with sarcoidosis 11/17/2022    Hepatic granuloma associated with sarcoidosis 11/17/2022    Personal history of colonic polyps     Pre-op evaluation 1/4/2023    Sarcoidosis, unspecified     Thrombocytopenia, unspecified     Unspecified cirrhosis of liver      Past Surgical History:   Procedure Laterality Date    ADENOIDECTOMY      BIOPSY OF LIVER WITH ULTRASOUND GUIDANCE N/A 7/24/2023    Procedure: BIOPSY, LIVER, WITH US GUIDANCE;  Surgeon: Cecilio Hyman MD;  Location: [facility] CATH LAB;  Service: Radiology;  Laterality: N/A;    CARDIAC CATHETERIZATION Right     COLONOSCOPY  07/11/2018    HIP SURGERY      RIght removed ruptured cyst    LIVER TRANSPLANT N/A 1/5/2023    Procedure: TRANSPLANT, LIVER;  Surgeon: Rosa Foreman MD;  Location: [facility] OR [address] [place] [address];  Service: Transplant;  Laterality: N/A;    NEEDLE LOCALIZATION N/A 7/24/2023    Procedure: NEEDLE LOCALIZATION;  Surgeon: Cecilio Hyman MD;  Location: [facility] CATH LAB;  Service: Radiology;  Laterality: N/A;    UPPER GASTROINTESTINAL ENDOSCOPY  10/09/2019     Family History       Problem Relation (Age of Onset)    Cancer Father, Maternal Grandfather    Diabetes Maternal Grandmother, Maternal Grandfather, Paternal Grandmother    Heart disease Father    Hypertension Mother, Father    Rectal cancer Father    Thyroid disease Father          Tobacco Use    Smoking status: Never    Smokeless tobacco: Never   Substance and Sexual Activity    Alcohol use: Never    Drug use: Never    Sexual activity: Not on file       Review  of Systems   Constitutional:  Positive for activity change, appetite change and fatigue. Negative for chills and fever.   HENT:  Negative for congestion and sore throat.    Respiratory:  Positive for shortness of breath. Negative for cough and wheezing.    Cardiovascular:  Negative for chest pain.   Gastrointestinal:  Positive for diarrhea, nausea and vomiting. Negative for abdominal pain and constipation.   Skin:  Negative for color change.     Objective:     Vital Signs (Most Recent):  Temp: 98.2 °F (36.8 °C) (02/07/24 1102)  Pulse: 86 (02/07/24 1102)  Resp: 18 (02/07/24 1102)  BP: (!) 113/55 (02/07/24 1102)  SpO2: 100 % (02/07/24 1102) Vital Signs (24h Range):  Temp:  [97.9 °F (36.6 °C)-98.5 °F (36.9 °C)] 98.2 °F (36.8 °C)  Pulse:  [75-88] 86  Resp:  [18-20] 18  SpO2:  [97 %-100 %] 100 %  BP: (105-137)/(55-98) 113/55     Weight: 112.1 kg (247 lb 0.4 oz)  Body mass index is 37.56 kg/m².  Body surface area is 2.32 meters squared.      Intake/Output Summary (Last 24 hours) at 2/7/2024 1524  Last data filed at 2/7/2024 1316  Gross per 24 hour   Intake 2510.8 ml   Output 750 ml   Net 1760.8 ml        Physical Exam  Constitutional:       Appearance: Normal appearance.   HENT:      Head: Normocephalic and atraumatic.      Nose: Nose normal.      Mouth/Throat:      Mouth: Mucous membranes are dry.   Eyes:      Extraocular Movements: Extraocular movements intact.      Pupils: Pupils are equal, round, and reactive to light.   Cardiovascular:      Rate and Rhythm: Normal rate and regular rhythm.      Pulses: Normal pulses.      Heart sounds: Normal heart sounds.   Pulmonary:      Effort: Pulmonary effort is normal.      Breath sounds: Normal breath sounds.   Abdominal:      General: Bowel sounds are normal.      Palpations: Abdomen is soft.   Skin:     General: Skin is warm.   Neurological:      General: No focal deficit present.      Mental Status: He is alert and oriented to person, place, and time.   Psychiatric:          Mood and Affect: Mood normal.          Significant Labs:   All pertinent labs from the last 24 hours have been reviewed.    Diagnostic Results:  I have reviewed all pertinent imaging results/findings within the past 24 hours.  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 He presented as a transfer from [facility] [place] on 2/7/23 for n/v/diarrhea.     Labs with leukopenia, hgb 8.7 g/dL, plt 70K, MCV 85, Cr 2.3, elevated bilirubin 1.8, direct bili 0.7, , tacro level 14 (2/6), dose reduced. Normal PT/INR.     Patient does not appear to have nutritional deficiency. No evidence of DIC. Will obtain peripheral smear. Parvovirus, hep panel, HIV, JOSE pending.     He appears to have chronic thrombocytopenia. Leukpenia and anemia appear acute, progressing. May be multifactorial, infection, drug induced (tacrolimus can cause drug induced TMA). Noted similar drop in hemogram in 5/2023 after patient diagnosed with ACR - unclear if this may be etiology at this time. Although hemolytic markers are positive, indirect bili is not impressive, perhaps low level hemolysis.     Will follow work up ordered.     Thank you for your consult. I will follow-up with patient. Please contact us if you have any additional questions.    Anna Davis MD  Hematology/Oncology Fellow PGY 6  [facility]

## 2024-02-07 NOTE — ASSESSMENT & PLAN NOTE
- Continue Prograf. Monitor trough daily and adjust dose as needed to achieve therapeutic level.  - Cellcept held for infection/GI issues and pancytopenia.  - Not currently taking steroids

## 2024-02-07 NOTE — SUBJECTIVE & OBJECTIVE
Scheduled Meds:   ARIPiprazole  2 mg Oral Daily    EScitalopram oxalate  20 mg Oral Daily    heparin (porcine)  5,000 Units Subcutaneous Q8H    hydroxychloroquine  200 mg Oral BID    insulin aspart U-100  26 Units Subcutaneous TID AC    insulin detemir U-100 (Levemir)  48 Units Subcutaneous Daily    pantoprazole  40 mg Oral Daily    sodium bicarbonate  1,300 mg Oral TID    tacrolimus  4 mg Oral BID     Continuous Infusions:  PRN Meds:acetaminophen, dextrose 10%, dextrose 10%, glucagon (human recombinant), glucose, glucose, insulin aspart U-100, melatonin, ondansetron, prochlorperazine, sodium chloride 0.9%    Review of Systems   Constitutional:  Positive for activity change, appetite change, fatigue and unexpected weight change (~10 lbs in 1 week). Negative for fever.   HENT:  Negative for congestion, sore throat and trouble swallowing.    Eyes:  Negative for photophobia and redness.   Respiratory:  Positive for shortness of breath (with exertion). Negative for cough, wheezing and stridor.    Cardiovascular:  Negative for chest pain and leg swelling.   Gastrointestinal:  Positive for diarrhea (improving) and nausea (improving). Negative for abdominal distention, abdominal pain, blood in stool, constipation and vomiting.   Genitourinary:  Positive for decreased urine volume (improving). Negative for difficulty urinating and dysuria.   Musculoskeletal:  Negative for neck pain and neck stiffness.   Skin:  Negative for wound.   Allergic/Immunologic: Positive for immunocompromised state.   Neurological:  Positive for dizziness (when standing intermittently) and weakness. Negative for syncope.   Psychiatric/Behavioral:  Negative for confusion.      Objective:     Vital Signs (Most Recent):  Temp: 98.2 °F (36.8 °C) (02/07/24 1102)  Pulse: 86 (02/07/24 1102)  Resp: 18 (02/07/24 1102)  BP: (!) 113/55 (02/07/24 1102)  SpO2: 100 % (02/07/24 1102) Vital Signs (24h Range):  Temp:  [97.9 °F (36.6 °C)-98.5 °F (36.9 °C)] 98.2 °F  (36.8 °C)  Pulse:  [75-88] 86  Resp:  [18-20] 18  SpO2:  [97 %-100 %] 100 %  BP: (105-137)/(55-98) 113/55     Weight: 112.1 kg (247 lb 0.4 oz)  Body mass index is 37.56 kg/m².    Intake/Output - Last 3 Shifts         02/05 0700  02/06 0659 02/06 0700  02/07 0659 02/07 0700  02/08 0659    P.O.   600    I.V. (mL/kg) 351.7 (3.1) 1374.6 (12.3) 1410.9 (12.6)    Total Intake(mL/kg) 351.7 (3.1) 1374.6 (12.3) 2010.9 (18)    Urine (mL/kg/hr)  125 (0) 750 (0.9)    Stool   0    Total Output  125 750    Net +351.7 +1249.6 +1260.9           Urine Occurrence  1 x 1 x    Stool Occurrence   1 x             Physical Exam  Vitals and nursing note reviewed.   Constitutional:       General: He is not in acute distress.  HENT:      Head: Normocephalic and atraumatic.      Mouth/Throat:      Mouth: Mucous membranes are moist.   Eyes:      General: No scleral icterus.        Right eye: No discharge.         Left eye: No discharge.      Extraocular Movements: Extraocular movements intact.      Conjunctiva/sclera: Conjunctivae normal.   Cardiovascular:      Rate and Rhythm: Normal rate and regular rhythm.      Pulses: Normal pulses.      Heart sounds: Normal heart sounds. No murmur heard.  Pulmonary:      Effort: Pulmonary effort is normal. No respiratory distress.      Breath sounds: No wheezing or rales.   Abdominal:      General: Bowel sounds are normal. There is no distension.      Palpations: Abdomen is soft.      Tenderness: There is no abdominal tenderness. There is no guarding.      Hernia: No hernia is present.      Comments: Well healed chevron   Musculoskeletal:         General: No swelling. Normal range of motion.      Cervical back: Normal range of motion.      Right lower leg: No edema.      Left lower leg: No edema.   Skin:     General: Skin is warm.      Capillary Refill: Capillary refill takes less than 2 seconds.   Neurological:      Mental Status: He is alert and oriented to person, place, and time.   Psychiatric:          Mood and Affect: Mood normal.         Behavior: Behavior normal.         Thought Content: Thought content normal.         Judgment: Judgment normal.          Laboratory:  Immunosuppressants           Stop Route Frequency     tacrolimus capsule 4 mg         -- Oral 2 times daily          CBC:   Recent Labs   Lab 02/07/24  1345   WBC 2.72*   RBC 2.96*   HGB 8.7*   HCT 25.0*   PLT 70*   MCV 85   MCH 29.4   MCHC 34.8     CMP:   Recent Labs   Lab 02/07/24  0554   *   CALCIUM 8.2*   ALBUMIN 3.5   PROT 5.1*      K 4.1   CO2 24      BUN 38*   CREATININE 2.3*   ALKPHOS 28*   ALT 6*   AST 12   BILITOT 1.8*     Coagulation:   Recent Labs   Lab 02/05/24  1323   INR 1.15   APTT 31.5     Labs within the past 24 hours have been reviewed.    Diagnostic Results:  I have personally reviewed all pertinent imaging studies.    Debility/Functional status: Patient debilitated by evidence of Chronic fatigue, unspecified. Physical and occupational therapy ordered daily to evaluate and treat. Debility was: present on admission.

## 2024-02-07 NOTE — PROGRESS NOTES
"[facility] - Transplant Stepdown  Endocrinology  Progress Note    Admit Date: 2024     Reason for Consult: Management of T2DM, Hyperglycemia      Surgical Procedure and Date: Liver transplant 2023     Diabetes diagnosis year: Over 5 years ago     Home Diabetes Medications:  Tresiba 45 units QD, Humalog 25 units TIDWM +SSI MDC (180/25), Jardiance 25 mg, Ozempic 2 mg     How often checking glucose at home? >4 x day   BG readings on regimen: 100-180  Hypoglycemia on the regimen?  No  Missed doses on regimen?  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 Surgery with replaced R HA reconstruction. Endocrine consulted to manage hyperglycemia and type 2 diabetes.     Lab Results   Component Value Date    HGBA1C 4.9 2024         Interval HPI:   Overnight events: No acute events overnight. Patient in room 34108/41084 A. Blood glucose stable. BG at goal on current insulin regimen (SSI, prandial, and basal insulin ). Steroid use- None.    Renal function- Abnormal - Creatinine 2.3   Vasopressors-  None       Endocrine will continue to follow and manage insulin orders inpatient.         Diet diabetic  Calorie     Eatin%  Nausea: No  Hypoglycemia and intervention: No  Fever: No  TPN and/or TF: No      BP (!) 134/98   Pulse 87   Temp 98.4 °F (36.9 °C)   Resp 19   Ht 5' 8" (1.727 m)   Wt 112.1 kg (247 lb 0.4 oz)   SpO2 100%   BMI 37.56 kg/m²     Labs Reviewed and Include    Recent Labs   Lab 24  0554   *   CALCIUM 8.2*   ALBUMIN 3.5   PROT 5.1*      K 4.1   CO2 24      BUN 38*   CREATININE 2.3*   ALKPHOS 28*   ALT 6*   AST 12   BILITOT 1.8*     Lab Results   Component Value Date    WBC 1.51 (LL) 2024    HGB 7.1 (L) 2024    HCT 20.9 (L) 2024    MCV 86 2024 " "   PLT 54 (L) 02/07/2024     No results for input(s): "TSH", "FREET4" in the last 168 hours.  Lab Results   Component Value Date    HGBA1C 4.9 02/06/2024       Nutritional status:   Body mass index is 37.56 kg/m².  Lab Results   Component Value Date    ALBUMIN 3.5 02/07/2024    ALBUMIN 4.1 02/06/2024    ALBUMIN 4.0 02/06/2024     No results found for: "PREALBUMIN"    Estimated Creatinine Clearance: 47.7 mL/min (A) (based on SCr of 2.3 mg/dL (H)).    Accu-Checks  Recent Labs     02/06/24  0908 02/06/24  1248 02/06/24  1707 02/06/24  2108   POCTGLUCOSE 202* 185* 165* 85       Current Medications and/or Treatments Impacting Glycemic Control  Immunotherapy:    Immunosuppressants       None          Steroids:   Hormones (From admission, onward)      Start     Stop Route Frequency Ordered    02/06/24 0039  melatonin tablet 6 mg         -- Oral Nightly PRN 02/05/24 2343          Pressors:    Autonomic Drugs (From admission, onward)      None          Hyperglycemia/Diabetes Medications:   Antihyperglycemics (From admission, onward)      Start     Stop Route Frequency Ordered    02/07/24 0900  insulin detemir U-100 (Levemir) pen 48 Units         -- SubQ Daily 02/06/24 1642    02/06/24 1645  insulin aspart U-100 pen 26 Units         -- SubQ 3 times daily before meals 02/06/24 1642    02/06/24 0040  insulin aspart U-100 pen 0-10 Units         -- SubQ Before meals & nightly PRN 02/05/24 2343            ASSESSMENT and PLAN    Oncology  Anemia of chronic disease  May impact the accuracy of the HbA1C  Per Vy TIR for the past 30 days is only 46%      Endocrine  Type 2 diabetes mellitus with hyperglycemia  Endocrinology consulted for BG management.   BG goal 140-180    - Levemir (Insulin Detemir) 48 units daily  - Novolog (Insulin Aspart) 26 units TIDWM and prn for BG excursions [facility] SSI (150/25)  - BG checks AC/HS  - Hypoglycemia protocol in place  - If blood glucose greater than 300, please ask patient not to eat food or drink " anything other than water until correctional insulin has brought it back below 250    ** Please notify Endocrine for any change and/or advance in diet**  ** Please call Endocrine for any BG related issues **    Discharge Planning:   TBD. Please notify endocrinology prior to discharge.        GI  * Diarrhea  Managed per primary team  Avoid hypoglycemia              Jimi Sunshine, DNP, FNP  Endocrinology  [facility] - Transplant Stepdown

## 2024-02-07 NOTE — ASSESSMENT & PLAN NOTE
- Continue Plaquenil.  - Rheum consult 2/7 due to concern for flare with sx of fatigue, GALLEGOS, n/v/d, and cytopenias  - Rheum reviewed chart, think flare less likely but did recommend a few labs (vit D, urinary calcium, G6PD, and lysozyme)  - ACE is also pending  - Patient follows with Dr. Dempsey in [place], [place] and has previously been on Imuran and Prednisone for Sarcoidosis. Currently just on Plaquenil.  - WCTM.

## 2024-02-07 NOTE — SUBJECTIVE & OBJECTIVE
Medications:  Continuous Infusions:  Scheduled Meds:   ARIPiprazole  2 mg Oral Daily    EScitalopram oxalate  20 mg Oral Daily    heparin (porcine)  5,000 Units Subcutaneous Q8H    hydroxychloroquine  200 mg Oral BID    insulin aspart U-100  26 Units Subcutaneous TID AC    insulin detemir U-100 (Levemir)  48 Units Subcutaneous Daily    pantoprazole  40 mg Oral Daily    sodium bicarbonate  1,300 mg Oral TID    tacrolimus  4 mg Oral BID     PRN Meds:acetaminophen, dextrose 10%, dextrose 10%, glucagon (human recombinant), glucose, glucose, insulin aspart U-100, melatonin, ondansetron, prochlorperazine, sodium chloride 0.9%     Review of patient's allergies indicates:   Allergen Reactions    Adhesive Other (See Comments)     Skin irritation  Paper tape okay to use    Pcn [penicillins] Rash     Happened as a baby        Past Medical History:   Diagnosis Date    C. difficile diarrhea     Cirrhosis of liver with ascites 11/17/2022    Diabetes mellitus, type II, insulin dependent     Granulomatous colitis     Hepatic granuloma associated with sarcoidosis     Hepatic granuloma associated with sarcoidosis 11/17/2022    Hepatic granuloma associated with sarcoidosis 11/17/2022    Personal history of colonic polyps     Pre-op evaluation 1/4/2023    Sarcoidosis, unspecified     Thrombocytopenia, unspecified     Unspecified cirrhosis of liver      Past Surgical History:   Procedure Laterality Date    ADENOIDECTOMY      BIOPSY OF LIVER WITH ULTRASOUND GUIDANCE N/A 7/24/2023    Procedure: BIOPSY, LIVER, WITH US GUIDANCE;  Surgeon: Cecilio Hyman MD;  Location: [facility] CATH LAB;  Service: Radiology;  Laterality: N/A;    CARDIAC CATHETERIZATION Right     COLONOSCOPY  07/11/2018    HIP SURGERY      RIght removed ruptured cyst    LIVER TRANSPLANT N/A 1/5/2023    Procedure: TRANSPLANT, LIVER;  Surgeon: Rosa Foreman MD;  Location: [facility] OR [address] [place] [address];  Service: Transplant;  Laterality: N/A;    NEEDLE LOCALIZATION N/A 7/24/2023     Procedure: NEEDLE LOCALIZATION;  Surgeon: Cecilio Hyman MD;  Location: [facility] CATH LAB;  Service: Radiology;  Laterality: N/A;    UPPER GASTROINTESTINAL ENDOSCOPY  10/09/2019     Family History       Problem Relation (Age of Onset)    Cancer Father, Maternal Grandfather    Diabetes Maternal Grandmother, Maternal Grandfather, Paternal Grandmother    Heart disease Father    Hypertension Mother, Father    Rectal cancer Father    Thyroid disease Father          Tobacco Use    Smoking status: Never    Smokeless tobacco: Never   Substance and Sexual Activity    Alcohol use: Never    Drug use: Never    Sexual activity: Not on file       Review of Systems   Constitutional:  Positive for activity change, appetite change and fatigue. Negative for chills and fever.   HENT:  Negative for congestion and sore throat.    Respiratory:  Positive for shortness of breath. Negative for cough and wheezing.    Cardiovascular:  Negative for chest pain.   Gastrointestinal:  Positive for diarrhea, nausea and vomiting. Negative for abdominal pain and constipation.   Skin:  Negative for color change.     Objective:     Vital Signs (Most Recent):  Temp: 98.2 °F (36.8 °C) (02/07/24 1102)  Pulse: 86 (02/07/24 1102)  Resp: 18 (02/07/24 1102)  BP: (!) 113/55 (02/07/24 1102)  SpO2: 100 % (02/07/24 1102) Vital Signs (24h Range):  Temp:  [97.9 °F (36.6 °C)-98.5 °F (36.9 °C)] 98.2 °F (36.8 °C)  Pulse:  [75-88] 86  Resp:  [18-20] 18  SpO2:  [97 %-100 %] 100 %  BP: (105-137)/(55-98) 113/55     Weight: 112.1 kg (247 lb 0.4 oz)  Body mass index is 37.56 kg/m².  Body surface area is 2.32 meters squared.      Intake/Output Summary (Last 24 hours) at 2/7/2024 1524  Last data filed at 2/7/2024 1316  Gross per 24 hour   Intake 2510.8 ml   Output 750 ml   Net 1760.8 ml        Physical Exam  Constitutional:       Appearance: Normal appearance.   HENT:      Head: Normocephalic and atraumatic.      Nose: Nose normal.      Mouth/Throat:      Mouth: Mucous  membranes are dry.   Eyes:      Extraocular Movements: Extraocular movements intact.      Pupils: Pupils are equal, round, and reactive to light.   Cardiovascular:      Rate and Rhythm: Normal rate and regular rhythm.      Pulses: Normal pulses.      Heart sounds: Normal heart sounds.   Pulmonary:      Effort: Pulmonary effort is normal.      Breath sounds: Normal breath sounds.   Abdominal:      General: Bowel sounds are normal.      Palpations: Abdomen is soft.   Skin:     General: Skin is warm.   Neurological:      General: No focal deficit present.      Mental Status: He is alert and oriented to person, place, and time.   Psychiatric:         Mood and Affect: Mood normal.          Significant Labs:   All pertinent labs from the last 24 hours have been reviewed.    Diagnostic Results:  I have reviewed all pertinent imaging results/findings within the past 24 hours.

## 2024-02-07 NOTE — ASSESSMENT & PLAN NOTE
Endocrinology consulted for BG management.   BG goal 140-180    - Levemir (Insulin Detemir) 48 units daily  - Novolog (Insulin Aspart) 26 units TIDWM and prn for BG excursions [facility] SSI (150/25)  - BG checks AC/HS  - Hypoglycemia protocol in place  - If blood glucose greater than 300, please ask patient not to eat food or drink anything other than water until correctional insulin has brought it back below 250    ** Please notify Endocrine for any change and/or advance in diet**  ** Please call Endocrine for any BG related issues **    Discharge Planning:   TBD. Please notify endocrinology prior to discharge.

## 2024-02-07 NOTE — PROGRESS NOTES
[facility]  Liver Transplant  Progress Note    Patient Name: Rosalio Mccarty  MRN: [id number]  Admission Date: 2/5/2024  Hospital Length of Stay: 2 days  Code Status: Full Code  Primary Care Provider: Osorio Loredo Jr., MD  Post-Operative Day: 398    ORGAN:   LIVER  Disease Etiology: Cirrhosis: Other, Specify - granulomatous hepatitis  Donor Type:   Donation after Brain Death  CDC High Risk:   Yes  Donor CMV Status:   Donor CMV Status: Positive  Donor HBcAB:   Negative  Donor HCV Status:   Positive  Donor HBV ASAF:   Donor HCV ASAF: Negative  Whole or Partial: Whole Liver  Biliary Anastomosis: End to End  Arterial Anatomy: Replaced Right Hepatic from SMA  Subjective:     History of Present Illness:  Rosalio Mccarty is a 48yr old man who 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 Surgery with replaced R HA reconstruction. Did well post transplant, post op course:  Moderate ACR: bx 5/18. SMP x 3.   Moderate ACR: bx 7/24. SMP x 3.   Fall: 7/2023, proximal fracture of fibular shaft, no surgery required   Persistent mild hyperbilirubinemia likely secondary to Gilbert syndrome (per Dr. Orta's note 1/5/24)  Presents to [facility] as a transfer from OSH for increased weakness and diarrhea x 1 month. Endorses decreased appetite, 10 lb weight loss in 1 week, NVD, dyspnea on exertion, decreased UOP. Denies fever, chills, CP, dysuria. Routine labs also noted to be abnormal, coordinator directed patient to go to ED for further investigation. In ED, labs notable for BARBARA (Cr 3.1 from BL 1.98-2.0), hyperkalemia (K 5.8), acidosis (bicarb 16). On arrival to [facility], he is resting comfortably, no TTP of abdomen, no edema noted. Plan for IVF, stool studies, CMV PCR, BC, UA. MMF held for diarrhea.    Hospital Course:  Transferred from OSH ED ([facility] in [place]) on 2/5 for n/v/d. Labs notable for BARBARA and acidosis. Hydrated with bicarb gtt. Infectious workup sent.    Interval History: NAEON.  Patient reports feeling slightly better today. DC bicarb gtt; encourage oral hydration. He reports is is tolerating intake without n/v and that diarrhea is improving. So far, infectious workup is negative. CMV PCR neg. C. Diff neg. WBC stool no neutrophils seen. Rotavirus negative. Some stool studies are still pending. Parvovirus sent this AM. UA neg for infection and blood cx NGTD. Renal function is improving with hydration. LFTs are stable. However, wbc, h/h, and plt decreased. In reviewing symptoms of fatigue, GALLEGOS, n/v/d, cytopenias, (pt reports sx slowly started after steroid taper ended), thought could be component of sarcoidosis flare. D/w Rheum who reviewed chart and stated less likely. Did add G6PD, vit D, lysoyme, and urine calcium to AM labs as recommended by Rhem in case team circles back to rheum later. Patient reports following with Dr. Dempsey in [place], [place] for sarcoidosis. Labs sent later this AM notable for elevated LDH, retic, and low hapto which copuld be consistent with hemolysis. Heme consulted as recommended by Rheum. Heme ordered additional labs, most that are pending. Appreciate heme assistance. Cellcept has been held since admit for GI sx; continue to hold. Prograf resumed at lower dose after level of 14 yesterday. VSS. WCTM.    Scheduled Meds:   ARIPiprazole  2 mg Oral Daily    EScitalopram oxalate  20 mg Oral Daily    heparin (porcine)  5,000 Units Subcutaneous Q8H    hydroxychloroquine  200 mg Oral BID    insulin aspart U-100  26 Units Subcutaneous TID AC    insulin detemir U-100 (Levemir)  48 Units Subcutaneous Daily    pantoprazole  40 mg Oral Daily    sodium bicarbonate  1,300 mg Oral TID    tacrolimus  4 mg Oral BID     Continuous Infusions:  PRN Meds:acetaminophen, dextrose 10%, dextrose 10%, glucagon (human recombinant), glucose, glucose, insulin aspart U-100, melatonin, ondansetron, prochlorperazine, sodium chloride 0.9%    Review of Systems   Constitutional:  Positive for  activity change, appetite change, fatigue and unexpected weight change (~10 lbs in 1 week). Negative for fever.   HENT:  Negative for congestion, sore throat and trouble swallowing.    Eyes:  Negative for photophobia and redness.   Respiratory:  Positive for shortness of breath (with exertion). Negative for cough, wheezing and stridor.    Cardiovascular:  Negative for chest pain and leg swelling.   Gastrointestinal:  Positive for diarrhea (improving) and nausea (improving). Negative for abdominal distention, abdominal pain, blood in stool, constipation and vomiting.   Genitourinary:  Positive for decreased urine volume (improving). Negative for difficulty urinating and dysuria.   Musculoskeletal:  Negative for neck pain and neck stiffness.   Skin:  Negative for wound.   Allergic/Immunologic: Positive for immunocompromised state.   Neurological:  Positive for dizziness (when standing intermittently) and weakness. Negative for syncope.   Psychiatric/Behavioral:  Negative for confusion.      Objective:     Vital Signs (Most Recent):  Temp: 98.2 °F (36.8 °C) (02/07/24 1102)  Pulse: 86 (02/07/24 1102)  Resp: 18 (02/07/24 1102)  BP: (!) 113/55 (02/07/24 1102)  SpO2: 100 % (02/07/24 1102) Vital Signs (24h Range):  Temp:  [97.9 °F (36.6 °C)-98.5 °F (36.9 °C)] 98.2 °F (36.8 °C)  Pulse:  [75-88] 86  Resp:  [18-20] 18  SpO2:  [97 %-100 %] 100 %  BP: (105-137)/(55-98) 113/55     Weight: 112.1 kg (247 lb 0.4 oz)  Body mass index is 37.56 kg/m².    Intake/Output - Last 3 Shifts         02/05 0700  02/06 0659 02/06 0700 02/07 0659 02/07 0700  02/08 0659    P.O.   600    I.V. (mL/kg) 351.7 (3.1) 1374.6 (12.3) 1410.9 (12.6)    Total Intake(mL/kg) 351.7 (3.1) 1374.6 (12.3) 2010.9 (18)    Urine (mL/kg/hr)  125 (0) 750 (0.9)    Stool   0    Total Output  125 750    Net +351.7 +1249.6 +1260.9           Urine Occurrence  1 x 1 x    Stool Occurrence   1 x             Physical Exam  Vitals and nursing note reviewed.   Constitutional:        General: He is not in acute distress.  HENT:      Head: Normocephalic and atraumatic.      Mouth/Throat:      Mouth: Mucous membranes are moist.   Eyes:      General: No scleral icterus.        Right eye: No discharge.         Left eye: No discharge.      Extraocular Movements: Extraocular movements intact.      Conjunctiva/sclera: Conjunctivae normal.   Cardiovascular:      Rate and Rhythm: Normal rate and regular rhythm.      Pulses: Normal pulses.      Heart sounds: Normal heart sounds. No murmur heard.  Pulmonary:      Effort: Pulmonary effort is normal. No respiratory distress.      Breath sounds: No wheezing or rales.   Abdominal:      General: Bowel sounds are normal. There is no distension.      Palpations: Abdomen is soft.      Tenderness: There is no abdominal tenderness. There is no guarding.      Hernia: No hernia is present.      Comments: Well healed chevron   Musculoskeletal:         General: No swelling. Normal range of motion.      Cervical back: Normal range of motion.      Right lower leg: No edema.      Left lower leg: No edema.   Skin:     General: Skin is warm.      Capillary Refill: Capillary refill takes less than 2 seconds.   Neurological:      Mental Status: He is alert and oriented to person, place, and time.   Psychiatric:         Mood and Affect: Mood normal.         Behavior: Behavior normal.         Thought Content: Thought content normal.         Judgment: Judgment normal.          Laboratory:  Immunosuppressants           Stop Route Frequency     tacrolimus capsule 4 mg         -- Oral 2 times daily          CBC:   Recent Labs   Lab 02/07/24  1345   WBC 2.72*   RBC 2.96*   HGB 8.7*   HCT 25.0*   PLT 70*   MCV 85   MCH 29.4   MCHC 34.8     CMP:   Recent Labs   Lab 02/07/24  0554   *   CALCIUM 8.2*   ALBUMIN 3.5   PROT 5.1*      K 4.1   CO2 24      BUN 38*   CREATININE 2.3*   ALKPHOS 28*   ALT 6*   AST 12   BILITOT 1.8*     Coagulation:   Recent Labs   Lab  "02/05/24  1323   INR 1.15   APTT 31.5     Labs within the past 24 hours have been reviewed.    Diagnostic Results:  I have personally reviewed all pertinent imaging studies.    Debility/Functional status: Patient debilitated by evidence of Chronic fatigue, unspecified. Physical and occupational therapy ordered daily to evaluate and treat. Debility was: present on admission.        Assessment/Plan:     * Diarrhea  - No neutrophils seen in stool. C. Diff negative. Rotavirus negative.  - Giardia/crypto, ova/parasites, e. Coli antigen, and stool culture pending.  - MMF held   - IV hydration (bicarb gtt) transition to oral hydration 2/7  - CMV negative  - UA neg for infection  - Blood cx 2/6 NGTD  - Parvovirus pending  - COVID negative  - Improving    Pancytopenia  - Wbc, H/H, plt decreased; wbc decreasing further  - Infectious workup in process; negative thus far  - CMV PCR negative. Parvovirus 2/7 pending.  - Cellcept has been held since admission   - Anemia workup notable for elevated LDH + retic, low hapto, folate/b12 wnl and iron/TIBC/ferritin wnl  - Heme consulted, appreciate assistance; additional labs sent/pending  - Continue to monitor    GERD without esophagitis  - Continue Protonix.      Anxiety  - Continue Lexapro and Abilify (home meds).      Neutropenia, unspecified  - See "pancytopenia."      At risk for opportunistic infections  - OI ppx per protocol.  - CMV PCR (D+/R-) PCR 2/6 negative.      Prophylactic immunotherapy  - See "long term use of immuno."      Long-term use of immunosuppressant medication  - Continue Prograf. Monitor trough daily and adjust dose as needed to achieve therapeutic level.  - Cellcept held for infection/GI issues and pancytopenia.  - Not currently taking steroids     Thrombocytopenia, unspecified  - See "pancytopenia."      Anemia of chronic disease  - See "pancytopenia."      Chronic systolic heart failure  - Monitor fluid status closely.      S/P liver transplant  - AST/ALT WNL "   - Mild hyperbilirubinemia, poss Gilbert's from allograft   - Continue to monitor with daily CMP.      Type 2 diabetes mellitus with hyperglycemia  - Home insulin ordered at 75% with SSI on admit  - Endocrine consulted, appreciate assistance       Sarcoidosis  - Continue Plaquenil.  - Rheum consult 2/7 due to concern for flare with sx of fatigue, GALLEGOS, n/v/d, and cytopenias  - Rheum reviewed chart, think flare less likely but did recommend a few labs (vit D, urinary calcium, G6PD, and lysozyme)  - ACE is also pending  - Patient follows with Dr. Dempsey in [place], [place] and has previously been on Imuran and Prednisone for Sarcoidosis. Currently just on Plaquenil.  - WCTM.        VTE Risk Mitigation (From admission, onward)           Ordered     heparin (porcine) injection 5,000 Units  Every 8 hours         02/05/24 2343     IP VTE HIGH RISK PATIENT  Once         02/05/24 2343     Place ROX hose  Until discontinued         02/05/24 2343                    The patients clinical status was discussed at multidisplinary rounds, involving transplant surgery, transplant medicine, pharmacy, nursing, nutrition, and social work.    Discharge Planning:  Not today.    Medical decision making for this encounter includes review of pertinent labs and diagnostic studies, assessment and planning, discussions with consulting providers, discussion with patient/family, and participation in multidisciplinary rounds. Time spent caring for patient: 60 minutes.    Sheryl Wolf PA-C  Liver Transplant  [facility]

## 2024-02-07 NOTE — ASSESSMENT & PLAN NOTE
- No neutrophils seen in stool. C. Diff negative. Rotavirus negative.  - Giardia/crypto, ova/parasites, e. Coli antigen, and stool culture pending.  - MMF held   - IV hydration (bicarb gtt) transition to oral hydration 2/7  - CMV negative  - UA neg for infection  - Blood cx 2/6 NGTD  - Parvovirus pending  - COVID negative  - Improving

## 2024-02-07 NOTE — SUBJECTIVE & OBJECTIVE
"Interval HPI:   Overnight events: No acute events overnight. Patient in room 36610/70470 A. Blood glucose stable. BG at goal on current insulin regimen (SSI, prandial, and basal insulin ). Steroid use- None.    Renal function- Abnormal - Creatinine 2.3   Vasopressors-  None       Endocrine will continue to follow and manage insulin orders inpatient.         Diet diabetic  Calorie     Eatin%  Nausea: No  Hypoglycemia and intervention: No  Fever: No  TPN and/or TF: No      BP (!) 134/98   Pulse 87   Temp 98.4 °F (36.9 °C)   Resp 19   Ht 5' 8" (1.727 m)   Wt 112.1 kg (247 lb 0.4 oz)   SpO2 100%   BMI 37.56 kg/m²     Labs Reviewed and Include    Recent Labs   Lab 24  0554   *   CALCIUM 8.2*   ALBUMIN 3.5   PROT 5.1*      K 4.1   CO2 24      BUN 38*   CREATININE 2.3*   ALKPHOS 28*   ALT 6*   AST 12   BILITOT 1.8*     Lab Results   Component Value Date    WBC 1.51 (LL) 2024    HGB 7.1 (L) 2024    HCT 20.9 (L) 2024    MCV 86 2024    PLT 54 (L) 2024     No results for input(s): "TSH", "FREET4" in the last 168 hours.  Lab Results   Component Value Date    HGBA1C 4.9 2024       Nutritional status:   Body mass index is 37.56 kg/m².  Lab Results   Component Value Date    ALBUMIN 3.5 2024    ALBUMIN 4.1 2024    ALBUMIN 4.0 2024     No results found for: "PREALBUMIN"    Estimated Creatinine Clearance: 47.7 mL/min (A) (based on SCr of 2.3 mg/dL (H)).    Accu-Checks  Recent Labs     24  0908 24  1248 24  1707 24  2108   POCTGLUCOSE 202* 185* 165* 85       Current Medications and/or Treatments Impacting Glycemic Control  Immunotherapy:    Immunosuppressants       None          Steroids:   Hormones (From admission, onward)      Start     Stop Route Frequency Ordered    24 0039  melatonin tablet 6 mg         -- Oral Nightly PRN 24 2188          Pressors:    Autonomic Drugs (From admission, onward)      " None          Hyperglycemia/Diabetes Medications:   Antihyperglycemics (From admission, onward)      Start     Stop Route Frequency Ordered    02/07/24 0900  insulin detemir U-100 (Levemir) pen 48 Units         -- SubQ Daily 02/06/24 1642    02/06/24 1645  insulin aspart U-100 pen 26 Units         -- SubQ 3 times daily before meals 02/06/24 1642    02/06/24 0040  insulin aspart U-100 pen 0-10 Units         -- SubQ Before meals & nightly PRN 02/05/24 9767

## 2024-02-08 PROBLEM — B33.8 RSV (RESPIRATORY SYNCYTIAL VIRUS INFECTION): Status: ACTIVE | Noted: 2024-02-08

## 2024-02-08 LAB
ABO + RH BLD: NORMAL
ACE SERPL-CCNC: 19 U/L (ref 16–85)
ADENOVIRUS: NOT DETECTED
ALBUMIN SERPL BCP-MCNC: 3.5 G/DL (ref 3.5–5.2)
ALP SERPL-CCNC: 35 U/L (ref 55–135)
ALT SERPL W/O P-5'-P-CCNC: 7 U/L (ref 10–44)
ANION GAP SERPL CALC-SCNC: 9 MMOL/L (ref 8–16)
AST SERPL-CCNC: 12 U/L (ref 10–40)
BACTERIA STL CULT: NORMAL
BACTERIA STL CULT: NORMAL
BASO STIPL BLD QL SMEAR: ABNORMAL
BASOPHILS # BLD AUTO: 0.01 K/UL (ref 0–0.2)
BASOPHILS NFR BLD: 0.6 % (ref 0–1.9)
BILIRUB SERPL-MCNC: 1.6 MG/DL (ref 0.1–1)
BLD GP AB SCN CELLS X3 SERPL QL: NORMAL
BLD PROD TYP BPU: NORMAL
BLOOD UNIT EXPIRATION DATE: NORMAL
BLOOD UNIT TYPE CODE: 6200
BLOOD UNIT TYPE: NORMAL
BORDETELLA PARAPERTUSSIS (IS1001): NOT DETECTED
BORDETELLA PERTUSSIS (PTXP): NOT DETECTED
BUN SERPL-MCNC: 27 MG/DL (ref 6–20)
CALCIUM SERPL-MCNC: 8.8 MG/DL (ref 8.7–10.5)
CHLAMYDIA PNEUMONIAE: NOT DETECTED
CHLORIDE SERPL-SCNC: 106 MMOL/L (ref 95–110)
CO2 SERPL-SCNC: 25 MMOL/L (ref 23–29)
CODING SYSTEM: NORMAL
CORONAVIRUS 229E, COMMON COLD VIRUS: NOT DETECTED
CORONAVIRUS HKU1, COMMON COLD VIRUS: NOT DETECTED
CORONAVIRUS NL63, COMMON COLD VIRUS: NOT DETECTED
CORONAVIRUS OC43, COMMON COLD VIRUS: NOT DETECTED
CREAT SERPL-MCNC: 2 MG/DL (ref 0.5–1.4)
CROSSMATCH INTERPRETATION: NORMAL
DIFFERENTIAL METHOD BLD: ABNORMAL
DISPENSE STATUS: NORMAL
E COLI SXT1 STL QL IA: NEGATIVE
E COLI SXT2 STL QL IA: NEGATIVE
EOSINOPHIL # BLD AUTO: 0 K/UL (ref 0–0.5)
EOSINOPHIL NFR BLD: 1.9 % (ref 0–8)
EPSTEIN-BARR VIRUS DNA: NORMAL
EPSTEIN-BARR VIRUS PCR, QUANT: NOT DETECTED IU/ML
ERYTHROCYTE [DISTWIDTH] IN BLOOD BY AUTOMATED COUNT: 18.4 % (ref 11.5–14.5)
EST. GFR  (NO RACE VARIABLE): 40.4 ML/MIN/1.73 M^2
FLUBV RNA NPH QL NAA+NON-PROBE: NOT DETECTED
GLUCOSE SERPL-MCNC: 98 MG/DL (ref 70–110)
HCT VFR BLD AUTO: 20.4 % (ref 40–54)
HGB BLD-MCNC: 7 G/DL (ref 14–18)
HPIV1 RNA NPH QL NAA+NON-PROBE: NOT DETECTED
HPIV2 RNA NPH QL NAA+NON-PROBE: NOT DETECTED
HPIV3 RNA NPH QL NAA+NON-PROBE: NOT DETECTED
HPIV4 RNA NPH QL NAA+NON-PROBE: NOT DETECTED
HUMAN METAPNEUMOVIRUS: NOT DETECTED
IMM GRANULOCYTES # BLD AUTO: 0.01 K/UL (ref 0–0.04)
IMM GRANULOCYTES NFR BLD AUTO: 0.6 % (ref 0–0.5)
INFLUENZA A (SUBTYPES H1,H1-2009,H3): NOT DETECTED
LYMPHOCYTES # BLD AUTO: 0.6 K/UL (ref 1–4.8)
LYMPHOCYTES NFR BLD: 37.3 % (ref 18–48)
MAGNESIUM SERPL-MCNC: 1.7 MG/DL (ref 1.6–2.6)
MCH RBC QN AUTO: 29.5 PG (ref 27–31)
MCHC RBC AUTO-ENTMCNC: 34.3 G/DL (ref 32–36)
MCV RBC AUTO: 86 FL (ref 82–98)
MONOCYTES # BLD AUTO: 0.2 K/UL (ref 0.3–1)
MONOCYTES NFR BLD: 10.6 % (ref 4–15)
MYCOPLASMA PNEUMONIAE: NOT DETECTED
NEUTROPHILS # BLD AUTO: 0.8 K/UL (ref 1.8–7.7)
NEUTROPHILS NFR BLD: 49 % (ref 38–73)
NRBC BLD-RTO: 0 /100 WBC
NUM UNITS TRANS PACKED RBC: NORMAL
O+P STL MICRO: NORMAL
OVALOCYTES BLD QL SMEAR: ABNORMAL
PATH REV BLD -IMP: NORMAL
PHOSPHATE SERPL-MCNC: 2.8 MG/DL (ref 2.7–4.5)
PLATELET # BLD AUTO: 53 K/UL (ref 150–450)
PLATELET BLD QL SMEAR: ABNORMAL
PMV BLD AUTO: 10.7 FL (ref 9.2–12.9)
POCT GLUCOSE: 112 MG/DL (ref 70–110)
POCT GLUCOSE: 129 MG/DL (ref 70–110)
POCT GLUCOSE: 144 MG/DL (ref 70–110)
POCT GLUCOSE: 149 MG/DL (ref 70–110)
POLYCHROMASIA BLD QL SMEAR: ABNORMAL
POTASSIUM SERPL-SCNC: 4.6 MMOL/L (ref 3.5–5.1)
PROT SERPL-MCNC: 5.2 G/DL (ref 6–8.4)
RBC # BLD AUTO: 2.37 M/UL (ref 4.6–6.2)
RESPIRATORY INFECTION PANEL SOURCE: ABNORMAL
RSV RNA NPH QL NAA+NON-PROBE: DETECTED
RV+EV RNA NPH QL NAA+NON-PROBE: NOT DETECTED
SARS-COV-2 RNA RESP QL NAA+PROBE: NOT DETECTED
SODIUM SERPL-SCNC: 140 MMOL/L (ref 136–145)
SPECIMEN OUTDATE: NORMAL
TACROLIMUS BLD-MCNC: 5.4 NG/ML (ref 5–15)
WBC # BLD AUTO: 1.61 K/UL (ref 3.9–12.7)

## 2024-02-08 PROCEDURE — 85025 COMPLETE CBC W/AUTO DIFF WBC: CPT

## 2024-02-08 PROCEDURE — 36430 TRANSFUSION BLD/BLD COMPNT: CPT

## 2024-02-08 PROCEDURE — 25000003 PHARM REV CODE 250: Performed by: PHYSICIAN ASSISTANT

## 2024-02-08 PROCEDURE — 82955 ASSAY OF G6PD ENZYME: CPT | Performed by: PHYSICIAN ASSISTANT

## 2024-02-08 PROCEDURE — 99232 SBSQ HOSP IP/OBS MODERATE 35: CPT | Mod: ,,, | Performed by: NURSE PRACTITIONER

## 2024-02-08 PROCEDURE — P9016 RBC LEUKOCYTES REDUCED: HCPCS | Performed by: PHYSICIAN ASSISTANT

## 2024-02-08 PROCEDURE — 36415 COLL VENOUS BLD VENIPUNCTURE: CPT | Performed by: PHYSICIAN ASSISTANT

## 2024-02-08 PROCEDURE — 86920 COMPATIBILITY TEST SPIN: CPT | Performed by: PHYSICIAN ASSISTANT

## 2024-02-08 PROCEDURE — 20600001 HC STEP DOWN PRIVATE ROOM

## 2024-02-08 PROCEDURE — 84100 ASSAY OF PHOSPHORUS: CPT | Performed by: PHYSICIAN ASSISTANT

## 2024-02-08 PROCEDURE — 63600175 PHARM REV CODE 636 W HCPCS

## 2024-02-08 PROCEDURE — 63600175 PHARM REV CODE 636 W HCPCS: Performed by: PHYSICIAN ASSISTANT

## 2024-02-08 PROCEDURE — 30233N1 TRANSFUSION OF NONAUTOLOGOUS RED BLOOD CELLS INTO PERIPHERAL VEIN, PERCUTANEOUS APPROACH: ICD-10-PCS | Performed by: TRANSPLANT SURGERY

## 2024-02-08 PROCEDURE — 25000003 PHARM REV CODE 250

## 2024-02-08 PROCEDURE — 99233 SBSQ HOSP IP/OBS HIGH 50: CPT | Mod: ,,, | Performed by: PHYSICIAN ASSISTANT

## 2024-02-08 PROCEDURE — 82652 VIT D 1 25-DIHYDROXY: CPT | Performed by: PHYSICIAN ASSISTANT

## 2024-02-08 PROCEDURE — 86901 BLOOD TYPING SEROLOGIC RH(D): CPT | Performed by: PHYSICIAN ASSISTANT

## 2024-02-08 PROCEDURE — 80197 ASSAY OF TACROLIMUS: CPT

## 2024-02-08 PROCEDURE — 85549 MURAMIDASE: CPT | Performed by: PHYSICIAN ASSISTANT

## 2024-02-08 PROCEDURE — 80053 COMPREHEN METABOLIC PANEL: CPT

## 2024-02-08 PROCEDURE — 83735 ASSAY OF MAGNESIUM: CPT

## 2024-02-08 PROCEDURE — 87633 RESP VIRUS 12-25 TARGETS: CPT | Performed by: PHYSICIAN ASSISTANT

## 2024-02-08 RX ORDER — PREDNISONE 5 MG/1
5 TABLET ORAL DAILY
Status: DISCONTINUED | OUTPATIENT
Start: 2024-02-09 | End: 2024-02-09 | Stop reason: HOSPADM

## 2024-02-08 RX ORDER — HYDROCODONE BITARTRATE AND ACETAMINOPHEN 500; 5 MG/1; MG/1
TABLET ORAL
Status: DISCONTINUED | OUTPATIENT
Start: 2024-02-08 | End: 2024-02-09 | Stop reason: HOSPADM

## 2024-02-08 RX ORDER — PREDNISONE 10 MG/1
10 TABLET ORAL DAILY
Status: DISCONTINUED | OUTPATIENT
Start: 2024-02-08 | End: 2024-02-08

## 2024-02-08 RX ORDER — TACROLIMUS 5 MG/1
5 CAPSULE ORAL 2 TIMES DAILY
Status: DISCONTINUED | OUTPATIENT
Start: 2024-02-08 | End: 2024-02-09

## 2024-02-08 RX ORDER — TACROLIMUS 1 MG/1
1 CAPSULE ORAL ONCE
Status: COMPLETED | OUTPATIENT
Start: 2024-02-08 | End: 2024-02-08

## 2024-02-08 RX ADMIN — HEPARIN SODIUM 5000 UNITS: 5000 INJECTION INTRAVENOUS; SUBCUTANEOUS at 08:02

## 2024-02-08 RX ADMIN — ARIPIPRAZOLE 2 MG: 2 TABLET ORAL at 08:02

## 2024-02-08 RX ADMIN — TACROLIMUS 5 MG: 5 CAPSULE ORAL at 06:02

## 2024-02-08 RX ADMIN — Medication 6 MG: at 08:02

## 2024-02-08 RX ADMIN — HYDROXYCHLOROQUINE SULFATE 200 MG: 200 TABLET, FILM COATED ORAL at 08:02

## 2024-02-08 RX ADMIN — INSULIN ASPART 26 UNITS: 100 INJECTION, SOLUTION INTRAVENOUS; SUBCUTANEOUS at 06:02

## 2024-02-08 RX ADMIN — PANTOPRAZOLE SODIUM 40 MG: 40 TABLET, DELAYED RELEASE ORAL at 08:02

## 2024-02-08 RX ADMIN — HEPARIN SODIUM 5000 UNITS: 5000 INJECTION INTRAVENOUS; SUBCUTANEOUS at 01:02

## 2024-02-08 RX ADMIN — SODIUM BICARBONATE 650 MG TABLET 1300 MG: at 01:02

## 2024-02-08 RX ADMIN — SODIUM BICARBONATE 650 MG TABLET 1300 MG: at 08:02

## 2024-02-08 RX ADMIN — ESCITALOPRAM OXALATE 20 MG: 10 TABLET ORAL at 08:02

## 2024-02-08 RX ADMIN — TACROLIMUS 4 MG: 1 CAPSULE ORAL at 08:02

## 2024-02-08 RX ADMIN — PREDNISONE 10 MG: 10 TABLET ORAL at 01:02

## 2024-02-08 RX ADMIN — TACROLIMUS 1 MG: 1 CAPSULE ORAL at 01:02

## 2024-02-08 RX ADMIN — INSULIN ASPART 26 UNITS: 100 INJECTION, SOLUTION INTRAVENOUS; SUBCUTANEOUS at 08:02

## 2024-02-08 RX ADMIN — INSULIN ASPART 26 UNITS: 100 INJECTION, SOLUTION INTRAVENOUS; SUBCUTANEOUS at 01:02

## 2024-02-08 RX ADMIN — HEPARIN SODIUM 5000 UNITS: 5000 INJECTION INTRAVENOUS; SUBCUTANEOUS at 05:02

## 2024-02-08 NOTE — PROGRESS NOTES
[facility] Step[facility]  Liver Transplant  Progress Note    Patient Name: Rosalio Mccarty  MRN: [id number]  Admission Date: 2/5/2024  Hospital Length of Stay: 3 days  Code Status: Full Code  Primary Care Provider: Osorio Loredo Jr., MD  Post-Operative Day: 399    ORGAN:   LIVER  Disease Etiology: Cirrhosis: Other, Specify - granulomatous hepatitis  Donor Type:   Donation after Brain Death  CDC High Risk:   Yes  Donor CMV Status:   Donor CMV Status: Positive  Donor HBcAB:   Negative  Donor HCV Status:   Positive  Donor HBV ASAF:   Donor HCV ASAF: Negative  Whole or Partial: Whole Liver  Biliary Anastomosis: End to End  Arterial Anatomy: Replaced Right Hepatic from SMA  Subjective:     History of Present Illness:  Rosalio Mccarty is a 48yr old man who 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 Surgery with replaced R HA reconstruction. Did well post transplant, post op course:  Moderate ACR: bx 5/18. SMP x 3.   Moderate ACR: bx 7/24. SMP x 3.   Fall: 7/2023, proximal fracture of fibular shaft, no surgery required   Persistent mild hyperbilirubinemia likely secondary to Gilbert syndrome (per Dr. Orta's note 1/5/24)  Presents to [facility] as a transfer from OSH for increased weakness and diarrhea x 1 month. Endorses decreased appetite, 10 lb weight loss in 1 week, NVD, dyspnea on exertion, decreased UOP. Denies fever, chills, CP, dysuria. Routine labs also noted to be abnormal, coordinator directed patient to go to ED for further investigation. In ED, labs notable for BARBARA (Cr 3.1 from BL 1.98-2.0), hyperkalemia (K 5.8), acidosis (bicarb 16). On arrival to [facility], he is resting comfortably, no TTP of abdomen, no edema noted. Plan for IVF, stool studies, CMV PCR, BC, UA. MMF held for diarrhea.    Hospital Course:  Transferred from OSH ED ([facility] in [place]) on 2/5 for n/v/d. Labs notable for BARBARA and acidosis. Hydrated with bicarb gtt. Infectious workup sent. Bicarb gtt discontinued 2/7;  encouraging oral hydration. Now tolerating a diet. Ua neg for infection. Blood cx 2/7 NGTD. CMV PCR neg. C. Diff neg. WBC stool no neutrophils seen. Rotavirus negative. Giardia/Crypto negative. E. Coli antigen negative. Stool culture pending. EBV pending. Parvovirous pending.    Wbc, h/h, and plt decreased. In reviewing symptoms of fatigue, GALLEGOS, n/v/d, cytopenias, (pt reports sx slowly started after steroid taper ended), thought could be component of sarcoidosis flare. D/w Rheum who reviewed chart and stated less likely. Did add G6PD (pending), vit D/calcitriol (pending), lysoyme (pending), and urine calcium (wnl) to AM labs as recommended by Rhem in case team circles back to rheum later. Patient reports following with Dr. Dempsey in [place], [place] for sarcoidosis.    Initial anemia workup notable for elevated LDH, retic, and low hapto which could be consistent with hemolysis. Iron panel reviewed (iron/ferritin wnl). Heme consulted as recommended by Rheum. Heme ordered additional labs. JOSE negative. Peripheral blood smear pending heme review.    Interval History: Renal function continues to improve with hydration and LFTs remain stable. Patient no longer having n/v/d, but continues to report fatigue and lightheadedness when walking. H/H 7/20. Will transfuse 1 unit of pRBC for symptomatic anemia. No overt s/s bleeding. WBC also decreasing. . Hold off on Neupogen today per heme. Cellcept remains on hold. Plan to start 5 mg Prednisone daily while off Cellcept waiting for wbc to improve. Of note, respiratory viral panel sent this AM 2/8 positive for RSV; likely cause of GALLEGOS/fatigue and could be contributing to cytopenias per heme. Treatment is supportive. If patient feeling well tomorrow and labs are stable/improved, may discharge patient home with close follow up (his hepatologist is Dr. Jackson). [facility].          Scheduled Meds:   ARIPiprazole  2 mg Oral Daily    EScitalopram oxalate  20 mg Oral Daily    heparin  (porcine)  5,000 Units Subcutaneous Q8H    hydroxychloroquine  200 mg Oral BID    insulin aspart U-100  26 Units Subcutaneous TID AC    insulin detemir U-100 (Levemir)  48 Units Subcutaneous Daily    pantoprazole  40 mg Oral Daily    [START ON 2/9/2024] predniSONE  5 mg Oral Daily    sodium bicarbonate  1,300 mg Oral TID    tacrolimus  5 mg Oral BID     Continuous Infusions:  PRN Meds:0.9%  NaCl infusion (for blood administration), acetaminophen, dextrose 10%, dextrose 10%, glucagon (human recombinant), glucose, glucose, insulin aspart U-100, melatonin, ondansetron, prochlorperazine, sodium chloride 0.9%    Review of Systems   Constitutional:  Positive for activity change, appetite change, fatigue and unexpected weight change (~10 lbs in 1 week). Negative for fever.   HENT:  Negative for congestion, sore throat and trouble swallowing.    Eyes:  Negative for photophobia and redness.   Respiratory:  Positive for shortness of breath (with exertion). Negative for cough, wheezing and stridor.    Cardiovascular:  Negative for chest pain and leg swelling.   Gastrointestinal:  Negative for abdominal distention, abdominal pain, blood in stool, constipation, diarrhea, nausea and vomiting.   Genitourinary:  Negative for difficulty urinating and dysuria.   Musculoskeletal:  Negative for neck pain and neck stiffness.   Skin:  Negative for wound.   Allergic/Immunologic: Positive for immunocompromised state.   Neurological:  Positive for dizziness (when standing intermittently), weakness and light-headedness (when walking). Negative for syncope.   Psychiatric/Behavioral:  Negative for confusion.      Objective:     Vital Signs (Most Recent):  Temp: 98.6 °F (37 °C) (02/08/24 1308)  Pulse: 90 (02/08/24 1308)  Resp: 16 (02/08/24 0858)  BP: 132/86 (02/08/24 1308)  SpO2: 99 % (02/08/24 1147) Vital Signs (24h Range):  Temp:  [98 °F (36.7 °C)-99.5 °F (37.5 °C)] 98.6 °F (37 °C)  Pulse:  [84-94] 90  Resp:  [16-20] 16  SpO2:  [96 %-100 %]  99 %  BP: ()/(58-86) 132/86     Weight: 112.1 kg (247 lb 0.4 oz)  Body mass index is 37.56 kg/m².    Intake/Output - Last 3 Shifts         02/06 0700  02/07 0659 02/07 0700  02/08 0659 02/08 0700  02/09 0659    P.O.  2100     I.V. (mL/kg) 1374.6 (12.3) 1410.9 (12.6)     Total Intake(mL/kg) 1374.6 (12.3) 3510.9 (31.3)     Urine (mL/kg/hr) 125 (0) 2050 (0.8) 400 (0.5)    Stool  0     Total Output 125 2050 400    Net +1249.6 +1460.9 -400           Urine Occurrence 1 x 1 x     Stool Occurrence  2 x              Physical Exam  Vitals and nursing note reviewed.   Constitutional:       General: He is not in acute distress.  HENT:      Head: Normocephalic and atraumatic.      Mouth/Throat:      Mouth: Mucous membranes are moist.   Eyes:      General: No scleral icterus.        Right eye: No discharge.         Left eye: No discharge.      Extraocular Movements: Extraocular movements intact.      Conjunctiva/sclera: Conjunctivae normal.   Cardiovascular:      Rate and Rhythm: Normal rate and regular rhythm.      Pulses: Normal pulses.      Heart sounds: Normal heart sounds. No murmur heard.  Pulmonary:      Effort: Pulmonary effort is normal. No respiratory distress.      Breath sounds: No wheezing or rales.   Abdominal:      General: Bowel sounds are normal. There is no distension.      Palpations: Abdomen is soft.      Tenderness: There is no abdominal tenderness. There is no guarding.      Hernia: No hernia is present.      Comments: Well healed chevron   Musculoskeletal:         General: No swelling. Normal range of motion.      Cervical back: Normal range of motion.      Right lower leg: No edema.      Left lower leg: No edema.   Skin:     General: Skin is warm.      Capillary Refill: Capillary refill takes less than 2 seconds.   Neurological:      Mental Status: He is alert and oriented to person, place, and time.   Psychiatric:         Mood and Affect: Mood normal.         Behavior: Behavior normal.          Thought Content: Thought content normal.         Judgment: Judgment normal.          Laboratory:  Immunosuppressants           Stop Route Frequency     tacrolimus capsule 5 mg         -- Oral 2 times daily          CBC:   Recent Labs   Lab 02/08/24  0640   WBC 1.61*   RBC 2.37*   HGB 7.0*   HCT 20.4*   PLT 53*   MCV 86   MCH 29.5   MCHC 34.3     CMP:   Recent Labs   Lab 02/08/24  0640   GLU 98   CALCIUM 8.8   ALBUMIN 3.5   PROT 5.2*      K 4.6   CO2 25      BUN 27*   CREATININE 2.0*   ALKPHOS 35*   ALT 7*   AST 12   BILITOT 1.6*     Coagulation:   Recent Labs   Lab 02/05/24  1323   INR 1.15   APTT 31.5     Labs within the past 24 hours have been reviewed.    Diagnostic Results:  Reviewed    Debility/Functional status: Patient debilitated by evidence of Chronic fatigue, unspecified and Other malaise. Physical and occupational therapy ordered daily to evaluate and treat. Debility was: present on admission.        Assessment/Plan:     * RSV (respiratory syncytial virus infection)  - RSV+ on respiratory infection panel sent 2/8  - CXR 2/5 was unremarkable.  - Likely cause of presenting symptoms, although other viral studies pending  - Continue supportive care.  - Vitals stable. Sp02 high 90s on RA.      Pancytopenia  - Wbc, H/H, plt decreased; wbc decreasing further  - Heme consulted 2/7; appreciate assistance  - Infectious workup in process; notable for RSV which could be contributing to cytopenias per heme  - CMV PCR negative.   - Parvovirus and EBV 2/7 pending.  - Cellcept has been held since admission; started Pred on 2/8 while off Cellcept  - Anemia workup notable for elevated LDH + retic, low hapto, folate/b12 wnl and iron/TIBC/ferritin wnl  - JOSE negative, peripheral smear reviewed by heme  - 1 unit pRBC 2/8 for H/H 7/20. . Holding Neupogen at this time.  - Continue supportive care and f/u workup  - Daily cbc with diff  - Continue to monitor    Diarrhea  - No neutrophils seen in stool.   - C.  "Diff negative.  - Rotavirus negative.  - WBC stool no neutrophils seen, Giardia/crypto neg, E. Coli antigen neg  - Ova/parasites and stool culture pending  - MMF held   - IV hydration (bicarb gtt) transitioned to oral hydration 2/7  - CMV negative  - UA neg for infection  - Blood cx 2/6 NGTD  - Parvovirus and EBV pending  - COVID negative  - Diarrhea resolved    S/P liver transplant  - AST/ALT WNL   - Mild hyperbilirubinemia, poss Gilbert's from allograft   - Continue to monitor with daily CMP.      GERD without esophagitis  - Continue Protonix.      Anxiety  - Continue Lexapro and Abilify (home meds).      Class 2 obesity due to excess calories with body mass index (BMI) of 38.0 to 38.9 in adult  - Monitor      Neutropenia, unspecified  - See "pancytopenia."      At risk for opportunistic infections  - OI ppx per protocol.  - CMV PCR (D+/R-) PCR 2/6 negative.      Prophylactic immunotherapy  - See "long term use of immuno."      Long-term use of immunosuppressant medication  - Continue Prograf. Monitor trough daily and adjust dose as needed to achieve therapeutic level.  - Cellcept held for pancytopenia.  - 10 mg x 1 2/8 then 5 mg Pred daily while off Cellcept     Thrombocytopenia, unspecified  - See "pancytopenia."      Anemia of chronic disease  - See "pancytopenia."      Chronic systolic heart failure  - Monitor fluid status closely.      Type 2 diabetes mellitus with hyperglycemia  - Home insulin ordered at 75% with SSI on admit  - Endocrine consulted, appreciate assistance       Sarcoidosis  - Continue Plaquenil.  - Rheum consult 2/7 due to concern for flare with sx of fatigue, GALLEGOS, n/v/d, and cytopenias  - Rheum reviewed chart, think flare less likely but did recommend a few labs (vit D, urinary calcium, G6PD, and lysozyme)  - ACE is also pending  - Patient follows with Dr. Dempsey in [place], [place] and has previously been on Imuran and Prednisone for Sarcoidosis. Currently just on Plaquenil.  - " [facility].        VTE Risk Mitigation (From admission, onward)           Ordered     heparin (porcine) injection 5,000 Units  Every 8 hours         02/05/24 2343     IP VTE HIGH RISK PATIENT  Once         02/05/24 2343     Place ROX hose  Until discontinued         02/05/24 2343                    The patients clinical status was discussed at multidisplinary rounds, involving transplant surgery, transplant medicine, pharmacy, nursing, nutrition, and social work.    Discharge Planning:  PharmD review: switch Ca carbonate to Ca 600 mg/vitaminD 800u daily, as vitD level is 22. [XH 3/3/23]    Medical decision making for this encounter includes review of pertinent labs and diagnostic studies, assessment and planning, discussions with consulting providers, discussion with patient/family, and participation in multidisciplinary rounds. Time spent caring for patient: 90 minutes.    Sheryl Wolf PA-C  Liver Transplant  [facility]

## 2024-02-08 NOTE — PLAN OF CARE
AAO x4. VSS. On room air. AC/HS- no coverage needed. Sugar 134. Independent with ambulating. Pt denies diarrhea this shift. Hematology consulted. Bed locked and in lowest settings. Call bell in reach. Reminded to call for assistance.

## 2024-02-08 NOTE — ASSESSMENT & PLAN NOTE
- RSV+ on respiratory infection panel sent 2/8  - CXR 2/5 was unremarkable.  - Likely cause of presenting symptoms, although other viral studies pending  - Continue supportive care.  - Vitals stable. Sp02 high 90s on RA.

## 2024-02-08 NOTE — SUBJECTIVE & OBJECTIVE
"Interval HPI:   Overnight events: No acute events overnight. Patient in room 63614/77425 A. Blood glucose stable. BG at goal on current insulin regimen (SSI, prandial, and basal insulin ). Steroid use- None.    Renal function- Abnormal - Creatinine 2.3   Vasopressors-  None       Endocrine will continue to follow and manage insulin orders inpatient.         Diet diabetic  Calorie     Eatin%  Nausea: No  Hypoglycemia and intervention: No  Fever: No  TPN and/or TF: No      /71   Pulse 88   Temp 98 °F (36.7 °C)   Resp 18   Ht 5' 8" (1.727 m)   Wt 112.1 kg (247 lb 0.4 oz)   SpO2 99%   BMI 37.56 kg/m²     Labs Reviewed and Include    No results for input(s): "GLU", "CALCIUM", "ALBUMIN", "PROT", "NA", "K", "CO2", "CL", "BUN", "CREATININE", "ALKPHOS", "ALT", "AST", "BILITOT" in the last 24 hours.    Lab Results   Component Value Date    WBC 2.72 (L) 2024    HGB 8.7 (L) 2024    HCT 25.0 (L) 2024    MCV 85 2024    PLT 70 (L) 2024     No results for input(s): "TSH", "FREET4" in the last 168 hours.  Lab Results   Component Value Date    HGBA1C 4.9 2024       Nutritional status:   Body mass index is 37.56 kg/m².  Lab Results   Component Value Date    ALBUMIN 3.5 2024    ALBUMIN 4.1 2024    ALBUMIN 4.0 2024     No results found for: "PREALBUMIN"    Estimated Creatinine Clearance: 47.7 mL/min (A) (based on SCr of 2.3 mg/dL (H)).    Accu-Checks  Recent Labs     24  0908 24  1248 24  1707 24  2108 24  0852 24  1318 24  1721 244   POCTGLUCOSE 202* 185* 165* 85 183* 91 118* 134*       Current Medications and/or Treatments Impacting Glycemic Control  Immunotherapy:    Immunosuppressants           Stop Route Frequency     tacrolimus capsule 4 mg         -- Oral 2 times daily          Steroids:   Hormones (From admission, onward)      Start     Stop Route Frequency Ordered    24 0039  melatonin tablet " 6 mg         -- Oral Nightly PRN 02/05/24 2343          Pressors:    Autonomic Drugs (From admission, onward)      None          Hyperglycemia/Diabetes Medications:   Antihyperglycemics (From admission, onward)      Start     Stop Route Frequency Ordered    02/07/24 0900  insulin detemir U-100 (Levemir) pen 48 Units         -- SubQ Daily 02/06/24 1642    02/06/24 1645  insulin aspart U-100 pen 26 Units         -- SubQ 3 times daily before meals 02/06/24 1642    02/06/24 0040  insulin aspart U-100 pen 0-10 Units         -- SubQ Before meals & nightly PRN 02/05/24 3659

## 2024-02-08 NOTE — ASSESSMENT & PLAN NOTE
- No neutrophils seen in stool.   - C. Diff negative.  - Rotavirus negative.  - WBC stool no neutrophils seen, Giardia/crypto neg, E. Coli antigen neg  - Ova/parasites and stool culture pending  - MMF held   - IV hydration (bicarb gtt) transitioned to oral hydration 2/7  - CMV negative  - UA neg for infection  - Blood cx 2/6 NGTD  - Parvovirus and EBV pending  - COVID negative  - Diarrhea resolved

## 2024-02-08 NOTE — ASSESSMENT & PLAN NOTE
- Continue Plaquenil.  - Rheum consult 2/7 due to concern for flare with sx of fatigue, GALLEGOS, n/v/d, and cytopenias  - Rheum reviewed chart, think flare less likely but did recommend a few labs (vit D, urinary calcium, G6PD, and lysozyme)  - ACE is also pending  - Patient follows with Dr. Dempsey in [place], [place] and has previously been on Imuran and Prednisone for Sarcoidosis. Currently just on Plaquenil.  - [facility].

## 2024-02-08 NOTE — DISCHARGE SUMMARY
[facility]  Liver Transplant  Discharge Summary      Patient Name: Rosalio Mccarty  MRN: [id number]  Admission Date: 2/5/2024  Hospital Length of Stay: 4 days  Discharge Date and Time:  02/09/2024 11:53 AM  Attending Physician: Jasvir Travis MD  Discharging Provider: Jolynn Cano NP  Primary Care Provider: Osorio Loredo Jr., MD  Post-Operative Day: 400     ORGAN:   LIVER  Disease Etiology: Cirrhosis: Other, Specify - granulomatous hepatitis  Donor Type:   Donation after Brain Death  CDC High Risk:   Yes  Donor CMV Status:   Donor CMV Status: Positive  Donor HBcAB:   Negative  Donor HCV Status:   Positive  Whole or Partial: Whole Liver  Biliary Anastomosis: End to End  Arterial Anatomy: Replaced Right Hepatic from SMA    HPI:   Rosalio Mccarty is a 48yr old man who 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 Surgery with replaced R HA reconstruction. Did well post transplant, post op course:  Moderate ACR: bx 5/18. SMP x 3.   Moderate ACR: bx 7/24. SMP x 3.   Fall: 7/2023, proximal fracture of fibular shaft, no surgery required   Persistent mild hyperbilirubinemia likely secondary to Gilbert syndrome (per Dr. Orta's note 1/5/24)  Presents to [facility] as a transfer from OSH for increased weakness and diarrhea x 1 month. Endorses decreased appetite, 10 lb weight loss in 1 week, NVD, dyspnea on exertion, decreased UOP. Denies fever, chills, CP, dysuria. Routine labs also noted to be abnormal, coordinator directed patient to go to ED for further investigation. In ED, labs notable for BARBARA (Cr 3.1 from BL 1.98-2.0), hyperkalemia (K 5.8), acidosis (bicarb 16). On arrival to [facility], he is resting comfortably, no TTP of abdomen, no edema noted. Plan for IVF, stool studies, CMV PCR, BC, UA. MMF held for diarrhea.      Hospital Course:    Transferred from OSH ED ([facility] in [place]) on 2/5 for n/v/d. Labs notable for BARBARA and acidosis. Hydrated with bicarb gtt. Infectious workup  "sent. Bicarb gtt discontinued 2/7; encouraged oral hydration. Able to tolerate a diet.\ Ua neg for infection. Blood cx 2/7 NGTD. CMV PCR neg. C. Diff neg. WBC stool no neutrophils seen. Rotavirus negative. Giardia/Crypto negative. E. Coli antigen negative. Ova/parasite and Stool culture pending.     Wbc, h/h, and plt decreased. In reviewing symptoms of fatigue, GALLEGOS, n/v/d, cytopenias, (pt reports sx slowly started after steroid taper ended), thought could be component of sarcoidosis flare. D/w Rheum who reviewed chart and stated sarcoidosis flare less likely. Did add G6PD (pending), vit D/calcitriol (pending), lysoyme (pending), and urine calcium (wnl) to AM labs as recommended by Rhem in case team circles back to rheum later. Patient reports following with Dr. Dempsey in [place], [place] for sarcoidosis.    Initial anemia workup notable for elevated LDH, retic, and low hapto which could be consistent with hemolysis. Iron panel reviewed (iron/ferritin wnl). Heme consulted as recommended by Rheum. Heme ordered additional labs. JOSE negative. Peripheral blood smear with pathologist interpretaition of "Pathologist interpretation of peripheral blood smear:  Normochromic normocytic red cells show mild to moderate anisocytosis. There is also mild polychromasia.  No significant poikilocytosis is   appreciated.  Platelets are morphologically unremarkable if decreased in number.  No significant platelet clumping is seen on scanning.  White cells show occasional atypical lymphocytes, favor reactive in   the overall spectrum.  EBV not detected. Parvovirous pending. WBC also decreasing.  2/8; held off on Neupogen per heme. WBC improved on 2/9. Cellcept remains on hold for neutropenia. Plan to start 5 mg Prednisone daily while off Cellcept waiting for wbc to improve. (He was given a 1x dose of 10 mg prior 2/8 then transitioned to 5 mg daily). 1 unit pRBC transfused on 2/8 for H/H 7/20 with moderate response. No s/s of " bleeding present on exam. Likely with hemolysis due to infection and medication. Patient to follow up with Hematology outpatient.     Of note, respiratory viral panel sent this AM 2/8 positive for RSV; likely cause of GALLEGOS/fatigue and could be contributing to cytopenias per heme. Treatment is supportive.     Patient is now stable and ready for discharge. N/v/d has resolved. He is tolerating a diet. Afebrile with stable vitals. Renal function improved significantly with hydration and LFTs remain stable. Follow up to include labs on 2/12/24 and clinic appointment with Dr. Jackson within 2 weeks (coordinator to schedule). Patient is in agreement with discharge from the hospital today and follow up plan.      Goals of Care Treatment Preferences:  Code Status: Full Code      Final Active Diagnoses:    Diagnosis Date Noted POA    PRINCIPAL PROBLEM:  RSV (respiratory syncytial virus infection) [B33.8] 02/08/2024 Yes    Pancytopenia [D61.818] 02/07/2024 Yes    Diarrhea [R19.7] 02/05/2024 Yes    S/P liver transplant [Z94.4] 01/07/2023 Not Applicable    Anxiety [F41.9] 02/06/2024 Yes    GERD without esophagitis [K21.9] 02/06/2024 Yes    Neutropenia, unspecified [D70.9] 06/09/2023 Yes    Chronic systolic heart failure [I50.22] 01/09/2023 Yes    Anemia of chronic disease [D63.8] 01/09/2023 Yes    Long-term use of immunosuppressant medication [Z79.60] 01/09/2023 Not Applicable    Prophylactic immunotherapy [Z29.89] 01/09/2023 Not Applicable    At risk for opportunistic infections [Z91.89] 01/09/2023 Yes    Thrombocytopenia, unspecified [D69.6] 01/09/2023 Yes    Type 2 diabetes mellitus with hyperglycemia [E11.65] 01/06/2023 Yes    Sarcoidosis [D86.9] 11/17/2022 Yes    Class 2 obesity due to excess calories with body mass index (BMI) of 38.0 to 38.9 in adult [E66.09, Z68.38] 05/24/2018 Not Applicable      Problems Resolved During this Admission:    Diagnosis Date Noted Date Resolved POA    Acidosis [E87.20] 02/06/2024 02/07/2024  Yes       Consults (From admission, onward)          Status Ordering Provider     Inpatient consult to Hematology  Once        Provider:  (Not yet assigned)    Completed RASHAUN YUSUF     Inpatient consult to Endocrinology  Once        Provider:  (Not yet assigned)    Completed RASHAUN KOVACS            Pending Diagnostic Studies:       Procedure Component Value Units Date/Time    Calcitriol [[id number]] Collected: 02/08/24 0640    Order Status: Sent Lab Status: In process Updated: 02/08/24 0658    Specimen: Blood     Olya-Barr virus DNA, quantitative [[id number]] Collected: 02/07/24 1736    Order Status: Sent Lab Status: In process Updated: 02/07/24 1805    Specimen: Blood     G6PD,Quantitative [[id number]] Collected: 02/08/24 0640    Order Status: Sent Lab Status: In process Updated: 02/08/24 0701    Specimen: Blood     Lysozyme (Muramidase), Plasma [[id number]] Collected: 02/08/24 0640    Order Status: Sent Lab Status: In process Updated: 02/08/24 0701    Specimen: Blood     Stool Exam-Ova,Cysts,Parasites [[id number]] Collected: 02/06/24 0757    Order Status: Sent Lab Status: In process Updated: 02/06/24 0848    Specimen: Stool           Significant Diagnostic Studies: Labs: CMP   Recent Labs   Lab 02/07/24  0554 02/08/24  0640    140   K 4.1 4.6    106   CO2 24 25   * 98   BUN 38* 27*   CREATININE 2.3* 2.0*   CALCIUM 8.2* 8.8   PROT 5.1* 5.2*   ALBUMIN 3.5 3.5   BILITOT 1.8* 1.6*   ALKPHOS 28* 35*   AST 12 12   ALT 6* 7*   ANIONGAP 8 9   , CBC   Recent Labs   Lab 02/07/24  1005 02/07/24  1345 02/08/24  0640   WBC 2.06* 2.72* 1.61*   HGB 8.3* 8.7* 7.0*   HCT 24.2* 25.0* 20.4*   PLT 59* 70* 53*   , and INR   Lab Results   Component Value Date    INR 1.15 02/05/2024    INR 1.2 05/18/2023    INR 1.2 01/09/2023       The patients clinical status was discussed at multidisplinary rounds, involving transplant surgery, transplant medicine, pharmacy, nursing, nutrition, and social  "work    Discharged Condition: stable    Disposition: Dc home    Follow Up: See above    Patient Instructions:   No discharge procedures on file.  Medications:  Reconciled Home Medications:      Medication List        START taking these medications      predniSONE 5 MG tablet  Commonly known as: DELTASONE  Take 1 tablet (5 mg total) by mouth once daily.  Start taking on: February 10, 2024            CHANGE how you take these medications      tacrolimus 1 MG Cap  Commonly known as: PROGRAF  Take 4 capsules (4 mg total) by mouth every 12 (twelve) hours.  What changed: See the new instructions.            CONTINUE taking these medications      ARIPiprazole 2 MG Tab  Commonly known as: ABILIFY  Take 1 tablet (2 mg total) by mouth once daily.     aspirin 81 MG EC tablet  Commonly known as: ECOTRIN  Take 1 tablet (81 mg total) by mouth once daily.     BD ULTRA-FINE MELVINA PEN NEEDLE 32 gauge x 5/32" Ndle  Generic drug: pen needle, diabetic  1 Dose by Misc.(Non-Drug; Combo Route) route 3 (three) times daily.     calcium carbonate 500 mg calcium (1,250 mg) tablet  Commonly known as: OS-ORTIZ  Take 1 tablet (500 mg total) by mouth once daily.     empagliflozin 25 mg tablet  Commonly known as: JARDIANCE  Take 1 tablet (25 mg total) by mouth once daily.     EScitalopram oxalate 20 MG tablet  Commonly known as: LEXAPRO  Take 1 tablet (20 mg total) by mouth once daily.     fluoride (sodium) 1.1 % Pste  Commonly known as: PREVIDENT 5000  USE TWICE DAILY IN REPLACEMENT OF NON-PRESCRIBED TOOTHPASTE     hydroxychloroquine 200 mg tablet  Commonly known as: PLAQUENIL  Take 200 mg by mouth 2 (two) times daily.     iron-vitamin C 100-250 mg (ICAR-C) 100-250 mg Tab  Take 1 tablet by mouth every morning.     ONETOUCH DELICA PLUS LANCET 33 gauge Misc  Generic drug: lancets  by Misc.(Non-Drug; Combo Route) route 4 (four) times daily.     ONETOUCH VERIO TEST STRIPS Strp  Generic drug: blood sugar diagnostic  1 strip by Misc.(Non-Drug; Combo " Route) route 4 (four) times daily.     OZEMPIC 2 mg/dose (8 mg/3 mL) Pnij  Generic drug: semaglutide  Inject 2 mg into the skin every 7 days.     pantoprazole 40 MG tablet  Commonly known as: PROTONIX  Take 1 tablet (40 mg total) by mouth once daily.     sodium bicarbonate 650 MG tablet  Take 2 tablets (1,300 mg total) by mouth 3 (three) times daily.     traZODone 150 MG tablet  Commonly known as: DESYREL  Take 1 tablet (150 mg total) by mouth every evening.     vitamin D 1000 units Tab  Commonly known as: VITAMIN D3  Take 1,000 Units by mouth once daily.            STOP taking these medications      FREESTYLE LUZMA 14 DAY SENSOR Kit  Generic drug: flash glucose sensor     mycophenolate 250 mg Cap  Commonly known as: CELLCEPT     ONETOUCH VERIO REFLECT METER Misc  Generic drug: blood-glucose meter            ASK your doctor about these medications      DOCOSAHEXAENOIC ACID ORAL  Take 2 g by mouth once daily.     HYDROcodone-acetaminophen 5-325 mg per tablet  Commonly known as: NORCO  Take 1 tablet by mouth every 8 (eight) hours.     insulin aspart U-100 100 unit/mL (3 mL) Inpn pen  Commonly known as: NovoLOG  Inject into the skin.     insulin lispro 100 unit/mL pen  Commonly known as: HumaLOG KwikPen Insulin  Inject 32 Units into the skin 3 (three) times daily before meals. Plus sliding scale insulin: 150 - 200 = +2 units; 201 - 250 = +4 units; 251 - 300 = +6 units; 301 - 350 = +8 units; >350 = +10 units. Total daily dose: 126 units     TRESIBA FLEXTOUCH U-100 100 unit/mL (3 mL) insulin pen  Generic drug: insulin degludec  Inject 50 Units into the skin once daily.            Time spent caring for patient (Greater than 1/2 spent in direct face-to-face contact): > 30 minutes    Jolynn Cano NP  Liver Transplant  [facility] - [facility]

## 2024-02-08 NOTE — PLAN OF CARE
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 He presented as a transfer from [facility] in [place] on 2/7/23 for n/v/diarrhea.     Hematology consulted for pancytopenia.      Patient does not appear to have nutritional deficiency. No evidence of DIC. Peripheral smear with atypical lymphocytes favoring reactive process. Parvovirus pending. Negative hep panel, CMV, HIV, and JOSE.      He appears to have chronic thrombocytopenia. Leukpenia and anemia appear acute, progressing. May be multifactorial in the setting of RSV and drug induced. No gcsf at this time.       Thank you for your consult. I will follow-up with patient. Please contact us if you have any additional questions.     Anna Davis MD  Hematology/Oncology Fellow PGY 6  [facility]

## 2024-02-08 NOTE — ASSESSMENT & PLAN NOTE
- Wbc, H/H, plt decreased; wbc decreasing further  - Heme consulted 2/7; appreciate assistance  - Infectious workup in process; notable for RSV which could be contributing to cytopenias per heme  - CMV PCR negative.   - Parvovirus and EBV 2/7 pending.  - Cellcept has been held since admission; started Pred on 2/8 while off Cellcept  - Anemia workup notable for elevated LDH + retic, low hapto, folate/b12 wnl and iron/TIBC/ferritin wnl  - JOSE negative, peripheral smear reviewed by heme  - 1 unit pRBC 2/8 for H/H 7/20. . Holding Neupogen at this time.  - Continue supportive care and f/u workup  - Daily cbc with diff  - Continue to monitor

## 2024-02-08 NOTE — ASSESSMENT & PLAN NOTE
- Continue Prograf. Monitor trough daily and adjust dose as needed to achieve therapeutic level.  - Cellcept held for pancytopenia.  - 10 mg x 1 2/8 then 5 mg Pred daily while off Cellcept

## 2024-02-08 NOTE — SUBJECTIVE & OBJECTIVE
Scheduled Meds:   ARIPiprazole  2 mg Oral Daily    EScitalopram oxalate  20 mg Oral Daily    heparin (porcine)  5,000 Units Subcutaneous Q8H    hydroxychloroquine  200 mg Oral BID    insulin aspart U-100  26 Units Subcutaneous TID AC    insulin detemir U-100 (Levemir)  48 Units Subcutaneous Daily    pantoprazole  40 mg Oral Daily    [START ON 2/9/2024] predniSONE  5 mg Oral Daily    sodium bicarbonate  1,300 mg Oral TID    tacrolimus  5 mg Oral BID     Continuous Infusions:  PRN Meds:0.9%  NaCl infusion (for blood administration), acetaminophen, dextrose 10%, dextrose 10%, glucagon (human recombinant), glucose, glucose, insulin aspart U-100, melatonin, ondansetron, prochlorperazine, sodium chloride 0.9%    Review of Systems   Constitutional:  Positive for activity change, appetite change, fatigue and unexpected weight change (~10 lbs in 1 week). Negative for fever.   HENT:  Negative for congestion, sore throat and trouble swallowing.    Eyes:  Negative for photophobia and redness.   Respiratory:  Positive for shortness of breath (with exertion). Negative for cough, wheezing and stridor.    Cardiovascular:  Negative for chest pain and leg swelling.   Gastrointestinal:  Negative for abdominal distention, abdominal pain, blood in stool, constipation, diarrhea, nausea and vomiting.   Genitourinary:  Negative for difficulty urinating and dysuria.   Musculoskeletal:  Negative for neck pain and neck stiffness.   Skin:  Negative for wound.   Allergic/Immunologic: Positive for immunocompromised state.   Neurological:  Positive for dizziness (when standing intermittently), weakness and light-headedness (when walking). Negative for syncope.   Psychiatric/Behavioral:  Negative for confusion.      Objective:     Vital Signs (Most Recent):  Temp: 98.6 °F (37 °C) (02/08/24 1308)  Pulse: 90 (02/08/24 1308)  Resp: 16 (02/08/24 0858)  BP: 132/86 (02/08/24 1308)  SpO2: 99 % (02/08/24 1147) Vital Signs (24h Range):  Temp:  [98 °F (36.7  °C)-99.5 °F (37.5 °C)] 98.6 °F (37 °C)  Pulse:  [84-94] 90  Resp:  [16-20] 16  SpO2:  [96 %-100 %] 99 %  BP: ()/(58-86) 132/86     Weight: 112.1 kg (247 lb 0.4 oz)  Body mass index is 37.56 kg/m².    Intake/Output - Last 3 Shifts         02/06 0700  02/07 0659 02/07 0700  02/08 0659 02/08 0700  02/09 0659    P.O.  2100     I.V. (mL/kg) 1374.6 (12.3) 1410.9 (12.6)     Total Intake(mL/kg) 1374.6 (12.3) 3510.9 (31.3)     Urine (mL/kg/hr) 125 (0) 2050 (0.8) 400 (0.5)    Stool  0     Total Output 125 2050 400    Net +1249.6 +1460.9 -400           Urine Occurrence 1 x 1 x     Stool Occurrence  2 x              Physical Exam  Vitals and nursing note reviewed.   Constitutional:       General: He is not in acute distress.  HENT:      Head: Normocephalic and atraumatic.      Mouth/Throat:      Mouth: Mucous membranes are moist.   Eyes:      General: No scleral icterus.        Right eye: No discharge.         Left eye: No discharge.      Extraocular Movements: Extraocular movements intact.      Conjunctiva/sclera: Conjunctivae normal.   Cardiovascular:      Rate and Rhythm: Normal rate and regular rhythm.      Pulses: Normal pulses.      Heart sounds: Normal heart sounds. No murmur heard.  Pulmonary:      Effort: Pulmonary effort is normal. No respiratory distress.      Breath sounds: No wheezing or rales.   Abdominal:      General: Bowel sounds are normal. There is no distension.      Palpations: Abdomen is soft.      Tenderness: There is no abdominal tenderness. There is no guarding.      Hernia: No hernia is present.      Comments: Well healed chevron   Musculoskeletal:         General: No swelling. Normal range of motion.      Cervical back: Normal range of motion.      Right lower leg: No edema.      Left lower leg: No edema.   Skin:     General: Skin is warm.      Capillary Refill: Capillary refill takes less than 2 seconds.   Neurological:      Mental Status: He is alert and oriented to person, place, and time.    Psychiatric:         Mood and Affect: Mood normal.         Behavior: Behavior normal.         Thought Content: Thought content normal.         Judgment: Judgment normal.          Laboratory:  Immunosuppressants           Stop Route Frequency     tacrolimus capsule 5 mg         -- Oral 2 times daily          CBC:   Recent Labs   Lab 02/08/24  0640   WBC 1.61*   RBC 2.37*   HGB 7.0*   HCT 20.4*   PLT 53*   MCV 86   MCH 29.5   MCHC 34.3     CMP:   Recent Labs   Lab 02/08/24  0640   GLU 98   CALCIUM 8.8   ALBUMIN 3.5   PROT 5.2*      K 4.6   CO2 25      BUN 27*   CREATININE 2.0*   ALKPHOS 35*   ALT 7*   AST 12   BILITOT 1.6*     Coagulation:   Recent Labs   Lab 02/05/24  1323   INR 1.15   APTT 31.5     Labs within the past 24 hours have been reviewed.    Diagnostic Results:  Reviewed    Debility/Functional status: Patient debilitated by evidence of Chronic fatigue, unspecified and Other malaise. Physical and occupational therapy ordered daily to evaluate and treat. Debility was: present on admission.

## 2024-02-08 NOTE — PLAN OF CARE
- Patient with headache this AM  - Orthostatic BP checked & not orthostatic.  Lightheaded when standing, walking per patient  - Rhem & Heme consulted.  Anemia workup  - CBG ACHS.  MTI + SSI, Levemir daily.  Home dexcom in place    Problem: Adult Inpatient Plan of Care  Goal: Plan of Care Review  Outcome: Ongoing, Progressing  Goal: Patient-Specific Goal (Individualized)  Outcome: Ongoing, Progressing

## 2024-02-08 NOTE — PROGRESS NOTES
"[facility] - Transplant Stepdown  Endocrinology  Progress Note    Admit Date: 2024     Reason for Consult: Management of T2DM, Hyperglycemia      Surgical Procedure and Date: Liver transplant 2023     Diabetes diagnosis year: Over 5 years ago     Home Diabetes Medications:  Tresiba 45 units QD, Humalog 25 units TIDWM +SSI MDC (180/25), Jardiance 25 mg, Ozempic 2 mg     How often checking glucose at home? >4 x day   BG readings on regimen: 100-180  Hypoglycemia on the regimen?  No  Missed doses on regimen?  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 Surgery with replaced R HA reconstruction. Endocrine consulted to manage hyperglycemia and type 2 diabetes.     Lab Results   Component Value Date    HGBA1C 4.9 2024         Interval HPI:   Overnight events: No acute events overnight. Patient in room 17653/36879 A. Blood glucose stable. BG at goal on current insulin regimen (SSI, prandial, and basal insulin ). Steroid use- None.    Renal function- Abnormal - Creatinine 2.3   Vasopressors-  None       Endocrine will continue to follow and manage insulin orders inpatient.         Diet diabetic  Calorie     Eatin%  Nausea: No  Hypoglycemia and intervention: No  Fever: No  TPN and/or TF: No      /71   Pulse 88   Temp 98 °F (36.7 °C)   Resp 18   Ht 5' 8" (1.727 m)   Wt 112.1 kg (247 lb 0.4 oz)   SpO2 99%   BMI 37.56 kg/m²     Labs Reviewed and Include    No results for input(s): "GLU", "CALCIUM", "ALBUMIN", "PROT", "NA", "K", "CO2", "CL", "BUN", "CREATININE", "ALKPHOS", "ALT", "AST", "BILITOT" in the last 24 hours.    Lab Results   Component Value Date    WBC 2.72 (L) 2024    HGB 8.7 (L) 2024    HCT 25.0 (L) 2024    MCV 85 2024    PLT 70 (L) 2024     No results " "for input(s): "TSH", "FREET4" in the last 168 hours.  Lab Results   Component Value Date    HGBA1C 4.9 02/06/2024       Nutritional status:   Body mass index is 37.56 kg/m².  Lab Results   Component Value Date    ALBUMIN 3.5 02/07/2024    ALBUMIN 4.1 02/06/2024    ALBUMIN 4.0 02/06/2024     No results found for: "PREALBUMIN"    Estimated Creatinine Clearance: 47.7 mL/min (A) (based on SCr of 2.3 mg/dL (H)).    Accu-Checks  Recent Labs     02/06/24  0908 02/06/24  1248 02/06/24  1707 02/06/24  2108 02/07/24  0852 02/07/24  1318 02/07/24  1721 02/07/24  2124   POCTGLUCOSE 202* 185* 165* 85 183* 91 118* 134*       Current Medications and/or Treatments Impacting Glycemic Control  Immunotherapy:    Immunosuppressants           Stop Route Frequency     tacrolimus capsule 4 mg         -- Oral 2 times daily          Steroids:   Hormones (From admission, onward)      Start     Stop Route Frequency Ordered    02/06/24 0039  melatonin tablet 6 mg         -- Oral Nightly PRN 02/05/24 2343          Pressors:    Autonomic Drugs (From admission, onward)      None          Hyperglycemia/Diabetes Medications:   Antihyperglycemics (From admission, onward)      Start     Stop Route Frequency Ordered    02/07/24 0900  insulin detemir U-100 (Levemir) pen 48 Units         -- SubQ Daily 02/06/24 1642    02/06/24 1645  insulin aspart U-100 pen 26 Units         -- SubQ 3 times daily before meals 02/06/24 1642    02/06/24 0040  insulin aspart U-100 pen 0-10 Units         -- SubQ Before meals & nightly PRN 02/05/24 2343            ASSESSMENT and PLAN    Oncology  Anemia of chronic disease  May impact the accuracy of the HbA1C      Endocrine  Type 2 diabetes mellitus with hyperglycemia  Endocrinology consulted for BG management.   BG goal 140-180    - Levemir (Insulin Detemir) 48 units daily  - Novolog (Insulin Aspart) 26 units TIDWM and prn for BG excursions MDC SSI (150/25)  - BG checks AC/HS  - Hypoglycemia protocol in place  - If blood " glucose greater than 300, please ask patient not to eat food or drink anything other than water until correctional insulin has brought it back below 250    ** Please notify Endocrine for any change and/or advance in diet**  ** Please call Endocrine for any BG related issues **    Discharge Planning:   TBD. Please notify endocrinology prior to discharge.        GI  * Diarrhea  Managed per primary team  Avoid hypoglycemia             Jimi Sunshine, DNP, FNP  Endocrinology  [facility] - Transplant Stepdown

## 2024-02-09 ENCOUNTER — TELEPHONE (OUTPATIENT)
Dept: ENDOCRINOLOGY | Facility: HOSPITAL | Age: 49
End: 2024-02-09

## 2024-02-09 VITALS
WEIGHT: 241.5 LBS | BODY MASS INDEX: 36.6 KG/M2 | DIASTOLIC BLOOD PRESSURE: 76 MMHG | SYSTOLIC BLOOD PRESSURE: 122 MMHG | OXYGEN SATURATION: 96 % | RESPIRATION RATE: 18 BRPM | HEART RATE: 78 BPM | TEMPERATURE: 98 F | HEIGHT: 68 IN

## 2024-02-09 DIAGNOSIS — E11.65 TYPE 2 DIABETES MELLITUS WITH HYPERGLYCEMIA, UNSPECIFIED WHETHER LONG TERM INSULIN USE: Primary | ICD-10-CM

## 2024-02-09 LAB
ALBUMIN SERPL BCP-MCNC: 3.6 G/DL (ref 3.5–5.2)
ALP SERPL-CCNC: 32 U/L (ref 55–135)
ALT SERPL W/O P-5'-P-CCNC: 7 U/L (ref 10–44)
ANION GAP SERPL CALC-SCNC: 8 MMOL/L (ref 8–16)
ANISOCYTOSIS BLD QL SMEAR: SLIGHT
AST SERPL-CCNC: 15 U/L (ref 10–40)
BASOPHILS # BLD AUTO: 0.01 K/UL (ref 0–0.2)
BASOPHILS NFR BLD: 0.5 % (ref 0–1.9)
BILIRUB SERPL-MCNC: 2 MG/DL (ref 0.1–1)
BUN SERPL-MCNC: 21 MG/DL (ref 6–20)
CALCIUM SERPL-MCNC: 8.8 MG/DL (ref 8.7–10.5)
CHLORIDE SERPL-SCNC: 106 MMOL/L (ref 95–110)
CO2 SERPL-SCNC: 24 MMOL/L (ref 23–29)
CREAT SERPL-MCNC: 1.9 MG/DL (ref 0.5–1.4)
DIFFERENTIAL METHOD BLD: ABNORMAL
EOSINOPHIL # BLD AUTO: 0 K/UL (ref 0–0.5)
EOSINOPHIL NFR BLD: 1.5 % (ref 0–8)
ERYTHROCYTE [DISTWIDTH] IN BLOOD BY AUTOMATED COUNT: 18.1 % (ref 11.5–14.5)
EST. GFR  (NO RACE VARIABLE): 43 ML/MIN/1.73 M^2
GLUCOSE SERPL-MCNC: 108 MG/DL (ref 70–110)
HCT VFR BLD AUTO: 23.6 % (ref 40–54)
HGB BLD-MCNC: 7.8 G/DL (ref 14–18)
HYPOCHROMIA BLD QL SMEAR: ABNORMAL
IMM GRANULOCYTES # BLD AUTO: 0.02 K/UL (ref 0–0.04)
IMM GRANULOCYTES NFR BLD AUTO: 1 % (ref 0–0.5)
LYMPHOCYTES # BLD AUTO: 0.9 K/UL (ref 1–4.8)
LYMPHOCYTES NFR BLD: 43.8 % (ref 18–48)
MAGNESIUM SERPL-MCNC: 1.7 MG/DL (ref 1.6–2.6)
MCH RBC QN AUTO: 29.1 PG (ref 27–31)
MCHC RBC AUTO-ENTMCNC: 33.1 G/DL (ref 32–36)
MCV RBC AUTO: 88 FL (ref 82–98)
MONOCYTES # BLD AUTO: 0.2 K/UL (ref 0.3–1)
MONOCYTES NFR BLD: 10.4 % (ref 4–15)
NEUTROPHILS # BLD AUTO: 0.9 K/UL (ref 1.8–7.7)
NEUTROPHILS NFR BLD: 42.8 % (ref 38–73)
NRBC BLD-RTO: 0 /100 WBC
OVALOCYTES BLD QL SMEAR: ABNORMAL
PHOSPHATE SERPL-MCNC: 3.2 MG/DL (ref 2.7–4.5)
PLATELET # BLD AUTO: 48 K/UL (ref 150–450)
PMV BLD AUTO: 10.7 FL (ref 9.2–12.9)
POCT GLUCOSE: 110 MG/DL (ref 70–110)
POIKILOCYTOSIS BLD QL SMEAR: SLIGHT
POLYCHROMASIA BLD QL SMEAR: ABNORMAL
POTASSIUM SERPL-SCNC: 5 MMOL/L (ref 3.5–5.1)
PROT SERPL-MCNC: 5.4 G/DL (ref 6–8.4)
RBC # BLD AUTO: 2.68 M/UL (ref 4.6–6.2)
SODIUM SERPL-SCNC: 138 MMOL/L (ref 136–145)
SPHEROCYTES BLD QL SMEAR: ABNORMAL
TACROLIMUS BLD-MCNC: 9.3 NG/ML (ref 5–15)
WBC # BLD AUTO: 2.01 K/UL (ref 3.9–12.7)

## 2024-02-09 PROCEDURE — 25000003 PHARM REV CODE 250: Performed by: PHYSICIAN ASSISTANT

## 2024-02-09 PROCEDURE — 99232 SBSQ HOSP IP/OBS MODERATE 35: CPT | Mod: ,,, | Performed by: NURSE PRACTITIONER

## 2024-02-09 PROCEDURE — 80053 COMPREHEN METABOLIC PANEL: CPT

## 2024-02-09 PROCEDURE — 84100 ASSAY OF PHOSPHORUS: CPT | Performed by: PHYSICIAN ASSISTANT

## 2024-02-09 PROCEDURE — 80197 ASSAY OF TACROLIMUS: CPT

## 2024-02-09 PROCEDURE — 99239 HOSP IP/OBS DSCHRG MGMT >30: CPT | Mod: ,,,

## 2024-02-09 PROCEDURE — 63600175 PHARM REV CODE 636 W HCPCS: Performed by: TRANSPLANT SURGERY

## 2024-02-09 PROCEDURE — 36415 COLL VENOUS BLD VENIPUNCTURE: CPT

## 2024-02-09 PROCEDURE — 63600175 PHARM REV CODE 636 W HCPCS

## 2024-02-09 PROCEDURE — 83735 ASSAY OF MAGNESIUM: CPT

## 2024-02-09 PROCEDURE — 85025 COMPLETE CBC W/AUTO DIFF WBC: CPT

## 2024-02-09 PROCEDURE — 63600175 PHARM REV CODE 636 W HCPCS: Performed by: PHYSICIAN ASSISTANT

## 2024-02-09 PROCEDURE — 25000003 PHARM REV CODE 250

## 2024-02-09 RX ORDER — PREDNISONE 5 MG/1
5 TABLET ORAL DAILY
Qty: 30 TABLET | Refills: 11 | Status: SHIPPED | OUTPATIENT
Start: 2024-02-10 | End: 2024-02-23 | Stop reason: ALTCHOICE

## 2024-02-09 RX ORDER — TACROLIMUS 1 MG/1
4 CAPSULE ORAL EVERY 12 HOURS
Qty: 240 CAPSULE | Refills: 11 | Status: SHIPPED | OUTPATIENT
Start: 2024-02-09

## 2024-02-09 RX ORDER — INSULIN ASPART 100 [IU]/ML
24 INJECTION, SOLUTION INTRAVENOUS; SUBCUTANEOUS
Status: DISCONTINUED | OUTPATIENT
Start: 2024-02-09 | End: 2024-02-09 | Stop reason: HOSPADM

## 2024-02-09 RX ORDER — TACROLIMUS 1 MG/1
4 CAPSULE ORAL 2 TIMES DAILY
Status: DISCONTINUED | OUTPATIENT
Start: 2024-02-09 | End: 2024-02-09 | Stop reason: HOSPADM

## 2024-02-09 RX ADMIN — INSULIN ASPART 2 UNITS: 100 INJECTION, SOLUTION INTRAVENOUS; SUBCUTANEOUS at 01:02

## 2024-02-09 RX ADMIN — HEPARIN SODIUM 5000 UNITS: 5000 INJECTION INTRAVENOUS; SUBCUTANEOUS at 05:02

## 2024-02-09 RX ADMIN — TACROLIMUS 5 MG: 5 CAPSULE ORAL at 09:02

## 2024-02-09 RX ADMIN — INSULIN ASPART 24 UNITS: 100 INJECTION, SOLUTION INTRAVENOUS; SUBCUTANEOUS at 09:02

## 2024-02-09 RX ADMIN — SODIUM BICARBONATE 650 MG TABLET 1300 MG: at 09:02

## 2024-02-09 RX ADMIN — ARIPIPRAZOLE 2 MG: 2 TABLET ORAL at 09:02

## 2024-02-09 RX ADMIN — INSULIN ASPART 24 UNITS: 100 INJECTION, SOLUTION INTRAVENOUS; SUBCUTANEOUS at 01:02

## 2024-02-09 RX ADMIN — PREDNISONE 5 MG: 5 TABLET ORAL at 11:02

## 2024-02-09 RX ADMIN — HYDROXYCHLOROQUINE SULFATE 200 MG: 200 TABLET, FILM COATED ORAL at 09:02

## 2024-02-09 RX ADMIN — ESCITALOPRAM OXALATE 20 MG: 10 TABLET ORAL at 09:02

## 2024-02-09 RX ADMIN — PANTOPRAZOLE SODIUM 40 MG: 40 TABLET, DELAYED RELEASE ORAL at 09:02

## 2024-02-09 NOTE — ASSESSMENT & PLAN NOTE
Endocrinology consulted for BG management.   BG goal 140-180    - Levemir (Insulin Detemir) 40 units daily (20% reduction due to fasting blood glucose below goal.)  - Novolog (Insulin Aspart) 24 units TIDWM and prn for BG excursions MDC SSI (150/25) (10% reduction due to prandial blood glucose below goal)   - BG checks AC/HS  - Hypoglycemia protocol in place  - If blood glucose greater than 300, please ask patient not to eat food or drink anything other than water until correctional insulin has brought it back below 250    ** Please notify Endocrine for any change and/or advance in diet**  ** Please call Endocrine for any BG related issues **    Discharge Planning:   TBD. Please notify endocrinology prior to discharge.

## 2024-02-09 NOTE — SUBJECTIVE & OBJECTIVE
"Interval HPI:   Overnight events: No acute events overnight. Patient in room 48069/23863 A. Blood glucose stable. BG at and below goal on current insulin regimen (SSI, prandial, and basal insulin ). Steroid use- Prednisone 5 mg.    Renal function- Abnormal - Creatinine 1.9   Vasopressors-  None       Endocrine will continue to follow and manage insulin orders inpatient.         Diet diabetic  Calorie     Eatin%  Nausea: No  Hypoglycemia and intervention: No  Fever: No  TPN and/or TF: No      BP (!) 142/91 (Patient Position: Lying)   Pulse 94   Temp 98.4 °F (36.9 °C) (Oral)   Resp 18   Ht 5' 8" (1.727 m)   Wt 109.5 kg (241 lb 8.2 oz)   SpO2 99%   BMI 36.72 kg/m²     Labs Reviewed and Include    Recent Labs   Lab 24  0524      CALCIUM 8.8   ALBUMIN 3.6   PROT 5.4*      K 5.0   CO2 24      BUN 21*   CREATININE 1.9*   ALKPHOS 32*   ALT 7*   AST 15   BILITOT 2.0*     Lab Results   Component Value Date    WBC 2.01 (L) 2024    HGB 7.8 (L) 2024    HCT 23.6 (L) 2024    MCV 88 2024    PLT 48 (L) 2024     No results for input(s): "TSH", "FREET4" in the last 168 hours.  Lab Results   Component Value Date    HGBA1C 4.9 2024       Nutritional status:   Body mass index is 36.72 kg/m².  Lab Results   Component Value Date    ALBUMIN 3.6 2024    ALBUMIN 3.5 2024    ALBUMIN 3.5 2024     No results found for: "PREALBUMIN"    Estimated Creatinine Clearance: 57.1 mL/min (A) (based on SCr of 1.9 mg/dL (H)).    Accu-Checks  Recent Labs     24  2108 24  0852 24  1318 24  1721 24  2124 24  0852 24  1349 24  1808 24  2035 24  0752   POCTGLUCOSE 85 183* 91 118* 134* 112* 149* 129* 144* 110       Current Medications and/or Treatments Impacting Glycemic Control  Immunotherapy:    Immunosuppressants           Stop Route Frequency     tacrolimus capsule 5 mg         -- Oral 2 times daily      "     Steroids:   Hormones (From admission, onward)      Start     Stop Route Frequency Ordered    02/09/24 0900  predniSONE tablet 5 mg         -- Oral Daily 02/08/24 1406    02/06/24 0039  melatonin tablet 6 mg         -- Oral Nightly PRN 02/05/24 2343          Pressors:    Autonomic Drugs (From admission, onward)      None          Hyperglycemia/Diabetes Medications:   Antihyperglycemics (From admission, onward)      Start     Stop Route Frequency Ordered    02/09/24 1100  insulin aspart U-100 pen 24 Units         -- SubQ 3 times daily before meals 02/09/24 0907    02/09/24 0915  insulin detemir U-100 (Levemir) pen 40 Units         -- SubQ Daily 02/09/24 0912    02/06/24 0040  insulin aspart U-100 pen 0-10 Units         -- SubQ Before meals & nightly PRN 02/05/24 2343

## 2024-02-09 NOTE — CARE UPDATE
Care Update:     No acute events overnight. Patient in room 08551/23897 A. Blood glucose stable on current inpatient regimen.No hypoglycemia noted over the past 24-hours. Endocrine will continue to follow and manage insulin orders inpatient.       Steroid use- Prednisone 10 mg.   Renal function-   Lab Results   Component Value Date    CREATININE 1.9 (H) 02/09/2024        Diet diabetic 2000 Calorie     POCT Glucose   Date Value Ref Range Status   02/09/2024 110 70 - 110 mg/dL Final   02/08/2024 144 (H) 70 - 110 mg/dL Final   02/08/2024 129 (H) 70 - 110 mg/dL Final   02/08/2024 149 (H) 70 - 110 mg/dL Final   02/08/2024 112 (H) 70 - 110 mg/dL Final   02/07/2024 134 (H) 70 - 110 mg/dL Final   02/07/2024 118 (H) 70 - 110 mg/dL Final   02/07/2024 91 70 - 110 mg/dL Final   02/07/2024 183 (H) 70 - 110 mg/dL Final   02/06/2024 85 70 - 110 mg/dL Final   02/06/2024 165 (H) 70 - 110 mg/dL Final   02/06/2024 185 (H) 70 - 110 mg/dL Final   02/06/2024 202 (H) 70 - 110 mg/dL Final     Lab Results   Component Value Date    HGBA1C 4.9 02/06/2024         Home Medications   Diabetes Medications               empagliflozin (JARDIANCE) 25 mg tablet Take 1 tablet (25 mg total) by mouth once daily.    insulin aspart U-100 (NOVOLOG) 100 unit/mL (3 mL) InPn pen Inject into the skin.    insulin degludec (TRESIBA FLEXTOUCH U-100) 100 unit/mL (3 mL) insulin pen Inject 50 Units into the skin once daily.    insulin lispro (HUMALOG KWIKPEN INSULIN) 100 unit/mL pen Inject 32 Units into the skin 3 (three) times daily before meals. Plus sliding scale insulin: 150 - 200 = +2 units; 201 - 250 = +4 units; 251 - 300 = +6 units; 301 - 350 = +8 units; >350 = +10 units. Total daily dose: 126 units    semaglutide (OZEMPIC) 2 mg/dose (8 mg/3 mL) PnIj Inject 2 mg into the skin every 7 days.          Endocrine  Type 2 diabetes mellitus with hyperglycemia  Endocrinology consulted for BG management.   BG goal 140-180     - Levemir (Insulin Detemir) 48 units  daily  - Novolog (Insulin Aspart) 26 units TIDWM and prn for BG excursions [facility] SSI (150/25)  - BG checks AC/HS  - Hypoglycemia protocol in place  - If blood glucose greater than 300, please ask patient not to eat food or drink anything other than water until correctional insulin has brought it back below 250       Jimi Sunshine DNP, FNP-C  Department of Endocrinology  Inpatient Glycemic Management

## 2024-02-09 NOTE — PLAN OF CARE
- Positive for RSV  - Orthostatic BP checked & not orthostatic.  Lightheaded when standing, walking per patient  - Rhem & Heme consulted.  Anemia workup  - CBG ACHS.  MTI + SSI, Levemir daily.  Home dexcom in place    Problem: Adult Inpatient Plan of Care  Goal: Plan of Care Review  Outcome: Ongoing, Progressing  Goal: Patient-Specific Goal (Individualized)  Outcome: Ongoing, Progressing  Goal: Absence of Hospital-Acquired Illness or Injury  Outcome: Ongoing, Progressing  Goal: Optimal Comfort and Wellbeing  Outcome: Ongoing, Progressing  Goal: Readiness for Transition of Care  Outcome: Ongoing, Progressing

## 2024-02-09 NOTE — PROGRESS NOTES
Discharge Note       presented to patient's bedside to discuss discharge plans, as well as assess any concerns or needs.     Patient was alert and oriented x 4 and communicative. Patient will discharge to home today with no needs.  Patient's wife, Triston who is on her way to the hospital will transport patient upon discharge. Patient is coping well with support from family.      Patient reports agreeing with the discharge plan and has no other psychosocial concerns. Patient and caregiver verbalize understanding and are involved in treatment planning and discharge process. Patient nor caregiver had any further concerns or questions at this time.     SW remains available and will continue to follow, providing psychosocial support, education and discharge planning as indicated. Transplant team remains available and patient states understanding of how to access team and resources as needed.

## 2024-02-09 NOTE — CARE UPDATE
Hematology Plan of Care:    Patient discharging today. Hemogram stable. Patient to follow outpatient with hematology.     We will sign off.     Anna Davis MD  Hematology/Oncology Fellow PGY 6  [facility]

## 2024-02-09 NOTE — PROGRESS NOTES
"[facility] - [facility]plant Stepdown  Endocrinology  Progress Note    Admit Date: 2024     Reason for Consult: Management of T2DM, Hyperglycemia      Surgical Procedure and Date: Liver transplant 2023     Diabetes diagnosis year: Over 5 years ago     Home Diabetes Medications:  Tresiba 45 units QD, Humalog 25 units TIDWM +SSI MDC (180/25), Jardiance 25 mg, Ozempic 2 mg     How often checking glucose at home? >4 x day   BG readings on regimen: 100-180  Hypoglycemia on the regimen?  No  Missed doses on regimen?  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 Surgery with replaced R HA reconstruction. Endocrine consulted to manage hyperglycemia and type 2 diabetes.     Lab Results   Component Value Date    HGBA1C 4.9 2024         Interval HPI:   Overnight events: No acute events overnight. Patient in room 86403/50043 A. Blood glucose stable. BG at and below goal on current insulin regimen (SSI, prandial, and basal insulin ). Steroid use- Prednisone 5 mg.    Renal function- Abnormal - Creatinine 1.9   Vasopressors-  None       Endocrine will continue to follow and manage insulin orders inpatient.         Diet diabetic  Calorie     Eatin%  Nausea: No  Hypoglycemia and intervention: No  Fever: No  TPN and/or TF: No      BP (!) 142/91 (Patient Position: Lying)   Pulse 94   Temp 98.4 °F (36.9 °C) (Oral)   Resp 18   Ht 5' 8" (1.727 m)   Wt 109.5 kg (241 lb 8.2 oz)   SpO2 99%   BMI 36.72 kg/m²     Labs Reviewed and Include    Recent Labs   Lab 24  0524      CALCIUM 8.8   ALBUMIN 3.6   PROT 5.4*      K 5.0   CO2 24      BUN 21*   CREATININE 1.9*   ALKPHOS 32*   ALT 7*   AST 15   BILITOT 2.0*     Lab Results   Component Value Date    WBC 2.01 (L) 2024    HGB 7.8 (L) " "02/09/2024    HCT 23.6 (L) 02/09/2024    MCV 88 02/09/2024    PLT 48 (L) 02/09/2024     No results for input(s): "TSH", "FREET4" in the last 168 hours.  Lab Results   Component Value Date    HGBA1C 4.9 02/06/2024       Nutritional status:   Body mass index is 36.72 kg/m².  Lab Results   Component Value Date    ALBUMIN 3.6 02/09/2024    ALBUMIN 3.5 02/08/2024    ALBUMIN 3.5 02/07/2024     No results found for: "PREALBUMIN"    Estimated Creatinine Clearance: 57.1 mL/min (A) (based on SCr of 1.9 mg/dL (H)).    Accu-Checks  Recent Labs     02/06/24  2108 02/07/24  0852 02/07/24  1318 02/07/24  1721 02/07/24  2124 02/08/24  0852 02/08/24  1349 02/08/24  1808 02/08/24  2035 02/09/24  0752   POCTGLUCOSE 85 183* 91 118* 134* 112* 149* 129* 144* 110       Current Medications and/or Treatments Impacting Glycemic Control  Immunotherapy:    Immunosuppressants           Stop Route Frequency     tacrolimus capsule 5 mg         -- Oral 2 times daily          Steroids:   Hormones (From admission, onward)      Start     Stop Route Frequency Ordered    02/09/24 0900  predniSONE tablet 5 mg         -- Oral Daily 02/08/24 1406    02/06/24 0039  melatonin tablet 6 mg         -- Oral Nightly PRN 02/05/24 2343          Pressors:    Autonomic Drugs (From admission, onward)      None          Hyperglycemia/Diabetes Medications:   Antihyperglycemics (From admission, onward)      Start     Stop Route Frequency Ordered    02/09/24 1100  insulin aspart U-100 pen 24 Units         -- SubQ 3 times daily before meals 02/09/24 0907    02/09/24 0915  insulin detemir U-100 (Levemir) pen 40 Units         -- SubQ Daily 02/09/24 0912    02/06/24 0040  insulin aspart U-100 pen 0-10 Units         -- SubQ Before meals & nightly PRN 02/05/24 2343            ASSESSMENT and PLAN    Oncology  Anemia of chronic disease  May impact the accuracy of the HbA1C      Endocrine  Type 2 diabetes mellitus with hyperglycemia  Endocrinology consulted for BG management. "   BG goal 140-180    - Levemir (Insulin Detemir) 40 units daily (20% reduction due to fasting blood glucose below goal.)  - Novolog (Insulin Aspart) 24 units TIDWM and prn for BG excursions MDC SSI (150/25) (10% reduction due to prandial blood glucose below goal)   - BG checks AC/HS  - Hypoglycemia protocol in place  - If blood glucose greater than 300, please ask patient not to eat food or drink anything other than water until correctional insulin has brought it back below 250    ** Please notify Endocrine for any change and/or advance in diet**  ** Please call Endocrine for any BG related issues **    Discharge Planning:   TBD. Please notify endocrinology prior to discharge.        GI  Diarrhea  Managed per primary team  Avoid hypoglycemia             Jimi Sunshine, DNP, FNP  Endocrinology  [facility] - Transplant Stepdown

## 2024-02-09 NOTE — NURSING
Patient ready for dc home. Bedside Rx have been delivered and blue med card has been updated/ revised by PharmD.Followup appts per AVS. Wife to transport home.

## 2024-02-10 NOTE — TELEPHONE ENCOUNTER
Patient needs a follow-up appointment in the discharge clinic in 1-2 weeks. Discussed with patient changing to Mounjaro to see if that will help with weight loss and insulin resistance. Advised by the liver transplant team to not start Mounjaro or resume Ozempic for 2 weeks. Patient aware that Mounjaro is intended to replace Ozempic.     Lab Results   Component Value Date    HGBA1C 4.9 02/06/2024       POCT Glucose   Date Value Ref Range Status   02/09/2024 110 70 - 110 mg/dL Final   02/08/2024 144 (H) 70 - 110 mg/dL Final   02/08/2024 129 (H) 70 - 110 mg/dL Final   02/08/2024 149 (H) 70 - 110 mg/dL Final   02/08/2024 112 (H) 70 - 110 mg/dL Final   02/07/2024 134 (H) 70 - 110 mg/dL Final   02/07/2024 118 (H) 70 - 110 mg/dL Final   02/07/2024 91 70 - 110 mg/dL Final   02/07/2024 183 (H) 70 - 110 mg/dL Final       Diabetes Medications               empagliflozin (JARDIANCE) 25 mg tablet Take 1 tablet (25 mg total) by mouth once daily.    insulin degludec (TRESIBA FLEXTOUCH U-100) 100 unit/mL (3 mL) insulin pen Inject 50 Units into the skin once daily.    insulin lispro (HUMALOG KWIKPEN INSULIN) 100 unit/mL pen Inject 32 Units into the skin 3 (three) times daily before meals. Plus sliding scale insulin: 150 - 200 = +2 units; 201 - 250 = +4 units; 251 - 300 = +6 units; 301 - 350 = +8 units; >350 = +10 units. Total daily dose: 126 units    semaglutide (OZEMPIC) 2 mg/dose (8 mg/3 mL) PnIj Inject 2 mg into the skin every 7 days.            Thanks,    Jimi Sunshine DNP, FNP-C  Department of Endocrinology  Inpatient Glycemic Management

## 2024-02-11 LAB
B19V DNA # SPEC NAA+PROBE: NOT DETECTED COPIES/ML
BACTERIA BLD CULT: NORMAL
BACTERIA BLD CULT: NORMAL
PARVOVIRUS B19 DNA, QUANT: NOT DETECTED LOG CPS/ML
SPECIMEN SOURCE: NORMAL

## 2024-02-12 ENCOUNTER — PATIENT MESSAGE (OUTPATIENT)
Dept: TRANSPLANT | Facility: CLINIC | Age: 49
End: 2024-02-12
Payer: COMMERCIAL

## 2024-02-12 LAB
1,25(OH)2D3 SERPL-MCNC: 27 PG/ML (ref 20–79)
G6PD RBC-CCNT: 17.3 U/G HB (ref 8–11.9)
LYSOZYME SERPL-MCNC: 10.4 MCG/ML (ref 2.6–6)

## 2024-02-16 ENCOUNTER — PATIENT MESSAGE (OUTPATIENT)
Dept: TRANSPLANT | Facility: CLINIC | Age: 49
End: 2024-02-16
Payer: COMMERCIAL

## 2024-02-16 LAB
EXT ALBUMIN: 4.3
EXT ALKALINE PHOSPHATASE: 35
EXT ALT: 11
EXT AST: 14
EXT BASOPHIL%: 0.5
EXT BILIRUBIN DIRECT: 0.7 MG/DL
EXT BILIRUBIN TOTAL: 1.8
EXT BUN: 25
EXT CALCIUM: 9.4
EXT CHLORIDE: 108
EXT CO2: 24
EXT CREATININE: 1.67 MG/DL
EXT EOSINOPHIL%: 2.8
EXT GFR MDRD NON AF AMER: 50
EXT GLUCOSE: 124
EXT HEMATOCRIT: 29.3
EXT HEMOGLOBIN: 9.1
EXT LYMPH%: 22.6
EXT MONOCYTES%: 6.9
EXT PLATELETS: 53
EXT POTASSIUM: 5
EXT PROTEIN TOTAL: 6
EXT SEGS%: 65.8
EXT SODIUM: 142 MMOL/L
EXT TACROLIMUS LVL: ABNORMAL
EXT WBC: 2.2

## 2024-02-19 ENCOUNTER — PATIENT MESSAGE (OUTPATIENT)
Dept: TRANSPLANT | Facility: CLINIC | Age: 49
End: 2024-02-19
Payer: COMMERCIAL

## 2024-02-19 ENCOUNTER — TELEPHONE (OUTPATIENT)
Dept: TRANSPLANT | Facility: CLINIC | Age: 49
End: 2024-02-19
Payer: COMMERCIAL

## 2024-02-19 LAB
EXT ALBUMIN: 4.3
EXT ALKALINE PHOSPHATASE: 35
EXT ALT: 11
EXT AST: 14
EXT BASOPHIL%: 0.5
EXT BILIRUBIN DIRECT: 0.7 MG/DL
EXT BILIRUBIN TOTAL: 1.8
EXT BUN: 25
EXT CALCIUM: 9.4
EXT CHLORIDE: 108
EXT CO2: 24
EXT CREATININE: 1.67 MG/DL
EXT EOSINOPHIL%: 2.8
EXT GFR MDRD NON AF AMER: 50
EXT GLUCOSE: 124
EXT HEMATOCRIT: 29.3
EXT HEMOGLOBIN: 3.14
EXT LYMPH%: 22.6
EXT MONOCYTES%: 6.9
EXT PLATELETS: 53
EXT POTASSIUM: 5
EXT PROTEIN TOTAL: 6
EXT SEGS%: 65.8
EXT SODIUM: 142 MMOL/L
EXT TACROLIMUS LVL: 4.9
EXT WBC: 2.2

## 2024-02-19 NOTE — TELEPHONE ENCOUNTER
Portal message sent, repeat labs 3/25/24----- Message from Yaa Jackson MD sent at 2/19/2024  1:43 PM CST -----  LFTs are within normal limits. Bili is mildly elevated, but similar to prior checks. Cr is back at baseline.    No changes in immunosuppression at this time.

## 2024-02-21 ENCOUNTER — OFFICE VISIT (OUTPATIENT)
Dept: TRANSPLANT | Facility: CLINIC | Age: 49
End: 2024-02-21
Payer: COMMERCIAL

## 2024-02-21 DIAGNOSIS — Z94.4 S/P LIVER TRANSPLANT: ICD-10-CM

## 2024-02-21 DIAGNOSIS — D61.818 PANCYTOPENIA: ICD-10-CM

## 2024-02-21 DIAGNOSIS — Z79.60 LONG-TERM USE OF IMMUNOSUPPRESSANT MEDICATION: ICD-10-CM

## 2024-02-21 PROCEDURE — 99214 OFFICE O/P EST MOD 30 MIN: CPT | Mod: 95,,, | Performed by: INTERNAL MEDICINE

## 2024-02-21 NOTE — PROGRESS NOTES
Transplant Hepatology  Liver Transplant Recipient Follow-up    Transplant Date: 1/5/2023  UNOS Native Liver Dx: Cirrhosis: Other, Specify - granulomatous hepatitis    The patient location is: [place]  The chief complaint leading to consultation is: follow up of his liver transplant.    Visit type: audiovisual    Face to Face time with patient: 17 minutes.  30 minutes of total time spent on the encounter, which includes face to face time and non-face to face time preparing to see the patient (eg, review of tests), Obtaining and/or reviewing separately obtained history, Documenting clinical information in the electronic or other health record, Independently interpreting results (not separately reported) and communicating results to the patient/family/caregiver, or Care coordination (not separately reported).     Each patient to whom he or she provides medical services by telemedicine is:  (1) informed of the relationship between the physician and patient and the respective role of any other health care provider with respect to management of the patient; and (2) notified that he or she may decline to receive medical services by telemedicine and may withdraw from such care at any time.    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 Presumed T-cell mediated rejection in Dec 2023 treated with oral prednisone in the outpatient setting.    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 Feels well today.     He was admitted to [facility] from 2/4/24 - 2/9/24 after presenting to outside hospital with N/V, diarrhea, and metabolic disarray and BARBARA in routine labs. Infectious workup was notable for RSV. Treated with supportive management. Cellcept was held on discharge given leukopenia. Strated on prenisone 5mg qd while off Cellcept.    Today he feels well. Thinks he is back at baseline. Denies acute complaints. Continues taking Tac 4/4 and pred 5mg qd. Off Cellcept.    Denies fevers, chills, tremors, new headaches.    Objective:     There were no vitals filed for this visit.  There is no height or weight on file to calculate BMI.    Physical Exam  Constitutional:       General: He is not in acute distress.     Appearance: He is not toxic-appearing.   Eyes:      General: No scleral icterus.  Skin:     Coloration: Skin is not jaundiced.   Neurological:      General: No focal deficit present.      Mental Status: He is alert.   Psychiatric:         Thought Content: Thought content normal.         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 Post-operative course has been complicated by two episodes of moderate T-cell mediated rejection requiring admission for pulse dose steroids.    1. S/P liver  transplant    2. Long-term use of immunosuppressant medication    3. Pancytopenia        1. Liver Transplant. Excellent allograft function. Mild hyperbilirubinemia likely secondary to Gilbert syndrome.    2. Immunosuppression. History of T-cell mediated rejections correlate with prior attempts at decreasing IS. Continue Tac 4/4, and prednisone 5mg qd. Continue to hold Cellcept for now.    3. Blood Pressure. Continue monitoring. Recommend discussing initiation of anti-hypertensive therapy with PCP.    4. Renal. Cr 1.9 2/9/24. Encourage hydration. Avoid NSAIDs. Plan minimize CNI as able.     5. Aspirin. The patient will continue to take aspirin as prophylaxis against post-operative hepatic artery thrombosis and as a cardioprotective agent post-transplantation.    6. Explant Pathology. No evidence of neoplasia.    RTC in 3 months.    UNOS Patient Status  Functional Status: 80% - Normal activity with effort: some symptoms of disease  Physical Capacity: No Limitations  Working for income: yes  New diabetes onset between last follow-up to the current follow-up: No  Did patient have any acute rejection episodes during the follow-up period: No  Post transplant malignancy: No    Yaa Jackson MD  Staff Physician  Hepatology and Liver Transplant  [facility]  [facility] [place]

## 2024-02-21 NOTE — Clinical Note
No changes to immunosuppression at this time. Continue holding Cellcept.  Labs in early March 2024. RTC in 3 months. Thank you.

## 2024-02-21 NOTE — LETTER
February 28, 2024        Alan Chinchilla  [address] [place] [address]  Phone: [phone]  Fax: [phone]             [facility]  [address]  [facility] [address]  Phone: [phone]   Patient: Rosalio Mccarty   MR Number: [id number]   YOB: 1975   Date of Visit: 2/21/2024       Dear Dr. Alan Chinchilla    Thank you for referring Rosalio Mccarty to me for evaluation. Attached you will find relevant portions of my assessment and plan of care.    If you have questions, please do not hesitate to call me. I look forward to following Rosalio Mccarty along with you.    Sincerely,    Yaa Jackson MD    Enclosure    If you would like to receive this communication electronically, please contact [email or web address] or [phone] to request [employer] access.    [employer] is a tool which provides read-only access to select patient information with whom you have a relationship. Its easy to use and provides real time access to review your patients record including encounter summaries, notes, results, and demographic information.    If you feel you have received this communication in error or would no longer like to receive these types of communications, please e-mail [email or web address]

## 2024-02-23 ENCOUNTER — PATIENT MESSAGE (OUTPATIENT)
Dept: TRANSPLANT | Facility: CLINIC | Age: 49
End: 2024-02-23
Payer: COMMERCIAL

## 2024-02-23 DIAGNOSIS — Z94.4 LIVER TRANSPLANTED: ICD-10-CM

## 2024-02-26 RX ORDER — ASPIRIN 81 MG/1
81 TABLET ORAL DAILY
Qty: 30 TABLET | Refills: 11 | Status: SHIPPED | OUTPATIENT
Start: 2024-02-26 | End: 2025-02-25

## 2024-02-26 RX ORDER — MYCOPHENOLATE MOFETIL 250 MG/1
500 CAPSULE ORAL 2 TIMES DAILY
Qty: 120 CAPSULE | Refills: 11 | OUTPATIENT
Start: 2024-02-26

## 2024-03-05 ENCOUNTER — PATIENT MESSAGE (OUTPATIENT)
Dept: TRANSPLANT | Facility: CLINIC | Age: 49
End: 2024-03-05
Payer: COMMERCIAL

## 2024-03-05 RX ORDER — PREDNISONE 5 MG/1
5 TABLET ORAL DAILY
Qty: 30 TABLET | Refills: 3 | Status: ON HOLD | OUTPATIENT
Start: 2024-03-05 | End: 2024-03-15 | Stop reason: HOSPADM

## 2024-03-11 ENCOUNTER — TELEPHONE (OUTPATIENT)
Dept: TRANSPLANT | Facility: CLINIC | Age: 49
End: 2024-03-11
Payer: COMMERCIAL

## 2024-03-11 ENCOUNTER — PATIENT MESSAGE (OUTPATIENT)
Dept: TRANSPLANT | Facility: CLINIC | Age: 49
End: 2024-03-11
Payer: COMMERCIAL

## 2024-03-11 LAB
EXT ALBUMIN: 5
EXT ALKALINE PHOSPHATASE: 39
EXT ALT: 32
EXT AST: 30
EXT BASOPHIL%: 0.3
EXT BILIRUBIN DIRECT: 1 MG/DL
EXT BILIRUBIN TOTAL: 4.4
EXT BUN: 37
EXT CALCIUM: 9.9
EXT CHLORIDE: 105
EXT CO2: 25
EXT CREATININE: 1.85 MG/DL
EXT EOSINOPHIL%: 2.1
EXT GFR MDRD NON AF AMER: 44
EXT GLUCOSE: 115
EXT HEMATOCRIT: 35.4
EXT HEMOGLOBIN: 11.9
EXT LYMPH%: 42.6
EXT MONOCYTES%: 8.3
EXT PLATELETS: 68
EXT POTASSIUM: 4.5
EXT PROTEIN TOTAL: 7.3
EXT SEGS%: 46.4
EXT SODIUM: 138 MMOL/L
EXT TACROLIMUS LVL: 6.3
EXT WBC: 3.4

## 2024-03-11 NOTE — TELEPHONE ENCOUNTER
Portal message sent, repeat labs 4/15/24----- Message from Yaa Jackson MD sent at 3/11/2024  3:40 PM CDT -----  Labs are within acceptable ranges. No changes recommended at this time.

## 2024-03-12 ENCOUNTER — HOSPITAL ENCOUNTER (INPATIENT)
Facility: HOSPITAL | Age: 49
LOS: 3 days | Discharge: HOME | DRG: 871 | End: 2024-03-15
Attending: HOSPITALIST | Admitting: HOSPITALIST
Payer: COMMERCIAL

## 2024-03-12 DIAGNOSIS — N18.9 ACUTE KIDNEY INJURY SUPERIMPOSED ON CKD: Primary | ICD-10-CM

## 2024-03-12 DIAGNOSIS — R07.9 CHEST PAIN: ICD-10-CM

## 2024-03-12 DIAGNOSIS — I50.22 CHRONIC SYSTOLIC HEART FAILURE: ICD-10-CM

## 2024-03-12 DIAGNOSIS — R94.31 QT PROLONGATION: ICD-10-CM

## 2024-03-12 DIAGNOSIS — E87.20 METABOLIC ACIDOSIS: ICD-10-CM

## 2024-03-12 DIAGNOSIS — E11.65 TYPE 2 DIABETES MELLITUS WITH HYPERGLYCEMIA, UNSPECIFIED WHETHER LONG TERM INSULIN USE: ICD-10-CM

## 2024-03-12 DIAGNOSIS — E87.1 HYPONATREMIA: ICD-10-CM

## 2024-03-12 DIAGNOSIS — N17.9 ACUTE KIDNEY INJURY SUPERIMPOSED ON CKD: Primary | ICD-10-CM

## 2024-03-12 DIAGNOSIS — E87.5 HYPERKALEMIA: ICD-10-CM

## 2024-03-12 DIAGNOSIS — Z94.4 S/P LIVER TRANSPLANT: ICD-10-CM

## 2024-03-12 DIAGNOSIS — E80.6 HYPERBILIRUBINEMIA: ICD-10-CM

## 2024-03-12 DIAGNOSIS — N17.9 AKI (ACUTE KIDNEY INJURY): ICD-10-CM

## 2024-03-12 PROCEDURE — 20600001 HC STEP DOWN PRIVATE ROOM

## 2024-03-12 NOTE — PLAN OF CARE
Outside Transfer Acceptance Note / Regional Referral Center    Referring facility: [facility]   Referring provider: LUDA BUNN  Accepting facility: [facility]  Accepting provider: Vida ZUNIGA  Admitting provider: ODETTE  Reason for transfer: Higher Level of Care   Transfer diagnosis: BARBARA  Transfer specialty requested: Transplant Liver  Hepatology  Transfer specialty notified: Yes  Transfer level: NUMBER 1-5: 2  Bed type requested: Med-tele  Isolation status: No active isolations   Admission class or status: IP- Inpatient  IP- Inpatient      Narrative     48M DBD LTX 1/5/23 for 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 CKDIII thought contributed by CNI currently at [place] ED for evaluation of acute kidney injury. Of note, patient was admitted to [facility] from 2/4/24 - 2/9/24 after presenting to outside hospital with N/V, diarrhea, and metabolic disarray and BARBARA in routine labs. Infectious workup was notable for RSV. Treated with supportive management. Cellcept was held on discharge given leukopenia. Started on prenisone 5 mg qd while off Cellcept. Patient continued on tacrolimus 4/4. Patient presents to ED today with dysuria. Associated symptoms include fever to 101 at home, though afebrile in ED. Patient otherwise HDS. Labs notable for BARBARA with Cr 1.9 -> 2.3 with UA notable for pyuria and hyaline casts. CMP also notable for an indirect hyperbilirubinemia which could be typical of Gilbert but at level of 8.4, AST/ALT 39/31, ALP 38. Pro-jane is 3.55.  CBC with 13 > 14.9 < 93. Patient started on vancomycin and cefepime and given IVF as there are concerns for sepsis. Patient to transfer to [facility] for hepatology and potentially nephrology evaluations.        Objective     Vitals:    Recent Labs: All pertinent labs within the past 24 hours have  been reviewed.  Recent imaging: See above   Airway:     Vent settings:         IV access:    Infusions: See above  Allergies:   Review of patient's allergies indicates:   Allergen Reactions    Adhesive Other (See Comments)     Skin irritation  Paper tape okay to use    Pcn [penicillins] Rash     Happened as a baby      NPO: No    Anticoagulation:   Anticoagulants       None             Instructions      [facility]-  Admit to Hospital Medicine  Upon patient arrival to floor, please send SecureChat to [facility] or call extension [phone] (if no answer, do NOT leave a callback number after the beep, rather please send a SecureChat to [facility]), for Hospital Medicine admit team assignment and for additional admit orders for the patient.  Do not page the attending physician associated with the patient on arrival (this physician may not be on duty at the time of arrival).  Rather, always send a SecureChat to [facility] or call [phone] to reach the triage physician for orders and team assignment.

## 2024-03-13 PROBLEM — D61.818 PANCYTOPENIA: Status: RESOLVED | Noted: 2024-02-07 | Resolved: 2024-03-13

## 2024-03-13 PROBLEM — B33.8 RSV (RESPIRATORY SYNCYTIAL VIRUS INFECTION): Status: RESOLVED | Noted: 2024-02-08 | Resolved: 2024-03-13

## 2024-03-13 PROBLEM — E80.4 GILBERT SYNDROME: Status: ACTIVE | Noted: 2024-03-13

## 2024-03-13 PROBLEM — E80.6 HYPERBILIRUBINEMIA: Status: ACTIVE | Noted: 2024-03-13

## 2024-03-13 PROBLEM — E83.42 HYPOMAGNESEMIA: Status: ACTIVE | Noted: 2024-03-13

## 2024-03-13 PROBLEM — A41.9 SEPSIS: Status: ACTIVE | Noted: 2024-03-13

## 2024-03-13 PROBLEM — N18.9 ACUTE KIDNEY INJURY SUPERIMPOSED ON CKD: Status: ACTIVE | Noted: 2024-03-13

## 2024-03-13 PROBLEM — E87.1 HYPONATREMIA: Status: ACTIVE | Noted: 2024-03-13

## 2024-03-13 PROBLEM — N17.9 ACUTE KIDNEY INJURY SUPERIMPOSED ON CKD: Status: ACTIVE | Noted: 2024-03-13

## 2024-03-13 PROBLEM — N17.9 ACUTE RENAL FAILURE SUPERIMPOSED ON CHRONIC KIDNEY DISEASE: Status: ACTIVE | Noted: 2024-03-13

## 2024-03-13 PROBLEM — R19.7 DIARRHEA: Status: RESOLVED | Noted: 2024-02-05 | Resolved: 2024-03-13

## 2024-03-13 PROBLEM — N18.9 ACUTE RENAL FAILURE SUPERIMPOSED ON CHRONIC KIDNEY DISEASE: Status: ACTIVE | Noted: 2024-03-13

## 2024-03-13 LAB
ALBUMIN SERPL BCP-MCNC: 3.1 G/DL (ref 3.5–5.2)
ALBUMIN SERPL BCP-MCNC: 3.2 G/DL (ref 3.5–5.2)
ALBUMIN SERPL BCP-MCNC: 3.6 G/DL (ref 3.5–5.2)
ALP SERPL-CCNC: 36 U/L (ref 55–135)
ALT SERPL W/O P-5'-P-CCNC: 24 U/L (ref 10–44)
AMMONIA PLAS-SCNC: 42 UMOL/L (ref 10–50)
ANION GAP SERPL CALC-SCNC: 10 MMOL/L (ref 8–16)
ANION GAP SERPL CALC-SCNC: 11 MMOL/L (ref 8–16)
ANION GAP SERPL CALC-SCNC: 15 MMOL/L (ref 8–16)
ANISOCYTOSIS BLD QL SMEAR: SLIGHT
ANISOCYTOSIS BLD QL SMEAR: SLIGHT
AST SERPL-CCNC: 19 U/L (ref 10–40)
BACTERIA #/AREA URNS AUTO: ABNORMAL /HPF
BASOPHILS # BLD AUTO: 0.02 K/UL (ref 0–0.2)
BASOPHILS # BLD AUTO: 0.02 K/UL (ref 0–0.2)
BASOPHILS # BLD AUTO: 0.08 K/UL (ref 0–0.2)
BASOPHILS NFR BLD: 0.5 % (ref 0–1.9)
BASOPHILS NFR BLD: 0.5 % (ref 0–1.9)
BASOPHILS NFR BLD: 0.7 % (ref 0–1.9)
BILIRUB DIRECT SERPL-MCNC: 0.5 MG/DL (ref 0.1–0.3)
BILIRUB SERPL-MCNC: 4.8 MG/DL (ref 0.1–1)
BILIRUB UR QL STRIP: NEGATIVE
BUN SERPL-MCNC: 64 MG/DL (ref 6–20)
BUN SERPL-MCNC: 70 MG/DL (ref 6–20)
BUN SERPL-MCNC: 70 MG/DL (ref 6–20)
CALCIUM SERPL-MCNC: 8.3 MG/DL (ref 8.7–10.5)
CALCIUM SERPL-MCNC: 8.3 MG/DL (ref 8.7–10.5)
CALCIUM SERPL-MCNC: 9.1 MG/DL (ref 8.7–10.5)
CHLORIDE SERPL-SCNC: 104 MMOL/L (ref 95–110)
CHLORIDE SERPL-SCNC: 105 MMOL/L (ref 95–110)
CHLORIDE SERPL-SCNC: 106 MMOL/L (ref 95–110)
CLARITY UR REFRACT.AUTO: ABNORMAL
CO2 SERPL-SCNC: 12 MMOL/L (ref 23–29)
CO2 SERPL-SCNC: 13 MMOL/L (ref 23–29)
CO2 SERPL-SCNC: 16 MMOL/L (ref 23–29)
COLOR UR AUTO: ABNORMAL
CREAT SERPL-MCNC: 3.5 MG/DL (ref 0.5–1.4)
CREAT SERPL-MCNC: 3.5 MG/DL (ref 0.5–1.4)
CREAT SERPL-MCNC: 3.7 MG/DL (ref 0.5–1.4)
CREAT UR-MCNC: 196 MG/DL (ref 23–375)
DIFFERENTIAL METHOD BLD: ABNORMAL
EOSINOPHIL # BLD AUTO: 0 K/UL (ref 0–0.5)
EOSINOPHIL # BLD AUTO: 0.1 K/UL (ref 0–0.5)
EOSINOPHIL # BLD AUTO: 0.1 K/UL (ref 0–0.5)
EOSINOPHIL NFR BLD: 0.1 % (ref 0–8)
EOSINOPHIL NFR BLD: 2.5 % (ref 0–8)
EOSINOPHIL NFR BLD: 2.5 % (ref 0–8)
ERYTHROCYTE [DISTWIDTH] IN BLOOD BY AUTOMATED COUNT: 18.7 % (ref 11.5–14.5)
ERYTHROCYTE [DISTWIDTH] IN BLOOD BY AUTOMATED COUNT: 18.7 % (ref 11.5–14.5)
ERYTHROCYTE [DISTWIDTH] IN BLOOD BY AUTOMATED COUNT: 19.4 % (ref 11.5–14.5)
EST. GFR  (NO RACE VARIABLE): 19.3 ML/MIN/1.73 M^2
EST. GFR  (NO RACE VARIABLE): 20.6 ML/MIN/1.73 M^2
EST. GFR  (NO RACE VARIABLE): 20.6 ML/MIN/1.73 M^2
GLUCOSE SERPL-MCNC: 156 MG/DL (ref 70–110)
GLUCOSE SERPL-MCNC: 211 MG/DL (ref 70–110)
GLUCOSE SERPL-MCNC: 260 MG/DL (ref 70–110)
GLUCOSE UR QL STRIP: ABNORMAL
HAPTOGLOB SERPL-MCNC: <10 MG/DL (ref 30–250)
HCT VFR BLD AUTO: 30 % (ref 40–54)
HCT VFR BLD AUTO: 30 % (ref 40–54)
HCT VFR BLD AUTO: 41.7 % (ref 40–54)
HGB BLD-MCNC: 10.7 G/DL (ref 14–18)
HGB BLD-MCNC: 10.7 G/DL (ref 14–18)
HGB BLD-MCNC: 15.1 G/DL (ref 14–18)
HGB UR QL STRIP: NEGATIVE
HYALINE CASTS UR QL AUTO: 2 /LPF
IMM GRANULOCYTES # BLD AUTO: 0.01 K/UL (ref 0–0.04)
IMM GRANULOCYTES # BLD AUTO: 0.01 K/UL (ref 0–0.04)
IMM GRANULOCYTES # BLD AUTO: 0.08 K/UL (ref 0–0.04)
IMM GRANULOCYTES NFR BLD AUTO: 0.2 % (ref 0–0.5)
IMM GRANULOCYTES NFR BLD AUTO: 0.2 % (ref 0–0.5)
IMM GRANULOCYTES NFR BLD AUTO: 0.7 % (ref 0–0.5)
INR PPP: 1.2 (ref 0.8–1.2)
KETONES UR QL STRIP: ABNORMAL
LDH SERPL L TO P-CCNC: 570 U/L (ref 110–260)
LEUKOCYTE ESTERASE UR QL STRIP: ABNORMAL
LYMPHOCYTES # BLD AUTO: 1.4 K/UL (ref 1–4.8)
LYMPHOCYTES # BLD AUTO: 1.4 K/UL (ref 1–4.8)
LYMPHOCYTES # BLD AUTO: 3.3 K/UL (ref 1–4.8)
LYMPHOCYTES NFR BLD: 27 % (ref 18–48)
LYMPHOCYTES NFR BLD: 35.5 % (ref 18–48)
LYMPHOCYTES NFR BLD: 35.5 % (ref 18–48)
MAGNESIUM SERPL-MCNC: 1.4 MG/DL (ref 1.6–2.6)
MAGNESIUM SERPL-MCNC: 2 MG/DL (ref 1.6–2.6)
MCH RBC QN AUTO: 31 PG (ref 27–31)
MCH RBC QN AUTO: 31 PG (ref 27–31)
MCH RBC QN AUTO: 31.6 PG (ref 27–31)
MCHC RBC AUTO-ENTMCNC: 35.7 G/DL (ref 32–36)
MCHC RBC AUTO-ENTMCNC: 35.7 G/DL (ref 32–36)
MCHC RBC AUTO-ENTMCNC: 36.2 G/DL (ref 32–36)
MCV RBC AUTO: 87 FL (ref 82–98)
MICROSCOPIC COMMENT: ABNORMAL
MONOCYTES # BLD AUTO: 0.3 K/UL (ref 0.3–1)
MONOCYTES # BLD AUTO: 0.3 K/UL (ref 0.3–1)
MONOCYTES # BLD AUTO: 1.1 K/UL (ref 0.3–1)
MONOCYTES NFR BLD: 7.9 % (ref 4–15)
MONOCYTES NFR BLD: 7.9 % (ref 4–15)
MONOCYTES NFR BLD: 9.3 % (ref 4–15)
NEUTROPHILS # BLD AUTO: 2.2 K/UL (ref 1.8–7.7)
NEUTROPHILS # BLD AUTO: 2.2 K/UL (ref 1.8–7.7)
NEUTROPHILS # BLD AUTO: 7.7 K/UL (ref 1.8–7.7)
NEUTROPHILS NFR BLD: 53.4 % (ref 38–73)
NEUTROPHILS NFR BLD: 53.4 % (ref 38–73)
NEUTROPHILS NFR BLD: 62.2 % (ref 38–73)
NITRITE UR QL STRIP: NEGATIVE
NON-SQ EPI CELLS #/AREA URNS AUTO: 1 /HPF
NRBC BLD-RTO: 0 /100 WBC
OHS QRS DURATION: 70 MS
OHS QTC CALCULATION: 447 MS
OVALOCYTES BLD QL SMEAR: ABNORMAL
OVALOCYTES BLD QL SMEAR: ABNORMAL
PATH REV BLD -IMP: NORMAL
PH UR STRIP: 5 [PH] (ref 5–8)
PHOSPHATE SERPL-MCNC: 4.2 MG/DL (ref 2.7–4.5)
PHOSPHATE SERPL-MCNC: 4.2 MG/DL (ref 2.7–4.5)
PHOSPHATE SERPL-MCNC: 4.6 MG/DL (ref 2.7–4.5)
PLATELET # BLD AUTO: 68 K/UL (ref 150–450)
PLATELET # BLD AUTO: 68 K/UL (ref 150–450)
PLATELET # BLD AUTO: 92 K/UL (ref 150–450)
PLATELET BLD QL SMEAR: ABNORMAL
PLATELET BLD QL SMEAR: ABNORMAL
PMV BLD AUTO: 10.2 FL (ref 9.2–12.9)
PMV BLD AUTO: 10.2 FL (ref 9.2–12.9)
PMV BLD AUTO: 10.4 FL (ref 9.2–12.9)
POCT GLUCOSE: 156 MG/DL (ref 70–110)
POCT GLUCOSE: 161 MG/DL (ref 70–110)
POCT GLUCOSE: 214 MG/DL (ref 70–110)
POCT GLUCOSE: 251 MG/DL (ref 70–110)
POTASSIUM SERPL-SCNC: 4.5 MMOL/L (ref 3.5–5.1)
POTASSIUM SERPL-SCNC: 4.9 MMOL/L (ref 3.5–5.1)
POTASSIUM SERPL-SCNC: 5.2 MMOL/L (ref 3.5–5.1)
PROT SERPL-MCNC: 5.8 G/DL (ref 6–8.4)
PROT UR QL STRIP: NEGATIVE
PROTHROMBIN TIME: 13 SEC (ref 9–12.5)
RBC # BLD AUTO: 3.45 M/UL (ref 4.6–6.2)
RBC # BLD AUTO: 3.45 M/UL (ref 4.6–6.2)
RBC # BLD AUTO: 4.78 M/UL (ref 4.6–6.2)
RBC #/AREA URNS AUTO: 5 /HPF (ref 0–4)
RETICS/RBC NFR AUTO: 5.6 % (ref 0.4–2)
SODIUM SERPL-SCNC: 130 MMOL/L (ref 136–145)
SODIUM SERPL-SCNC: 131 MMOL/L (ref 136–145)
SODIUM SERPL-SCNC: 131 MMOL/L (ref 136–145)
SODIUM UR-SCNC: 17 MMOL/L (ref 20–250)
SP GR UR STRIP: 1.02 (ref 1–1.03)
TACROLIMUS BLD-MCNC: 9.6 NG/ML (ref 5–15)
URN SPEC COLLECT METH UR: ABNORMAL
WBC # BLD AUTO: 12.28 K/UL (ref 3.9–12.7)
WBC # BLD AUTO: 4.03 K/UL (ref 3.9–12.7)
WBC # BLD AUTO: 4.03 K/UL (ref 3.9–12.7)
WBC #/AREA URNS AUTO: 52 /HPF (ref 0–5)
WBC CLUMPS UR QL AUTO: ABNORMAL
YEAST UR QL AUTO: ABNORMAL

## 2024-03-13 PROCEDURE — 51798 US URINE CAPACITY MEASURE: CPT

## 2024-03-13 PROCEDURE — 87040 BLOOD CULTURE FOR BACTERIA: CPT | Mod: 59

## 2024-03-13 PROCEDURE — 82140 ASSAY OF AMMONIA: CPT

## 2024-03-13 PROCEDURE — 63600175 PHARM REV CODE 636 W HCPCS: Performed by: HOSPITALIST

## 2024-03-13 PROCEDURE — 83010 ASSAY OF HAPTOGLOBIN QUANT: CPT

## 2024-03-13 PROCEDURE — 80197 ASSAY OF TACROLIMUS: CPT | Performed by: HOSPITALIST

## 2024-03-13 PROCEDURE — 85025 COMPLETE CBC W/AUTO DIFF WBC: CPT

## 2024-03-13 PROCEDURE — 93010 ELECTROCARDIOGRAM REPORT: CPT | Mod: ,,, | Performed by: INTERNAL MEDICINE

## 2024-03-13 PROCEDURE — 83735 ASSAY OF MAGNESIUM: CPT | Mod: 91 | Performed by: HOSPITALIST

## 2024-03-13 PROCEDURE — 85045 AUTOMATED RETICULOCYTE COUNT: CPT | Performed by: HOSPITALIST

## 2024-03-13 PROCEDURE — 83735 ASSAY OF MAGNESIUM: CPT

## 2024-03-13 PROCEDURE — 20600001 HC STEP DOWN PRIVATE ROOM

## 2024-03-13 PROCEDURE — 36415 COLL VENOUS BLD VENIPUNCTURE: CPT

## 2024-03-13 PROCEDURE — 80069 RENAL FUNCTION PANEL: CPT | Mod: 91 | Performed by: HOSPITALIST

## 2024-03-13 PROCEDURE — 85610 PROTHROMBIN TIME: CPT

## 2024-03-13 PROCEDURE — 36415 COLL VENOUS BLD VENIPUNCTURE: CPT | Mod: XB

## 2024-03-13 PROCEDURE — 93005 ELECTROCARDIOGRAM TRACING: CPT

## 2024-03-13 PROCEDURE — 25000003 PHARM REV CODE 250: Performed by: HOSPITALIST

## 2024-03-13 PROCEDURE — 84100 ASSAY OF PHOSPHORUS: CPT

## 2024-03-13 PROCEDURE — 80053 COMPREHEN METABOLIC PANEL: CPT

## 2024-03-13 PROCEDURE — 87086 URINE CULTURE/COLONY COUNT: CPT | Performed by: HOSPITALIST

## 2024-03-13 PROCEDURE — 85060 BLOOD SMEAR INTERPRETATION: CPT | Mod: ,,, | Performed by: PATHOLOGY

## 2024-03-13 PROCEDURE — 85025 COMPLETE CBC W/AUTO DIFF WBC: CPT | Mod: 91 | Performed by: HOSPITALIST

## 2024-03-13 PROCEDURE — 25000003 PHARM REV CODE 250

## 2024-03-13 PROCEDURE — 82570 ASSAY OF URINE CREATININE: CPT | Performed by: HOSPITALIST

## 2024-03-13 PROCEDURE — 99223 1ST HOSP IP/OBS HIGH 75: CPT | Mod: ,,, | Performed by: INTERNAL MEDICINE

## 2024-03-13 PROCEDURE — 63600175 PHARM REV CODE 636 W HCPCS

## 2024-03-13 PROCEDURE — 81001 URINALYSIS AUTO W/SCOPE: CPT | Performed by: HOSPITALIST

## 2024-03-13 PROCEDURE — 36415 COLL VENOUS BLD VENIPUNCTURE: CPT | Mod: XB | Performed by: HOSPITALIST

## 2024-03-13 PROCEDURE — 83615 LACTATE (LD) (LDH) ENZYME: CPT

## 2024-03-13 PROCEDURE — 84300 ASSAY OF URINE SODIUM: CPT | Performed by: HOSPITALIST

## 2024-03-13 PROCEDURE — 82248 BILIRUBIN DIRECT: CPT

## 2024-03-13 RX ORDER — ONDANSETRON HYDROCHLORIDE 2 MG/ML
4 INJECTION, SOLUTION INTRAVENOUS EVERY 6 HOURS PRN
Status: DISCONTINUED | OUTPATIENT
Start: 2024-03-13 | End: 2024-03-15 | Stop reason: HOSPADM

## 2024-03-13 RX ORDER — INSULIN ASPART 100 [IU]/ML
0-10 INJECTION, SOLUTION INTRAVENOUS; SUBCUTANEOUS
Status: DISCONTINUED | OUTPATIENT
Start: 2024-03-13 | End: 2024-03-15 | Stop reason: HOSPADM

## 2024-03-13 RX ORDER — NALOXONE HCL 0.4 MG/ML
0.02 VIAL (ML) INJECTION
Status: DISCONTINUED | OUTPATIENT
Start: 2024-03-13 | End: 2024-03-15 | Stop reason: HOSPADM

## 2024-03-13 RX ORDER — SODIUM CHLORIDE 9 MG/ML
INJECTION, SOLUTION INTRAVENOUS CONTINUOUS
Status: DISCONTINUED | OUTPATIENT
Start: 2024-03-13 | End: 2024-03-13

## 2024-03-13 RX ORDER — ESCITALOPRAM OXALATE 10 MG/1
20 TABLET ORAL DAILY
Status: DISCONTINUED | OUTPATIENT
Start: 2024-03-13 | End: 2024-03-15 | Stop reason: HOSPADM

## 2024-03-13 RX ORDER — ASPIRIN 81 MG/1
81 TABLET ORAL DAILY
Status: DISCONTINUED | OUTPATIENT
Start: 2024-03-13 | End: 2024-03-15 | Stop reason: HOSPADM

## 2024-03-13 RX ORDER — PANTOPRAZOLE SODIUM 40 MG/1
40 TABLET, DELAYED RELEASE ORAL
Status: DISCONTINUED | OUTPATIENT
Start: 2024-03-13 | End: 2024-03-15 | Stop reason: HOSPADM

## 2024-03-13 RX ORDER — IBUPROFEN 200 MG
24 TABLET ORAL
Status: DISCONTINUED | OUTPATIENT
Start: 2024-03-13 | End: 2024-03-15 | Stop reason: HOSPADM

## 2024-03-13 RX ORDER — SODIUM CHLORIDE 0.9 % (FLUSH) 0.9 %
10 SYRINGE (ML) INJECTION EVERY 12 HOURS PRN
Status: DISCONTINUED | OUTPATIENT
Start: 2024-03-13 | End: 2024-03-15 | Stop reason: HOSPADM

## 2024-03-13 RX ORDER — SODIUM BICARBONATE 650 MG/1
1300 TABLET ORAL 3 TIMES DAILY
Status: DISCONTINUED | OUTPATIENT
Start: 2024-03-13 | End: 2024-03-13

## 2024-03-13 RX ORDER — IBUPROFEN 200 MG
16 TABLET ORAL
Status: DISCONTINUED | OUTPATIENT
Start: 2024-03-13 | End: 2024-03-15 | Stop reason: HOSPADM

## 2024-03-13 RX ORDER — HEPARIN SODIUM 5000 [USP'U]/ML
5000 INJECTION, SOLUTION INTRAVENOUS; SUBCUTANEOUS EVERY 8 HOURS
Status: DISCONTINUED | OUTPATIENT
Start: 2024-03-13 | End: 2024-03-15 | Stop reason: HOSPADM

## 2024-03-13 RX ORDER — TACROLIMUS 1 MG/1
4 CAPSULE ORAL 2 TIMES DAILY
Status: DISCONTINUED | OUTPATIENT
Start: 2024-03-13 | End: 2024-03-15 | Stop reason: HOSPADM

## 2024-03-13 RX ORDER — GLUCAGON 1 MG
1 KIT INJECTION
Status: DISCONTINUED | OUTPATIENT
Start: 2024-03-13 | End: 2024-03-15 | Stop reason: HOSPADM

## 2024-03-13 RX ORDER — SODIUM BICARBONATE 650 MG/1
1300 TABLET ORAL 3 TIMES DAILY
Status: DISCONTINUED | OUTPATIENT
Start: 2024-03-13 | End: 2024-03-15 | Stop reason: HOSPADM

## 2024-03-13 RX ORDER — PROCHLORPERAZINE EDISYLATE 5 MG/ML
2.5 INJECTION INTRAMUSCULAR; INTRAVENOUS EVERY 6 HOURS PRN
Status: DISCONTINUED | OUTPATIENT
Start: 2024-03-13 | End: 2024-03-15 | Stop reason: HOSPADM

## 2024-03-13 RX ORDER — MAGNESIUM SULFATE HEPTAHYDRATE 40 MG/ML
2 INJECTION, SOLUTION INTRAVENOUS ONCE
Status: COMPLETED | OUTPATIENT
Start: 2024-03-13 | End: 2024-03-13

## 2024-03-13 RX ORDER — ACETAMINOPHEN 325 MG/1
650 TABLET ORAL EVERY 8 HOURS PRN
Status: DISCONTINUED | OUTPATIENT
Start: 2024-03-13 | End: 2024-03-15 | Stop reason: HOSPADM

## 2024-03-13 RX ORDER — TALC
6 POWDER (GRAM) TOPICAL NIGHTLY PRN
Status: DISCONTINUED | OUTPATIENT
Start: 2024-03-13 | End: 2024-03-15 | Stop reason: HOSPADM

## 2024-03-13 RX ORDER — ONDANSETRON HYDROCHLORIDE 2 MG/ML
4 INJECTION, SOLUTION INTRAVENOUS ONCE
Status: COMPLETED | OUTPATIENT
Start: 2024-03-13 | End: 2024-03-13

## 2024-03-13 RX ORDER — DIPHENHYDRAMINE HCL 25 MG
50 CAPSULE ORAL EVERY 4 HOURS PRN
Status: DISCONTINUED | OUTPATIENT
Start: 2024-03-13 | End: 2024-03-15 | Stop reason: HOSPADM

## 2024-03-13 RX ORDER — HYDROXYCHLOROQUINE SULFATE 200 MG/1
200 TABLET, FILM COATED ORAL 2 TIMES DAILY
Status: DISCONTINUED | OUTPATIENT
Start: 2024-03-13 | End: 2024-03-15 | Stop reason: HOSPADM

## 2024-03-13 RX ORDER — ARIPIPRAZOLE 2 MG/1
2 TABLET ORAL DAILY
Status: DISCONTINUED | OUTPATIENT
Start: 2024-03-13 | End: 2024-03-15 | Stop reason: HOSPADM

## 2024-03-13 RX ORDER — INSULIN ASPART 100 [IU]/ML
12 INJECTION, SOLUTION INTRAVENOUS; SUBCUTANEOUS
Status: DISCONTINUED | OUTPATIENT
Start: 2024-03-13 | End: 2024-03-15 | Stop reason: HOSPADM

## 2024-03-13 RX ORDER — SODIUM CHLORIDE, SODIUM LACTATE, POTASSIUM CHLORIDE, CALCIUM CHLORIDE 600; 310; 30; 20 MG/100ML; MG/100ML; MG/100ML; MG/100ML
INJECTION, SOLUTION INTRAVENOUS CONTINUOUS
Status: ACTIVE | OUTPATIENT
Start: 2024-03-13 | End: 2024-03-13

## 2024-03-13 RX ADMIN — SODIUM CHLORIDE 1000 ML: 9 INJECTION, SOLUTION INTRAVENOUS at 02:03

## 2024-03-13 RX ADMIN — VANCOMYCIN HYDROCHLORIDE 1500 MG: 1.5 INJECTION, POWDER, LYOPHILIZED, FOR SOLUTION INTRAVENOUS at 03:03

## 2024-03-13 RX ADMIN — TACROLIMUS 4 MG: 1 CAPSULE ORAL at 05:03

## 2024-03-13 RX ADMIN — SODIUM BICARBONATE 650 MG TABLET 1300 MG: at 09:03

## 2024-03-13 RX ADMIN — PANTOPRAZOLE SODIUM 40 MG: 40 TABLET, DELAYED RELEASE ORAL at 06:03

## 2024-03-13 RX ADMIN — INSULIN ASPART 4 UNITS: 100 INJECTION, SOLUTION INTRAVENOUS; SUBCUTANEOUS at 01:03

## 2024-03-13 RX ADMIN — DIPHENHYDRAMINE HYDROCHLORIDE 50 MG: 25 CAPSULE ORAL at 05:03

## 2024-03-13 RX ADMIN — SODIUM CHLORIDE: 9 INJECTION, SOLUTION INTRAVENOUS at 03:03

## 2024-03-13 RX ADMIN — HEPARIN SODIUM 5000 UNITS: 5000 INJECTION INTRAVENOUS; SUBCUTANEOUS at 06:03

## 2024-03-13 RX ADMIN — INSULIN ASPART 12 UNITS: 100 INJECTION, SOLUTION INTRAVENOUS; SUBCUTANEOUS at 01:03

## 2024-03-13 RX ADMIN — HYDROXYCHLOROQUINE SULFATE 200 MG: 200 TABLET, FILM COATED ORAL at 08:03

## 2024-03-13 RX ADMIN — SODIUM CHLORIDE, POTASSIUM CHLORIDE, SODIUM LACTATE AND CALCIUM CHLORIDE: 600; 310; 30; 20 INJECTION, SOLUTION INTRAVENOUS at 02:03

## 2024-03-13 RX ADMIN — HYDROXYCHLOROQUINE SULFATE 200 MG: 200 TABLET, FILM COATED ORAL at 09:03

## 2024-03-13 RX ADMIN — SODIUM BICARBONATE 650 MG TABLET 1300 MG: at 02:03

## 2024-03-13 RX ADMIN — INSULIN ASPART 6 UNITS: 100 INJECTION, SOLUTION INTRAVENOUS; SUBCUTANEOUS at 09:03

## 2024-03-13 RX ADMIN — HEPARIN SODIUM 5000 UNITS: 5000 INJECTION INTRAVENOUS; SUBCUTANEOUS at 08:03

## 2024-03-13 RX ADMIN — INSULIN ASPART 12 UNITS: 100 INJECTION, SOLUTION INTRAVENOUS; SUBCUTANEOUS at 09:03

## 2024-03-13 RX ADMIN — HEPARIN SODIUM 5000 UNITS: 5000 INJECTION INTRAVENOUS; SUBCUTANEOUS at 01:03

## 2024-03-13 RX ADMIN — ONDANSETRON 4 MG: 2 INJECTION INTRAMUSCULAR; INTRAVENOUS at 12:03

## 2024-03-13 RX ADMIN — INSULIN ASPART 1 UNITS: 100 INJECTION, SOLUTION INTRAVENOUS; SUBCUTANEOUS at 08:03

## 2024-03-13 RX ADMIN — DIPHENHYDRAMINE HYDROCHLORIDE 50 MG: 25 CAPSULE ORAL at 01:03

## 2024-03-13 RX ADMIN — ASPIRIN 81 MG: 81 TABLET, COATED ORAL at 09:03

## 2024-03-13 RX ADMIN — CEFEPIME 2 G: 2 INJECTION, POWDER, FOR SOLUTION INTRAVENOUS at 02:03

## 2024-03-13 RX ADMIN — INSULIN ASPART 2 UNITS: 100 INJECTION, SOLUTION INTRAVENOUS; SUBCUTANEOUS at 05:03

## 2024-03-13 RX ADMIN — INSULIN DETEMIR 20 UNITS: 100 INJECTION, SOLUTION SUBCUTANEOUS at 09:03

## 2024-03-13 RX ADMIN — DIPHENHYDRAMINE HYDROCHLORIDE 50 MG: 25 CAPSULE ORAL at 02:03

## 2024-03-13 RX ADMIN — TACROLIMUS 4 MG: 1 CAPSULE ORAL at 09:03

## 2024-03-13 RX ADMIN — SODIUM BICARBONATE 650 MG TABLET 1300 MG: at 08:03

## 2024-03-13 RX ADMIN — INSULIN ASPART 12 UNITS: 100 INJECTION, SOLUTION INTRAVENOUS; SUBCUTANEOUS at 05:03

## 2024-03-13 RX ADMIN — DIPHENHYDRAMINE HYDROCHLORIDE 50 MG: 25 CAPSULE ORAL at 07:03

## 2024-03-13 RX ADMIN — ARIPIPRAZOLE 2 MG: 2 TABLET ORAL at 09:03

## 2024-03-13 RX ADMIN — MAGNESIUM SULFATE HEPTAHYDRATE 2 G: 40 INJECTION, SOLUTION INTRAVENOUS at 05:03

## 2024-03-13 RX ADMIN — ESCITALOPRAM OXALATE 20 MG: 10 TABLET ORAL at 09:03

## 2024-03-13 NOTE — ASSESSMENT & PLAN NOTE
Rheumatology chart reviewed during previous admission due to concern for flare due to sx of fatigue, GALLEGOS, n/v/d, and cytopenias; ordered labs but had low concern for sarcoidosis flare    2/7 ACE and urine calcium WNL   2/8 1,25 dihydroxy WNL  2/8 elevated G6PD (17.3) and lysozyme (10.4)    - Continue  mg po BID  - Consider Rheumatology consult

## 2024-03-13 NOTE — ASSESSMENT & PLAN NOTE
Patient has Abnormal Magnesium: hypomagnesemia. Will continue to monitor electrolytes closely. Will replace the affected electrolytes and repeat labs to be done after interventions completed. The patient's magnesium results have been reviewed and are listed below.  Recent Labs   Lab 03/13/24  0005   MG 1.4*

## 2024-03-13 NOTE — ASSESSMENT & PLAN NOTE
This patient does have evidence of infective focus  My overall impression is sepsis.  Source: Urinary Tract  Antibiotics given-   Antibiotics (72h ago, onward)      Start     Stop Route Frequency Ordered    03/13/24 0230  ceFEPIme (MAXIPIME) 2 g in dextrose 5 % in water (D5W) 100 mL IVPB (MB+)         -- IV Every 24 hours (non-standard times) 03/13/24 0043    03/13/24 0141  vancomycin - pharmacy to dose  (vancomycin IVPB (PEDS and ADULTS))        See Hyperspace for full Linked Orders Report.    -- IV pharmacy to manage frequency 03/13/24 0043          Latest lactate reviewed-  Recent Labs   Lab 03/12/24  1040   LACTATE 1.9     Organ dysfunction indicated by Acute kidney injury    Fluid challenge Not needed - patient is not hypotensive      Post- resuscitation assessment No - Post resuscitation assessment not needed     Will Not start Pressors- Levophed for MAP of 65  Source control achieved by: N/A

## 2024-03-13 NOTE — PLAN OF CARE
[facility] - Transplant Stepdown  Discharge Assessment    Primary Care Provider: Osorio Loredo Jr., MD     Discharge Assessment (most recent)       BRIEF DISCHARGE ASSESSMENT - 03/13/24 1129          Discharge Planning    Assessment Type Discharge Planning Brief Assessment     Resource/Environmental Concerns none     Support Systems Spouse/significant other     Equipment Currently Used at Home none     Current Living Arrangements home     Patient/Family Anticipates Transition to home     Patient/Family Anticipated Services at Transition none     DME Needed Upon Discharge  none     Discharge Plan A Home     Discharge Plan B Home                   Pt is a 48 y.o. male admitted with Sepsis and has a PMH of s/p liver Tx 1/5/23. He lives with his spouse in a 2 story house. Pt is able to naviaget the stairs and is independent with his ADLs and iADls. He drives and works full time and will have transportation home. [facility] given to patient and/or family with understanding verbalized.   name and number and estimated discharge date written on white board in patient's room with request to call for any questions or concerns.  Will continue to follow for needs. Discharge Plan A and Plan B have been determined by review of patient's clinical status, future medical and therapeutic needs, and coverage/benefits for post-acute care in coordination with multidisciplinary team members.    Jasvir Mayorga RN,BSN

## 2024-03-13 NOTE — HPI
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 Yaa Jackson's note dated 1/5/2024), CKD3 (no outpatient Nephrology follow-up but noted eGFRs ranging in 30s-50s since May 2023), GERD, pancytopenia (during Feb 2024 admission, now resolved), anxiety, and reported "chronic systolic heart failure" (although only TTE on file shows EF 65%; 12/8/2022) who was transferred from [facility] ED due to concern for urosepsis with BARBARA. Over the last few days, he has noticed a feeling of fullness in his bladder as if he is not "emptying all the way" and has noted some episodes of urinary incontinence with decreased UOP today. No associated dysuria. Yesterday evening, he noted a fever at home with reported T 100.4 F, for which he took Tylenol. This morning, he was profoundly weak, so much so that he was unable to get into his son's car and had to call EMS to transport him to [facility]. He noted that he was broken out in hives, which he says is common for him whenever he gets sick. Just prior to these symptoms, he has been "feeling great" since his discharge [facility] on 2/9/[facility]24.     As noted above, he was recently admitted to [facility] from 2/4/2024-2/9/2024 after presenting to OSH with N/V, diarrhea, metabolic derangements, and BARBARA - he was found to have RSV and was treated with supportive measures. At discharge, Cellcept was held due to leukopenia and he was started on prednisone 5 mg daily in its place.     At [facility], he was afebrile but with reported "borderline blood pressure." Labs significant for Na 132, Cr 2.33, T Bili 8.4, direct bili 0.50, WBC 13.0, PLT 93, INR 1.19, albumin 4.6, and procal 3.55. UA was positive for 3+ leukocytes, negative nitrites, 3+ bacteria, 2+ occult blood, hyaline casts and WBCs. Blood " cultures and urine culture collected. He received 2 g cefepime x 1, 2000 mg vancomycin x 1, Zofran 4 mg x 1, 25 mg Benadryl x 3, 1 L NS, and 1 L LR prior to transfer. He was transferred to [facility] for Hepatology and Nephrology evaluations. Upon arrival to [facility], /87, , RR 18, SpO2 96% on room air, and T 98.3 F.

## 2024-03-13 NOTE — ASSESSMENT & PLAN NOTE
"S/P liver transplant   Long-term use of immunosuppressant medication   Gilbert syndrome     Tbili 8.4 at [facility] but decreased to 4.8 upon arrival to [facility].   Direct bili 0.5    - Hepatology consulted, appreciate recs  - Continue Prograf 4 mg po BID  - Check tacrolimus level (must be drawn 30-60 minutes prior to next dose)  - Holding home prednisone due to note from [facility] indicating "no steroids per Hepatology at [facility]" by Dr. Wilson in the ED Course on 3/12  - F/u US liver with Doppler  "

## 2024-03-13 NOTE — ASSESSMENT & PLAN NOTE
Patient was found to have thrombocytopenia, the likely etiology is secondary to cirrhosis/portal hypertension, will monitor the platelets Daily. Will transfuse if platelet count is <50k (if undergoing surgical procedure or have active bleeding). Hold DVT prophylaxis if platelets are <50k. The patient's platelet results have been reviewed and are listed below.  Recent Labs   Lab 03/13/24  0005   PLT 92*

## 2024-03-13 NOTE — SUBJECTIVE & OBJECTIVE
Past Medical History:   Diagnosis Date    C. difficile diarrhea     Cirrhosis of liver with ascites 11/17/2022    Diabetes mellitus, type II, insulin dependent     Granulomatous colitis     Hepatic granuloma associated with sarcoidosis     Hepatic granuloma associated with sarcoidosis 11/17/2022    Hepatic granuloma associated with sarcoidosis 11/17/2022    Pancytopenia 02/07/2024    Personal history of colonic polyps     Pre-op evaluation 01/04/2023    RSV (respiratory syncytial virus infection) 02/08/2024    Sarcoidosis, unspecified     Thrombocytopenia, unspecified     Unspecified cirrhosis of liver        Past Surgical History:   Procedure Laterality Date    ADENOIDECTOMY      BIOPSY OF LIVER WITH ULTRASOUND GUIDANCE N/A 7/24/2023    Procedure: BIOPSY, LIVER, WITH US GUIDANCE;  Surgeon: Cecilio Hyman MD;  Location: [facility] CATH LAB;  Service: Radiology;  Laterality: N/A;    CARDIAC CATHETERIZATION Right     COLONOSCOPY  07/11/2018    HIP SURGERY      RIght removed ruptured cyst    LIVER TRANSPLANT N/A 1/5/2023    Procedure: TRANSPLANT, LIVER;  Surgeon: Rosa Foreman MD;  Location: [facility] OR [address] [place] [address];  Service: Transplant;  Laterality: N/A;    NEEDLE LOCALIZATION N/A 7/24/2023    Procedure: NEEDLE LOCALIZATION;  Surgeon: Cecilio Hyman MD;  Location: [facility] CATH LAB;  Service: Radiology;  Laterality: N/A;    UPPER GASTROINTESTINAL ENDOSCOPY  10/09/2019       Review of patient's allergies indicates:   Allergen Reactions    Adhesive Other (See Comments)     Skin irritation  Paper tape okay to use    Pcn [penicillins] Rash     Happened as a baby     Current Facility-Administered Medications   Medication Frequency    0.9%  NaCl infusion Continuous    acetaminophen tablet 650 mg Q8H PRN    ARIPiprazole tablet 2 mg Daily    aspirin EC tablet 81 mg Daily    ceFEPIme (MAXIPIME) 2 g in dextrose 5 % in water (D5W) 100 mL IVPB (MB+) Q24H    dextrose 10% bolus 125 mL 125 mL PRN    dextrose 10% bolus 250 mL 250 mL PRN     diphenhydrAMINE capsule 50 mg Q4H PRN    EScitalopram oxalate tablet 20 mg Daily    glucagon (human recombinant) injection 1 mg PRN    glucose chewable tablet 16 g PRN    glucose chewable tablet 24 g PRN    heparin (porcine) injection 5,000 Units Q8H    hydroxychloroquine tablet 200 mg BID    insulin aspart U-100 pen 0-10 Units QID (AC + HS) PRN    insulin aspart U-100 pen 12 Units TIDWM    insulin detemir U-100 (Levemir) pen 20 Units Daily    melatonin tablet 6 mg Nightly PRN    naloxone 0.4 mg/mL injection 0.02 mg PRN    ondansetron injection 4 mg Q6H PRN    pantoprazole EC tablet 40 mg Before breakfast    prochlorperazine injection Soln 2.5 mg Q6H PRN    sodium bicarbonate tablet 1,300 mg TID    sodium chloride 0.9% flush 10 mL Q12H PRN    tacrolimus capsule 4 mg BID    vancomycin - pharmacy to dose pharmacy to manage frequency     Family History       Problem Relation (Age of Onset)    Cancer Father, Maternal Grandfather    Diabetes Maternal Grandmother, Maternal Grandfather, Paternal Grandmother    Heart disease Father    Hypertension Mother, Father    Rectal cancer Father    Thyroid disease Father          Tobacco Use    Smoking status: Never    Smokeless tobacco: Never   Substance and Sexual Activity    Alcohol use: Never    Drug use: Never    Sexual activity: Not on file     Review of Systems   Constitutional:  Positive for chills and fever. Negative for fatigue.   HENT:  Negative for congestion and sore throat.    Respiratory:  Negative for cough and shortness of breath.    Cardiovascular:  Negative for chest pain.   Gastrointestinal:  Negative for abdominal pain, constipation, diarrhea, nausea and vomiting.   Genitourinary:  Positive for difficulty urinating, frequency and urgency. Negative for dysuria and flank pain.   Musculoskeletal:  Negative for joint swelling.   Skin:  Positive for color change.   Neurological:  Negative for dizziness and headaches.   Psychiatric/Behavioral:  Negative for confusion.  The patient is not nervous/anxious.      Objective:     Vital Signs (Most Recent):  Temp: 98 °F (36.7 °C) (03/13/24 1127)  Pulse: 92 (03/13/24 1127)  Resp: 19 (03/13/24 1127)  BP: 113/70 (03/13/24 1127)  SpO2: 99 % (03/13/24 1127) Vital Signs (24h Range):  Temp:  [98 °F (36.7 °C)-98.8 °F (37.1 °C)] 98 °F (36.7 °C)  Pulse:  [] 92  Resp:  [16-19] 19  SpO2:  [95 %-99 %] 99 %  BP: ()/(52-87) 113/70     Weight: 111.4 kg (245 lb 9.5 oz) (03/13/24 0240)  Body mass index is 37.34 kg/m².  Body surface area is 2.31 meters squared.    I/O last 3 completed shifts:  In: 2056.2 [P.O.:320; I.V.:340.6; IV Piggyback:1395.6]  Out: 200 [Emesis/NG output:200]     Physical Exam  HENT:      Head: Normocephalic.      Nose: Nose normal.      Mouth/Throat:      Mouth: Mucous membranes are moist.   Eyes:      General: Scleral icterus present.   Cardiovascular:      Rate and Rhythm: Normal rate.   Pulmonary:      Effort: Pulmonary effort is normal. No respiratory distress.   Abdominal:      General: Abdomen is flat.      Tenderness: There is no abdominal tenderness.   Musculoskeletal:      Cervical back: Normal range of motion.   Skin:     General: Skin is warm.      Capillary Refill: Capillary refill takes less than 2 seconds.      Coloration: Skin is jaundiced.   Neurological:      General: No focal deficit present.      Mental Status: He is alert and oriented to person, place, and time.   Psychiatric:         Mood and Affect: Mood normal.          Significant Labs:  All labs within the past 24 hours have been reviewed.    Significant Imaging:  Imaging reviewed

## 2024-03-13 NOTE — HPI
Mr. Mccarty is a pleasant 48 year old male with a history of Sarcoidosis w/ liver involvement s/p DBD transplant 1/5/23, hyperbilirubinemia attributed to Gilbert's, CKD3, anxiety who presented to [facility] initially for weakness, urinary frequency/urgency, hives, fever, and decreased urinary output. Was transferred to [facility] for higher level of care due to his liver transplant. At [facility] was found to have increasing creatinine, a dirty UA ( 3+ bacteria, 2+ protein, 2+ blood, 3+ LE, nitrite+), mild leukocytosis to 13. Cultures were collected and he was started on vancomycin and cefepime. His immunosuppression included prednisone 5 and tacrolimus 4/4 as well as Cellcept (held due to leukopenia).  Was noted to have received 2L LR at OSH with worsening Cr 2.2->3.5.     Nephrology consulted for BARBARA on CKD along with metabolic acidosis.

## 2024-03-13 NOTE — PROGRESS NOTES
Pharmacokinetic Initial Assessment: IV Vancomycin    Assessment/Plan:    Initiate intravenous vancomycin with loading dose of 1500 mg once with subsequent doses when random concentrations are less than 20 mcg/mL  Desired empiric serum trough concentration is 15 to 20 mcg/mL  Draw vancomycin random level on 03/14/23 at around 0430 with morning labs.  Pharmacy will continue to follow and monitor vancomycin.      Please contact pharmacy at extension [phone] with any questions regarding this assessment.     Thank you for the consult,   Ivana Garsia       Patient brief summary:  Rosalio Mccarty is a 48 y.o. male initiated on antimicrobial therapy with IV Vancomycin for treatment of suspected sepsis    Drug Allergies:   Review of patient's allergies indicates:   Allergen Reactions    Adhesive Other (See Comments)     Skin irritation  Paper tape okay to use    Pcn [penicillins] Rash     Happened as a baby       Actual Body Weight:   108 kg    Renal Function:   Estimated Creatinine Clearance: 30.7 mL/min (A) (based on SCr of 3.5 mg/dL (H)).,     Dialysis Method (if applicable):  N/A    CBC (last 72 hours):  Recent Labs   Lab Result Units 03/13/24  0005   WBC K/uL 12.28   Hemoglobin g/dL 15.1   Hematocrit % 41.7   Platelets K/uL 92*   Gran % % 62.2   Lymph % % 27.0   Mono % % 9.3   Eosinophil % % 0.1   Basophil % % 0.7   Differential Method  Automated       Metabolic Panel (last 72 hours):  Recent Labs   Lab Result Units 03/12/24  1040 03/13/24  0005   Sodium mmol/L  --  131*   Potassium mmol/L  --  5.2*   Chloride mmol/L  --  104   CO2 mmol/L  --  12*   Glucose mg/dL  --  211*   BUN mg/dL  --  64*   Creatinine mg/dL  --  3.5*   Albumin g/dL  --  3.6   Albumin Level g/dL 4.6  --    Total Bilirubin mg/dL  --  4.8*   Bilirubin Total mg/dL 8.4*  --    Alkaline Phosphatase U/L  --  36*   Alk Phos IU/L 38*  --    AST U/L  --  19   Aspartate Aminotransferase IU/L 39*  --    ALT U/L  --  24   Alanine Aminotransferase IU/L 31  --   "  Magnesium mg/dL  --  1.4*   Magnesium Level mg/dL 1.7  --    Phosphorus mg/dL  --  4.2       Drug levels (last 3 results):  No results for input(s): "VANCOMYCINRA", "VANCORANDOM", "VANCOMYCINPE", "VANCOPEAK", "VANCOMYCINTR", "VANCOTROUGH" in the last 72 hours.    Microbiologic Results:  Microbiology Results (last 7 days)       Procedure Component Value Units Date/Time    Urine Culture High Risk [[id number]]     Order Status: No result Specimen: Urine, Clean Catch             "

## 2024-03-13 NOTE — CLINICAL REVIEW
IP Sepsis Screen (most recent)       Sepsis Screen (IP) - 03/13/24 0902       Is the patient's history or complaint suggestive of a possible infection? Yes  -CB    Are there at least two of the following signs and symptoms present? Yes  -CB    Sepsis signs/symptoms - Tachycardia Tachycardia     >90  -CB    Sepsis signs/symptoms - WBC WBC < 4,000 or WBC > 12,000  -CB    Are any of the following organ dysfunction criteria present and not considered to be due to a chronic condition? Yes   wang on ckd -CB    Organ Dysfunction Criteria Creatinine > 2.0  -CB    Organ Dysfunction Criteria Total Bilirubin > 2.0 Platelet count < 100,000  -CB    Initiate Sepsis Protocol No  -CB    Reason sepsis not considered Pt. receiving appropriate management  -CB              User Key  (r) = Recorded By, (t) = Taken By, (c) = Cosigned By      Initials Name    Bety Arita, RN

## 2024-03-13 NOTE — SUBJECTIVE & OBJECTIVE
Past Medical History:   Diagnosis Date    C. difficile diarrhea     Cirrhosis of liver with ascites 11/17/2022    Diabetes mellitus, type II, insulin dependent     Granulomatous colitis     Hepatic granuloma associated with sarcoidosis     Hepatic granuloma associated with sarcoidosis 11/17/2022    Hepatic granuloma associated with sarcoidosis 11/17/2022    Personal history of colonic polyps     Pre-op evaluation 1/4/2023    Sarcoidosis, unspecified     Thrombocytopenia, unspecified     Unspecified cirrhosis of liver        Past Surgical History:   Procedure Laterality Date    ADENOIDECTOMY      BIOPSY OF LIVER WITH ULTRASOUND GUIDANCE N/A 7/24/2023    Procedure: BIOPSY, LIVER, WITH US GUIDANCE;  Surgeon: Cecilio Hyman MD;  Location: [facility] CATH LAB;  Service: Radiology;  Laterality: N/A;    CARDIAC CATHETERIZATION Right     COLONOSCOPY  07/11/2018    HIP SURGERY      RIght removed ruptured cyst    LIVER TRANSPLANT N/A 1/5/2023    Procedure: TRANSPLANT, LIVER;  Surgeon: Rosa Foreman MD;  Location: [facility] OR [address] [place] [address];  Service: Transplant;  Laterality: N/A;    NEEDLE LOCALIZATION N/A 7/24/2023    Procedure: NEEDLE LOCALIZATION;  Surgeon: Cecilio Hyman MD;  Location: [facility] CATH LAB;  Service: Radiology;  Laterality: N/A;    UPPER GASTROINTESTINAL ENDOSCOPY  10/09/2019       Review of patient's allergies indicates:   Allergen Reactions    Adhesive Other (See Comments)     Skin irritation  Paper tape okay to use    Pcn [penicillins] Rash     Happened as a baby       Current Facility-Administered Medications on File Prior to Encounter   Medication    [DISCONTINUED] GENERIC EXTERNAL MEDICATION    [DISCONTINUED] GENERIC EXTERNAL MEDICATION    [DISCONTINUED] ondansetron injection     Current Outpatient Medications on File Prior to Encounter   Medication Sig    ARIPiprazole (ABILIFY) 2 MG Tab Take 1 tablet (2 mg total) by mouth once daily.    aspirin (ECOTRIN) 81 MG EC tablet Take 1 tablet (81 mg total) by mouth once  "daily.    blood sugar diagnostic Strp 1 strip by Misc.(Non-Drug; Combo Route) route 4 (four) times daily.    calcium carbonate (OS-ORTIZ) 500 mg calcium (1,250 mg) tablet Take 1 tablet (500 mg total) by mouth once daily.    empagliflozin (JARDIANCE) 25 mg tablet Take 1 tablet (25 mg total) by mouth once daily.    EScitalopram oxalate (LEXAPRO) 20 MG tablet Take 1 tablet (20 mg total) by mouth once daily.    fluoride, sodium, (PREVIDENT 5000) 1.1 % Pste USE TWICE DAILY IN REPLACEMENT OF NON-PRESCRIBED TOOTHPASTE    HYDROcodone-acetaminophen (NORCO) 5-325 mg per tablet Take 1 tablet by mouth every 8 (eight) hours.    hydrOXYchloroQUINE (PLAQUENIL) 200 mg tablet Take 200 mg by mouth 2 (two) times daily.    insulin degludec (TRESIBA FLEXTOUCH U-100) 100 unit/mL (3 mL) insulin pen Inject 50 Units into the skin once daily.    insulin lispro (HUMALOG KWIKPEN INSULIN) 100 unit/mL pen Inject 32 Units into the skin 3 (three) times daily before meals. Plus sliding scale insulin: 150 - 200 = +2 units; 201 - 250 = +4 units; 251 - 300 = +6 units; 301 - 350 = +8 units; >350 = +10 units. Total daily dose: 126 units    iron-vitamin C 100-250 mg, ICAR-C, 100-250 mg Tab Take 1 tablet by mouth every morning.    lancets 33 gauge Misc by Misc.(Non-Drug; Combo Route) route 4 (four) times daily.    pantoprazole (PROTONIX) 40 MG tablet Take 1 tablet (40 mg total) by mouth once daily.    pen needle, diabetic 32 gauge x 5/32" Ndle 1 Dose by Misc.(Non-Drug; Combo Route) route 3 (three) times daily.    predniSONE (DELTASONE) 5 MG tablet Take 1 tablet (5 mg total) by mouth once daily.    sodium bicarbonate 650 MG tablet Take 2 tablets (1,300 mg total) by mouth 3 (three) times daily.    tacrolimus (PROGRAF) 1 MG Cap Take 4 capsules (4 mg total) by mouth every 12 (twelve) hours.    tirzepatide 7.5 mg/0.5 mL PnIj Inject 7.5 mg into the skin every 7 days.    traZODone (DESYREL) 150 MG tablet Take 1 tablet (150 mg total) by mouth every evening.    " vitamin D (VITAMIN D3) 1000 units Tab Take 1,000 Units by mouth once daily.     Family History       Problem Relation (Age of Onset)    Cancer Father, Maternal Grandfather    Diabetes Maternal Grandmother, Maternal Grandfather, Paternal Grandmother    Heart disease Father    Hypertension Mother, Father    Rectal cancer Father    Thyroid disease Father          Tobacco Use    Smoking status: Never    Smokeless tobacco: Never   Substance and Sexual Activity    Alcohol use: Never    Drug use: Never    Sexual activity: Not on file     Review of Systems   Constitutional:  Positive for chills, fatigue and fever.   Respiratory:  Positive for shortness of breath (on exertion).    Cardiovascular:  Negative for chest pain, palpitations and leg swelling.   Gastrointestinal:  Positive for nausea and vomiting. Negative for abdominal distention, abdominal pain, constipation and diarrhea.   Genitourinary:  Positive for decreased urine volume, difficulty urinating, frequency and urgency. Negative for dysuria and hematuria.        + urinary incontinence   Neurological:  Positive for weakness. Negative for light-headedness and headaches.     Objective:     Vital Signs (Most Recent):  Temp: 98.3 °F (36.8 °C) (03/12/24 2330)  Pulse: (!) 115 (03/12/24 2330)  Resp: 18 (03/12/24 2330)  BP: 127/87 (03/12/24 2330)  SpO2: 96 % (03/12/24 2330) Vital Signs (24h Range):  Temp:  [98.1 °F (36.7 °C)-98.4 °F (36.9 °C)] 98.3 °F (36.8 °C)  Pulse:  [110-119] 115  Resp:  [16-18] 18  SpO2:  [95 %-97 %] 96 %  BP: ()/(52-87) 127/87        There is no height or weight on file to calculate BMI.     Physical Exam  Vitals and nursing note reviewed.   Constitutional:       General: He is not in acute distress.     Appearance: Normal appearance. He is obese.   HENT:      Head: Normocephalic and atraumatic.   Eyes:      Comments: Periorbital jaundice   Cardiovascular:      Rate and Rhythm: Normal rate and regular rhythm.      Heart sounds: Normal heart  sounds.   Pulmonary:      Effort: Pulmonary effort is normal. No respiratory distress.      Breath sounds: Normal breath sounds. No wheezing.   Abdominal:      General: Abdomen is flat. Bowel sounds are normal. There is no distension.      Palpations: Abdomen is soft.      Tenderness: There is no abdominal tenderness.   Musculoskeletal:      Right lower leg: No edema.      Left lower leg: No edema.   Skin:     General: Skin is warm and dry.      Findings: Rash present.      Comments: Diffuse hives, predominant on face   Neurological:      General: No focal deficit present.      Mental Status: He is alert and oriented to person, place, and time. Mental status is at baseline.   Psychiatric:         Mood and Affect: Mood normal.         Behavior: Behavior normal.         Thought Content: Thought content normal.         Judgment: Judgment normal.                Significant Labs: All pertinent labs within the past 24 hours have been reviewed.    Significant Imaging: I have reviewed all pertinent imaging results/findings within the past 24 hours.

## 2024-03-13 NOTE — PLAN OF CARE
Pt arrived to room 96209 @2330. Pt is AAOx4 indep. ACHS. VSS. Tachy 110's. Pt. Reported urgency w/ decreased UOP. Bladder scanned revealed 36mL. Pt. Vomited 1x this shift w/ 200mL in emesis bag. PRN zofran was given as ordered. Pt reported feeling itchy with hives all over. PRN benadryl was given as ordered. Kidney US showed no hydronephrosis with no findings of kidney disease. Liver US results still pending. Bed is locked in the lowest position, non skid socks when out of bed and call light within reach.

## 2024-03-13 NOTE — ASSESSMENT & PLAN NOTE
Patient with acute kidney injury/acute renal failure likely due to  urosepsis versus post-renal  BARBARA is currently worsening. Baseline creatinine  ~1.7-2.0  - Labs reviewed- Renal function/electrolytes with Estimated Creatinine Clearance: 31.3 mL/min (A) (based on SCr of 3.5 mg/dL (H)). according to latest data. Monitor urine output and serial BMP and adjust therapy as needed. Avoid nephrotoxins and renally dose meds for GFR listed above. Cr 2.3 at [facility] ED but increased to 3.5 upon arrival to [facility], despite being given 2 L of fluid. RP US without hydronephrosis. Bladder scan without retention. Urine culture at : enterobacter aerogenes resistant to ampicillin, ancef, unasyn. Sensitive to cefepime <4, cipro <0.25, gentamicin <2, levoquin <5, meropenem <1, pip/tazo <8, rocephin <1, tetracycline <4.     - De-escalate of IV antibiotics per culture susceptibilities   - Nephrology consulted, appreciate recs  - Strict I/O  - RFP q8h  - Avoid nephrotoxic agents  - Renally dose meds to current eGFR  - Continue home sodium bicarb 1300 mg po TID

## 2024-03-13 NOTE — CONSULTS
[facility]  Nephrology  Consult Note    Patient Name: Rosalio Mccarty  MRN: [id number]  Admission Date: 3/12/2024  Hospital Length of Stay: 1 days  Attending Provider: Marina Santacruz MD   Primary Care Physician: Osorio Loredo Jr., MD  Principal Problem:Sepsis    Inpatient consult to Nephrology  Consult performed by: Raegan Gardner DO  Consult ordered by: Marina Santacruz MD        Subjective:     HPI: Mr. 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 Was transferred to [facility] for higher level of care due to his liver transplant. At [facility] was found to have increasing creatinine, a dirty UA ( 3+ bacteria, 2+ protein, 2+ blood, 3+ LE, nitrite+), mild leukocytosis to 13. Cultures were collected and he was started on vancomycin and cefepime. His immunosuppression included prednisone 5 and tacrolimus 4/4 as well as Cellcept (held due to leukopenia).  Was noted to have received 2L LR at OSH with worsening Cr 2.2->3.5.     Nephrology consulted for BARBARA on CKD along with metabolic acidosis.     Past Medical History:   Diagnosis Date    C. difficile diarrhea     Cirrhosis of liver with ascites 11/17/2022    Diabetes mellitus, type II, insulin dependent     Granulomatous colitis     Hepatic granuloma associated with sarcoidosis     Hepatic granuloma associated with sarcoidosis 11/17/2022    Hepatic granuloma associated with sarcoidosis 11/17/2022    Pancytopenia 02/07/2024    Personal history of colonic polyps     Pre-op evaluation 01/04/2023    RSV (respiratory syncytial virus infection) 02/08/2024    Sarcoidosis, unspecified     Thrombocytopenia, unspecified     Unspecified cirrhosis of liver        Past Surgical History:   Procedure Laterality  Date    ADENOIDECTOMY      BIOPSY OF LIVER WITH ULTRASOUND GUIDANCE N/A 7/24/2023    Procedure: BIOPSY, LIVER, WITH US GUIDANCE;  Surgeon: Cecilio Hyman MD;  Location: [facility] CATH LAB;  Service: Radiology;  Laterality: N/A;    CARDIAC CATHETERIZATION Right     COLONOSCOPY  07/11/2018    HIP SURGERY      RIght removed ruptured cyst    LIVER TRANSPLANT N/A 1/5/2023    Procedure: TRANSPLANT, LIVER;  Surgeon: Rosa Foreman MD;  Location: [facility] OR [address] [place] [address];  Service: Transplant;  Laterality: N/A;    NEEDLE LOCALIZATION N/A 7/24/2023    Procedure: NEEDLE LOCALIZATION;  Surgeon: Cecilio Hyman MD;  Location: [facility] CATH LAB;  Service: Radiology;  Laterality: N/A;    UPPER GASTROINTESTINAL ENDOSCOPY  10/09/2019       Review of patient's allergies indicates:   Allergen Reactions    Adhesive Other (See Comments)     Skin irritation  Paper tape okay to use    Pcn [penicillins] Rash     Happened as a baby     Current Facility-Administered Medications   Medication Frequency    0.9%  NaCl infusion Continuous    acetaminophen tablet 650 mg Q8H PRN    ARIPiprazole tablet 2 mg Daily    aspirin EC tablet 81 mg Daily    ceFEPIme (MAXIPIME) 2 g in dextrose 5 % in water (D5W) 100 mL IVPB (MB+) Q24H    dextrose 10% bolus 125 mL 125 mL PRN    dextrose 10% bolus 250 mL 250 mL PRN    diphenhydrAMINE capsule 50 mg Q4H PRN    EScitalopram oxalate tablet 20 mg Daily    glucagon (human recombinant) injection 1 mg PRN    glucose chewable tablet 16 g PRN    glucose chewable tablet 24 g PRN    heparin (porcine) injection 5,000 Units Q8H    hydroxychloroquine tablet 200 mg BID    insulin aspart U-100 pen 0-10 Units QID (AC + HS) PRN    insulin aspart U-100 pen 12 Units TIDWM    insulin detemir U-100 (Levemir) pen 20 Units Daily    melatonin tablet 6 mg Nightly PRN    naloxone 0.4 mg/mL injection 0.02 mg PRN    ondansetron injection 4 mg Q6H PRN    pantoprazole EC tablet 40 mg Before breakfast    prochlorperazine injection Soln 2.5 mg Q6H PRN     sodium bicarbonate tablet 1,300 mg TID    sodium chloride 0.9% flush 10 mL Q12H PRN    tacrolimus capsule 4 mg BID    vancomycin - pharmacy to dose pharmacy to manage frequency     Family History       Problem Relation (Age of Onset)    Cancer Father, Maternal Grandfather    Diabetes Maternal Grandmother, Maternal Grandfather, Paternal Grandmother    Heart disease Father    Hypertension Mother, Father    Rectal cancer Father    Thyroid disease Father          Tobacco Use    Smoking status: Never    Smokeless tobacco: Never   Substance and Sexual Activity    Alcohol use: Never    Drug use: Never    Sexual activity: Not on file     Review of Systems   Constitutional:  Positive for chills and fever. Negative for fatigue.   HENT:  Negative for congestion and sore throat.    Respiratory:  Negative for cough and shortness of breath.    Cardiovascular:  Negative for chest pain.   Gastrointestinal:  Negative for abdominal pain, constipation, diarrhea, nausea and vomiting.   Genitourinary:  Positive for difficulty urinating, frequency and urgency. Negative for dysuria and flank pain.   Musculoskeletal:  Negative for joint swelling.   Skin:  Positive for color change.   Neurological:  Negative for dizziness and headaches.   Psychiatric/Behavioral:  Negative for confusion. The patient is not nervous/anxious.      Objective:     Vital Signs (Most Recent):  Temp: 98 °F (36.7 °C) (03/13/24 1127)  Pulse: 92 (03/13/24 1127)  Resp: 19 (03/13/24 1127)  BP: 113/70 (03/13/24 1127)  SpO2: 99 % (03/13/24 1127) Vital Signs (24h Range):  Temp:  [98 °F (36.7 °C)-98.8 °F (37.1 °C)] 98 °F (36.7 °C)  Pulse:  [] 92  Resp:  [16-19] 19  SpO2:  [95 %-99 %] 99 %  BP: ()/(52-87) 113/70     Weight: 111.4 kg (245 lb 9.5 oz) (03/13/24 0240)  Body mass index is 37.34 kg/m².  Body surface area is 2.31 meters squared.    I/O last 3 completed shifts:  In: 2056.2 [P.O.:320; I.V.:340.6; IV Piggyback:1395.6]  Out: 200 [Emesis/NG output:200]      Physical Exam  HENT:      Head: Normocephalic.      Nose: Nose normal.      Mouth/Throat:      Mouth: Mucous membranes are moist.   Eyes:      General: Scleral icterus present.   Cardiovascular:      Rate and Rhythm: Normal rate.   Pulmonary:      Effort: Pulmonary effort is normal. No respiratory distress.   Abdominal:      General: Abdomen is flat.      Tenderness: There is no abdominal tenderness.   Musculoskeletal:      Cervical back: Normal range of motion.   Skin:     General: Skin is warm.      Capillary Refill: Capillary refill takes less than 2 seconds.      Coloration: Skin is jaundiced.   Neurological:      General: No focal deficit present.      Mental Status: He is alert and oriented to person, place, and time.   Psychiatric:         Mood and Affect: Mood normal.          Significant Labs:  All labs within the past 24 hours have been reviewed.    Significant Imaging:  Imaging reviewed  Assessment/Plan:     Renal/  Acute kidney injury superimposed on CKD  48 year old male with history as above who presented to OSH for possible urosepsis and transferred to [facility] for transplant care. RP ultrasound without hydronephrosis or signs of obstruction. His urine studies are consistent with pre-renal injury however his creatinine continues to rise despite adequate fluid resuscitation. Possibly mixed etiology of CNI and ATN secondary to sepsis. We will analyze urine further under microscopy.      Recommendations:  D/c NS infusion, start LR infusion  Urine with muddy granular casts consistent with ATN  Continue tacrolimus per hepatology  Strict I&Os and daily weights   Avoid nephrotoxic agents such as NSAIDs, gadolinium and IV radiocontrast.  Renally dose meds to current GFR.  Maintain MAP > 65.        Thank you for your consult. I will follow-up with patient. Please contact us if you have any additional questions.    Raegan Gardner, DO  Nephrology  [facility]    ATTENDING PHYSICIAN ATTESTATION  I  have personally verified the history and examined the patient. I thoroughly reviewed the demographic, clinical, laboratorial and imaging information available in medical records. I agree with the assessment and recommendations provided by the subspecialty resident who was under my supervision.

## 2024-03-13 NOTE — H&P
"  [facility] Medicine  History & Physical    Patient Name: Rosalio Mccarty  MRN: [id number]  Patient Class: IP- Inpatient  Admission Date: 3/12/2024  Attending Physician: Marina Santacruz MD   Primary Care Provider: Osorio Loredo Jr., MD         Patient information was obtained from patient, past medical records, and ER records.     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 Yaa Jackson's note dated 1/5/2024), CKD3 (no outpatient Nephrology follow-up but noted eGFRs ranging in 30s-50s since May 2023), GERD, pancytopenia (during Feb 2024 admission, now resolved), anxiety, and reported "chronic systolic heart failure" (although only TTE on file shows EF 65%; 12/8/2022) who was transferred from [facility] ED due to concern for urosepsis with BARBARA. Over the last few days, he has noticed a feeling of fullness in his bladder as if he is not "emptying all the way" and has noted some episodes of urinary incontinence with decreased UOP today. No associated dysuria. Yesterday evening, he noted a fever at home with reported T 100.4 F, for which he took Tylenol. This morning, he was profoundly weak, so much so that he was unable to get into his son's car and had to call EMS to transport him to [facility]. He noted that he was broken out in hives, which he says is common for him whenever he gets sick. Just prior to these symptoms, he has been "feeling great" since his discharge from [facility] on 2/9/2024.     As noted above, he was recently admitted to [facility] from 2/4/2024-2/9/2024 after presenting to OSH with N/V, diarrhea, metabolic derangements, and " "BARBARA - he was found to have RSV and was treated with supportive measures. At discharge, Cellcept was held due to leukopenia and he was started on prednisone 5 mg daily in its place.     At [facility], he was afebrile but with reported "borderline blood pressure." Labs significant for Na 132, Cr 2.33, T Bili 8.4, direct bili 0.50, WBC 13.0, PLT 93, INR 1.19, albumin 4.6, and procal 3.55. UA was positive for 3+ leukocytes, negative nitrites, 3+ bacteria, 2+ occult blood, hyaline casts and WBCs. Blood cultures and urine culture collected. He received 2 g cefepime x 1, 2000 mg vancomycin x 1, Zofran 4 mg x 1, 25 mg Benadryl x 3, 1 L NS, and 1 L LR prior to transfer. Upon arrival to [facility], /87, , RR 18, SpO2 96% on room air, and T 98.3 F.     Past Medical History:   Diagnosis Date    C. difficile diarrhea     Cirrhosis of liver with ascites 11/17/2022    Diabetes mellitus, type II, insulin dependent     Granulomatous colitis     Hepatic granuloma associated with sarcoidosis     Hepatic granuloma associated with sarcoidosis 11/17/2022    Hepatic granuloma associated with sarcoidosis 11/17/2022    Personal history of colonic polyps     Pre-op evaluation 1/4/2023    Sarcoidosis, unspecified     Thrombocytopenia, unspecified     Unspecified cirrhosis of liver        Past Surgical History:   Procedure Laterality Date    ADENOIDECTOMY      BIOPSY OF LIVER WITH ULTRASOUND GUIDANCE N/A 7/24/2023    Procedure: BIOPSY, LIVER, WITH US GUIDANCE;  Surgeon: Cecilio Hyman MD;  Location: [facility] CATH LAB;  Service: Radiology;  Laterality: N/A;    CARDIAC CATHETERIZATION Right     COLONOSCOPY  07/11/2018    HIP SURGERY      RIght removed ruptured cyst    LIVER TRANSPLANT N/A 1/5/2023    Procedure: TRANSPLANT, LIVER;  Surgeon: Rosa Foreman MD;  Location: [facility] OR [address] [place] [address];  Service: Transplant;  Laterality: N/A;    NEEDLE LOCALIZATION N/A 7/24/2023    Procedure: NEEDLE LOCALIZATION;  Surgeon: Cecilio Hyman MD;  " Location: [facility] LAB;  Service: Radiology;  Laterality: N/A;    UPPER GASTROINTESTINAL ENDOSCOPY  10/09/2019       Review of patient's allergies indicates:   Allergen Reactions    Adhesive Other (See Comments)     Skin irritation  Paper tape okay to use    Pcn [penicillins] Rash     Happened as a baby       Current Facility-Administered Medications on File Prior to Encounter   Medication    [DISCONTINUED] GENERIC EXTERNAL MEDICATION    [DISCONTINUED] GENERIC EXTERNAL MEDICATION    [DISCONTINUED] ondansetron injection     Current Outpatient Medications on File Prior to Encounter   Medication Sig    ARIPiprazole (ABILIFY) 2 MG Tab Take 1 tablet (2 mg total) by mouth once daily.    aspirin (ECOTRIN) 81 MG EC tablet Take 1 tablet (81 mg total) by mouth once daily.    blood sugar diagnostic Strp 1 strip by Misc.(Non-Drug; Combo Route) route 4 (four) times daily.    calcium carbonate (OS-ORTIZ) 500 mg calcium (1,250 mg) tablet Take 1 tablet (500 mg total) by mouth once daily.    empagliflozin (JARDIANCE) 25 mg tablet Take 1 tablet (25 mg total) by mouth once daily.    EScitalopram oxalate (LEXAPRO) 20 MG tablet Take 1 tablet (20 mg total) by mouth once daily.    fluoride, sodium, (PREVIDENT 5000) 1.1 % Pste USE TWICE DAILY IN REPLACEMENT OF NON-PRESCRIBED TOOTHPASTE    HYDROcodone-acetaminophen (NORCO) 5-325 mg per tablet Take 1 tablet by mouth every 8 (eight) hours.    hydrOXYchloroQUINE (PLAQUENIL) 200 mg tablet Take 200 mg by mouth 2 (two) times daily.    insulin degludec (TRESIBA FLEXTOUCH U-100) 100 unit/mL (3 mL) insulin pen Inject 50 Units into the skin once daily.    insulin lispro (HUMALOG KWIKPEN INSULIN) 100 unit/mL pen Inject 32 Units into the skin 3 (three) times daily before meals. Plus sliding scale insulin: 150 - 200 = +2 units; 201 - 250 = +4 units; 251 - 300 = +6 units; 301 - 350 = +8 units; >350 = +10 units. Total daily dose: 126 units    iron-vitamin C 100-250 mg, ICAR-C, 100-250 mg Tab Take 1 tablet  "by mouth every morning.    lancets 33 gauge Misc by Misc.(Non-Drug; Combo Route) route 4 (four) times daily.    pantoprazole (PROTONIX) 40 MG tablet Take 1 tablet (40 mg total) by mouth once daily.    pen needle, diabetic 32 gauge x 5/32" Ndle 1 Dose by Misc.(Non-Drug; Combo Route) route 3 (three) times daily.    predniSONE (DELTASONE) 5 MG tablet Take 1 tablet (5 mg total) by mouth once daily.    sodium bicarbonate 650 MG tablet Take 2 tablets (1,300 mg total) by mouth 3 (three) times daily.    tacrolimus (PROGRAF) 1 MG Cap Take 4 capsules (4 mg total) by mouth every 12 (twelve) hours.    tirzepatide 7.5 mg/0.5 mL PnIj Inject 7.5 mg into the skin every 7 days.    traZODone (DESYREL) 150 MG tablet Take 1 tablet (150 mg total) by mouth every evening.    vitamin D (VITAMIN D3) 1000 units Tab Take 1,000 Units by mouth once daily.     Family History       Problem Relation (Age of Onset)    Cancer Father, Maternal Grandfather    Diabetes Maternal Grandmother, Maternal Grandfather, Paternal Grandmother    Heart disease Father    Hypertension Mother, Father    Rectal cancer Father    Thyroid disease Father          Tobacco Use    Smoking status: Never    Smokeless tobacco: Never   Substance and Sexual Activity    Alcohol use: Never    Drug use: Never    Sexual activity: Not on file     Review of Systems   Constitutional:  Positive for chills, fatigue and fever.   Respiratory:  Positive for shortness of breath (on exertion).    Cardiovascular:  Negative for chest pain, palpitations and leg swelling.   Gastrointestinal:  Positive for nausea and vomiting. Negative for abdominal distention, abdominal pain, constipation and diarrhea.   Genitourinary:  Positive for decreased urine volume, difficulty urinating, frequency and urgency. Negative for dysuria and hematuria.        + urinary incontinence   Neurological:  Positive for weakness. Negative for light-headedness and headaches.     Objective:     Vital Signs (Most " Recent):  Temp: 98.3 °F (36.8 °C) (03/12/24 2330)  Pulse: (!) 115 (03/12/24 2330)  Resp: 18 (03/12/24 2330)  BP: 127/87 (03/12/24 2330)  SpO2: 96 % (03/12/24 2330) Vital Signs (24h Range):  Temp:  [98.1 °F (36.7 °C)-98.4 °F (36.9 °C)] 98.3 °F (36.8 °C)  Pulse:  [110-119] 115  Resp:  [16-18] 18  SpO2:  [95 %-97 %] 96 %  BP: ()/(52-87) 127/87        There is no height or weight on file to calculate BMI.     Physical Exam  Vitals and nursing note reviewed.   Constitutional:       General: He is not in acute distress.     Appearance: Normal appearance. He is obese.   HENT:      Head: Normocephalic and atraumatic.   Eyes:      Comments: Periorbital jaundice   Cardiovascular:      Rate and Rhythm: Normal rate and regular rhythm.      Heart sounds: Normal heart sounds.   Pulmonary:      Effort: Pulmonary effort is normal. No respiratory distress.      Breath sounds: Normal breath sounds. No wheezing.   Abdominal:      General: Abdomen is flat. Bowel sounds are normal. There is no distension.      Palpations: Abdomen is soft.      Tenderness: There is no abdominal tenderness.   Musculoskeletal:      Right lower leg: No edema.      Left lower leg: No edema.   Skin:     General: Skin is warm and dry.      Findings: Rash present.      Comments: Diffuse hives, predominant on face   Neurological:      General: No focal deficit present.      Mental Status: He is alert and oriented to person, place, and time. Mental status is at baseline.   Psychiatric:         Mood and Affect: Mood normal.         Behavior: Behavior normal.         Thought Content: Thought content normal.         Judgment: Judgment normal.                Significant Labs: All pertinent labs within the past 24 hours have been reviewed.    Significant Imaging: I have reviewed all pertinent imaging results/findings within the past 24 hours.  Assessment/Plan:     * Sepsis  This patient does have evidence of infective focus  My overall impression is sepsis -  reported objective fever (T 100.4 F, tachycardic with elevated WBCs and UA concerning for infection)  Source: Urinary Tract  Antibiotics given-   Antibiotics (72h ago, onward)      Start     Stop Route Frequency Ordered    03/13/24 0300  vancomycin 1,500 mg in dextrose 5 % (D5W) 250 mL IVPB (Vial-Mate)         -- IV Once 03/13/24 0130    03/13/24 0230  ceFEPIme (MAXIPIME) 2 g in dextrose 5 % in water (D5W) 100 mL IVPB (MB+)         -- IV Every 24 hours (non-standard times) 03/13/24 0043    03/13/24 0141  vancomycin - pharmacy to dose  (vancomycin IVPB (PEDS and ADULTS))        See Hyperspace for full Linked Orders Report.    -- IV pharmacy to manage frequency 03/13/24 0043          Latest lactate reviewed-  Recent Labs   Lab 03/12/24  1040   LACTATE 1.9       Organ dysfunction indicated by Acute kidney injury    Fluid challenge Not needed - patient is not hypotensive      Post- resuscitation assessment No - Post resuscitation assessment not needed     Will Not start Pressors- Levophed for MAP of 65  Source control achieved by: N/A    Acute kidney injury superimposed on CKD  Patient with acute kidney injury/acute renal failure likely due to  urosepsis versus post-renal  BARBARA is currently worsening. Baseline creatinine  ~1.7-2.0  - Labs reviewed- Renal function/electrolytes with Estimated Creatinine Clearance: 31.3 mL/min (A) (based on SCr of 3.5 mg/dL (H)). according to latest data. Monitor urine output and serial BMP and adjust therapy as needed. Avoid nephrotoxins and renally dose meds for GFR listed above.    Cr 2.3 at [facility] ED but increased to 3.5 upon arrival to [facility], despite being given 2 L of fluid.    Bladder scan with 36 cc of urine    - Nephrology consulted, appreciate recs  - F/u RP US  - Strict I/O  - F/u urine culture, urine sodium, urine Cr  - 1 L NS bolus followed by NS @ 100 cc/hr x 10 hours  - RFP q8h  - Avoid nephrotoxic agents  - Renally dose meds to current eGFR  - Continue home sodium  "bicarb 1300 mg po TID    Hyperbilirubinemia  S/P liver transplant   Long-term use of immunosuppressant medication   Gilbert syndrome     Tbili 8.4 at [facility] but decreased to 4.8 upon arrival to [facility].   Direct bili 0.5    - Hepatology consulted, appreciate recs  - Continue Prograf 4 mg po BID  - Check tacrolimus level (must be drawn 30-60 minutes prior to next dose)  - Holding home prednisone due to note from [facility] indicating "no steroids per Hepatology at [facility]" by Dr. Wilson in the ED Course on 3/12  - F/u US liver with Doppler    Hypomagnesemia  Patient has Abnormal Magnesium: hypomagnesemia. Will continue to monitor electrolytes closely. Will replace the affected electrolytes and repeat labs to be done after interventions completed. The patient's magnesium results have been reviewed and are listed below.  Recent Labs   Lab 03/13/24  0005   MG 1.4*        GERD without esophagitis  Continue Protonix 40 mg po before breakfast      Anxiety  Continue home Abilify 2 mg po daily and Lexapro 20 mg po daily      Thrombocytopenia, unspecified  Patient was found to have thrombocytopenia, the likely etiology is secondary to cirrhosis/portal hypertension, will monitor the platelets Daily. Will transfuse if platelet count is <50k (if undergoing surgical procedure or have active bleeding). Hold DVT prophylaxis if platelets are <50k. The patient's platelet results have been reviewed and are listed below.  Recent Labs   Lab 03/13/24  0005   PLT 92*         Type 2 diabetes mellitus with hyperglycemia  Patient's FSGs are controlled on current medication regimen.  Last A1c reviewed-   Lab Results   Component Value Date    HGBA1C 4.9 02/06/2024     Most recent fingerstick glucose reviewed- No results for input(s): "POCTGLUCOSE" in the last 24 hours.  Current correctional scale  Medium  Maintain anti-hyperglycemic dose as follows-   Antihyperglycemics (From admission, onward)      Start     Stop Route Frequency Ordered " "   03/13/24 0900  insulin detemir U-100 (Levemir) pen 20 Units         -- SubQ Daily 03/13/24 0030    03/13/24 0715  insulin aspart U-100 pen 12 Units         -- SubQ 3 times daily with meals 03/13/24 0030    03/13/24 0127  insulin aspart U-100 pen 0-10 Units         -- SubQ Before meals & nightly PRN 03/13/24 0030          Hold Oral hypoglycemics while patient is in the hospital.    Per Endocrinology recs dated 2/9/2024:  "- Levemir (Insulin Detemir) 40 units daily (20% reduction due to fasting blood glucose below goal.)  - Novolog (Insulin Aspart) 24 units TIDWM and prn for BG excursions MDC SSI (150/25) (10% reduction due to prandial blood glucose below goal)"    Sarcoidosis  Rheumatology chart reviewed during previous admission due to concern for flare due to sx of fatigue, GALLEGOS, n/v/d, and cytopenias; ordered labs but had low concern for sarcoidosis flare    2/7 ACE and urine calcium WNL   2/8 1,25 dihydroxy WNL  2/8 elevated G6PD (17.3) and lysozyme (10.4)    - Continue  mg po BID  - Consider Rheumatology consult        VTE Risk Mitigation (From admission, onward)           Ordered     heparin (porcine) injection 5,000 Units  Every 8 hours         03/13/24 0134     IP VTE HIGH RISK PATIENT  Once         03/13/24 0134     Place sequential compression device  Until discontinued         03/13/24 0030     Reason for No Pharmacological VTE Prophylaxis  Once        Question:  Reasons:  Answer:  Thrombocytopenia    03/13/24 0030                               Pharmacokinetic Initial Assessment: IV Vancomycin    Assessment/Plan:    Initiate intravenous vancomycin with loading dose of 1500 mg once with subsequent doses when random concentrations are less than 20 mcg/mL  Desired empiric serum trough concentration is 15 to 20 mcg/mL  Draw vancomycin random level on 03/14/23 at around 0430 with morning labs.  Pharmacy will continue to follow and monitor vancomycin.      Please contact pharmacy at extension [phone] with " "any questions regarding this assessment.     Thank you for the consult,   Ivana Garsia       Patient brief summary:  Rosalio Mccarty is a 48 y.o. male initiated on antimicrobial therapy with IV Vancomycin for treatment of suspected sepsis    Drug Allergies:   Review of patient's allergies indicates:   Allergen Reactions    Adhesive Other (See Comments)     Skin irritation  Paper tape okay to use    Pcn [penicillins] Rash     Happened as a baby       Actual Body Weight:   108 kg    Renal Function:   Estimated Creatinine Clearance: 30.7 mL/min (A) (based on SCr of 3.5 mg/dL (H)).,     Dialysis Method (if applicable):  N/A    CBC (last 72 hours):  Recent Labs   Lab Result Units 03/13/24  0005   WBC K/uL 12.28   Hemoglobin g/dL 15.1   Hematocrit % 41.7   Platelets K/uL 92*   Gran % % 62.2   Lymph % % 27.0   Mono % % 9.3   Eosinophil % % 0.1   Basophil % % 0.7   Differential Method  Automated       Metabolic Panel (last 72 hours):  Recent Labs   Lab Result Units 03/12/24  1040 03/13/24  0005   Sodium mmol/L  --  131*   Potassium mmol/L  --  5.2*   Chloride mmol/L  --  104   CO2 mmol/L  --  12*   Glucose mg/dL  --  211*   BUN mg/dL  --  64*   Creatinine mg/dL  --  3.5*   Albumin g/dL  --  3.6   Albumin Level g/dL 4.6  --    Total Bilirubin mg/dL  --  4.8*   Bilirubin Total mg/dL 8.4*  --    Alkaline Phosphatase U/L  --  36*   Alk Phos IU/L 38*  --    AST U/L  --  19   Aspartate Aminotransferase IU/L 39*  --    ALT U/L  --  24   Alanine Aminotransferase IU/L 31  --    Magnesium mg/dL  --  1.4*   Magnesium Level mg/dL 1.7  --    Phosphorus mg/dL  --  4.2       Drug levels (last 3 results):  No results for input(s): "VANCOMYCINRA", "VANCORANDOM", "VANCOMYCINPE", "VANCOPEAK", "VANCOMYCINTR", "VANCOTROUGH" in the last 72 hours.    Microbiologic Results:  Microbiology Results (last 7 days)       Procedure Component Value Units Date/Time    Urine Culture High Risk [[id number]]     Order Status: No result Specimen: Urine, Clean " Anahi Valero MD  Department of Hospital Medicine  [facility] - Transplant Stepdown

## 2024-03-13 NOTE — HOSPITAL COURSE
48 YOM admitted for urosepsis and BARBARA transferred to [facility] for nephrology and transplant hepatology services. Noted to be acidotic and hyperkalemic with worsening renal function and hyper unconjugated bilirubinemia. IV antibiotics initiated. Nephrology and hepatology consulted. Tacrolimus continued, prednisone held. , procal 3.55, haptoglobin <10, suggesting hemolysis. Peripheral smear with mild-moderate anisocytosis, occasional atypical lymphocyte, normal platelet morphology. Hematology consulted: suspect multifactorial from myelosuppression from infection, antibiotics, IST, and liver sequestration.  BARBARA/acidosis/hyperkalemia steadily improved. [facility] reported urine cultures growing enterobacter aerogenes with sensitivities listed in A/P, including ciprofloxacin. All blood cultures NGTD.  He was medically stable for DC on 3/14, so he was DC'ed on a course of Cipro (total ABX course of 14 days), Prograf per Hepatology recs, and follow-up labs and clinic visits in place.

## 2024-03-13 NOTE — CONSULTS
[facility]  Hepatology  Consult Note    Patient Name: Rosalio Mccarty  MRN: [id number]  Admission Date: 3/12/2024  Hospital Length of Stay: 1 days  Code Status: Full Code   Attending Provider:  Dr. Jackson  Consulting Provider: Olvin Cole MD  Primary Care Physician: Osorio Loredo Jr., MD  Principal Problem:Sepsis    Inpatient consult to Hepatology  Consult performed by: Olvin Cole MD  Consult ordered by: Marina Santacruz MD        Subjective:     HPI: Rosalio Mccarty is a 48 y.o. 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 Hepatology consulted for hyperbilirubinemia. Bilirubin was 8.4 on arrival, now down to 4.8.  Transaminases WNL. UA concerning for infection. US liver without intrahepatic ductal dilation; Doppler satisfactory.     He was recently treated for RSV infection at [facility] from 2/4/2024-2/9/2024. At discharge, Cellcept was held due to leukopenia and he was started on prednisone 5 mg daily in its place. He also takes tacrolimus 4/4. Saw Dr. Jackson in clinic on 2/21/24.     Review of Systems   Constitutional:  Positive for chills, fatigue and fever.   Respiratory:  Positive for shortness of breath (on exertion).    Cardiovascular:  Negative for chest pain, palpitations and leg swelling.   Gastrointestinal:  Positive for nausea and vomiting. Negative for abdominal distention, abdominal pain, constipation and diarrhea.   Genitourinary:  Positive for decreased urine volume, difficulty urinating, frequency and urgency. Negative for dysuria and hematuria.        + urinary incontinence   Neurological:  Positive for weakness. Negative for light-headedness and headaches.    Objective:     Vitals:    03/13/24 0432   BP: 137/87   Pulse: (!) 114   Resp: 18   Temp: 98.8 °F  (37.1 °C)         Constitutional: obese,  alert,  not in acute distress, and well developed  HENT: Head: Normal, normocephalic, atraumatic.  Eye:  Eyes appear puffy and skin around eyes icteric  Eyes: conjunctiva clear and sclera nonicteric  GI: soft, non-tender, without masses or organomegaly, nondistended, without guarding, and without rebound. Well healed Chevron incision noted.   Skin: jaundice present  Neurological: alert, oriented x3  speech normal in context and clarity  memory intact grossly  Psychiatric: mood and affect are within normal limits, pt is a good historian; no memory problems were noted      Significant Labs:  Recent Labs   Lab 03/13/24  0005   HGB 15.1       Lab Results   Component Value Date    WBC 12.28 03/13/2024    HGB 15.1 03/13/2024    HCT 41.7 03/13/2024    MCV 87 03/13/2024    PLT 92 (L) 03/13/2024       Lab Results   Component Value Date     (L) 03/13/2024    K 5.2 (H) 03/13/2024     03/13/2024    CO2 12 (L) 03/13/2024    BUN 64 (H) 03/13/2024    CREATININE 3.5 (H) 03/13/2024    CALCIUM 9.1 03/13/2024    ANIONGAP 15 03/13/2024    ESTGFRAFRICA 75 (L) 11/01/2022    EGFRNONAA 65 (L) 11/01/2022       Lab Results   Component Value Date    ALT 24 03/13/2024    AST 19 03/13/2024     (H) 12/08/2022    ALKPHOS 36 (L) 03/13/2024    BILITOT 4.8 (H) 03/13/2024       Lab Results   Component Value Date    INR 1.19 (H) 03/12/2024    INR 1.15 02/05/2024    INR 1.2 05/18/2023       Significant Imaging:  Reviewed pertinent radiology findings.       Assessment/Plan:     Rosalio Mccarty is a 48 y.o. male with history of hepatic sarcoidosis s/p DBD liver transplant on 1/5/2023 c/b moderate ACR, persistent hyperbilirubinemia, CKD3, GERD, and anxiety, who was transferred from [facility] due to concern for urosepsis with BARBARA. Hepatology consulted for hyperbilirubinemia. Bilirubin was 8.4 on arrival, now down to 4.8, transaminases WNL, BARBARA +nt. UA concerning for infection. US liver without  intrahepatic ductal dilation; Doppler satisfactory. Drop in all cell lines hints towards hemoconcentration.     Recent admission for RSV during which Cellcept was held, and patient was discharged on prednisone 20 mg daily & Prograf 4/4.     Problem List:  Abnormal labs, presumably due to hemoconcentration   Indirect hyperbilirubinemia 2/2 Gilbert's disease  S/P OLT on 1/5/23 for hepatic sarcoidosis, on chronic IS      Recommendations:  - Continue tacrolimus 4 mg QAM/4 mg QPM. Hold prednisone.   - Hold off on Cellcept due to leukopenia/infection. Defer treatment of UTI to primary team.    - Consider repeating hemolytic labs.   - Await nephrology recs for BARBARA.   - Would follow blood and urine cx performed at OSH.   - Daily CBC, CMP, AM Prograf level.      Thank you for involving us in the care of Rosalio Mccarty. Please call with any additional questions, concerns or changes in the patient's clinical status. We will continue to follow.     Olvin Cole MD  Gastroenterology Fellow PGY V  [facility]

## 2024-03-13 NOTE — ASSESSMENT & PLAN NOTE
48 year old male with history as above who presented to OSH for possible urosepsis and transferred to [facility] for transplant care. RP ultrasound without hydronephrosis or signs of obstruction. His urine studies are consistent with pre-renal injury however his creatinine continues to rise despite adequate fluid resuscitation. Possibly mixed etiology of CNI and ATN secondary to sepsis. We will analyze urine further under microscopy.      Recommendations:  D/c NS infusion, start LR infusion  We will spin urine and analyze  Continue tacrolimus per hepatology  Strict I&Os and daily weights   Avoid nephrotoxic agents such as NSAIDs, gadolinium and IV radiocontrast.  Renally dose meds to current GFR.  Maintain MAP > 65.

## 2024-03-13 NOTE — ASSESSMENT & PLAN NOTE
On admission, This patient does have evidence of infective focus. My overall impression is sepsis - reported objective fever (T 100.4 F, tachycardic with elevated WBCs and UA concerning for infection). Source: Urinary Tract. Organ dysfunction indicated by Acute kidney injury. Received vancomycin/cefepime for antibacterial coverage. RESOLVED. See BARBARA.

## 2024-03-13 NOTE — ASSESSMENT & PLAN NOTE
48 year old male with history as above who presented to OSH for possible urosepsis and [place] [facility] [place]. RP ultrasound without hydronephrosis or signs of obstruction. His urine studies are consistent with pre-renal injury however his creatinine continues to rise despite adequate fluid resuscitation. Possibly mixed etiology of CNI and ATN secondary to sepsis. We will analyze urine further under microscopy.      Recommendations:  S/p LR infusion w/ improvement, will give another LR bolus today  Urine with muddy granular casts consistent with ATN  Continue tacrolimus per hepatology  Strict I&Os and daily weights   Avoid nephrotoxic agents such as NSAIDs, gadolinium and IV radiocontrast.  Renally dose meds to current GFR.  Maintain MAP > 65.

## 2024-03-13 NOTE — ASSESSMENT & PLAN NOTE
Patient with acute kidney injury/acute renal failure likely due to  urosepsis versus post-renal  BARBARA is currently worsening. Baseline creatinine  ~1.7-2.0  - Labs reviewed- Renal function/electrolytes with Estimated Creatinine Clearance: 30.7 mL/min (A) (based on SCr of 3.5 mg/dL (H)). according to latest data. Monitor urine output and serial BMP and adjust therapy as needed. Avoid nephrotoxins and renally dose meds for GFR listed above.    Cr 2.3 at [facility] ED but increased to 3.5 upon arrival to [facility], despite being given 2 L of fluid.    - Nephrology consulted, appreciate recs  - F/u RP US  - F/u bladder scan  - Strict I/O  - F/u urine culture, urine sodium, urine Cr  - 1 L NS bolus followed by NS @ 100 cc/hr x 10 hours  - RFP q8h  - Avoid nephrotoxic agents  - Renally dose meds to current eGFR

## 2024-03-13 NOTE — ASSESSMENT & PLAN NOTE
"Patient's FSGs are controlled on current medication regimen.  Last A1c reviewed-   Lab Results   Component Value Date    HGBA1C 4.9 02/06/2024     Most recent fingerstick glucose reviewed- No results for input(s): "POCTGLUCOSE" in the last 24 hours.  Current correctional scale  Medium  Maintain anti-hyperglycemic dose as follows-   Antihyperglycemics (From admission, onward)      Start     Stop Route Frequency Ordered    03/13/24 0900  insulin detemir U-100 (Levemir) pen 20 Units         -- SubQ Daily 03/13/24 0030    03/13/24 0715  insulin aspart U-100 pen 12 Units         -- SubQ 3 times daily with meals 03/13/24 0030    03/13/24 0127  insulin aspart U-100 pen 0-10 Units         -- SubQ Before meals & nightly PRN 03/13/24 0030          Hold Oral hypoglycemics while patient is in the hospital.    Per Endocrinology recs dated 2/9/2024:  "- Levemir (Insulin Detemir) 40 units daily (20% reduction due to fasting blood glucose below goal.)  - Novolog (Insulin Aspart) 24 units TIDWM and prn for BG excursions MDC SSI (150/25) (10% reduction due to prandial blood glucose below goal)"  "

## 2024-03-14 LAB
ALBUMIN SERPL BCP-MCNC: 3 G/DL (ref 3.5–5.2)
ALBUMIN SERPL BCP-MCNC: 3.1 G/DL (ref 3.5–5.2)
ALP SERPL-CCNC: 32 U/L (ref 55–135)
ALT SERPL W/O P-5'-P-CCNC: 18 U/L (ref 10–44)
ANION GAP SERPL CALC-SCNC: 6 MMOL/L (ref 8–16)
ANION GAP SERPL CALC-SCNC: 7 MMOL/L (ref 8–16)
AST SERPL-CCNC: 13 U/L (ref 10–40)
BACTERIA UR CULT: NO GROWTH
BASOPHILS # BLD AUTO: 0.01 K/UL (ref 0–0.2)
BASOPHILS NFR BLD: 0.4 % (ref 0–1.9)
BILIRUB SERPL-MCNC: 2.3 MG/DL (ref 0.1–1)
BUN SERPL-MCNC: 54 MG/DL (ref 6–20)
BUN SERPL-MCNC: 55 MG/DL (ref 6–20)
CALCIUM SERPL-MCNC: 8.4 MG/DL (ref 8.7–10.5)
CALCIUM SERPL-MCNC: 8.5 MG/DL (ref 8.7–10.5)
CHLORIDE SERPL-SCNC: 108 MMOL/L (ref 95–110)
CHLORIDE SERPL-SCNC: 108 MMOL/L (ref 95–110)
CO2 SERPL-SCNC: 19 MMOL/L (ref 23–29)
CO2 SERPL-SCNC: 20 MMOL/L (ref 23–29)
CREAT SERPL-MCNC: 2.5 MG/DL (ref 0.5–1.4)
CREAT SERPL-MCNC: 2.5 MG/DL (ref 0.5–1.4)
DAT IGG-SP REAG RBC-IMP: NORMAL
DIFFERENTIAL METHOD BLD: ABNORMAL
EOSINOPHIL # BLD AUTO: 0.1 K/UL (ref 0–0.5)
EOSINOPHIL NFR BLD: 3 % (ref 0–8)
ERYTHROCYTE [DISTWIDTH] IN BLOOD BY AUTOMATED COUNT: 18.7 % (ref 11.5–14.5)
EST. GFR  (NO RACE VARIABLE): 30.9 ML/MIN/1.73 M^2
EST. GFR  (NO RACE VARIABLE): 30.9 ML/MIN/1.73 M^2
FACT VIII ACT/NOR PPP: 159 % (ref 60–170)
GLUCOSE SERPL-MCNC: 139 MG/DL (ref 70–110)
GLUCOSE SERPL-MCNC: 140 MG/DL (ref 70–110)
HCT VFR BLD AUTO: 25.6 % (ref 40–54)
HGB BLD-MCNC: 8.9 G/DL (ref 14–18)
IMM GRANULOCYTES # BLD AUTO: 0 K/UL (ref 0–0.04)
IMM GRANULOCYTES NFR BLD AUTO: 0 % (ref 0–0.5)
LYMPHOCYTES # BLD AUTO: 0.9 K/UL (ref 1–4.8)
LYMPHOCYTES NFR BLD: 37.2 % (ref 18–48)
MAGNESIUM SERPL-MCNC: 2.2 MG/DL (ref 1.6–2.6)
MCH RBC QN AUTO: 31.3 PG (ref 27–31)
MCHC RBC AUTO-ENTMCNC: 34.8 G/DL (ref 32–36)
MCV RBC AUTO: 90 FL (ref 82–98)
MONOCYTES # BLD AUTO: 0.2 K/UL (ref 0.3–1)
MONOCYTES NFR BLD: 8.2 % (ref 4–15)
NEUTROPHILS # BLD AUTO: 1.2 K/UL (ref 1.8–7.7)
NEUTROPHILS NFR BLD: 51.2 % (ref 38–73)
NRBC BLD-RTO: 0 /100 WBC
PATH REV BLD -IMP: NORMAL
PHOSPHATE SERPL-MCNC: 2.7 MG/DL (ref 2.7–4.5)
PHOSPHATE SERPL-MCNC: 2.7 MG/DL (ref 2.7–4.5)
PLATELET # BLD AUTO: 53 K/UL (ref 150–450)
PMV BLD AUTO: 10.5 FL (ref 9.2–12.9)
POCT GLUCOSE: 112 MG/DL (ref 70–110)
POCT GLUCOSE: 124 MG/DL (ref 70–110)
POCT GLUCOSE: 125 MG/DL (ref 70–110)
POCT GLUCOSE: 155 MG/DL (ref 70–110)
POTASSIUM SERPL-SCNC: 4.6 MMOL/L (ref 3.5–5.1)
POTASSIUM SERPL-SCNC: 4.7 MMOL/L (ref 3.5–5.1)
PROT SERPL-MCNC: 5.1 G/DL (ref 6–8.4)
RBC # BLD AUTO: 2.84 M/UL (ref 4.6–6.2)
SODIUM SERPL-SCNC: 134 MMOL/L (ref 136–145)
SODIUM SERPL-SCNC: 134 MMOL/L (ref 136–145)
TACROLIMUS BLD-MCNC: 5.5 NG/ML (ref 5–15)
VANCOMYCIN SERPL-MCNC: 25.5 UG/ML
WBC # BLD AUTO: 2.31 K/UL (ref 3.9–12.7)

## 2024-03-14 PROCEDURE — 83735 ASSAY OF MAGNESIUM: CPT | Performed by: HOSPITALIST

## 2024-03-14 PROCEDURE — 25000003 PHARM REV CODE 250: Performed by: HOSPITALIST

## 2024-03-14 PROCEDURE — 86880 COOMBS TEST DIRECT: CPT

## 2024-03-14 PROCEDURE — 85240 CLOT FACTOR VIII AHG 1 STAGE: CPT | Performed by: STUDENT IN AN ORGANIZED HEALTH CARE EDUCATION/TRAINING PROGRAM

## 2024-03-14 PROCEDURE — 80197 ASSAY OF TACROLIMUS: CPT | Performed by: HOSPITALIST

## 2024-03-14 PROCEDURE — 36415 COLL VENOUS BLD VENIPUNCTURE: CPT | Performed by: HOSPITALIST

## 2024-03-14 PROCEDURE — 63600175 PHARM REV CODE 636 W HCPCS: Performed by: HOSPITALIST

## 2024-03-14 PROCEDURE — 99222 1ST HOSP IP/OBS MODERATE 55: CPT | Mod: ,,, | Performed by: INTERNAL MEDICINE

## 2024-03-14 PROCEDURE — 99232 SBSQ HOSP IP/OBS MODERATE 35: CPT | Mod: ,,, | Performed by: INTERNAL MEDICINE

## 2024-03-14 PROCEDURE — 25000003 PHARM REV CODE 250

## 2024-03-14 PROCEDURE — 85025 COMPLETE CBC W/AUTO DIFF WBC: CPT | Performed by: HOSPITALIST

## 2024-03-14 PROCEDURE — 99233 SBSQ HOSP IP/OBS HIGH 50: CPT | Mod: ,,, | Performed by: INTERNAL MEDICINE

## 2024-03-14 PROCEDURE — 84132 ASSAY OF SERUM POTASSIUM: CPT

## 2024-03-14 PROCEDURE — 80202 ASSAY OF VANCOMYCIN: CPT | Performed by: HOSPITALIST

## 2024-03-14 PROCEDURE — 84295 ASSAY OF SERUM SODIUM: CPT

## 2024-03-14 PROCEDURE — 84100 ASSAY OF PHOSPHORUS: CPT | Performed by: HOSPITALIST

## 2024-03-14 PROCEDURE — 80053 COMPREHEN METABOLIC PANEL: CPT | Performed by: HOSPITALIST

## 2024-03-14 PROCEDURE — 80069 RENAL FUNCTION PANEL: CPT | Performed by: HOSPITALIST

## 2024-03-14 PROCEDURE — 99900035 HC TECH TIME PER 15 MIN (STAT)

## 2024-03-14 PROCEDURE — 63600175 PHARM REV CODE 636 W HCPCS

## 2024-03-14 PROCEDURE — 20600001 HC STEP DOWN PRIVATE ROOM

## 2024-03-14 PROCEDURE — 63600175 PHARM REV CODE 636 W HCPCS: Performed by: STUDENT IN AN ORGANIZED HEALTH CARE EDUCATION/TRAINING PROGRAM

## 2024-03-14 PROCEDURE — 63600175 PHARM REV CODE 636 W HCPCS: Performed by: INTERNAL MEDICINE

## 2024-03-14 PROCEDURE — 85014 HEMATOCRIT: CPT

## 2024-03-14 PROCEDURE — 82330 ASSAY OF CALCIUM: CPT

## 2024-03-14 PROCEDURE — 25000003 PHARM REV CODE 250: Performed by: INTERNAL MEDICINE

## 2024-03-14 RX ORDER — TRAZODONE HYDROCHLORIDE 50 MG/1
100 TABLET ORAL NIGHTLY
Status: DISCONTINUED | OUTPATIENT
Start: 2024-03-14 | End: 2024-03-15 | Stop reason: HOSPADM

## 2024-03-14 RX ADMIN — INSULIN ASPART 12 UNITS: 100 INJECTION, SOLUTION INTRAVENOUS; SUBCUTANEOUS at 05:03

## 2024-03-14 RX ADMIN — INSULIN ASPART 12 UNITS: 100 INJECTION, SOLUTION INTRAVENOUS; SUBCUTANEOUS at 08:03

## 2024-03-14 RX ADMIN — ARIPIPRAZOLE 2 MG: 2 TABLET ORAL at 08:03

## 2024-03-14 RX ADMIN — TACROLIMUS 4 MG: 1 CAPSULE ORAL at 05:03

## 2024-03-14 RX ADMIN — TRAZODONE HYDROCHLORIDE 100 MG: 50 TABLET ORAL at 10:03

## 2024-03-14 RX ADMIN — SODIUM BICARBONATE 650 MG TABLET 1300 MG: at 08:03

## 2024-03-14 RX ADMIN — INSULIN DETEMIR 20 UNITS: 100 INJECTION, SOLUTION SUBCUTANEOUS at 08:03

## 2024-03-14 RX ADMIN — PANTOPRAZOLE SODIUM 40 MG: 40 TABLET, DELAYED RELEASE ORAL at 05:03

## 2024-03-14 RX ADMIN — INSULIN ASPART 12 UNITS: 100 INJECTION, SOLUTION INTRAVENOUS; SUBCUTANEOUS at 12:03

## 2024-03-14 RX ADMIN — HEPARIN SODIUM 5000 UNITS: 5000 INJECTION INTRAVENOUS; SUBCUTANEOUS at 08:03

## 2024-03-14 RX ADMIN — INSULIN ASPART 2 UNITS: 100 INJECTION, SOLUTION INTRAVENOUS; SUBCUTANEOUS at 08:03

## 2024-03-14 RX ADMIN — HEPARIN SODIUM 5000 UNITS: 5000 INJECTION INTRAVENOUS; SUBCUTANEOUS at 05:03

## 2024-03-14 RX ADMIN — HYDROXYCHLOROQUINE SULFATE 200 MG: 200 TABLET, FILM COATED ORAL at 08:03

## 2024-03-14 RX ADMIN — SODIUM BICARBONATE 650 MG TABLET 1300 MG: at 03:03

## 2024-03-14 RX ADMIN — CEFEPIME 2 G: 2 INJECTION, POWDER, FOR SOLUTION INTRAVENOUS at 02:03

## 2024-03-14 RX ADMIN — CEFEPIME 2 G: 2 INJECTION, POWDER, FOR SOLUTION INTRAVENOUS at 03:03

## 2024-03-14 RX ADMIN — ASPIRIN 81 MG: 81 TABLET, COATED ORAL at 08:03

## 2024-03-14 RX ADMIN — DIPHENHYDRAMINE HYDROCHLORIDE 50 MG: 25 CAPSULE ORAL at 09:03

## 2024-03-14 RX ADMIN — SODIUM CHLORIDE, POTASSIUM CHLORIDE, SODIUM LACTATE AND CALCIUM CHLORIDE 1000 ML: 600; 310; 30; 20 INJECTION, SOLUTION INTRAVENOUS at 11:03

## 2024-03-14 RX ADMIN — TACROLIMUS 4 MG: 1 CAPSULE ORAL at 08:03

## 2024-03-14 RX ADMIN — ESCITALOPRAM OXALATE 20 MG: 10 TABLET ORAL at 08:03

## 2024-03-14 NOTE — ASSESSMENT & PLAN NOTE
Patient was found to have thrombocytopenia, the likely etiology is secondary to cirrhosis/portal hypertension, will monitor the platelets Daily. Will transfuse if platelet count is <50k (if undergoing surgical procedure or have active bleeding). Hold DVT prophylaxis if platelets are <50k. The patient's platelet results have been reviewed and are listed below.  Recent Labs   Lab 03/14/24  0808   PLT 53*

## 2024-03-14 NOTE — ASSESSMENT & PLAN NOTE
This patient has hyperkalemia which is controlled. We will monitor for arrhythmias with EKG or continuous telemetry. We will treat the hyperkalemia with Potassium Binders if necessary. The likely etiology of the hyperkalemia is BARBARA. The patients latest potassium has been reviewed and the results are listed below. Nephrology consulted. RFP Q8 hours.   Recent Labs   Lab 03/14/24  0808   K 4.6  4.7

## 2024-03-14 NOTE — SUBJECTIVE & OBJECTIVE
Oncology Treatment Plan:   [No matching plan found]    Medications:  Continuous Infusions:  Scheduled Meds:   ARIPiprazole  2 mg Oral Daily    aspirin  81 mg Oral Daily    ceFEPime IV (PEDS and ADULTS)  2 g Intravenous Q12H    EScitalopram oxalate  20 mg Oral Daily    heparin (porcine)  5,000 Units Subcutaneous Q8H    hydroxychloroquine  200 mg Oral BID    insulin aspart U-100  12 Units Subcutaneous TIDWM    insulin detemir U-100  20 Units Subcutaneous Daily    lactated ringers  1,000 mL Intravenous Once    pantoprazole  40 mg Oral Before breakfast    sodium bicarbonate  1,300 mg Oral TID    tacrolimus  4 mg Oral BID     PRN Meds:acetaminophen, dextrose 10%, dextrose 10%, diphenhydrAMINE, glucagon (human recombinant), glucose, glucose, insulin aspart U-100, melatonin, naloxone, ondansetron, prochlorperazine, sodium chloride 0.9%, Pharmacy to dose Vancomycin consult **AND** vancomycin - pharmacy to dose     Review of patient's allergies indicates:   Allergen Reactions    Adhesive Other (See Comments)     Skin irritation  Paper tape okay to use    Pcn [penicillins] Rash     Happened as a baby        Past Medical History:   Diagnosis Date    C. difficile diarrhea     Cirrhosis of liver with ascites 11/17/2022    Diabetes mellitus, type II, insulin dependent     Granulomatous colitis     Hepatic granuloma associated with sarcoidosis     Hepatic granuloma associated with sarcoidosis 11/17/2022    Hepatic granuloma associated with sarcoidosis 11/17/2022    Pancytopenia 02/07/2024    Personal history of colonic polyps     Pre-op evaluation 01/04/2023    RSV (respiratory syncytial virus infection) 02/08/2024    Sarcoidosis, unspecified     Thrombocytopenia, unspecified     Unspecified cirrhosis of liver      Past Surgical History:   Procedure Laterality Date    ADENOIDECTOMY      BIOPSY OF LIVER WITH ULTRASOUND GUIDANCE N/A 7/24/2023    Procedure: BIOPSY, LIVER, WITH US GUIDANCE;  Surgeon: Cecilio Hyman MD;  Location:  [facility] CATH LAB;  Service: Radiology;  Laterality: N/A;    CARDIAC CATHETERIZATION Right     COLONOSCOPY  07/11/2018    HIP SURGERY      RIght removed ruptured cyst    LIVER TRANSPLANT N/A 1/5/2023    Procedure: TRANSPLANT, LIVER;  Surgeon: Rosa Foreman MD;  Location: [address];  Service: Transplant;  Laterality: N/A;    NEEDLE LOCALIZATION N/A 7/24/2023    Procedure: NEEDLE LOCALIZATION;  Surgeon: Cecilio Hyman MD;  Location: [facility] CATH LAB;  Service: Radiology;  Laterality: N/A;    UPPER GASTROINTESTINAL ENDOSCOPY  10/09/2019     Family History       Problem Relation (Age of Onset)    Cancer Father, Maternal Grandfather    Diabetes Maternal Grandmother, Maternal Grandfather, Paternal Grandmother    Heart disease Father    Hypertension Mother, Father    Rectal cancer Father    Thyroid disease Father          Tobacco Use    Smoking status: Never    Smokeless tobacco: Never   Substance and Sexual Activity    Alcohol use: Never    Drug use: Never    Sexual activity: Not on file       Review of Systems   Constitutional:  Positive for fatigue and fever.   Genitourinary:  Positive for frequency and urgency.   Neurological:  Positive for weakness.     Objective:     Vital Signs (Most Recent):  Temp: 97.6 °F (36.4 °C) (03/14/24 1225)  Pulse: 87 (03/14/24 1225)  Resp: 18 (03/14/24 1225)  BP: 100/70 (03/14/24 1230)  SpO2: 100 % (03/14/24 1225) Vital Signs (24h Range):  Temp:  [97.6 °F (36.4 °C)-98.9 °F (37.2 °C)] 97.6 °F (36.4 °C)  Pulse:  [84-95] 87  Resp:  [18] 18  SpO2:  [98 %-100 %] 100 %  BP: ()/(53-78) 100/70     Weight: 116 kg (255 lb 11.7 oz)  Body mass index is 38.88 kg/m².  Body surface area is 2.36 meters squared.      Intake/Output Summary (Last 24 hours) at 3/14/2024 1338  Last data filed at 3/14/2024 1241  Gross per 24 hour   Intake 3144.11 ml   Output 3420 ml   Net -275.89 ml        Physical Exam  Vitals and nursing note reviewed.   Constitutional:       Appearance: Normal appearance.    Cardiovascular:      Rate and Rhythm: Normal rate and regular rhythm.      Pulses: Normal pulses.      Heart sounds: Normal heart sounds.   Pulmonary:      Effort: Pulmonary effort is normal.      Breath sounds: Normal breath sounds.   Musculoskeletal:         General: Normal range of motion.   Neurological:      General: No focal deficit present.      Mental Status: He is alert.   Psychiatric:         Mood and Affect: Mood normal.          Significant Labs:   All pertinent labs from the last 24 hours have been reviewed.    Diagnostic Results:  I have reviewed all pertinent imaging results/findings within the past 24 hours.

## 2024-03-14 NOTE — ASSESSMENT & PLAN NOTE
S/P liver transplant   Long-term use of immunosuppressant medication   Gilbert syndrome     Tbili 8.4 at [facility] but decreased to 4.8 upon arrival to [facility]. Direct bili 0.5. Known history of Gilbert's, however LDH/retic elevated and haptoglobin <10 on this admission. Liver doppler : Satisfactory Doppler evaluation of liver transplant. Persistent dilatation of the common bile duct. No significant intrahepatic ductal dilatation.    - Hepatology consulted, appreciate recs  - Continue Prograf 4 mg po BID  - Hold prednisone  - Hematology consultation for hemolysis per hepatology  - CMP daily

## 2024-03-14 NOTE — NURSING
Pt AAOx4, VSS, afebrile. Cr 2.5 from 3.5. Bi 2.3 from 4.8. BARBARA & Bili improving. Na 134. CO2 20. K WNL. LR 1L bolus infused. Urine - ATN. Urine cx growing enterobacter. Cefepime Q12H. Heme consulted for + hemolysis labs. U/O 3.4 L. Bed in low/locked position, call light/personal belongings w/in reach, non-slip socks in place, pt remains free from falls, family at bedside, TM.

## 2024-03-14 NOTE — PLAN OF CARE
Pt has call bell in reach, non slip socks on, and bedrails up x2.  Pt encouraged to wash hands.  He has cefepime iv pb on my shift for infection.  Pt is ac/hs.  He has prn pain and nausea meds.  Pt electrolytes being monitored with labs in am.

## 2024-03-14 NOTE — ASSESSMENT & PLAN NOTE
Patient with chronic metabolic acidosis in setting of CKD. Takes sodium bicarb tablets. At home. His metabolic acidosis on admission is acute on chronic in setting of BARBARA on CKD3a. IVF administered with significant improvement in acidosis. Nephrology consulted. RFP Q8hrs.

## 2024-03-14 NOTE — PROGRESS NOTES
[facility]  Nephrology  Progress Note    Patient Name: Rosalio Mccarty  MRN: [id number]  Admission Date: 3/12/2024  Hospital Length of Stay: 2 days  Attending Provider: Jasvir Joyner MD   Primary Care Physician: Osorio Loredo Jr., MD  Principal Problem:Sepsis    Subjective:     HPI: Mr. Mccarty is a pleasant 48 year old male with a history of Sarcoidosis w/ liver involvement s/p DBD transplant 1/5/23, hyperbilirubinemia attributed to Gilbert's, CKD3, anxiety who presented to [facility] initially for weakness, urinary frequency/urgency, hives, fever, and decreased urinary output. [place] [facility] [place] due to his liver transplant. At [facility] was found to have increasing creatinine, a dirty UA ( 3+ bacteria, 2+ protein, 2+ blood, 3+ LE, nitrite+), mild leukocytosis to 13. Cultures were collected and he was started on vancomycin and cefepime. His immunosuppression included prednisone 5 and tacrolimus 4/4 as well as Cellcept (held due to leukopenia).  Was noted to have received 2L LR at OSH with worsening Cr 2.2->3.5.     Nephrology consulted for BARBARA on CKD along with metabolic acidosis.     Interval History: No acute events overnight. He remains stable, Urine output has been increasing.    Review of patient's allergies indicates:   Allergen Reactions    Adhesive Other (See Comments)     Skin irritation  Paper tape okay to use    Pcn [penicillins] Rash     Happened as a baby     Current Facility-Administered Medications   Medication Frequency    acetaminophen tablet 650 mg Q8H PRN    ARIPiprazole tablet 2 mg Daily    aspirin EC tablet 81 mg Daily    ceFEPIme (MAXIPIME) 2 g in dextrose 5 % in water (D5W) 100 mL IVPB (MB+) Q12H    dextrose 10% bolus 125 mL 125 mL PRN    dextrose 10% bolus 250 mL 250 mL PRN    diphenhydrAMINE capsule 50 mg Q4H PRN    EScitalopram oxalate tablet 20 mg Daily    glucagon (human recombinant) injection 1 mg PRN    glucose chewable tablet  16 g PRN    glucose chewable tablet 24 g PRN    heparin (porcine) injection 5,000 Units Q8H    hydroxychloroquine tablet 200 mg BID    insulin aspart U-100 pen 0-10 Units QID (AC + HS) PRN    insulin aspart U-100 pen 12 Units TIDWM    insulin detemir U-100 (Levemir) pen 20 Units Daily    lactated ringers bolus 1,000 mL Once    melatonin tablet 6 mg Nightly PRN    naloxone 0.4 mg/mL injection 0.02 mg PRN    ondansetron injection 4 mg Q6H PRN    pantoprazole EC tablet 40 mg Before breakfast    prochlorperazine injection Soln 2.5 mg Q6H PRN    sodium bicarbonate tablet 1,300 mg TID    sodium chloride 0.9% flush 10 mL Q12H PRN    tacrolimus capsule 4 mg BID    vancomycin - pharmacy to dose pharmacy to manage frequency       Objective:     Vital Signs (Most Recent):  Temp: 98.3 °F (36.8 °C) (03/14/24 0745)  Pulse: 88 (03/14/24 0745)  Resp: 18 (03/14/24 0745)  BP: 108/78 (03/14/24 0745)  SpO2: 98 % (03/14/24 0745) Vital Signs (24h Range):  Temp:  [98 °F (36.7 °C)-98.9 °F (37.2 °C)] 98.3 °F (36.8 °C)  Pulse:  [84-95] 88  Resp:  [18] 18  SpO2:  [98 %-100 %] 98 %  BP: ()/(55-78) 108/78     Weight: 116 kg (255 lb 11.7 oz) (03/14/24 0500)  Body mass index is 38.88 kg/m².  Body surface area is 2.36 meters squared.    I/O last 3 completed shifts:  In: 4450.3 [P.O.:1790; I.V.:1185.5; IV Piggyback:1474.8]  Out: 2580 [Urine:2380; Emesis/NG output:200]     Physical Exam  HENT:      Head: Normocephalic.      Nose: Nose normal.      Mouth/Throat:      Mouth: Mucous membranes are moist.   Eyes:      General: Scleral icterus present.   Cardiovascular:      Rate and Rhythm: Normal rate.   Pulmonary:      Effort: Pulmonary effort is normal.   Abdominal:      General: Abdomen is flat.      Tenderness: There is no abdominal tenderness.   Musculoskeletal:         General: Normal range of motion.      Right lower leg: No edema.      Left lower leg: No edema.   Skin:     General: Skin is warm.      Capillary Refill: Capillary refill  takes less than 2 seconds.   Neurological:      General: No focal deficit present.      Mental Status: He is alert and oriented to person, place, and time.   Psychiatric:         Mood and Affect: Mood normal.          Significant Labs:  All labs within the past 24 hours have been reviewed.     Significant Imaging:  Imaging reviewed  Assessment/Plan:     Renal/  Acute kidney injury superimposed on CKD  48 year old male with history as above who presented to OSH for possible urosepsis and [place] [facility] [place]. RP ultrasound without hydronephrosis or signs of obstruction. His urine studies are consistent with pre-renal injury however his creatinine continues to rise despite adequate fluid resuscitation. Possibly mixed etiology of CNI and ATN secondary to sepsis. We will analyze urine further under microscopy.      Recommendations:  S/p LR infusion w/ improvement, will give another LR bolus today  Urine with muddy granular casts consistent with ATN  Continue tacrolimus per hepatology  Strict I&Os and daily weights   Avoid nephrotoxic agents such as NSAIDs, gadolinium and IV radiocontrast.  Renally dose meds to current GFR.  Maintain MAP > 65.    Metabolic acidosis  - Discontinue NS infusion as may worsen metabolic acidosis       ID  * Sepsis  Immunocompromised due to renal transplant. Source likely urinary.   - Plan per primary         Thank you for your consult. I will follow-up with patient. Please contact us if you have any additional questions.    Raegan Gardner, DO  Nephrology  [facility]    ATTENDING PHYSICIAN ATTESTATION  I have personally verified the history and examined the patient. I thoroughly reviewed the demographic, clinical, laboratorial and imaging information available in medical records. I agree with the assessment and recommendations provided by the subspecialty resident who was under my supervision.

## 2024-03-14 NOTE — ASSESSMENT & PLAN NOTE
Patient has hyponatremia which is controlled,We will aim to correct the sodium by 4-6mEq in 24 hours. We will monitor sodium Every 8 hours. The hyponatremia is due to Dehydration/hypovolemia.  We will treat the hyponatremia with IV fluids. The patient's sodium results have been reviewed and are listed below.  Recent Labs   Lab 03/14/24  0808   *  134*

## 2024-03-14 NOTE — CONSULTS
[facility] - Transplant Stepdown  Hematology/Oncology  Consult Note    Patient Name: Rosalio Mccarty  MRN: [id number]  Admission Date: 3/12/2024  Hospital Length of Stay: 2 days  Code Status: Full Code   Attending Provider: Jasvir Joyner MD  Consulting Provider: Sylvester Thompson DO  Primary Care Physician: Osorio Loredo Jr., MD  Principal Problem:Acute kidney injury superimposed on CKD    Inpatient consult to Hematology  Consult performed by: Sylvester Thompson DO  Consult ordered by: Irasema Walton MD        Subjective:     HPI:  Patient is a 48 year-old male with a PMH of Hepatic Sarcoidosis s/p DBD in 1/2023, Hyperbilirubinemia secondary to presumed Gilbert's Syndrome, CKD, GERD, and Anxiety who presents to [facility] for a higher level of care following presenting to [facility] with concerns for urosepsis. Prior to admission he noted some suprapubic pain and tenderness and incomplete emptying with some incontinence. The day prior to admit he was found to have a fever of 100.4F with progressive weakness to the point of needing EMS for transport. Most recent admission was in 2/2024 for treatment of RSV and BARBARA. In the ED at [facility], he was found to have an BARBARA on CKD at 2.3, T bilir of 8.4, and UA concerning for UTI with 3+ leukocytes and 3+ bacteria. He was started on Cefepime and admitted to . Hepatology consulted as well given his recent transplant. Concern was raised for acute hemolysis given haptoglobin <10, LDH of 570, bilirubin of 2.3, and direct of 0.5. Fibrinogen of 132, PT/INR of 13/1.2, PTT of 31.7.           Oncology Treatment Plan:   [No matching plan found]    Medications:  Continuous Infusions:  Scheduled Meds:   ARIPiprazole  2 mg Oral Daily    aspirin  81 mg Oral Daily    ceFEPime IV (PEDS and ADULTS)  2 g Intravenous Q12H    EScitalopram oxalate  20 mg Oral Daily    heparin (porcine)  5,000 Units Subcutaneous Q8H    hydroxychloroquine  200 mg Oral BID    insulin aspart U-100  12 Units Subcutaneous  TIDWM    insulin detemir U-100  20 Units Subcutaneous Daily    lactated ringers  1,000 mL Intravenous Once    pantoprazole  40 mg Oral Before breakfast    sodium bicarbonate  1,300 mg Oral TID    tacrolimus  4 mg Oral BID     PRN Meds:acetaminophen, dextrose 10%, dextrose 10%, diphenhydrAMINE, glucagon (human recombinant), glucose, glucose, insulin aspart U-100, melatonin, naloxone, ondansetron, prochlorperazine, sodium chloride 0.9%, Pharmacy to dose Vancomycin consult **AND** vancomycin - pharmacy to dose     Review of patient's allergies indicates:   Allergen Reactions    Adhesive Other (See Comments)     Skin irritation  Paper tape okay to use    Pcn [penicillins] Rash     Happened as a baby        Past Medical History:   Diagnosis Date    C. difficile diarrhea     Cirrhosis of liver with ascites 11/17/2022    Diabetes mellitus, type II, insulin dependent     Granulomatous colitis     Hepatic granuloma associated with sarcoidosis     Hepatic granuloma associated with sarcoidosis 11/17/2022    Hepatic granuloma associated with sarcoidosis 11/17/2022    Pancytopenia 02/07/2024    Personal history of colonic polyps     Pre-op evaluation 01/04/2023    RSV (respiratory syncytial virus infection) 02/08/2024    Sarcoidosis, unspecified     Thrombocytopenia, unspecified     Unspecified cirrhosis of liver      Past Surgical History:   Procedure Laterality Date    ADENOIDECTOMY      BIOPSY OF LIVER WITH ULTRASOUND GUIDANCE N/A 7/24/2023    Procedure: BIOPSY, LIVER, WITH US GUIDANCE;  Surgeon: Cecilio Hyman MD;  Location: [facility] CATH LAB;  Service: Radiology;  Laterality: N/A;    CARDIAC CATHETERIZATION Right     COLONOSCOPY  07/11/2018    HIP SURGERY      RIght removed ruptured cyst    LIVER TRANSPLANT N/A 1/5/2023    Procedure: TRANSPLANT, LIVER;  Surgeon: Rosa Foreman MD;  Location: [facility] OR [address] [place] [address];  Service: Transplant;  Laterality: N/A;    NEEDLE LOCALIZATION N/A 7/24/2023    Procedure: NEEDLE LOCALIZATION;   Surgeon: Cecilio Hyman MD;  Location: [facility] LAB;  Service: Radiology;  Laterality: N/A;    UPPER GASTROINTESTINAL ENDOSCOPY  10/09/2019     Family History       Problem Relation (Age of Onset)    Cancer Father, Maternal Grandfather    Diabetes Maternal Grandmother, Maternal Grandfather, Paternal Grandmother    Heart disease Father    Hypertension Mother, Father    Rectal cancer Father    Thyroid disease Father          Tobacco Use    Smoking status: Never    Smokeless tobacco: Never   Substance and Sexual Activity    Alcohol use: Never    Drug use: Never    Sexual activity: Not on file       Review of Systems   Constitutional:  Positive for fatigue and fever.   Genitourinary:  Positive for frequency and urgency.   Neurological:  Positive for weakness.     Objective:     Vital Signs (Most Recent):  Temp: 97.6 °F (36.4 °C) (03/14/24 1225)  Pulse: 87 (03/14/24 1225)  Resp: 18 (03/14/24 1225)  BP: 100/70 (03/14/24 1230)  SpO2: 100 % (03/14/24 1225) Vital Signs (24h Range):  Temp:  [97.6 °F (36.4 °C)-98.9 °F (37.2 °C)] 97.6 °F (36.4 °C)  Pulse:  [84-95] 87  Resp:  [18] 18  SpO2:  [98 %-100 %] 100 %  BP: ()/(53-78) 100/70     Weight: 116 kg (255 lb 11.7 oz)  Body mass index is 38.88 kg/m².  Body surface area is 2.36 meters squared.      Intake/Output Summary (Last 24 hours) at 3/14/2024 1338  Last data filed at 3/14/2024 1241  Gross per 24 hour   Intake 3144.11 ml   Output 3420 ml   Net -275.89 ml        Physical Exam  Vitals and nursing note reviewed.   Constitutional:       Appearance: Normal appearance.   Cardiovascular:      Rate and Rhythm: Normal rate and regular rhythm.      Pulses: Normal pulses.      Heart sounds: Normal heart sounds.   Pulmonary:      Effort: Pulmonary effort is normal.      Breath sounds: Normal breath sounds.   Musculoskeletal:         General: Normal range of motion.   Neurological:      General: No focal deficit present.      Mental Status: He is alert.   Psychiatric:          Mood and Affect: Mood normal.          Significant Labs:   All pertinent labs from the last 24 hours have been reviewed.    Diagnostic Results:  I have reviewed all pertinent imaging results/findings within the past 24 hours.  Assessment/Plan:     Hyperbilirubinemia  Concern was raised for acute hemolysis given haptoglobin <10, LDH of 570, bilirubin of 2.3, and direct of 0.5. Fibrinogen of 132, PT/INR of 13/1.2, PTT of 31.7.   Patient with believed Gilbert's Syndrome per Hepatology  Presented with urosepsis at OSH with infectious work-up pending   Recommendations:  Hemolysis type picture confounded by Gilbert's, Urosepsis and Liver transplant  -Recommend obtaining JOSE, Path Review of CBC, and Factor VIII level  -Repeat hemolysis panel tomorrow  -Path review of CBC pending, could have been transient hemolysis secondary to sepsis as bilirubin has improved from 8.4 >2.3  -Please contact us for any questions or concerns  -Will follow-up with patient          Thank you for your consult. I will follow-up with patient. Please contact us if you have any additional questions.    Sylvester Thompson, DO  Hematology/Oncology  [facility] - Transplant Stepdown

## 2024-03-14 NOTE — ASSESSMENT & PLAN NOTE
Concern was raised for acute hemolysis given haptoglobin <10, LDH of 570, bilirubin of 2.3, and direct of 0.5. Fibrinogen of 132, PT/INR of 13/1.2, PTT of 31.7.   Patient with believed Gilbert's Syndrome per Hepatology  Presented with urosepsis at OSH with infectious work-up pending   Recommendations:  Hemolysis type picture confounded by Gilbert's, Urosepsis and Liver transplant  -Recommend obtaining JOSE, Path Review of CBC, and Factor VIII level  -Repeat hemolysis panel tomorrow  -Path review of CBC pending, could have been transient hemolysis secondary to sepsis as bilirubin has improved from 8.4 >2.3  -Please contact us for any questions or concerns  -Will follow-up with patient

## 2024-03-14 NOTE — PROGRESS NOTES
Pharmacist Renal Dose Adjustment Note    Rosalio Mccarty is a 48 y.o. male being treated with the medication cefepime    Patient Data:    Vital Signs (Most Recent):  Temp: 98.3 °F (36.8 °C) (03/14/24 0745)  Pulse: 88 (03/14/24 0745)  Resp: 18 (03/14/24 0745)  BP: 108/78 (03/14/24 0745)  SpO2: 98 % (03/14/24 0745) Vital Signs (72h Range):  Temp:  [98 °F (36.7 °C)-98.9 °F (37.2 °C)]   Pulse:  []   Resp:  [16-19]   BP: ()/(52-87)   SpO2:  [95 %-100 %]      Recent Labs   Lab 03/13/24  0804 03/13/24  1548 03/14/24  0808   CREATININE 3.7* 3.5* 2.5*  2.5*     Serum creatinine: 2.5 mg/dL (H) 03/14/24 0808  Estimated creatinine clearance: 44.7 mL/min (A)    Change cefepime to 2g IVPB Q12H for CrCl 30 - 60 mL/min    Pharmacist's Name: Robert Ross  Pharmacist's Extension: [phone]

## 2024-03-14 NOTE — ASSESSMENT & PLAN NOTE
Patient has Abnormal Magnesium: hypomagnesemia. Will continue to monitor electrolytes closely. Will replace the affected electrolytes and repeat labs to be done after interventions completed. The patient's magnesium results have been reviewed and are listed below.  Recent Labs   Lab 03/14/24  0808   MG 2.2

## 2024-03-14 NOTE — ASSESSMENT & PLAN NOTE
"Patient's FSGs are controlled on current medication regimen.  Last A1c reviewed-   Lab Results   Component Value Date    HGBA1C 4.9 02/06/2024     Most recent fingerstick glucose reviewed-   Recent Labs   Lab 03/13/24  1727 03/13/24  2017 03/14/24  0854 03/14/24  1238   POCTGLUCOSE 156* 161* 155* 112*     Current correctional scale  Medium  Maintain anti-hyperglycemic dose as follows-   Antihyperglycemics (From admission, onward)      Start     Stop Route Frequency Ordered    03/13/24 0900  insulin detemir U-100 (Levemir) pen 20 Units         -- SubQ Daily 03/13/24 0030    03/13/24 0715  insulin aspart U-100 pen 12 Units         -- SubQ 3 times daily with meals 03/13/24 0030    03/13/24 0127  insulin aspart U-100 pen 0-10 Units         -- SubQ Before meals & nightly PRN 03/13/24 0030          Hold Oral hypoglycemics while patient is in the hospital.    Endocrinology recs dated 2/9/2024:  "- Levemir (Insulin Detemir) 40 units daily (20% reduction due to fasting blood glucose below goal.)  - Novolog (Insulin Aspart) 24 units TIDWM and prn for BG excursions MDC SSI (150/25) (10% reduction due to prandial blood glucose below goal)"  "

## 2024-03-14 NOTE — HPI
Patient is a 48 year-old male with a PMH of Hepatic Sarcoidosis s/p DBD in 1/2023, Hyperbilirubinemia secondary to presumed Gilbert's Syndrome, CKD, GERD, and Anxiety who [place] [facility] [place] higher level of care following presenting to [facility] with concerns for urosepsis. Prior to admission he noted some suprapubic pain and tenderness and incomplete emptying with some incontinence. The day prior to admit he was found to have a fever of 100.4F with progressive weakness to the point of needing EMS for transport. Most recent admission was in 2/2024 for treatment of RSV and BARBARA. In the ED at [facility], he was found to have an BARBARA on CKD at 2.3, T bilir of 8.4, and UA concerning for UTI with 3+ leukocytes and 3+ bacteria. He was started on Cefepime and admitted to . Hepatology consulted as well given his recent transplant. Concern was raised for acute hemolysis given haptoglobin <10, LDH of 570, bilirubin of 2.3, and direct of 0.5. Fibrinogen of 132, PT/INR of 13/1.2, PTT of 31.7.

## 2024-03-14 NOTE — PROGRESS NOTES
Pharmacokinetic Assessment Follow Up: IV Vancomycin    Vancomycin serum concentration assessment/plan(s):    -Vancomycin random 25 above goal of 15 - 20 for sepsis  -No re-dose today  -Repeat random tomorrow AM, plans for re-dosing with levels < 20  -Scr decreased to 2.5, will continue to monitor renal function    Drug levels (last 3 results):  Recent Labs   Lab Result Units 03/14/24  0808   Vancomycin, Random ug/mL 25.5       Pharmacy will continue to follow and monitor vancomycin.    Please contact pharmacy at extension [phone] for questions regarding this assessment.    Thank you for the consult,   Robert Ross       Patient brief summary:  Rosalio Mccarty is a 48 y.o. male initiated on antimicrobial therapy with IV Vancomycin for treatment of sepsis    The patient's current regimen is vancomycin pulse dosing    Drug Allergies:   Review of patient's allergies indicates:   Allergen Reactions    Adhesive Other (See Comments)     Skin irritation  Paper tape okay to use    Pcn [penicillins] Rash     Happened as a baby       Actual Body Weight:   116 kg    Renal Function:   Estimated Creatinine Clearance: 44.7 mL/min (A) (based on SCr of 2.5 mg/dL (H)).,     Dialysis Method (if applicable):  N/A    CBC (last 72 hours):  Recent Labs   Lab Result Units 03/13/24  0005 03/13/24  1548 03/14/24  0808   WBC K/uL 12.28 4.03  4.03 2.31*   Hemoglobin g/dL 15.1 10.7*  10.7* 8.9*   Hematocrit % 41.7 30.0*  30.0* 25.6*   Platelets K/uL 92* 68*  68* 53*   Gran % % 62.2 53.4  53.4 51.2   Lymph % % 27.0 35.5  35.5 37.2   Mono % % 9.3 7.9  7.9 8.2   Eosinophil % % 0.1 2.5  2.5 3.0   Basophil % % 0.7 0.5  0.5 0.4   Differential Method  Automated Automated  Automated Automated       Metabolic Panel (last 72 hours):  Recent Labs   Lab Result Units 03/12/24  1040 03/13/24  0005 03/13/24  0730 03/13/24  0804 03/13/24  1548 03/14/24  0808   Sodium mmol/L  --  131*  --  130* 131* 134*  134*   Sodium, Urine mmol/L  --   --  17*  --   --    --    Potassium mmol/L  --  5.2*  --  4.9 4.5 4.6  4.7   Chloride mmol/L  --  104  --  106 105 108  108   CO2 mmol/L  --  12*  --  13* 16* 20*  19*   Glucose mg/dL  --  211*  --  260* 156* 140*  139*   Glucose, UA   --   --  3+*  --   --   --    BUN mg/dL  --  64*  --  70* 70* 54*  55*   Creatinine mg/dL  --  3.5*  --  3.7* 3.5* 2.5*  2.5*   Creatinine, Urine mg/dL  --   --  196.0  --   --   --    Albumin g/dL  --  3.6  --  3.2* 3.1* 3.1*  3.0*   Albumin Level g/dL 4.6  --   --   --   --   --    Total Bilirubin mg/dL  --  4.8*  --   --   --  2.3*   Bilirubin Total mg/dL 8.4*  --   --   --   --   --    Alkaline Phosphatase U/L  --  36*  --   --   --  32*   Alk Phos IU/L 38*  --   --   --   --   --    AST U/L  --  19  --   --   --  13   Aspartate Aminotransferase IU/L 39*  --   --   --   --   --    ALT U/L  --  24  --   --   --  18   Alanine Aminotransferase IU/L 31  --   --   --   --   --    Magnesium mg/dL  --  1.4*  --  2.0  --  2.2   Magnesium Level mg/dL 1.7  --   --   --   --   --    Phosphorus mg/dL  --  4.2  --  4.6* 4.2 2.7  2.7       Vancomycin Administrations:  vancomycin given in the last 96 hours                     vancomycin 1,500 mg in dextrose 5 % (D5W) 250 mL IVPB (Vial-Mate) (mg) 1,500 mg New Bag 03/13/24 0348                    Microbiologic Results:  Microbiology Results (last 7 days)       Procedure Component Value Units Date/Time    Blood culture [[id number]] Collected: 03/13/24 1044    Order Status: Completed Specimen: Blood from Peripheral, Hand, Right Updated: 03/13/24 1915     Blood Culture, Routine No Growth to date    Narrative:      Collection has been rescheduled by LKBARNEY at 03/13/2024 09:47 Reason:   Unable to collect. GAIL Hernandez ext [phone] notified. Hard stick wasn't   able to get 2nd set of cultures  Collection has been rescheduled by LKBARNEY at 03/13/2024 09:47 Reason:   Unable to collect. GAIL Hernandez ext [phone] notified. Hard stick wasn't   able to get 2nd set of cultures    Blood  culture [[id number]] Collected: 03/13/24 0935    Order Status: Completed Specimen: Blood Updated: 03/13/24 1715     Blood Culture, Routine No Growth to date    Narrative:      Lft wrist    Urine Culture High Risk [[id number]] Collected: 03/13/24 0730    Order Status: Sent Specimen: Urine, Clean Catch Updated: 03/13/24 0758

## 2024-03-14 NOTE — ASSESSMENT & PLAN NOTE
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 On prednisone 5 daily, tacrolimus 4 BID. Hepatology consulted.     - Continuing tac 4 BID  - Holding prednisone 5 daily  - Daily CMPs

## 2024-03-14 NOTE — SUBJECTIVE & OBJECTIVE
Interval History: Mr. Mccarty is doing well this morning reporting significant overall improvement and urine output. BARBARA/bilirubin improving. Hematology consulted per hepatology request for + hemolysis labs. Urine cultures at [facility] growing enterobacter aerogenes.     Review of Systems   Constitutional:  Positive for fatigue. Negative for chills and fever.   Respiratory:  Negative for chest tightness and shortness of breath.    Cardiovascular:  Negative for chest pain and leg swelling.   Gastrointestinal:  Negative for abdominal distention, abdominal pain, nausea and vomiting.   Genitourinary:  Positive for frequency. Negative for decreased urine volume, difficulty urinating, dysuria, hematuria and urgency.   Neurological:  Positive for weakness. Negative for light-headedness and headaches.     Objective:     Vital Signs (Most Recent):  Temp: 97.6 °F (36.4 °C) (03/14/24 1225)  Pulse: 87 (03/14/24 1225)  Resp: 18 (03/14/24 1225)  BP: 100/70 (03/14/24 1230)  SpO2: 100 % (03/14/24 1225) Vital Signs (24h Range):  Temp:  [97.6 °F (36.4 °C)-98.9 °F (37.2 °C)] 97.6 °F (36.4 °C)  Pulse:  [84-95] 87  Resp:  [18] 18  SpO2:  [98 %-100 %] 100 %  BP: ()/(53-78) 100/70     Weight: 116 kg (255 lb 11.7 oz)  Body mass index is 38.88 kg/m².    Intake/Output Summary (Last 24 hours) at 3/14/2024 1320  Last data filed at 3/14/2024 1241  Gross per 24 hour   Intake 3144.11 ml   Output 3420 ml   Net -275.89 ml         Physical Exam  Vitals and nursing note reviewed.   Constitutional:       General: He is not in acute distress.     Appearance: Normal appearance. He is obese.   HENT:      Head: Normocephalic and atraumatic.   Cardiovascular:      Rate and Rhythm: Normal rate and regular rhythm.      Heart sounds: Normal heart sounds.   Pulmonary:      Effort: Pulmonary effort is normal. No respiratory distress.      Breath sounds: Normal breath sounds. No wheezing.   Abdominal:      General: Abdomen is flat. Bowel sounds are  normal. There is no distension.      Palpations: Abdomen is soft.      Tenderness: There is no abdominal tenderness.   Musculoskeletal:      Right lower leg: No edema.      Left lower leg: No edema.   Skin:     General: Skin is warm and dry.   Neurological:      General: No focal deficit present.      Mental Status: He is alert and oriented to person, place, and time. Mental status is at baseline.   Psychiatric:         Mood and Affect: Mood normal.         Behavior: Behavior normal.         Thought Content: Thought content normal.         Judgment: Judgment normal.             Significant Labs: All pertinent labs within the past 24 hours have been reviewed.  CBC:   Recent Labs   Lab 03/13/24  0005 03/13/24  1548 03/14/24  0808   WBC 12.28 4.03  4.03 2.31*   HGB 15.1 10.7*  10.7* 8.9*   HCT 41.7 30.0*  30.0* 25.6*   PLT 92* 68*  68* 53*     CMP:   Recent Labs   Lab 03/13/24  0005 03/13/24  0804 03/13/24  1548 03/14/24  0808   * 130* 131* 134*  134*   K 5.2* 4.9 4.5 4.6  4.7    106 105 108  108   CO2 12* 13* 16* 20*  19*   * 260* 156* 140*  139*   BUN 64* 70* 70* 54*  55*   CREATININE 3.5* 3.7* 3.5* 2.5*  2.5*   CALCIUM 9.1 8.3* 8.3* 8.5*  8.4*   PROT 5.8*  --   --  5.1*   ALBUMIN 3.6 3.2* 3.1* 3.1*  3.0*   BILITOT 4.8*  --   --  2.3*   ALKPHOS 36*  --   --  32*   AST 19  --   --  13   ALT 24  --   --  18   ANIONGAP 15 11 10 6*  7*       Significant Imaging: I have reviewed all pertinent imaging results/findings within the past 24 hours.

## 2024-03-14 NOTE — SUBJECTIVE & OBJECTIVE
Interval History: No acute events overnight. He remains stable, Urine output has been increasing.    Review of patient's allergies indicates:   Allergen Reactions    Adhesive Other (See Comments)     Skin irritation  Paper tape okay to use    Pcn [penicillins] Rash     Happened as a baby     Current Facility-Administered Medications   Medication Frequency    acetaminophen tablet 650 mg Q8H PRN    ARIPiprazole tablet 2 mg Daily    aspirin EC tablet 81 mg Daily    ceFEPIme (MAXIPIME) 2 g in dextrose 5 % in water (D5W) 100 mL IVPB (MB+) Q12H    dextrose 10% bolus 125 mL 125 mL PRN    dextrose 10% bolus 250 mL 250 mL PRN    diphenhydrAMINE capsule 50 mg Q4H PRN    EScitalopram oxalate tablet 20 mg Daily    glucagon (human recombinant) injection 1 mg PRN    glucose chewable tablet 16 g PRN    glucose chewable tablet 24 g PRN    heparin (porcine) injection 5,000 Units Q8H    hydroxychloroquine tablet 200 mg BID    insulin aspart U-100 pen 0-10 Units QID (AC + HS) PRN    insulin aspart U-100 pen 12 Units TIDWM    insulin detemir U-100 (Levemir) pen 20 Units Daily    lactated ringers bolus 1,000 mL Once    melatonin tablet 6 mg Nightly PRN    naloxone 0.4 mg/mL injection 0.02 mg PRN    ondansetron injection 4 mg Q6H PRN    pantoprazole EC tablet 40 mg Before breakfast    prochlorperazine injection Soln 2.5 mg Q6H PRN    sodium bicarbonate tablet 1,300 mg TID    sodium chloride 0.9% flush 10 mL Q12H PRN    tacrolimus capsule 4 mg BID    vancomycin - pharmacy to dose pharmacy to manage frequency       Objective:     Vital Signs (Most Recent):  Temp: 98.3 °F (36.8 °C) (03/14/24 0745)  Pulse: 88 (03/14/24 0745)  Resp: 18 (03/14/24 0745)  BP: 108/78 (03/14/24 0745)  SpO2: 98 % (03/14/24 0745) Vital Signs (24h Range):  Temp:  [98 °F (36.7 °C)-98.9 °F (37.2 °C)] 98.3 °F (36.8 °C)  Pulse:  [84-95] 88  Resp:  [18] 18  SpO2:  [98 %-100 %] 98 %  BP: ()/(55-78) 108/78     Weight: 116 kg (255 lb 11.7 oz) (03/14/24 0500)  Body mass  index is 38.88 kg/m².  Body surface area is 2.36 meters squared.    I/O last 3 completed shifts:  In: 4450.3 [P.O.:1790; I.V.:1185.5; IV Piggyback:1474.8]  Out: 2580 [Urine:2380; Emesis/NG output:200]     Physical Exam  HENT:      Head: Normocephalic.      Nose: Nose normal.      Mouth/Throat:      Mouth: Mucous membranes are moist.   Eyes:      General: Scleral icterus present.   Cardiovascular:      Rate and Rhythm: Normal rate.   Pulmonary:      Effort: Pulmonary effort is normal.   Abdominal:      General: Abdomen is flat.      Tenderness: There is no abdominal tenderness.   Musculoskeletal:         General: Normal range of motion.      Right lower leg: No edema.      Left lower leg: No edema.   Skin:     General: Skin is warm.      Capillary Refill: Capillary refill takes less than 2 seconds.   Neurological:      General: No focal deficit present.      Mental Status: He is alert and oriented to person, place, and time.   Psychiatric:         Mood and Affect: Mood normal.          Significant Labs:  All labs within the past 24 hours have been reviewed.     Significant Imaging:  Imaging reviewed

## 2024-03-14 NOTE — PROGRESS NOTES
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 Yaa Jackson's note dated 1/5/2024), CKD3 (no outpatient Nephrology follow-up but noted eGFRs ranging in 30s-50s since May 2023), GERD, pancytopenia (during Feb 2024 admission, now resolved), anxiety, and reported "chronic systolic heart failure" (although only TTE on file shows EF 65%; 12/8/2022) who was transferred from [facility] ED due to concern for urosepsis with BARBARA. Over the last few days, he has noticed a feeling of fullness in his bladder as if he is not "emptying all the way" and has noted some episodes of urinary incontinence with decreased UOP today. No associated dysuria. Yesterday evening, he noted a fever at home with reported T 100.4 F, for which he took Tylenol. This morning, he was profoundly weak, so much so that he was unable to get into his son's car and had to call EMS to transport him to [facility]. He noted that he was broken out in hives, which he says is common for him whenever he gets sick. Just prior to these symptoms, he has been "feeling great" since his discharge from [facility] on 2/9/2024.     As noted above, he was recently admitted to [facility] from 2/4/2024-2/9/2024 after presenting to OSH with N/V, diarrhea, metabolic derangements, and BARBARA - he was found to have RSV and was treated with supportive measures. At discharge, Cellcept was held due to leukopenia and he " "was started on prednisone 5 mg daily in its place.     At [facility], he was afebrile but with reported "borderline blood pressure." Labs significant for Na 132, Cr 2.33, T Bili 8.4, direct bili 0.50, WBC 13.0, PLT 93, INR 1.19, albumin 4.6, and procal 3.55. UA was positive for 3+ leukocytes, negative nitrites, 3+ bacteria, 2+ occult blood, hyaline casts and WBCs. Blood cultures and urine culture collected. He received 2 g cefepime x 1, 2000 mg vancomycin x 1, Zofran 4 mg x 1, 25 mg Benadryl x 3, 1 L NS, and 1 L LR prior to transfer. He was transferred to [facility] for Hepatology and Nephrology evaluations. Upon arrival to [facility], /87, , RR 18, SpO2 96% on room air, and T 98.3 F.     Overview/Hospital Course:  48 YOM admitted for urosepsis and BARBARA transferred to [facility] for nephrology and transplant hepatology services. Noted to be acidotic and hyperkalemic with worsening renal function and hyper unconjugated bilirubinemia. IV antibiotics initiated. Nephrology and hepatology consulted. Tacrolimus continued, prednisone held. , procal 3.55, haptoglobin <10, suggesting hemolysis. Hematology consulted. Peripheral smear with mild-moderate anisocytosis, occasional atypical lymphocyte, normal platelet morphology. BARBARA/acidosis/hyperkalemia with interval improvement. [facility] reported urine cultures growing enterobacter aerogenes with sensitivities listed in A/P. All blood cultures NGTD.    Interval History: Mr. Mccarty is doing well this morning reporting significant overall improvement and urine output. BARBARA/bilirubin improving. Hematology consulted per hepatology request for + hemolysis labs. Urine cultures at [facility] growing enterobacter aerogenes.     Review of Systems   Constitutional:  Positive for fatigue. Negative for chills and fever.   Respiratory:  Negative for chest tightness and shortness of breath.    Cardiovascular:  Negative for chest pain and leg swelling.   Gastrointestinal:  Negative for " abdominal distention, abdominal pain, nausea and vomiting.   Genitourinary:  Positive for frequency. Negative for decreased urine volume, difficulty urinating, dysuria, hematuria and urgency.   Neurological:  Positive for weakness. Negative for light-headedness and headaches.     Objective:     Vital Signs (Most Recent):  Temp: 97.6 °F (36.4 °C) (03/14/24 1225)  Pulse: 87 (03/14/24 1225)  Resp: 18 (03/14/24 1225)  BP: 100/70 (03/14/24 1230)  SpO2: 100 % (03/14/24 1225) Vital Signs (24h Range):  Temp:  [97.6 °F (36.4 °C)-98.9 °F (37.2 °C)] 97.6 °F (36.4 °C)  Pulse:  [84-95] 87  Resp:  [18] 18  SpO2:  [98 %-100 %] 100 %  BP: ()/(53-78) 100/70     Weight: 116 kg (255 lb 11.7 oz)  Body mass index is 38.88 kg/m².    Intake/Output Summary (Last 24 hours) at 3/14/2024 1320  Last data filed at 3/14/2024 1241  Gross per 24 hour   Intake 3144.11 ml   Output 3420 ml   Net -275.89 ml         Physical Exam  Vitals and nursing note reviewed.   Constitutional:       General: He is not in acute distress.     Appearance: Normal appearance. He is obese.   HENT:      Head: Normocephalic and atraumatic.   Cardiovascular:      Rate and Rhythm: Normal rate and regular rhythm.      Heart sounds: Normal heart sounds.   Pulmonary:      Effort: Pulmonary effort is normal. No respiratory distress.      Breath sounds: Normal breath sounds. No wheezing.   Abdominal:      General: Abdomen is flat. Bowel sounds are normal. There is no distension.      Palpations: Abdomen is soft.      Tenderness: There is no abdominal tenderness.   Musculoskeletal:      Right lower leg: No edema.      Left lower leg: No edema.   Skin:     General: Skin is warm and dry.   Neurological:      General: No focal deficit present.      Mental Status: He is alert and oriented to person, place, and time. Mental status is at baseline.   Psychiatric:         Mood and Affect: Mood normal.         Behavior: Behavior normal.         Thought Content: Thought content  normal.         Judgment: Judgment normal.             Significant Labs: All pertinent labs within the past 24 hours have been reviewed.  CBC:   Recent Labs   Lab 03/13/24  0005 03/13/24  1548 03/14/24  0808   WBC 12.28 4.03  4.03 2.31*   HGB 15.1 10.7*  10.7* 8.9*   HCT 41.7 30.0*  30.0* 25.6*   PLT 92* 68*  68* 53*     CMP:   Recent Labs   Lab 03/13/24  0005 03/13/24  0804 03/13/24  1548 03/14/24  0808   * 130* 131* 134*  134*   K 5.2* 4.9 4.5 4.6  4.7    106 105 108  108   CO2 12* 13* 16* 20*  19*   * 260* 156* 140*  139*   BUN 64* 70* 70* 54*  55*   CREATININE 3.5* 3.7* 3.5* 2.5*  2.5*   CALCIUM 9.1 8.3* 8.3* 8.5*  8.4*   PROT 5.8*  --   --  5.1*   ALBUMIN 3.6 3.2* 3.1* 3.1*  3.0*   BILITOT 4.8*  --   --  2.3*   ALKPHOS 36*  --   --  32*   AST 19  --   --  13   ALT 24  --   --  18   ANIONGAP 15 11 10 6*  7*       Significant Imaging: I have reviewed all pertinent imaging results/findings within the past 24 hours.    Assessment/Plan:      * Acute kidney injury superimposed on CKD  Patient with acute kidney injury/acute renal failure likely due to  urosepsis versus post-renal  BARBARA is currently worsening. Baseline creatinine  ~1.7-2.0  - Labs reviewed- Renal function/electrolytes with Estimated Creatinine Clearance: 31.3 mL/min (A) (based on SCr of 3.5 mg/dL (H)). according to latest data. Monitor urine output and serial BMP and adjust therapy as needed. Avoid nephrotoxins and renally dose meds for GFR listed above. Cr 2.3 at [facility] ED but increased to 3.5 upon arrival to [facility], despite being given 2 L of fluid. RP US without hydronephrosis. Bladder scan without retention. Urine culture at [facility]: enterobacter aerogenes resistant to ampicillin, ancef, unasyn. Sensitive to cefepime <4, cipro <0.25, gentamicin <2, levoquin <5, meropenem <1, pip/tazo <8, rocephin <1, tetracycline <4.     - De-escalate of IV antibiotics per culture susceptibilities   - Nephrology consulted, appreciate  recs  - Strict I/O  - RFP q8h  - Avoid nephrotoxic agents  - Renally dose meds to current eGFR  - Continue home sodium bicarb 1300 mg po TID    Hyperbilirubinemia  S/P liver transplant   Long-term use of immunosuppressant medication   Gilbert syndrome     Tbili 8.4 at [facility] but decreased to 4.8 upon [place] [facility] Direct bili 0.5. Known history of Gilbert's, however LDH/retic elevated and haptoglobin <10 on this admission. Liver doppler : Satisfactory Doppler evaluation of liver transplant. Persistent dilatation of the common bile duct. No significant intrahepatic ductal dilatation.    - Hepatology consulted, appreciate recs  - Continue Prograf 4 mg po BID  - Hold prednisone  - Hematology consultation for hemolysis per hepatology  - CMP daily    Sepsis  On admission, This patient does have evidence of infective focus. My overall impression is sepsis - reported objective fever (T 100.4 F, tachycardic with elevated WBCs and UA concerning for infection). Source: Urinary Tract. Organ dysfunction indicated by Acute kidney injury. Received vancomycin/cefepime for antibacterial coverage. RESOLVED. See BARBARA.    Hyponatremia  Patient has hyponatremia which is controlled,We will aim to correct the sodium by 4-6mEq in 24 hours. We will monitor sodium Every 8 hours. The hyponatremia is due to Dehydration/hypovolemia.  We will treat the hyponatremia with IV fluids. The patient's sodium results have been reviewed and are listed below.  Recent Labs   Lab 03/14/24  0808   *  134*       Hypomagnesemia  Patient has Abnormal Magnesium: hypomagnesemia. Will continue to monitor electrolytes closely. Will replace the affected electrolytes and repeat labs to be done after interventions completed. The patient's magnesium results have been reviewed and are listed below.  Recent Labs   Lab 03/14/24  0808   MG 2.2          Gilbert syndrome  See hyperbilirubinemia      GERD without esophagitis  Continue Protonix 40 mg po before  breakfast      Anxiety  Continue home Abilify 2 mg po daily and Lexapro 20 mg po daily      Metabolic acidosis  Patient with chronic metabolic acidosis in setting of CKD. Takes sodium bicarb tablets. At home. His metabolic acidosis on admission is acute on chronic in setting of BARBARA on CKD3a. IVF administered with significant improvement in acidosis. Nephrology consulted. RFP Q8hrs.      Hyperkalemia  This patient has hyperkalemia which is controlled. We will monitor for arrhythmias with EKG or continuous telemetry. We will treat the hyperkalemia with Potassium Binders if necessary. The likely etiology of the hyperkalemia is BARBARA. The patients latest potassium has been reviewed and the results are listed below. Nephrology consulted. RFP Q8 hours.   Recent Labs   Lab 03/14/24  0808   K 4.6  4.7             Long-term use of immunosuppressant medication  See s/p liver transplant      Thrombocytopenia, unspecified  Patient was found to have thrombocytopenia, the likely etiology is secondary to cirrhosis/portal hypertension, will monitor the platelets Daily. Will transfuse if platelet count is <50k (if undergoing surgical procedure or have active bleeding). Hold DVT prophylaxis if platelets are <50k. The patient's platelet results have been reviewed and are listed below.  Recent Labs   Lab 03/14/24  0808   PLT 53*           Chronic systolic heart failure  Patient with history of chronic diastolic heart failure. Stress echo 11/29/2022 noted below without evidence of systolic/diastolic dysfunction.     The stress echo portion of this study is negative for myocardial ischemia.  The ECG portion of this study is negative for myocardial ischemia.  The patient reached the end of the protocol.  There were no arrhythmias during stress.  The estimated ejection fraction is 65%.  The left ventricle is normal in size with normal systolic function.  Normal left ventricular diastolic function.  Normal right ventricular size with normal  "right ventricular systolic function.  Normal central venous pressure (3 mmHg).         S/P liver transplant  Granulomatous hepatitis due to hepatic sarcoidosis s/p DBD liver transplant on 1/5/2023 with post-operative course c/b moderate acute cellular rejection. On prednisone 5 daily, tacrolimus 4 BID. Hepatology consulted.     - Continuing tac 4 BID  - Holding prednisone 5 daily  - Daily CMPs      Type 2 diabetes mellitus with hyperglycemia  Patient's FSGs are controlled on current medication regimen.  Last A1c reviewed-   Lab Results   Component Value Date    HGBA1C 4.9 02/06/2024     Most recent fingerstick glucose reviewed-   Recent Labs   Lab 03/13/24  1727 03/13/24 2017 03/14/24  0854 03/14/24  1238   POCTGLUCOSE 156* 161* 155* 112*     Current correctional scale  Medium  Maintain anti-hyperglycemic dose as follows-   Antihyperglycemics (From admission, onward)      Start     Stop Route Frequency Ordered    03/13/24 0900  insulin detemir U-100 (Levemir) pen 20 Units         -- SubQ Daily 03/13/24 0030    03/13/24 0715  insulin aspart U-100 pen 12 Units         -- SubQ 3 times daily with meals 03/13/24 0030    03/13/24 0127  insulin aspart U-100 pen 0-10 Units         -- SubQ Before meals & nightly PRN 03/13/24 0030          Hold Oral hypoglycemics while patient is in the hospital.    Endocrinology recs dated 2/9/2024:  "- Levemir (Insulin Detemir) 40 units daily (20% reduction due to fasting blood glucose below goal.)  - Novolog (Insulin Aspart) 24 units TIDWM and prn for BG excursions MDC SSI (150/25) (10% reduction due to prandial blood glucose below goal)"    Sarcoidosis  Rheumatology chart reviewed during previous admission due to concern for flare due to sx of fatigue, GALLEGOS, n/v/d, and cytopenias; ordered labs but had low concern for sarcoidosis flare.    2/7 ACE and urine calcium WNL   2/8 1,25 dihydroxy WNL  2/8 elevated G6PD (17.3) and lysozyme (10.4)    - Continue  mg po BID      VTE Risk " Mitigation (From admission, onward)           Ordered     heparin (porcine) injection 5,000 Units  Every 8 hours         03/13/24 0134     IP VTE HIGH RISK PATIENT  Once         03/13/24 0134     Place sequential compression device  Until discontinued         03/13/24 0030     Reason for No Pharmacological VTE Prophylaxis  Once        Question:  Reasons:  Answer:  Thrombocytopenia    03/13/24 0030                    Discharge Planning   QING: 3/16/2024     Code Status: Full Code   Is the patient medically ready for discharge?: No    Reason for patient still in hospital (select all that apply): Patient trending condition, Treatment, and Consult recommendations  Discharge Plan A: Home        Irasema Walton MD  Department of Hospital Medicine   [facility] - Transplant Stepdown

## 2024-03-14 NOTE — ASSESSMENT & PLAN NOTE
Rheumatology chart reviewed during previous admission due to concern for flare due to sx of fatigue, GALLEGOS, n/v/d, and cytopenias; ordered labs but had low concern for sarcoidosis flare.    2/7 ACE and urine calcium WNL   2/8 1,25 dihydroxy WNL  2/8 elevated G6PD (17.3) and lysozyme (10.4)    - Continue  mg po BID

## 2024-03-14 NOTE — ASSESSMENT & PLAN NOTE
Patient with history of chronic diastolic heart failure. Stress echo 11/29/2022 noted below without evidence of systolic/diastolic dysfunction.     The stress echo portion of this study is negative for myocardial ischemia.  The ECG portion of this study is negative for myocardial ischemia.  The patient reached the end of the protocol.  There were no arrhythmias during stress.  The estimated ejection fraction is 65%.  The left ventricle is normal in size with normal systolic function.  Normal left ventricular diastolic function.  Normal right ventricular size with normal right ventricular systolic function.  Normal central venous pressure (3 mmHg).

## 2024-03-14 NOTE — PROGRESS NOTES
Therapy with Vancomycin complete and/or consult discontinued by provider.  Pharmacy will sign off, please re-consult as needed.   Thank you

## 2024-03-14 NOTE — PROGRESS NOTES
Hepatology Progress Note    Rosalio Mccarty is a 48 y.o. male admitted to hospital 3/12/2024 (Hospital Day: 3) due to Sepsis.     Interval History   No acute events overnight.  Urine output yesterday 2.3 L.  Patient was alert and oriented x3 when seen this morning.  He was in good spirits.    Per Nephrology BARBARA likely due to ATN.     Transaminases normal, Prograf level 5.5 today.  Hemolytic labs concerning (haptoglobin< 10, reticulocyte count 5.6%, ).    Objective  Temp:  [98 °F (36.7 °C)-98.9 °F (37.2 °C)] 98.3 °F (36.8 °C) (03/14 0745)  Pulse:  [84-95] 88 (03/14 0745)  BP: ()/(55-78) 108/78 (03/14 0745)  Resp:  [18] 18 (03/14 0745)  SpO2:  [98 %-100 %] 98 % (03/14 0745)    General: Alert, Oriented x3, no distress  Neurologic: Asterixis absent  Abdomen:  Non-distended. Normal tympany. Soft. Non-tender. No peritoneal signs.    Laboratory    Lab Results   Component Value Date    WBC 2.31 (L) 03/14/2024    HGB 8.9 (L) 03/14/2024    HCT 25.6 (L) 03/14/2024    MCV 90 03/14/2024    PLT 53 (L) 03/14/2024       Lab Results   Component Value Date     (L) 03/14/2024     (L) 03/14/2024    K 4.7 03/14/2024    K 4.6 03/14/2024     03/14/2024     03/14/2024    CO2 19 (L) 03/14/2024    CO2 20 (L) 03/14/2024    BUN 55 (H) 03/14/2024    BUN 54 (H) 03/14/2024    CREATININE 2.5 (H) 03/14/2024    CREATININE 2.5 (H) 03/14/2024    CALCIUM 8.4 (L) 03/14/2024    CALCIUM 8.5 (L) 03/14/2024       Lab Results   Component Value Date    ALBUMIN 3.0 (L) 03/14/2024    ALBUMIN 3.1 (L) 03/14/2024    ALT 18 03/14/2024    AST 13 03/14/2024     (H) 12/08/2022    ALKPHOS 32 (L) 03/14/2024    BILITOT 2.3 (H) 03/14/2024       Lab Results   Component Value Date    INR 1.2 03/13/2024    INR 1.19 (H) 03/12/2024    INR 1.15 02/05/2024             Assessment  Rosalio Mccarty is a 48 y.o. male with history of hepatic sarcoidosis s/p DBD liver transplant on 1/5/2023 c/b moderate ACR, persistent hyperbilirubinemia, CKD3,  GERD, and anxiety, who was transferred from [facility] due to concern for urosepsis with BARBARA. Hepatology consulted for hyperbilirubinemia. US liver without intrahepatic ductal dilation; Doppler satisfactory. Drop in all cell lines hints towards hemoconcentration, which may explain initial hyperbilirubinemia. Suspect indirect hyperbilirubinemia is due to Gilbert syndrome > ?hemolysis (haptoglobin <10, elevated LDH and RC).      Recent admission for RSV during which Cellcept was held, and patient was discharged on prednisone 20 mg daily & Prograf 4/4. Excellent allograft function.     Problem List:   Indirect hyperbilirubinemia 2/2 Gilbert's disease vs hemolysis  ATN  S/P OLT on 1/5/23 for hepatic sarcoidosis, on chronic IS      Plan  -   Continue Prograf 4 mg q.a.m. - 4 mg q.p.m. Hold prednisone.   -   Consider Hematology consult/curbside for concerning hemolytic labs.  -   Appreciate Nephrology recommendations with regards to ATN.  Strict intake and output.  Avoid nephrotoxic agents.  - please obtain daily CBC, CMP, AM Prograf level.  - Plan of care was discussed with primary team    Thank you for involving us in the care of Rosalio Mccarty. Please call with any additional concerns or questions.    Olvin Cole MD, PGY-V  Gastroenterology Fellow  [facility]

## 2024-03-15 ENCOUNTER — PATIENT MESSAGE (OUTPATIENT)
Dept: TRANSPLANT | Facility: CLINIC | Age: 49
End: 2024-03-15
Payer: COMMERCIAL

## 2024-03-15 VITALS
WEIGHT: 255.75 LBS | RESPIRATION RATE: 18 BRPM | BODY MASS INDEX: 38.76 KG/M2 | SYSTOLIC BLOOD PRESSURE: 112 MMHG | TEMPERATURE: 99 F | HEART RATE: 80 BPM | HEIGHT: 68 IN | DIASTOLIC BLOOD PRESSURE: 75 MMHG | OXYGEN SATURATION: 99 %

## 2024-03-15 LAB
ALBUMIN SERPL BCP-MCNC: 3.2 G/DL (ref 3.5–5.2)
ALP SERPL-CCNC: 30 U/L (ref 55–135)
ALT SERPL W/O P-5'-P-CCNC: 16 U/L (ref 10–44)
ANION GAP SERPL CALC-SCNC: 6 MMOL/L (ref 8–16)
AST SERPL-CCNC: 16 U/L (ref 10–40)
BASOPHILS NFR BLD: 0 % (ref 0–1.9)
BILIRUB SERPL-MCNC: 1.7 MG/DL (ref 0.1–1)
BUN SERPL-MCNC: 45 MG/DL (ref 6–20)
CALCIUM SERPL-MCNC: 8.9 MG/DL (ref 8.7–10.5)
CHLORIDE SERPL-SCNC: 112 MMOL/L (ref 95–110)
CO2 SERPL-SCNC: 20 MMOL/L (ref 23–29)
CREAT SERPL-MCNC: 1.8 MG/DL (ref 0.5–1.4)
DIFFERENTIAL METHOD BLD: ABNORMAL
EOSINOPHIL NFR BLD: 1 % (ref 0–8)
ERYTHROCYTE [DISTWIDTH] IN BLOOD BY AUTOMATED COUNT: 18.2 % (ref 11.5–14.5)
EST. GFR  (NO RACE VARIABLE): 45.9 ML/MIN/1.73 M^2
FACT VIII ACT/NOR PPP: 150 % (ref 60–170)
GLUCOSE SERPL-MCNC: 111 MG/DL (ref 70–110)
HAPTOGLOB SERPL-MCNC: <10 MG/DL (ref 30–250)
HCT VFR BLD AUTO: 23.7 % (ref 40–54)
HGB BLD-MCNC: 7.9 G/DL (ref 14–18)
IMM GRANULOCYTES # BLD AUTO: ABNORMAL K/UL (ref 0–0.04)
IMM GRANULOCYTES NFR BLD AUTO: ABNORMAL % (ref 0–0.5)
LDH SERPL L TO P-CCNC: 349 U/L (ref 110–260)
LYMPHOCYTES NFR BLD: 48 % (ref 18–48)
MAGNESIUM SERPL-MCNC: 2.2 MG/DL (ref 1.6–2.6)
MCH RBC QN AUTO: 30.4 PG (ref 27–31)
MCHC RBC AUTO-ENTMCNC: 33.3 G/DL (ref 32–36)
MCV RBC AUTO: 91 FL (ref 82–98)
MONOCYTES NFR BLD: 4 % (ref 4–15)
NEUTROPHILS NFR BLD: 47 % (ref 38–73)
NRBC BLD-RTO: 0 /100 WBC
PHOSPHATE SERPL-MCNC: 2.9 MG/DL (ref 2.7–4.5)
PLATELET # BLD AUTO: 52 K/UL (ref 150–450)
PLATELET BLD QL SMEAR: ABNORMAL
PMV BLD AUTO: 10.5 FL (ref 9.2–12.9)
POCT GLUCOSE: 116 MG/DL (ref 70–110)
POCT GLUCOSE: 133 MG/DL (ref 70–110)
POTASSIUM SERPL-SCNC: 4.7 MMOL/L (ref 3.5–5.1)
PROT SERPL-MCNC: 5.3 G/DL (ref 6–8.4)
RBC # BLD AUTO: 2.6 M/UL (ref 4.6–6.2)
RETICS/RBC NFR AUTO: 4.3 % (ref 0.4–2)
SODIUM SERPL-SCNC: 138 MMOL/L (ref 136–145)
TACROLIMUS BLD-MCNC: 6 NG/ML (ref 5–15)
WBC # BLD AUTO: 1.7 K/UL (ref 3.9–12.7)

## 2024-03-15 PROCEDURE — 25000003 PHARM REV CODE 250: Performed by: HOSPITALIST

## 2024-03-15 PROCEDURE — 63600175 PHARM REV CODE 636 W HCPCS

## 2024-03-15 PROCEDURE — 36415 COLL VENOUS BLD VENIPUNCTURE: CPT

## 2024-03-15 PROCEDURE — 83010 ASSAY OF HAPTOGLOBIN QUANT: CPT

## 2024-03-15 PROCEDURE — 83735 ASSAY OF MAGNESIUM: CPT | Performed by: HOSPITALIST

## 2024-03-15 PROCEDURE — 80197 ASSAY OF TACROLIMUS: CPT | Performed by: HOSPITALIST

## 2024-03-15 PROCEDURE — 99232 SBSQ HOSP IP/OBS MODERATE 35: CPT | Mod: ,,, | Performed by: INTERNAL MEDICINE

## 2024-03-15 PROCEDURE — 99233 SBSQ HOSP IP/OBS HIGH 50: CPT | Mod: ,,, | Performed by: INTERNAL MEDICINE

## 2024-03-15 PROCEDURE — 25000003 PHARM REV CODE 250

## 2024-03-15 PROCEDURE — 80053 COMPREHEN METABOLIC PANEL: CPT | Performed by: HOSPITALIST

## 2024-03-15 PROCEDURE — 84100 ASSAY OF PHOSPHORUS: CPT | Performed by: HOSPITALIST

## 2024-03-15 PROCEDURE — 85007 BL SMEAR W/DIFF WBC COUNT: CPT | Performed by: HOSPITALIST

## 2024-03-15 PROCEDURE — 85045 AUTOMATED RETICULOCYTE COUNT: CPT

## 2024-03-15 PROCEDURE — 85240 CLOT FACTOR VIII AHG 1 STAGE: CPT

## 2024-03-15 PROCEDURE — 85027 COMPLETE CBC AUTOMATED: CPT | Performed by: HOSPITALIST

## 2024-03-15 PROCEDURE — 83615 LACTATE (LD) (LDH) ENZYME: CPT

## 2024-03-15 RX ORDER — CIPROFLOXACIN 500 MG/1
500 TABLET ORAL 2 TIMES DAILY
Qty: 22 TABLET | Refills: 0 | Status: SHIPPED | OUTPATIENT
Start: 2024-03-15 | End: 2024-03-26

## 2024-03-15 RX ADMIN — HYDROXYCHLOROQUINE SULFATE 200 MG: 200 TABLET, FILM COATED ORAL at 08:03

## 2024-03-15 RX ADMIN — PANTOPRAZOLE SODIUM 40 MG: 40 TABLET, DELAYED RELEASE ORAL at 05:03

## 2024-03-15 RX ADMIN — ESCITALOPRAM OXALATE 20 MG: 10 TABLET ORAL at 08:03

## 2024-03-15 RX ADMIN — ARIPIPRAZOLE 2 MG: 2 TABLET ORAL at 08:03

## 2024-03-15 RX ADMIN — INSULIN ASPART 12 UNITS: 100 INJECTION, SOLUTION INTRAVENOUS; SUBCUTANEOUS at 12:03

## 2024-03-15 RX ADMIN — TACROLIMUS 4 MG: 1 CAPSULE ORAL at 08:03

## 2024-03-15 RX ADMIN — SODIUM BICARBONATE 650 MG TABLET 1300 MG: at 08:03

## 2024-03-15 RX ADMIN — INSULIN DETEMIR 20 UNITS: 100 INJECTION, SOLUTION SUBCUTANEOUS at 09:03

## 2024-03-15 RX ADMIN — INSULIN ASPART 12 UNITS: 100 INJECTION, SOLUTION INTRAVENOUS; SUBCUTANEOUS at 09:03

## 2024-03-15 RX ADMIN — ASPIRIN 81 MG: 81 TABLET, COATED ORAL at 08:03

## 2024-03-15 NOTE — ASSESSMENT & PLAN NOTE
Patient was found to have thrombocytopenia, the likely etiology is secondary to cirrhosis/portal hypertension, will monitor the platelets Daily. Will transfuse if platelet count is <50k (if undergoing surgical procedure or have active bleeding). Hold DVT prophylaxis if platelets are <50k. The patient's platelet results have been reviewed and are listed below.  Recent Labs   Lab 03/15/24  0638   PLT 52*

## 2024-03-15 NOTE — ASSESSMENT & PLAN NOTE
Patient with acute kidney injury/acute renal failure likely due to  urosepsis versus post-renal  BARBARA is currently worsening. Baseline creatinine  ~1.7-2.0  - Labs reviewed- Renal function/electrolytes with Estimated Creatinine Clearance: 62 mL/min (A) (based on SCr of 1.8 mg/dL (H)). according to latest data. Monitor urine output and serial BMP and adjust therapy as needed. Avoid nephrotoxins and renally dose meds for GFR listed above. Cr 2.3 at [facility] ED but increased to 3.5 upon arrival to [facility], despite being given 2 L of fluid. RP US without hydronephrosis. Bladder scan without retention. Urine culture at : enterobacter aerogenes resistant to ampicillin, ancef, unasyn. Sensitive to cefepime <4, cipro <0.25, gentamicin <2, levoquin <5, meropenem <1, pip/tazo <8, rocephin <1, tetracycline <4.     - De-escalated IV antibiotics per culture susceptibilities to Cipro: discharging with 500 mg PO q12h x 11d, in order to complete a 14-day total ABX course  - Nephrology consulted, appreciate recs  - Strict I/O  - RFP q8h  - Avoid nephrotoxic agents  - Renally dose meds to current eGFR  - Continue home sodium bicarb

## 2024-03-15 NOTE — PLAN OF CARE
No complaints this shift.  No falls or injuries reported this shift.  Great urine output this shift.

## 2024-03-15 NOTE — DISCHARGE INSTRUCTIONS
In order to not potentially subject you to another prior authorization to keep getting them, I have continued your Jardiance and tirzepatide on your medications list.  HOWEVER, Jardiance can cause an increase in urinary blood glucose, which would serve as nutrition for the bacteria in your urinary tract.  And tirzepatide can decrease your urine output, which would slow down your eradication of the infection.  So please do NOT take any Jardiance or tirzepatide until you complete your course of ciprofloxacin.

## 2024-03-15 NOTE — PLAN OF CARE
[facility] - Transplant Stepdown  Discharge Final Note    Primary Care Provider: Osorio Loredo Jr., MD    Expected Discharge Date: 3/15/2024    Future Appointments   Date Time Provider Department Center   3/19/2024 10:20 AM Kaycee Felix MD [facility] BENHEM [place]   4/3/2024 10:30 AM Yaa Jackson MD [facility] LIVERTX [facility]   4/10/2024  1:30 PM George Hannah MD [facility] NEPH [place]     Pt discharged home with no services.  Jasvir Mayorga, RN,BSN        Final Discharge Note (most recent)       Final Note - 03/15/24 1406          Final Note    Assessment Type Final Discharge Note     Anticipated Discharge Disposition Home or Self Care     Hospital Resources/Appts/Education Provided Provided patient/caregiver with written discharge plan information;Appointments scheduled and added to AVS;Post-Acute resouces added to AVS        Post-Acute Status    Discharge Delays None known at this time                     Important Message from Medicare

## 2024-03-15 NOTE — ASSESSMENT & PLAN NOTE
RESOLVED.   On admission, This patient does have evidence of infective focus. My overall impression is sepsis - reported objective fever (T 100.4 F, tachycardic with elevated WBCs and UA concerning for infection). Source: Urinary Tract. Organ dysfunction indicated by Acute kidney injury. Received vancomycin/cefepime for antibacterial coverage. See BARBARA.

## 2024-03-15 NOTE — ASSESSMENT & PLAN NOTE
S/P liver transplant   Long-term use of immunosuppressant medication   Gilbert syndrome     Tbili 8.4 at [facility] but decreased to 4.8 upon arrival to [facility]. Direct bili 0.5. Known history of Gilbert's, however LDH/retic elevated and haptoglobin <10 on this admission. Liver doppler : Satisfactory Doppler evaluation of liver transplant. Persistent dilatation of the common bile duct. No significant intrahepatic ductal dilatation.    - Hepatology consulted, appreciate recs.  Will follow up in clinic.  - Continue Prograf 4 mg po BID  - Hold prednisone  - Hematology: suspect multifactorial from myelosuppression from infection, antibiotics, IST, and liver sequestration.  Follow up in clinic  - CMP on 3/18 in [place]

## 2024-03-15 NOTE — ASSESSMENT & PLAN NOTE
48 year old male with history as above who presented to OSH for possible urosepsis and transferred to [facility] for transplant care. RP ultrasound without hydronephrosis or signs of obstruction. His urine studies are consistent with pre-renal injury however his creatinine continues to rise despite adequate fluid resuscitation. Possibly mixed etiology of CNI and ATN secondary to sepsis. We will analyze urine further under microscopy.      Recommendations:  Creatinine at baseline s/p fluids and antibiotics  Urine with muddy granular casts consistent with ATN  Continue tacrolimus per hepatology  Strict I&Os and daily weights   Avoid nephrotoxic agents such as NSAIDs, gadolinium and IV radiocontrast.  Renally dose meds to current GFR.  Maintain MAP > 65.

## 2024-03-15 NOTE — PROGRESS NOTES
Hepatology Progress Note    Rosalio Mccarty is a 48 y.o. male admitted to hospital 3/12/2024 (Hospital Day: 4) due to Acute kidney injury superimposed on CKD.     Interval History   No acute events overnight.  VSS, AOX4. Patient in good spirits today. Urine output yesterday 5.4 L.     Cr now returned to baseline. Transaminases WNL. Prograf level 6.     Hematology reports that cytopenia is due to a combination of infection and immunosuppression medication.  Bone marrow biopsy not warranted at this time.    Objective  Temp:  [98.2 °F (36.8 °C)-98.7 °F (37.1 °C)] 98.5 °F (36.9 °C) (03/15 1159)  Pulse:  [80-92] 80 (03/15 1159)  BP: (101-130)/(63-81) 112/75 (03/15 1159)  Resp:  [18] 18 (03/15 1159)  SpO2:  [98 %-100 %] 99 % (03/15 1159)    General: Alert, Oriented x3, no distress  Neurologic: Asterixis absent  Abdomen:  Non-distended. Normal tympany. Soft. Non-tender. No peritoneal signs.    Laboratory    Lab Results   Component Value Date    WBC 1.70 (LL) 03/15/2024    HGB 7.9 (L) 03/15/2024    HCT 23.7 (L) 03/15/2024    MCV 91 03/15/2024    PLT 52 (L) 03/15/2024       Lab Results   Component Value Date     03/15/2024    K 4.7 03/15/2024     (H) 03/15/2024    CO2 20 (L) 03/15/2024    BUN 45 (H) 03/15/2024    CREATININE 1.8 (H) 03/15/2024    CALCIUM 8.9 03/15/2024       Lab Results   Component Value Date    ALBUMIN 3.2 (L) 03/15/2024    ALT 16 03/15/2024    AST 16 03/15/2024     (H) 12/08/2022    ALKPHOS 30 (L) 03/15/2024    BILITOT 1.7 (H) 03/15/2024       Lab Results   Component Value Date    INR 1.2 03/13/2024    INR 1.19 (H) 03/12/2024    INR 1.15 02/05/2024             Assessment  Rosalio Mccarty is a 48 y.o. male with history of hepatic sarcoidosis s/p DBD liver transplant on 1/5/2023 c/b moderate ACR, persistent hyperbilirubinemia, CKD3, GERD, and anxiety, who was transferred from [facility] ED due to concern for urosepsis with BARBARA. Hepatology consulted for hyperbilirubinemia. US liver without  intrahepatic ductal dilation; Doppler satisfactory. Drop in all cell lines hints towards hemoconcentration, which may explain initial hyperbilirubinemia. Suspect indirect hyperbilirubinemia is due to Gilbert syndrome > ?hemolysis (haptoglobin <10, elevated LDH and RC).      Recent admission for RSV during which Cellcept was held, and patient was discharged on prednisone 20 mg daily & Prograf 4/4. Excellent allograft function.     Problem List:   Indirect hyperbilirubinemia 2/2 Gilbert's disease vs hemolysis  ATN  S/P OLT on 1/5/23 for hepatic sarcoidosis, on chronic IS      Plan  -   Continue Prograf 4 mg q.a.m. - 4 mg q.p.m. Hold prednisone. OK for discharge from our perspective. Will arrange labs AM Monday, and hepatology follow up.   -  appreciate recommendations from Hematology and Nephrology  - please obtain daily CBC, CMP, AM Prograf level.  - Plan of care was discussed with primary team    Thank you for involving us in the care of Rosalio Mccarty. Please call with any additional concerns or questions.    Olvin Cole MD, PGY-V  Gastroenterology Fellow  [facility]

## 2024-03-15 NOTE — DISCHARGE SUMMARY
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 Yaa Jackson's note dated 1/5/2024), CKD3 (no outpatient Nephrology follow-up but noted eGFRs ranging in 30s-50s since May 2023), GERD, pancytopenia (during Feb 2024 admission, now resolved), anxiety, and reported "chronic systolic heart failure" (although only TTE on file shows EF 65%; 12/8/2022) who was transferred from [facility] ED due to concern for urosepsis with BARBARA. Over the last few days, he has noticed a feeling of fullness in his bladder as if he is not "emptying all the way" and has noted some episodes of urinary incontinence with decreased UOP today. No associated dysuria. Yesterday evening, he noted a fever at home with reported T 100.4 F, for which he took Tylenol. This morning, he was profoundly weak, so much so that he was unable to get into his son's car and had to call EMS to transport him to [facility]. He noted that he was broken out in hives, which he says is common for him whenever he gets sick. Just prior to these symptoms, he has been "feeling great" since his discharge from [facility] on 2/9/2024.     As noted above, he was recently admitted to [facility] from 2/4/2024-2/9/2024 after presenting to OSH with N/V, diarrhea, metabolic derangements, " "and BARBARA - he was found to have RSV and was treated with supportive measures. At discharge, Cellcept was held due to leukopenia and he was started on prednisone 5 mg daily in its place.     At [facility], he was afebrile but with reported "borderline blood pressure." Labs significant for Na 132, Cr 2.33, T Bili 8.4, direct bili 0.50, WBC 13.0, PLT 93, INR 1.19, albumin 4.6, and procal 3.55. UA was positive for 3+ leukocytes, negative nitrites, 3+ bacteria, 2+ occult blood, hyaline casts and WBCs. Blood cultures and urine culture collected. He received 2 g cefepime x 1, 2000 mg vancomycin x 1, Zofran 4 mg x 1, 25 mg Benadryl x 3, 1 L NS, and 1 L LR prior to transfer. He was transferred to [facility] for Hepatology and Nephrology evaluations. Upon arrival to [facility], /87, , RR 18, SpO2 96% on room air, and T 98.3 F.     * No surgery found *      Hospital Course:   48 YOM admitted for urosepsis and BARBARA transferred to [facility] for nephrology and transplant hepatology services. Noted to be acidotic and hyperkalemic with worsening renal function and hyper unconjugated bilirubinemia. IV antibiotics initiated. Nephrology and hepatology consulted. Tacrolimus continued, prednisone held. , procal 3.55, haptoglobin <10, suggesting hemolysis. Peripheral smear with mild-moderate anisocytosis, occasional atypical lymphocyte, normal platelet morphology. Hematology consulted: suspect multifactorial from myelosuppression from infection, antibiotics, IST, and liver sequestration.  BARBARA/acidosis/hyperkalemia steadily improved. [place] reported urine cultures growing enterobacter aerogenes with sensitivities listed in A/P, including ciprofloxacin. All blood cultures NGTD.  He was medically stable for DC on 3/14, so he was DC'ed on a course of Cipro (total ABX course of 14 days), Prograf per Hepatology recs, and follow-up labs and clinic visits in place.     Goals of Care Treatment Preferences:  Code Status: Full " Code      Consults:   Consults (From admission, onward)          Status Ordering Provider     Inpatient consult to Hematology  Once        Provider:  (Not yet assigned)    Completed KWASI SANTOS     Inpatient consult to Hepatology  Once        Provider:  (Not yet assigned)    Completed CONCETTA QUIÑONES     Inpatient consult to Nephrology  Once        Provider:  (Not yet assigned)    Completed CONCETTA QUIÑONES            Psychiatric  Anxiety  Continue home Abilify 2 mg po daily and Lexapro 20 mg po daily      Cardiac/Vascular  Chronic systolic heart failure  Patient with history of chronic diastolic heart failure. Stress echo 11/29/2022 noted below without evidence of systolic/diastolic dysfunction.     The stress echo portion of this study is negative for myocardial ischemia.  The ECG portion of this study is negative for myocardial ischemia.  The patient reached the end of the protocol.  There were no arrhythmias during stress.  The estimated ejection fraction is 65%.  The left ventricle is normal in size with normal systolic function.  Normal left ventricular diastolic function.  Normal right ventricular size with normal right ventricular systolic function.  Normal central venous pressure (3 mmHg).         Renal/  * Acute kidney injury superimposed on CKD  Patient with acute kidney injury/acute renal failure likely due to  urosepsis versus post-renal  BARBARA is currently worsening. Baseline creatinine  ~1.7-2.0  - Labs reviewed- Renal function/electrolytes with Estimated Creatinine Clearance: 62 mL/min (A) (based on SCr of 1.8 mg/dL (H)). according to latest data. Monitor urine output and serial BMP and adjust therapy as needed. Avoid nephrotoxins and renally dose meds for GFR listed above. Cr 2.3 at [facility] ED but increased to 3.5 upon arrival to [facility], despite being given 2 L of fluid. RP US without hydronephrosis. Bladder scan without retention. Urine culture at : enterobacter aerogenes resistant to  ampicillin, ancef, unasyn. Sensitive to cefepime <4, cipro <0.25, gentamicin <2, levoquin <5, meropenem <1, pip/tazo <8, rocephin <1, tetracycline <4.     - De-escalated IV antibiotics per culture susceptibilities to Cipro: discharging with 500 mg PO q12h x 11d, in order to complete a 14-day total ABX course  - Nephrology consulted, appreciate recs  - Strict I/O  - RFP q8h  - Avoid nephrotoxic agents  - Renally dose meds to current eGFR  - Continue home sodium bicarb    Hyperkalemia  This patient has hyperkalemia which is controlled. We will monitor for arrhythmias with EKG or continuous telemetry. We will treat the hyperkalemia with Potassium Binders if necessary. The likely etiology of the hyperkalemia is BARBARA. The patients latest potassium has been reviewed and the results are listed below. Nephrology consulted. RFP Q8 hours.   Recent Labs   Lab 03/15/24  0638   K 4.7               ID  Sepsis  RESOLVED.   On admission, This patient does have evidence of infective focus. My overall impression is sepsis - reported objective fever (T 100.4 F, tachycardic with elevated WBCs and UA concerning for infection). Source: Urinary Tract. Organ dysfunction indicated by Acute kidney injury. Received vancomycin/cefepime for antibacterial coverage. See BARBARA.    Hematology  Thrombocytopenia, unspecified  Patient was found to have thrombocytopenia, the likely etiology is secondary to cirrhosis/portal hypertension, will monitor the platelets Daily. Will transfuse if platelet count is <50k (if undergoing surgical procedure or have active bleeding). Hold DVT prophylaxis if platelets are <50k. The patient's platelet results have been reviewed and are listed below.  Recent Labs   Lab 03/15/24  0638   PLT 52*           Endocrine  Type 2 diabetes mellitus with hyperglycemia  Patient's FSGs are controlled on current medication regimen.  Last A1c reviewed-   Lab Results   Component Value Date    HGBA1C 4.9 02/06/2024     Most recent  fingerstick glucose reviewed-   Recent Labs   Lab 03/14/24  1758 03/14/24  2031 03/15/24  0925   POCTGLUCOSE 124* 125* 133*       Current correctional scale  Medium  Maintain anti-hyperglycemic dose as follows-   Antihyperglycemics (From admission, onward)      Start     Stop Route Frequency Ordered    03/13/24 0900  insulin detemir U-100 (Levemir) pen 20 Units         -- SubQ Daily 03/13/24 0030    03/13/24 0715  insulin aspart U-100 pen 12 Units         -- SubQ 3 times daily with meals 03/13/24 0030    03/13/24 0127  insulin aspart U-100 pen 0-10 Units         -- SubQ Before meals & nightly PRN 03/13/24 0030            Holding home Jardiance and tirzepatide until ABX course complete on 3/26.    GI  Hyperbilirubinemia  S/P liver transplant   Long-term use of immunosuppressant medication   Gilbert syndrome     Tbili 8.4 at [facility] but decreased to 4.8 upon arrival to [facility]. Direct bili 0.5. Known history of Gilbert's, however LDH/retic elevated and haptoglobin <10 on this admission. Liver doppler : Satisfactory Doppler evaluation of liver transplant. Persistent dilatation of the common bile duct. No significant intrahepatic ductal dilatation.    - Hepatology consulted, appreciate recs.  Will follow up in clinic.  - Continue Prograf 4 mg po BID  - Hold prednisone  - Hematology: suspect multifactorial from myelosuppression from infection, antibiotics, IST, and liver sequestration.  Follow up in clinic  - CMP on 3/18 in [place]      Final Active Diagnoses:    Diagnosis Date Noted POA    PRINCIPAL PROBLEM:  Acute kidney injury superimposed on CKD [N17.9, N18.9] 03/13/2024 Yes    Sepsis [A41.9] 03/13/2024 Yes    Hyperbilirubinemia [E80.6] 03/13/2024 Yes    Gilbert syndrome [E80.4] 03/13/2024 Yes    Hypomagnesemia [E83.42] 03/13/2024 Yes    Hyponatremia [E87.1] 03/13/2024 Yes    GERD without esophagitis [K21.9] 02/06/2024 Yes    Anxiety [F41.9] 02/06/2024 Yes    Metabolic acidosis [E87.20] 02/06/2024 Yes     Hyperkalemia [E87.5] 07/28/2023 Yes    Long-term use of immunosuppressant medication [Z79.60] 01/09/2023 Not Applicable    Thrombocytopenia, unspecified [D69.6] 01/09/2023 Yes    Chronic systolic heart failure [I50.22] 01/09/2023 Yes    S/P liver transplant [Z94.4] 01/07/2023 Not Applicable    Type 2 diabetes mellitus with hyperglycemia [E11.65] 01/06/2023 Yes    Sarcoidosis [D86.9] 11/17/2022 Yes      Problems Resolved During this Admission:       Discharged Condition: stable    Disposition: Home or Self Care    Follow Up:    Patient Instructions:   No discharge procedures on file.    Significant Diagnostic Studies: Labs: CMP   Recent Labs   Lab 03/13/24  1548 03/14/24  0808 03/15/24  0638   * 134*  134* 138   K 4.5 4.6  4.7 4.7    108  108 112*   CO2 16* 20*  19* 20*   * 140*  139* 111*   BUN 70* 54*  55* 45*   CREATININE 3.5* 2.5*  2.5* 1.8*   CALCIUM 8.3* 8.5*  8.4* 8.9   PROT  --  5.1* 5.3*   ALBUMIN 3.1* 3.1*  3.0* 3.2*   BILITOT  --  2.3* 1.7*   ALKPHOS  --  32* 30*   AST  --  13 16   ALT  --  18 16   ANIONGAP 10 6*  7* 6*   , CBC   Recent Labs   Lab 03/13/24  1548 03/14/24  0808 03/15/24  0638   WBC 4.03  4.03 2.31* 1.70*   HGB 10.7*  10.7* 8.9* 7.9*   HCT 30.0*  30.0* 25.6* 23.7*   PLT 68*  68* 53* 52*   , and A1C:   Recent Labs   Lab 10/03/23  0000 10/03/23  0748 02/06/24  0022   HGBA1C 5.1 5.1 4.9     Microbiology: Blood Culture   Lab Results   Component Value Date    LABBLOO No Growth to date 03/13/2024    LABBLOO No Growth to date 03/13/2024    LABBLOO No Growth to date 03/13/2024    and Urine Culture    Lab Results   Component Value Date    LABURIN No growth 03/13/2024       Pending Diagnostic Studies:       Procedure Component Value Units Date/Time    Factor 8 assay [[id number]] Collected: 03/15/24 0638    Order Status: Sent Lab Status: In process Updated: 03/15/24 0709    Specimen: Blood     Renal function panel [[id number]] Collected: 03/14/24 0808    Order Status:  "Sent Lab Status: No result     Specimen: Blood            Medications:  Reconciled Home Medications:      Medication List        START taking these medications      ciprofloxacin HCl 500 MG tablet  Commonly known as: CIPRO  Take 1 tablet (500 mg total) by mouth 2 (two) times daily. for 11 days            CONTINUE taking these medications      ARIPiprazole 2 MG Tab  Commonly known as: ABILIFY  Take 1 tablet (2 mg total) by mouth once daily.     aspirin 81 MG EC tablet  Commonly known as: ECOTRIN  Take 1 tablet (81 mg total) by mouth once daily.     BD ULTRA-FINE MELVINA PEN NEEDLE 32 gauge x 5/32" Ndle  Generic drug: pen needle, diabetic  1 Dose by Misc.(Non-Drug; Combo Route) route 3 (three) times daily.     calcium carbonate 500 mg calcium (1,250 mg) tablet  Commonly known as: OS-ORTIZ  Take 1 tablet (500 mg total) by mouth once daily.     EScitalopram oxalate 20 MG tablet  Commonly known as: LEXAPRO  Take 1 tablet (20 mg total) by mouth once daily.     fluoride (sodium) 1.1 % Pste  Commonly known as: PREVIDENT 5000  USE TWICE DAILY IN REPLACEMENT OF NON-PRESCRIBED TOOTHPASTE     HYDROcodone-acetaminophen 5-325 mg per tablet  Commonly known as: NORCO  Take 1 tablet by mouth every 8 (eight) hours.     hydroxychloroquine 200 mg tablet  Commonly known as: PLAQUENIL  Take 200 mg by mouth 2 (two) times daily.     insulin lispro 100 unit/mL pen  Commonly known as: HumaLOG KwikPen Insulin  Inject 32 Units into the skin 3 (three) times daily before meals. Plus sliding scale insulin: 150 - 200 = +2 units; 201 - 250 = +4 units; 251 - 300 = +6 units; 301 - 350 = +8 units; >350 = +10 units. Total daily dose: 126 units     iron-vitamin C 100-250 mg (ICAR-C) 100-250 mg Tab  Take 1 tablet by mouth every morning.     JARDIANCE 25 mg tablet  Generic drug: empagliflozin  Take 1 tablet (25 mg total) by mouth once daily.     ONETOUCH DELICA PLUS LANCET 33 gauge Misc  Generic drug: lancets  by Misc.(Non-Drug; Combo Route) route 4 (four) " times daily.     ONETOUCH VERIO TEST STRIPS Strp  Generic drug: blood sugar diagnostic  1 strip by Misc.(Non-Drug; Combo Route) route 4 (four) times daily.     pantoprazole 40 MG tablet  Commonly known as: PROTONIX  Take 1 tablet (40 mg total) by mouth once daily.     sodium bicarbonate 650 MG tablet  Take 2 tablets (1,300 mg total) by mouth 3 (three) times daily.     tacrolimus 1 MG Cap  Commonly known as: PROGRAF  Take 4 capsules (4 mg total) by mouth every 12 (twelve) hours.     tirzepatide 7.5 mg/0.5 mL Pnij  Inject 7.5 mg into the skin every 7 days.     traZODone 150 MG tablet  Commonly known as: DESYREL  Take 1 tablet (150 mg total) by mouth every evening.     TRESIBA FLEXTOUCH U-100 100 unit/mL (3 mL) insulin pen  Generic drug: insulin degludec  Inject 50 Units into the skin once daily.     vitamin D 1000 units Tab  Commonly known as: VITAMIN D3  Take 1,000 Units by mouth once daily.            STOP taking these medications      predniSONE 5 MG tablet  Commonly known as: DELTASONE              Indwelling Lines/Drains at time of discharge:   Lines/Drains/Airways       None                   Time spent on the discharge of patient: 45 minutes         Obed Isidro MD  Department of Hospital Medicine  [facility] - Transplant Stepdown

## 2024-03-15 NOTE — ASSESSMENT & PLAN NOTE
Patient's FSGs are controlled on current medication regimen.  Last A1c reviewed-   Lab Results   Component Value Date    HGBA1C 4.9 02/06/2024     Most recent fingerstick glucose reviewed-   Recent Labs   Lab 03/14/24  1758 03/14/24  2031 03/15/24  0925   POCTGLUCOSE 124* 125* 133*       Current correctional scale  Medium  Maintain anti-hyperglycemic dose as follows-   Antihyperglycemics (From admission, onward)      Start     Stop Route Frequency Ordered    03/13/24 0900  insulin detemir U-100 (Levemir) pen 20 Units         -- SubQ Daily 03/13/24 0030    03/13/24 0715  insulin aspart U-100 pen 12 Units         -- SubQ 3 times daily with meals 03/13/24 0030    03/13/24 0127  insulin aspart U-100 pen 0-10 Units         -- SubQ Before meals & nightly PRN 03/13/24 0030            Holding home Jardiance and tirzepatide until ABX course complete on 3/26.

## 2024-03-15 NOTE — SUBJECTIVE & OBJECTIVE
Interval History: Creatinine back to around baseline. Adequate urine output.     Review of patient's allergies indicates:   Allergen Reactions    Adhesive Other (See Comments)     Skin irritation  Paper tape okay to use    Pcn [penicillins] Rash     Happened as a baby     Current Facility-Administered Medications   Medication Frequency    acetaminophen tablet 650 mg Q8H PRN    ARIPiprazole tablet 2 mg Daily    aspirin EC tablet 81 mg Daily    cefTRIAXone (Rocephin) 1 g in dextrose 5 % in water (D5W) 100 mL IVPB (MB+) Q24H    dextrose 10% bolus 125 mL 125 mL PRN    dextrose 10% bolus 250 mL 250 mL PRN    diphenhydrAMINE capsule 50 mg Q4H PRN    EScitalopram oxalate tablet 20 mg Daily    glucagon (human recombinant) injection 1 mg PRN    glucose chewable tablet 16 g PRN    glucose chewable tablet 24 g PRN    heparin (porcine) injection 5,000 Units Q8H    hydroxychloroquine tablet 200 mg BID    insulin aspart U-100 pen 0-10 Units QID (AC + HS) PRN    insulin aspart U-100 pen 12 Units TIDWM    insulin detemir U-100 (Levemir) pen 20 Units Daily    melatonin tablet 6 mg Nightly PRN    naloxone 0.4 mg/mL injection 0.02 mg PRN    ondansetron injection 4 mg Q6H PRN    pantoprazole EC tablet 40 mg Before breakfast    prochlorperazine injection Soln 2.5 mg Q6H PRN    sodium bicarbonate tablet 1,300 mg TID    sodium chloride 0.9% flush 10 mL Q12H PRN    tacrolimus capsule 4 mg BID    traZODone tablet 100 mg QHS       Objective:     Vital Signs (Most Recent):  Temp: 98.7 °F (37.1 °C) (03/15/24 0751)  Pulse: 88 (03/15/24 0751)  Resp: 18 (03/15/24 0751)  BP: 127/81 (03/15/24 0751)  SpO2: 100 % (03/15/24 0751) Vital Signs (24h Range):  Temp:  [97.6 °F (36.4 °C)-98.7 °F (37.1 °C)] 98.7 °F (37.1 °C)  Pulse:  [85-92] 88  Resp:  [18] 18  SpO2:  [98 %-100 %] 100 %  BP: ()/(53-81) 127/81     Weight: 116 kg (255 lb 11.7 oz) (03/14/24 0500)  Body mass index is 38.88 kg/m².  Body surface area is 2.36 meters squared.    I/O last 3  completed shifts:  In: 4215.9 [P.O.:2220; I.V.:844.9; IV Piggyback:1151]  Out: 6825 [Urine:6825]     Physical Exam  Constitutional:       Appearance: Normal appearance.   HENT:      Head: Normocephalic and atraumatic.      Nose: Nose normal.      Mouth/Throat:      Mouth: Mucous membranes are moist.   Eyes:      General: Scleral icterus present.      Pupils: Pupils are equal, round, and reactive to light.   Cardiovascular:      Rate and Rhythm: Normal rate and regular rhythm.   Pulmonary:      Effort: Pulmonary effort is normal.      Breath sounds: Normal breath sounds.   Abdominal:      General: Abdomen is flat. Bowel sounds are normal.      Tenderness: There is no abdominal tenderness.   Musculoskeletal:         General: Normal range of motion.      Cervical back: Normal range of motion.   Skin:     General: Skin is warm and dry.   Neurological:      General: No focal deficit present.      Mental Status: He is alert and oriented to person, place, and time.   Psychiatric:         Mood and Affect: Mood normal.         Behavior: Behavior normal.          Significant Labs:  All labs within the past 24 hours have been reviewed.     Significant Imaging:  NOne

## 2024-03-15 NOTE — PROGRESS NOTES
[facility]  Nephrology  Progress Note    Patient Name: Rosalio Mccarty  MRN: [id number]  Admission Date: 3/12/2024  Hospital Length of Stay: 3 days  Attending Provider: Jasvir Joyner MD   Primary Care Physician: Osorio Loredo Jr., MD  Principal Problem:Acute kidney injury superimposed on CKD    Subjective:     HPI: Mr. 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 Was transferred to [facility] [place] for higher level of care due to his liver transplant. At [facility] was found to have increasing creatinine, a dirty UA ( 3+ bacteria, 2+ protein, 2+ blood, 3+ LE, nitrite+), mild leukocytosis to 13. Cultures were collected and he was started on vancomycin and cefepime. His immunosuppression included prednisone 5 and tacrolimus 4/4 as well as Cellcept (held due to leukopenia).  Was noted to have received 2L LR at OSH with worsening Cr 2.2->3.5.     Nephrology consulted for BARBARA on CKD along with metabolic acidosis.     Interval History: Creatinine back to around baseline. Adequate urine output.     Review of patient's allergies indicates:   Allergen Reactions    Adhesive Other (See Comments)     Skin irritation  Paper tape okay to use    Pcn [penicillins] Rash     Happened as a baby     Current Facility-Administered Medications   Medication Frequency    acetaminophen tablet 650 mg Q8H PRN    ARIPiprazole tablet 2 mg Daily    aspirin EC tablet 81 mg Daily    cefTRIAXone (Rocephin) 1 g in dextrose 5 % in water (D5W) 100 mL IVPB (MB+) Q24H    dextrose 10% bolus 125 mL 125 mL PRN    dextrose 10% bolus 250 mL 250 mL PRN    diphenhydrAMINE capsule 50 mg Q4H PRN    EScitalopram oxalate tablet 20 mg Daily    glucagon (human recombinant) injection 1 mg PRN    glucose  chewable tablet 16 g PRN    glucose chewable tablet 24 g PRN    heparin (porcine) injection 5,000 Units Q8H    hydroxychloroquine tablet 200 mg BID    insulin aspart U-100 pen 0-10 Units QID (AC + HS) PRN    insulin aspart U-100 pen 12 Units TIDWM    insulin detemir U-100 (Levemir) pen 20 Units Daily    melatonin tablet 6 mg Nightly PRN    naloxone 0.4 mg/mL injection 0.02 mg PRN    ondansetron injection 4 mg Q6H PRN    pantoprazole EC tablet 40 mg Before breakfast    prochlorperazine injection Soln 2.5 mg Q6H PRN    sodium bicarbonate tablet 1,300 mg TID    sodium chloride 0.9% flush 10 mL Q12H PRN    tacrolimus capsule 4 mg BID    traZODone tablet 100 mg QHS       Objective:     Vital Signs (Most Recent):  Temp: 98.7 °F (37.1 °C) (03/15/24 0751)  Pulse: 88 (03/15/24 0751)  Resp: 18 (03/15/24 0751)  BP: 127/81 (03/15/24 0751)  SpO2: 100 % (03/15/24 0751) Vital Signs (24h Range):  Temp:  [97.6 °F (36.4 °C)-98.7 °F (37.1 °C)] 98.7 °F (37.1 °C)  Pulse:  [85-92] 88  Resp:  [18] 18  SpO2:  [98 %-100 %] 100 %  BP: ()/(53-81) 127/81     Weight: 116 kg (255 lb 11.7 oz) (03/14/24 0500)  Body mass index is 38.88 kg/m².  Body surface area is 2.36 meters squared.    I/O last 3 completed shifts:  In: 4215.9 [P.O.:2220; I.V.:844.9; IV Piggyback:1151]  Out: 6825 [Urine:6825]     Physical Exam  Constitutional:       Appearance: Normal appearance.   HENT:      Head: Normocephalic and atraumatic.      Nose: Nose normal.      Mouth/Throat:      Mouth: Mucous membranes are moist.   Eyes:      General: Scleral icterus present.      Pupils: Pupils are equal, round, and reactive to light.   Cardiovascular:      Rate and Rhythm: Normal rate and regular rhythm.   Pulmonary:      Effort: Pulmonary effort is normal.      Breath sounds: Normal breath sounds.   Abdominal:      General: Abdomen is flat. Bowel sounds are normal.      Tenderness: There is no abdominal tenderness.   Musculoskeletal:         General: Normal range of motion.       Cervical back: Normal range of motion.   Skin:     General: Skin is warm and dry.   Neurological:      General: No focal deficit present.      Mental Status: He is alert and oriented to person, place, and time.   Psychiatric:         Mood and Affect: Mood normal.         Behavior: Behavior normal.          Significant Labs:  All labs within the past 24 hours have been reviewed.     Significant Imaging:  NOne  Assessment/Plan:     Renal/  * Acute kidney injury superimposed on CKD  48 year old male with history as above who presented to OSH for possible urosepsis and transferred to [facility] for transplant care. RP ultrasound without hydronephrosis or signs of obstruction. His urine studies are consistent with pre-renal injury however his creatinine continues to rise despite adequate fluid resuscitation. Possibly mixed etiology of CNI and ATN secondary to sepsis. We will analyze urine further under microscopy.      Recommendations:  Creatinine at baseline s/p fluids and antibiotics  Urine with muddy granular casts consistent with ATN  Continue tacrolimus per hepatology  Strict I&Os and daily weights   Avoid nephrotoxic agents such as NSAIDs, gadolinium and IV radiocontrast.  Renally dose meds to current GFR.  Maintain MAP > 65.    Metabolic acidosis  - Discontinue NS infusion as may worsen metabolic acidosis       ID  Sepsis  Immunocompromised due to renal transplant. Source likely urinary.   - Plan per primary         Thank you for your consult. I will sign off. Please contact us if you have any additional questions.    Raegan Gardner, DO  Nephrology  [facility]    ATTENDING PHYSICIAN ATTESTATION  I have personally verified the history and examined the patient. I thoroughly reviewed the demographic, clinical, laboratorial and imaging information available in medical records. I agree with the assessment and recommendations provided by the subspecialty resident who was under my supervision.

## 2024-03-15 NOTE — ASSESSMENT & PLAN NOTE
This patient has hyperkalemia which is controlled. We will monitor for arrhythmias with EKG or continuous telemetry. We will treat the hyperkalemia with Potassium Binders if necessary. The likely etiology of the hyperkalemia is BARBARA. The patients latest potassium has been reviewed and the results are listed below. Nephrology consulted. RFP Q8 hours.   Recent Labs   Lab 03/15/24  0638   K 4.7

## 2024-03-15 NOTE — NURSING
Patient discharged home to care of self and family. Education provided on follow up care and medication management.  Patient verbalizes understanding and acknowledges all discharge instructions and follow up care. Pt to follow up with hepatology outpatient and instructed to return to nearest emergency dept for re-occurring symptoms. Pt transported home via family with all personal belongings.

## 2024-03-16 LAB
ALLENS TEST: ABNORMAL
DELSYS: ABNORMAL
HCT VFR BLD CALC: 24 %PCV (ref 36–54)
MODE: ABNORMAL
POC IONIZED CALCIUM: 1.29 MMOL/L (ref 1.06–1.42)
POTASSIUM BLD-SCNC: 4.2 MMOL/L (ref 3.5–5.1)
SAMPLE: ABNORMAL
SITE: ABNORMAL
SODIUM BLD-SCNC: 135 MMOL/L (ref 136–145)
SP02: 95

## 2024-03-18 LAB
BACTERIA BLD CULT: NORMAL
BACTERIA BLD CULT: NORMAL

## 2024-03-19 ENCOUNTER — OFFICE VISIT (OUTPATIENT)
Dept: HEMATOLOGY/ONCOLOGY | Facility: CLINIC | Age: 49
End: 2024-03-19
Payer: COMMERCIAL

## 2024-03-19 DIAGNOSIS — B33.8 RSV (RESPIRATORY SYNCYTIAL VIRUS INFECTION): ICD-10-CM

## 2024-03-19 DIAGNOSIS — D61.818 PANCYTOPENIA: Primary | ICD-10-CM

## 2024-03-19 PROCEDURE — 99215 OFFICE O/P EST HI 40 MIN: CPT | Mod: 95,,, | Performed by: INTERNAL MEDICINE

## 2024-03-19 NOTE — PROGRESS NOTES
The patient location is: home  The chief complaint leading to consultation is: pancytopenia, hemolytic anemia    Visit type: audiovisual    Face to Face time with patient: 15  40 minutes of total time spent on the encounter, which includes face to face time and non-face to face time preparing to see the patient (eg, review of tests), Obtaining and/or reviewing separately obtained history, Documenting clinical information in the electronic or other health record, Independently interpreting results (not separately reported) and communicating results to the patient/family/caregiver, or Care coordination (not separately reported).         Each patient to whom he or she provides medical services by telemedicine is:  (1) informed of the relationship between the physician and patient and the respective role of any other health care provider with respect to management of the patient; and (2) notified that he or she may decline to receive medical services by telemedicine and may withdraw from such care at any time.    Notes:      Subjective:       Patient ID: Rosalio Mccarty is a 48 y.o. male.    Chief Complaint: Abnormal Lab    HPI    New virtual visit for pancytopenia.      Chronic leukopenia and thrombocytopenia in available lab review since at least 2020.  Normocytic anemia began in November 2022. Suspect much of underlying pancytopenia is due to sarcoidosis/ immune effect.     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     CBC returned to baseline after transplant. Admission February 2024 for RSV with suppressive marrow response and possibly hemolytic anemia. Cellcept discontinued for leukopenia. ANC as low at 0.8-1.5, not atypical for viral infection in setting of marrow suppression.    CBC recovered to baseline between February 2024 and admission March 2024 for urosepsis. At diagnosis of urosepsis and start of ciprofloxacin ANC 1.2, mild anemia.    Completes course of antibiotics this coming Sunday/Monday.    Review of Systems   Constitutional:  Negative for appetite change and unexpected weight change.   HENT:  Negative for mouth sores.    Eyes:  Negative for visual disturbance.   Respiratory:  Negative for cough and shortness of breath.    Cardiovascular:  Negative for chest pain.   Gastrointestinal:  Negative for abdominal pain and diarrhea.   Genitourinary:  Negative for frequency.   Musculoskeletal:  Negative for back pain.   Integumentary:  Negative for rash.   Neurological:  Negative for headaches.   Hematological:  Negative for adenopathy.   Psychiatric/Behavioral:  The patient is not nervous/anxious.          Objective:      Physical Exam No exam for telehealth visit    Current Outpatient Medications   Medication Sig Dispense Refill    ARIPiprazole (ABILIFY) 2 MG Tab Take 1 tablet (2 mg total) by mouth once daily. 30 tablet 11    aspirin (ECOTRIN) 81 MG EC tablet Take 1 tablet (81 mg total) by mouth once daily. 30 tablet 11    blood sugar diagnostic Strp 1 strip by Misc.(Non-Drug; Combo Route) route 4 (four) times daily. 100 strip 11    calcium carbonate (OS-ORTIZ) 500 mg calcium (1,250 mg) tablet Take 1 tablet (500 mg total) by mouth once daily. 30 tablet 11    ciprofloxacin HCl (CIPRO) 500 MG tablet Take 1 tablet (500 mg total) by mouth 2 (two) times daily. for 11 days 22 tablet 0    empagliflozin (JARDIANCE) 25 mg tablet Take 1 tablet (25 mg total) by mouth once daily. 30 tablet 11    EScitalopram oxalate (LEXAPRO) 20 MG tablet Take 1 tablet (20 mg total) by mouth once daily. 30 tablet 11    fluoride, sodium, (PREVIDENT 5000) 1.1 % Pste USE TWICE DAILY IN REPLACEMENT OF NON-PRESCRIBED TOOTHPASTE      HYDROcodone-acetaminophen (NORCO) 5-325 mg per tablet Take 1 tablet by mouth every 8 (eight) hours.      hydrOXYchloroQUINE (PLAQUENIL) 200 mg tablet Take 200 mg by mouth 2 (two) times daily.      insulin degludec (TRESIBA FLEXTOUCH U-100) 100 unit/mL  "(3 mL) insulin pen Inject 50 Units into the skin once daily. 5 pen 11    insulin lispro (HUMALOG KWIKPEN INSULIN) 100 unit/mL pen Inject 32 Units into the skin 3 (three) times daily before meals. Plus sliding scale insulin: 150 - 200 = +2 units; 201 - 250 = +4 units; 251 - 300 = +6 units; 301 - 350 = +8 units; >350 = +10 units. Total daily dose: 126 units 24 mL 11    iron-vitamin C 100-250 mg, ICAR-C, 100-250 mg Tab Take 1 tablet by mouth every morning.      lancets 33 gauge Misc by Misc.(Non-Drug; Combo Route) route 4 (four) times daily. 100 each 11    pantoprazole (PROTONIX) 40 MG tablet Take 1 tablet (40 mg total) by mouth once daily. 30 tablet 1    pen needle, diabetic 32 gauge x 5/32" Ndle 1 Dose by Misc.(Non-Drug; Combo Route) route 3 (three) times daily. 100 each 11    sodium bicarbonate 650 MG tablet Take 2 tablets (1,300 mg total) by mouth 3 (three) times daily. 180 tablet 11    tacrolimus (PROGRAF) 1 MG Cap Take 4 capsules (4 mg total) by mouth every 12 (twelve) hours. 240 capsule 11    tirzepatide 7.5 mg/0.5 mL PnIj Inject 7.5 mg into the skin every 7 days. 4 pen 3    traZODone (DESYREL) 150 MG tablet Take 1 tablet (150 mg total) by mouth every evening. 30 tablet 11    vitamin D (VITAMIN D3) 1000 units Tab Take 1,000 Units by mouth once daily.       No current facility-administered medications for this visit.      Lab Results   Component Value Date    WBC 1.70 (LL) 03/15/2024    HGB 7.9 (L) 03/15/2024    HCT 23.7 (L) 03/15/2024    MCV 91 03/15/2024    PLT 52 (L) 03/15/2024        CMP  Sodium   Date Value Ref Range Status   03/15/2024 138 136 - 145 mmol/L Final     Potassium   Date Value Ref Range Status   03/15/2024 4.7 3.5 - 5.1 mmol/L Final     Chloride   Date Value Ref Range Status   03/15/2024 112 (H) 95 - 110 mmol/L Final     CO2   Date Value Ref Range Status   03/15/2024 20 (L) 23 - 29 mmol/L Final     Glucose   Date Value Ref Range Status   03/15/2024 111 (H) 70 - 110 mg/dL Final     BUN   Date " Value Ref Range Status   03/15/2024 45 (H) 6 - 20 mg/dL Final     Creatinine   Date Value Ref Range Status   03/15/2024 1.8 (H) 0.5 - 1.4 mg/dL Final     Calcium   Date Value Ref Range Status   03/15/2024 8.9 8.7 - 10.5 mg/dL Final     Total Protein   Date Value Ref Range Status   03/15/2024 5.3 (L) 6.0 - 8.4 g/dL Final     Albumin   Date Value Ref Range Status   03/15/2024 3.2 (L) 3.5 - 5.2 g/dL Final     Total Bilirubin   Date Value Ref Range Status   03/15/2024 1.7 (H) 0.1 - 1.0 mg/dL Final     Comment:     For infants and newborns, interpretation of results should be based  on gestational age, weight and in agreement with clinical  observations.    Premature Infant recommended reference ranges:  Up to 24 hours.............<8.0 mg/dL  Up to 48 hours............<12.0 mg/dL  3-5 days..................<15.0 mg/dL  6-29 days.................<15.0 mg/dL       Alkaline Phosphatase   Date Value Ref Range Status   03/15/2024 30 (L) 55 - 135 U/L Final     AST   Date Value Ref Range Status   03/15/2024 16 10 - 40 U/L Final     ALT   Date Value Ref Range Status   03/15/2024 16 10 - 44 U/L Final     Anion Gap   Date Value Ref Range Status   03/15/2024 6 (L) 8 - 16 mmol/L Final     eGFR    Date Value Ref Range Status   11/01/2022 75 (L) >90 mL/min/1.73m2 Final     eGFR   Date Value Ref Range Status   11/01/2022 65 (L) >90 mL/min/1.73m2 Final          Assessment:       Problem List Items Addressed This Visit    None  Visit Diagnoses       RSV (respiratory syncytial virus infection)                Plan:       Reactive marrow, marrow suppression, hemolytic anemia, and 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    CBC at [facility] April 15th   Virtual visit after to review  results and next steps          BMT Chart Routing  Urgent    Follow up with physician . April 23rd virtual   Follow up with MARIAMA    Provider visit type    Infusion scheduling note    Injection scheduling note    Labs CBC   Scheduling:  Preferred lab:  Lab interval:  CBC at [facility] week of April 15th   Imaging    Pharmacy appointment    Other referrals

## 2024-03-25 LAB — EXT TACROLIMUS LVL: 5.1

## 2024-03-26 ENCOUNTER — PATIENT MESSAGE (OUTPATIENT)
Dept: TRANSPLANT | Facility: CLINIC | Age: 49
End: 2024-03-26
Payer: COMMERCIAL

## 2024-03-26 ENCOUNTER — TELEPHONE (OUTPATIENT)
Dept: TRANSPLANT | Facility: CLINIC | Age: 49
End: 2024-03-26
Payer: COMMERCIAL

## 2024-03-26 NOTE — TELEPHONE ENCOUNTER
Portal message sent, repeat labs 4/29/24----- Message from Yaa Jackson MD sent at 3/26/2024  8:53 AM CDT -----  Sorry about that. Got confused. I just took a look at the CMP from 3/18/24. Labs are within acceptable ranges. No changes recommended at this time. Thank you!  ----- Message -----  From: Ayala Pedraza RN  Sent: 3/26/2024   8:10 AM CDT  To: Yaa Jackson MD    The tacrolimus came in on a later date.  Cmp is resulted from 3/18/24 -  ----- Message -----  From: Yaa Jackson MD  Sent: 3/26/2024   7:43 AM CDT  To: [facility] Post-Liver Transplant Clinical    Tac trough at goal. LFTs pending. May chance Tac dosing based on LFT results. Thank you.

## 2024-03-26 NOTE — LETTER
"   LAB ORDERS    Labs due week of 24      ORDERING MD:   Yaa Jackson MD - NPI #[id number]         Patient Name: Rosalio Mccarty               : 1975    [facility] Number: [id number]                  #:        Please draw the following labs:     Test Frequency  Diagnosis/ICD-10 Code     CBC/DIFF/PLT every month  Z94.4 Liver Transplant       Complete Metabolic Panel every month  Z94.4 Liver Transplant         Prograf level every month  Z94.4 Liver Transplant     Vitamin D 25 every 3 months (due ) Z94.4 Liver Transplant          FAX RESULTS [phone] "Attention Liver Transplant Coordinator", and send the hard copy when completed. If you have any questions regarding this request or need additional information, please call [phone].    aJeing brandon  Fax [phone]    "

## 2024-04-03 ENCOUNTER — LAB VISIT (OUTPATIENT)
Dept: LAB | Facility: HOSPITAL | Age: 49
End: 2024-04-03
Attending: INTERNAL MEDICINE
Payer: COMMERCIAL

## 2024-04-03 ENCOUNTER — OFFICE VISIT (OUTPATIENT)
Dept: TRANSPLANT | Facility: CLINIC | Age: 49
End: 2024-04-03
Payer: COMMERCIAL

## 2024-04-03 VITALS
OXYGEN SATURATION: 100 % | SYSTOLIC BLOOD PRESSURE: 146 MMHG | HEART RATE: 76 BPM | TEMPERATURE: 97 F | RESPIRATION RATE: 17 BRPM | WEIGHT: 248.69 LBS | HEIGHT: 68 IN | BODY MASS INDEX: 37.69 KG/M2 | DIASTOLIC BLOOD PRESSURE: 88 MMHG

## 2024-04-03 DIAGNOSIS — Z87.440 HISTORY OF UTI: ICD-10-CM

## 2024-04-03 DIAGNOSIS — Z79.60 LONG-TERM USE OF IMMUNOSUPPRESSANT MEDICATION: ICD-10-CM

## 2024-04-03 DIAGNOSIS — Z94.4 S/P LIVER TRANSPLANT: ICD-10-CM

## 2024-04-03 DIAGNOSIS — Z91.89 AT RISK FOR OPPORTUNISTIC INFECTIONS: ICD-10-CM

## 2024-04-03 DIAGNOSIS — E80.6 HYPERBILIRUBINEMIA: ICD-10-CM

## 2024-04-03 LAB
ALBUMIN SERPL BCP-MCNC: 4.4 G/DL (ref 3.5–5.2)
ALP SERPL-CCNC: 56 U/L (ref 55–135)
ALT SERPL W/O P-5'-P-CCNC: 18 U/L (ref 10–44)
ANION GAP SERPL CALC-SCNC: 9 MMOL/L (ref 8–16)
AST SERPL-CCNC: 21 U/L (ref 10–40)
BASOPHILS # BLD AUTO: 0.02 K/UL (ref 0–0.2)
BASOPHILS NFR BLD: 0.7 % (ref 0–1.9)
BILIRUB DIRECT SERPL-MCNC: 0.6 MG/DL (ref 0.1–0.3)
BILIRUB SERPL-MCNC: 4.1 MG/DL (ref 0.1–1)
BUN SERPL-MCNC: 30 MG/DL (ref 6–20)
CALCIUM SERPL-MCNC: 9.9 MG/DL (ref 8.7–10.5)
CHLORIDE SERPL-SCNC: 104 MMOL/L (ref 95–110)
CO2 SERPL-SCNC: 23 MMOL/L (ref 23–29)
CREAT SERPL-MCNC: 1.7 MG/DL (ref 0.5–1.4)
DIFFERENTIAL METHOD BLD: ABNORMAL
EOSINOPHIL # BLD AUTO: 0.1 K/UL (ref 0–0.5)
EOSINOPHIL NFR BLD: 2.3 % (ref 0–8)
ERYTHROCYTE [DISTWIDTH] IN BLOOD BY AUTOMATED COUNT: 18.4 % (ref 11.5–14.5)
EST. GFR  (NO RACE VARIABLE): 48.8 ML/MIN/1.73 M^2
GLUCOSE SERPL-MCNC: 187 MG/DL (ref 70–110)
HCT VFR BLD AUTO: 34.6 % (ref 40–54)
HGB BLD-MCNC: 11.6 G/DL (ref 14–18)
IMM GRANULOCYTES # BLD AUTO: 0.01 K/UL (ref 0–0.04)
IMM GRANULOCYTES NFR BLD AUTO: 0.3 % (ref 0–0.5)
INR PPP: 1.1 (ref 0.8–1.2)
LYMPHOCYTES # BLD AUTO: 1.1 K/UL (ref 1–4.8)
LYMPHOCYTES NFR BLD: 36.3 % (ref 18–48)
MCH RBC QN AUTO: 29.8 PG (ref 27–31)
MCHC RBC AUTO-ENTMCNC: 33.5 G/DL (ref 32–36)
MCV RBC AUTO: 89 FL (ref 82–98)
MONOCYTES # BLD AUTO: 0.2 K/UL (ref 0.3–1)
MONOCYTES NFR BLD: 8 % (ref 4–15)
NEUTROPHILS # BLD AUTO: 1.6 K/UL (ref 1.8–7.7)
NEUTROPHILS NFR BLD: 52.4 % (ref 38–73)
NRBC BLD-RTO: 0 /100 WBC
PLATELET # BLD AUTO: 66 K/UL (ref 150–450)
PMV BLD AUTO: 9.8 FL (ref 9.2–12.9)
POTASSIUM SERPL-SCNC: 5.1 MMOL/L (ref 3.5–5.1)
PROT SERPL-MCNC: 7 G/DL (ref 6–8.4)
PROTHROMBIN TIME: 11.5 SEC (ref 9–12.5)
RBC # BLD AUTO: 3.89 M/UL (ref 4.6–6.2)
SODIUM SERPL-SCNC: 136 MMOL/L (ref 136–145)
WBC # BLD AUTO: 3 K/UL (ref 3.9–12.7)

## 2024-04-03 PROCEDURE — 80048 BASIC METABOLIC PNL TOTAL CA: CPT | Performed by: INTERNAL MEDICINE

## 2024-04-03 PROCEDURE — 85610 PROTHROMBIN TIME: CPT | Performed by: INTERNAL MEDICINE

## 2024-04-03 PROCEDURE — 85025 COMPLETE CBC W/AUTO DIFF WBC: CPT | Performed by: INTERNAL MEDICINE

## 2024-04-03 PROCEDURE — 99213 OFFICE O/P EST LOW 20 MIN: CPT | Mod: S$GLB,,, | Performed by: INTERNAL MEDICINE

## 2024-04-03 PROCEDURE — 99999 PR PBB SHADOW E&M-EST. PATIENT-LVL V: CPT | Mod: PBBFAC,,, | Performed by: INTERNAL MEDICINE

## 2024-04-03 PROCEDURE — 81350 UGT1A1 GENE COMMON VARIANTS: CPT | Performed by: INTERNAL MEDICINE

## 2024-04-03 PROCEDURE — 80076 HEPATIC FUNCTION PANEL: CPT | Performed by: INTERNAL MEDICINE

## 2024-04-03 NOTE — Clinical Note
Doing ok.  I ordered labs for him to have done prior to driving back to [place]. No changes on immunosuppression at this time.  RTC in 3 months. Thank you.

## 2024-04-03 NOTE — LETTER
April 3, 2024        Alan Chinchilla  [address] [place] [id number]  Phone: [phone]  Fax: [phone]             [facility]st [facility]  [address]  [facility] [address]  Phone: [phone]   Patient: Rosalio Mccarty   MR Number: [id number]   YOB: 1975   Date of Visit: 4/3/2024       Dear Dr. Alan Chinchilla    Thank you for referring Rosalio Mccarty to me for evaluation. Attached you will find relevant portions of my assessment and plan of care.    If you have questions, please do not hesitate to call me. I look forward to following Rosalio Mccarty along with you.    Sincerely,    Yaa Jackson MD    Enclosure    If you would like to receive this communication electronically, please contact [email or web address] or [phone] to request [employer] access.    [employer] is a tool which provides read-only access to select patient information with whom you have a relationship. Its easy to use and provides real time access to review your patients record including encounter summaries, notes, results, and demographic information.    If you feel you have received this communication in error or would no longer like to receive these types of communications, please e-mail [email or web address]

## 2024-04-03 NOTE — PROGRESS NOTES
Transplant Hepatology  Liver Transplant Recipient Follow-up    Transplant Date: 1/5/2023  UNOS Native Liver Dx: Cirrhosis: Other, Specify - granulomatous hepatitis    ORGAN: LIVER  Whole or Partial: whole liver  Donor Type: donation after brain death  Formerly named [facility] High Risk: yes  Donor CMV Status: Positive  Donor HCV Status: Positive  Donor HBcAb: Negative  Donor HBV ASAF:   Donor HCV ASAF: Negative  Biliary Anastomosis: end to end  Arterial Anatomy: replaced right hepatic from sma  IVC reconstruction: end to end ivc  Portal vein status: patent    He has had the following complications since transplant: moderate T-cell mediated rejection x2 (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 Presumed T-cell mediated rejection in Dec 2023 treated with oral prednisone in the outpatient setting.    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     He was admitted to [facility] from 3/12/24 - 3/15/24 for management of UTI, sepsis, BARBARA. Discharged to complete a 14-day course of Cipro. BARBARA resolved at time of discharge. Hematology was consulted for pancytopenia -> suspect multifactorial from myelosuppression from infection, antibiotics, IST, and liver sequestration.     Today he feels ok. He feels as though he has not returned to his baseline after discharge a couple of weeks ago. Feels tired / sleepy. He completed 14-d Cipro, but feels thinks he is still having very mild burning towards the end of urination, so he wonders if the urinary infection is completely treated. Continues taking Tac 4/4. Remains off Cellcept.    Denies fevers, chills, tremors, new headaches.    Objective:     Vitals:    04/03/24 1046   BP: (!) 146/88   Pulse: 76   Resp: 17   Temp: 97.3 °F (36.3 °C)   Body mass index is 37.82 kg/m².    Physical Exam  Constitutional:        General: He is not in acute distress.     Appearance: He is not toxic-appearing.   Eyes:      General: No scleral icterus.  Cardiovascular:      Rate and Rhythm: Normal rate.   Pulmonary:      Effort: Pulmonary effort is normal.   Abdominal:      General: Abdomen is protuberant. There is no distension.   Skin:     Coloration: Skin is not jaundiced.   Neurological:      General: No focal deficit present.      Mental Status: He is alert.   Psychiatric:         Thought Content: Thought content normal.         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 Post-operative course has been complicated by two episodes of moderate T-cell mediated rejection requiring admission for pulse dose steroids.    1. S/P liver transplant    2. Long-term use of immunosuppressant medication    3. Hyperbilirubinemia    4. At risk for opportunistic infections    5. History of UTI        1. Liver Transplant.   Excellent allograft function  Mild hyperbilirubinemia likely secondary to Gilbert syndrome. F/u UGT1A1 genotype    2. Immunosuppression. History of T-cell mediated rejections correlate with prior attempts at decreasing IS. Continue Tac 4/4. Continue to hold Cellcept for now given persistent leukopenia. Hold low dose prednisone given infectious complications.    3. Blood Pressure. Continue monitoring. Recommend discussing initiation of anti-hypertensive  therapy with PCP.    4. Renal. Cr 1.6 3/18/24. Encouraged hydration. Avoid NSAIDs. Plan minimize CNI as able.     5. Aspirin. The patient will continue to take aspirin as prophylaxis against post-operative hepatic artery thrombosis and as a cardioprotective agent post-transplantation.    6. Explant Pathology. No evidence of neoplasia.    7. History of UTI: Completed 14-day course of Cipro for UTI in March 2024. Reports mild burning / discomfort with at end of urination. Also notes prior UTI in his teens. Recommend discussing symptoms with PCP. May benefit from Urology evaluation.    RTC in 3 months.    UNOS Patient Status  Functional Status: 80% - Normal activity with effort: some symptoms of disease  Physical Capacity: No Limitations  Working for income: yes  New diabetes onset between last follow-up to the current follow-up: No  Did patient have any acute rejection episodes during the follow-up period: No  Post transplant malignancy: No    Yaa Jackson MD  Staff Physician  Hepatology and Liver Transplant  [facility]  [facility] [place]

## 2024-04-04 LAB
CMV DNA SPEC QL NAA+PROBE: NORMAL
CYTOMEGALOVIRUS PCR, QUANT: NOT DETECTED IU/ML

## 2024-04-05 LAB
ANNOTATION COMMENT IMP: NORMAL
GENETICIST REVIEW: NORMAL
PROVIDER SIGNING NAME: NORMAL
TEST PERFORMANCE INFO SPEC: NORMAL
UGT1A1 ALLELE GENO BLD/T: NORMAL
UGT1A1 GENE PROD MET ACT IMP BLD/T-IMP: NORMAL
UGT1A1 METHOD: NORMAL

## 2024-04-09 ENCOUNTER — TELEPHONE (OUTPATIENT)
Dept: TRANSPLANT | Facility: CLINIC | Age: 49
End: 2024-04-09
Payer: COMMERCIAL

## 2024-04-09 ENCOUNTER — PATIENT MESSAGE (OUTPATIENT)
Dept: TRANSPLANT | Facility: CLINIC | Age: 49
End: 2024-04-09
Payer: COMMERCIAL

## 2024-04-09 NOTE — TELEPHONE ENCOUNTER
Portal message sent to patient, next labs 4/29/24----- Message from Yaa Jackson MD sent at 4/8/2024  8:34 PM CDT -----  Testing for Gilbert syndrome is negative. No further recommendations at this time.

## 2024-05-03 ENCOUNTER — PATIENT MESSAGE (OUTPATIENT)
Dept: TRANSPLANT | Facility: CLINIC | Age: 49
End: 2024-05-03
Payer: COMMERCIAL

## 2024-05-03 ENCOUNTER — TELEPHONE (OUTPATIENT)
Dept: TRANSPLANT | Facility: CLINIC | Age: 49
End: 2024-05-03
Payer: COMMERCIAL

## 2024-05-03 LAB
EXT ALBUMIN: 4.7
EXT ALKALINE PHOSPHATASE: 60
EXT ALT: 18
EXT AST: 22
EXT BASOPHIL%: 0.5
EXT BILIRUBIN DIRECT: 0.5 MG/DL
EXT BILIRUBIN TOTAL: 6.7
EXT BUN: 50
EXT CALCIUM: 9.6
EXT CHLORIDE: 103
EXT CO2: 21
EXT CREATININE: 2.7 MG/DL
EXT EOSINOPHIL%: 3.8
EXT GLUCOSE: 235
EXT HEMATOCRIT: 33.1
EXT HEMOGLOBIN: 11.7
EXT LYMPH%: 37.4
EXT MONOCYTES%: 7.6
EXT PLATELETS: 82
EXT POTASSIUM: 5
EXT PROTEIN TOTAL: 7.2
EXT SEGS%: 50.2
EXT SODIUM: 134 MMOL/L
EXT TACROLIMUS LVL: 11.6
EXT WBC: 4

## 2024-05-03 NOTE — TELEPHONE ENCOUNTER
Portal message sent, repeat labs 5/6/24----- Message from Yaa Jackson MD sent at 5/3/2024 10:57 AM CDT -----  Labs reviewed.     Elevated Cr from baseline. Encourage hydration and recheck labs early next week.    Liver enzymes within normal limits. Indirect hyperbilirubinemia noted.    Tac pending.

## 2024-05-07 ENCOUNTER — OFFICE VISIT (OUTPATIENT)
Dept: HEMATOLOGY/ONCOLOGY | Facility: CLINIC | Age: 49
End: 2024-05-07
Payer: COMMERCIAL

## 2024-05-07 DIAGNOSIS — D61.818 PANCYTOPENIA: Primary | ICD-10-CM

## 2024-05-07 PROCEDURE — 99214 OFFICE O/P EST MOD 30 MIN: CPT | Mod: 95,,, | Performed by: INTERNAL MEDICINE

## 2024-05-07 NOTE — PROGRESS NOTES
The patient location is: home  The chief complaint leading to consultation is: pancytopenia, hemolytic anemia    Visit type: audiovisual    Face to Face time with patient: 15  30 minutes of total time spent on the encounter, which includes face to face time and non-face to face time preparing to see the patient (eg, review of tests), Obtaining and/or reviewing separately obtained history, Documenting clinical information in the electronic or other health record, Independently interpreting results (not separately reported) and communicating results to the patient/family/caregiver, or Care coordination (not separately reported).         Each patient to whom he or she provides medical services by telemedicine is:  (1) informed of the relationship between the physician and patient and the respective role of any other health care provider with respect to management of the patient; and (2) notified that he or she may decline to receive medical services by telemedicine and may withdraw from such care at any time.    Notes:      Subjective:       Patient ID: Rosalio Mccarty is a 49 y.o. male.    Chief Complaint: No chief complaint on file.    HPI    New virtual visit for pancytopenia.      Chronic leukopenia and thrombocytopenia in available lab review since at least 2020.  Normocytic anemia began in November 2022. Suspect much of underlying pancytopenia is due to sarcoidosis/ immune effect.     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     CBC returned to baseline after transplant. Admission February 2024 for RSV with suppressive marrow response and possibly hemolytic anemia. Cellcept discontinued for leukopenia. ANC as low at 0.8-1.5, not atypical for viral infection in setting of marrow suppression.    CBC recovered to baseline between February 2024 and admission March 2024 for urosepsis. At diagnosis of urosepsis and start of ciprofloxacin ANC  1.2, mild anemia.   Completes course of antibiotics this coming Sunday/Monday.    Virtual Follow-up 5/7/2024  CBC improvement since our virtual consult. WBC improved from 1.7 to 4.   Hgb improved from 7.9 to 11.1  Platelet count improved from 50 to 80.   Reports past week of GI illness, possibly viral, WBC did slightly dip possible secondary to this GI illness. ANC normal.    Review of Systems   Constitutional:  Negative for appetite change and unexpected weight change.   HENT:  Negative for mouth sores.    Eyes:  Negative for visual disturbance.   Respiratory:  Negative for cough and shortness of breath.    Cardiovascular:  Negative for chest pain.   Gastrointestinal:  Negative for abdominal pain and diarrhea.   Genitourinary:  Negative for frequency.   Musculoskeletal:  Negative for back pain.   Integumentary:  Negative for rash.   Neurological:  Negative for headaches.   Hematological:  Negative for adenopathy.   Psychiatric/Behavioral:  The patient is not nervous/anxious.          Objective:      Physical Exam No exam for telehealth visit    Current Outpatient Medications   Medication Sig Dispense Refill    ARIPiprazole (ABILIFY) 2 MG Tab Take 1 tablet (2 mg total) by mouth once daily. 30 tablet 11    aspirin (ECOTRIN) 81 MG EC tablet Take 1 tablet (81 mg total) by mouth once daily. 30 tablet 11    blood sugar diagnostic Strp 1 strip by Misc.(Non-Drug; Combo Route) route 4 (four) times daily. 100 strip 11    calcium carbonate (OS-ORTIZ) 500 mg calcium (1,250 mg) tablet Take 1 tablet (500 mg total) by mouth once daily. 30 tablet 11    empagliflozin (JARDIANCE) 25 mg tablet Take 1 tablet (25 mg total) by mouth once daily. 30 tablet 11    EScitalopram oxalate (LEXAPRO) 20 MG tablet Take 1 tablet (20 mg total) by mouth once daily. 30 tablet 11    fluoride, sodium, (PREVIDENT 5000) 1.1 % Pste USE TWICE DAILY IN REPLACEMENT OF NON-PRESCRIBED TOOTHPASTE      HYDROcodone-acetaminophen (NORCO) 5-325 mg per tablet Take 1  "tablet by mouth every 8 (eight) hours.      hydrOXYchloroQUINE (PLAQUENIL) 200 mg tablet Take 200 mg by mouth 2 (two) times daily.      insulin degludec (TRESIBA FLEXTOUCH U-100) 100 unit/mL (3 mL) insulin pen Inject 50 Units into the skin once daily. 5 pen 11    insulin lispro (HUMALOG KWIKPEN INSULIN) 100 unit/mL pen Inject 32 Units into the skin 3 (three) times daily before meals. Plus sliding scale insulin: 150 - 200 = +2 units; 201 - 250 = +4 units; 251 - 300 = +6 units; 301 - 350 = +8 units; >350 = +10 units. Total daily dose: 126 units 24 mL 11    iron-vitamin C 100-250 mg, ICAR-C, 100-250 mg Tab Take 1 tablet by mouth every morning.      lancets 33 gauge Misc by Misc.(Non-Drug; Combo Route) route 4 (four) times daily. 100 each 11    pantoprazole (PROTONIX) 40 MG tablet Take 1 tablet (40 mg total) by mouth once daily. 30 tablet 1    pen needle, diabetic 32 gauge x 5/32" Ndle 1 Dose by Misc.(Non-Drug; Combo Route) route 3 (three) times daily. 100 each 11    sodium bicarbonate 650 MG tablet Take 2 tablets (1,300 mg total) by mouth 3 (three) times daily. 180 tablet 11    tacrolimus (PROGRAF) 1 MG Cap Take 4 capsules (4 mg total) by mouth every 12 (twelve) hours. 240 capsule 11    tirzepatide 7.5 mg/0.5 mL PnIj Inject 7.5 mg into the skin every 7 days. 4 pen 3    traZODone (DESYREL) 150 MG tablet Take 1 tablet (150 mg total) by mouth every evening. 30 tablet 11    vitamin D (VITAMIN D3) 1000 units Tab Take 1,000 Units by mouth once daily.       No current facility-administered medications for this visit.      Lab Results   Component Value Date    WBC 3.3 (L) 04/30/2024    HGB 11.1 (L) 04/30/2024    HCT 32.2 (L) 04/30/2024    MCV 89.0 04/30/2024    PLT 80 (L) 04/30/2024        CMP  Sodium   Date Value Ref Range Status   04/03/2024 136 136 - 145 mmol/L Final     Potassium   Date Value Ref Range Status   04/03/2024 5.1 3.5 - 5.1 mmol/L Final     Chloride   Date Value Ref Range Status   04/03/2024 104 95 - 110 mmol/L " Final     CO2   Date Value Ref Range Status   04/03/2024 23 23 - 29 mmol/L Final     Glucose   Date Value Ref Range Status   04/03/2024 187 (H) 70 - 110 mg/dL Final     BUN   Date Value Ref Range Status   04/03/2024 30 (H) 6 - 20 mg/dL Final     Creatinine   Date Value Ref Range Status   04/03/2024 1.7 (H) 0.5 - 1.4 mg/dL Final     Calcium   Date Value Ref Range Status   04/03/2024 9.9 8.7 - 10.5 mg/dL Final     Total Protein   Date Value Ref Range Status   04/03/2024 7.0 6.0 - 8.4 g/dL Final     Albumin   Date Value Ref Range Status   04/03/2024 4.4 3.5 - 5.2 g/dL Final     Total Bilirubin   Date Value Ref Range Status   04/03/2024 4.1 (H) 0.1 - 1.0 mg/dL Final     Comment:     For infants and newborns, interpretation of results should be based  on gestational age, weight and in agreement with clinical  observations.    Premature Infant recommended reference ranges:  Up to 24 hours.............<8.0 mg/dL  Up to 48 hours............<12.0 mg/dL  3-5 days..................<15.0 mg/dL  6-29 days.................<15.0 mg/dL       Alkaline Phosphatase   Date Value Ref Range Status   04/03/2024 56 55 - 135 U/L Final     AST   Date Value Ref Range Status   04/03/2024 21 10 - 40 U/L Final     ALT   Date Value Ref Range Status   04/03/2024 18 10 - 44 U/L Final     Anion Gap   Date Value Ref Range Status   04/03/2024 9 8 - 16 mmol/L Final     eGFR    Date Value Ref Range Status   11/01/2022 75 (L) >90 mL/min/1.73m2 Final     eGFR   Date Value Ref Range Status   11/01/2022 65 (L) >90 mL/min/1.73m2 Final          Assessment:       Problem List Items Addressed This Visit    None        Plan:       Reactive marrow, marrow suppression, hemolytic anemia, and 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 Especially in WBC and platelet count. Should recover to baseline around 21 days from control of infection. Immune cells present (WBC and platelets) will be  consumed in initial response to infection. Marrow suppressive drugs will delay production of replacement cells. Infection may trigger immune hemolysis causing a worse anemia until infection control.     CBC has largely returned to baseline. Continues on Tac, has been off cellcept.  OK to try restarting cellcept with normal ANC- may improve anemia and platelet  count    Continue routine surveillance of CBC    Return to hematology as needed.        BMT Route Chart for Scheduling

## 2024-05-21 ENCOUNTER — TELEPHONE (OUTPATIENT)
Dept: TRANSPLANT | Facility: CLINIC | Age: 49
End: 2024-05-21
Payer: COMMERCIAL

## 2024-05-21 LAB
EXT ALBUMIN: 4.3
EXT ALKALINE PHOSPHATASE: 48
EXT ALT: 17
EXT AST: 23
EXT BASOPHIL%: 0.4
EXT BILIRUBIN DIRECT: 1 MG/DL
EXT BILIRUBIN TOTAL: 3
EXT BUN: 40
EXT CALCIUM: 9.6
EXT CHLORIDE: 108
EXT CO2: 23
EXT CREATININE: 2.04 MG/DL
EXT EOSINOPHIL%: 4.3
EXT GFR MDRD NON AF AMER: 39
EXT GLUCOSE: 178
EXT HEMATOCRIT: 31.9
EXT HEMOGLOBIN: 11
EXT LYMPH%: 26.4
EXT MONOCYTES%: 5.1
EXT PLATELETS: 70
EXT POTASSIUM: 4.8
EXT PROTEIN TOTAL: 6.4
EXT SEGS%: 63.1
EXT SODIUM: 140 MMOL/L
EXT TACROLIMUS LVL: 8.3
EXT URINE BACTERIA: ABNORMAL
EXT URINE OCCULT BLOOD: ABNORMAL
EXT WBC: 2.8
Lab: ABNORMAL
Lab: ABNORMAL
Lab: NORMAL
POC RBC, URINE: ABNORMAL HPF
URINE BILIRUBIN (POC): ABNORMAL
URINE GLUCOSE (POC): ABNORMAL
URINE LEUKOCYTES (POC): ABNORMAL
URINE NITRITE (POC): ABNORMAL
URINE PH (POC): 6
URINE SPECIFIC GRAVITY (POC): 1.02
URINE-COLOR: YELLOW
WBC URINE: 1

## 2024-05-21 NOTE — TELEPHONE ENCOUNTER
Portal message sent, repeat labs 6/17/24----- Message from Yaa Jackson MD sent at 5/21/2024 12:00 PM CDT -----  Labs within acceptable ranges. No changes at this time.

## 2024-06-17 PROBLEM — A41.9 SEPSIS: Status: RESOLVED | Noted: 2024-03-13 | Resolved: 2024-06-17

## 2024-06-17 PROBLEM — N17.9 AKI (ACUTE KIDNEY INJURY): Status: RESOLVED | Noted: 2024-03-13 | Resolved: 2024-06-17

## 2024-06-27 ENCOUNTER — TELEPHONE (OUTPATIENT)
Dept: TRANSPLANT | Facility: CLINIC | Age: 49
End: 2024-06-27
Payer: COMMERCIAL

## 2024-06-27 LAB
EXT ALBUMIN: 4.4
EXT ALKALINE PHOSPHATASE: 57
EXT ALT: 18
EXT AST: 18
EXT BASOPHIL%: 0.7
EXT BILIRUBIN DIRECT: 0.9 MG/DL
EXT BILIRUBIN TOTAL: 2.6
EXT BUN: 38
EXT CALCIUM: 9.7
EXT CHLORIDE: 105
EXT CO2: 24
EXT CREATININE: 1.89 MG/DL
EXT EOSINOPHIL%: 4.1
EXT GFR MDRD NON AF AMER: 43
EXT GLUCOSE: 228
EXT HEMATOCRIT: 34.9
EXT HEMOGLOBIN: 11.9
EXT LYMPH%: 34.9
EXT MONOCYTES%: 7.8
EXT PLATELETS: 68
EXT POTASSIUM: 4.7
EXT PROTEIN TOTAL: 6.8
EXT SEGS%: 52.2
EXT SODIUM: 137 MMOL/L
EXT TACROLIMUS LVL: 4.8
EXT WBC: 3

## 2024-06-27 NOTE — TELEPHONE ENCOUNTER
Portal message sent, repeat labs 7/29/24----- Message from Yaa Jackson MD sent at 6/27/2024 10:40 AM CDT -----  Labs reviewed. No changes at this time.

## 2024-06-27 NOTE — LETTER
"   LAB ORDERS    2024  Labs due week of 24      ORDERING MD:   Yaa Jackson MD - NPI #[id number]         Patient Name: Rosalio Mccarty               : 1975    [facility] Number: [id number]                  #:        Please draw the following labs:     Test Frequency  Diagnosis/ICD-10 Code     CBC/DIFF/PLT every month  Z94.4 Liver Transplant       Complete Metabolic Panel every month  Z94.4 Liver Transplant         Prograf level every 3 months  Z94.4 Liver Transplant     Hepatitis C antibody once  Z94.4 Liver Transplant      HCV quantitative Once  Z94.4 Liver Transplant     Vitamin D Once   Z94.4 LIver Transplant         FAX RESULTS [phone] "[facility] Liver Transplant Coordinator", and send the hard copy when completed. If you have any questions regarding this request or need additional information, please call [phone].      Blank taylor  Fax [phone]  "

## 2024-07-03 ENCOUNTER — PATIENT MESSAGE (OUTPATIENT)
Dept: TRANSPLANT | Facility: CLINIC | Age: 49
End: 2024-07-03
Payer: COMMERCIAL

## 2024-07-03 ENCOUNTER — OFFICE VISIT (OUTPATIENT)
Dept: TRANSPLANT | Facility: CLINIC | Age: 49
End: 2024-07-03
Payer: COMMERCIAL

## 2024-07-03 DIAGNOSIS — Z79.60 LONG-TERM USE OF IMMUNOSUPPRESSANT MEDICATION: ICD-10-CM

## 2024-07-03 DIAGNOSIS — Z94.4 S/P LIVER TRANSPLANT: ICD-10-CM

## 2024-07-03 DIAGNOSIS — Z91.89 AT RISK FOR OPPORTUNISTIC INFECTIONS: ICD-10-CM

## 2024-07-03 NOTE — PROGRESS NOTES
Transplant Hepatology  Liver Transplant Recipient Follow-up    The patient location is: [place]  The chief complaint leading to consultation is: post-OLT    Visit type: audiovisual    Face to Face time with patient: 15 minutes.  30 minutes of total time spent on the encounter, which includes face to face time and non-face to face time preparing to see the patient (eg, review of tests), Obtaining and/or reviewing separately obtained history, Documenting clinical information in the electronic or other health record, Independently interpreting results (not separately reported) and communicating results to the patient/family/caregiver, or Care coordination (not separately reported).     Each patient to whom he or she provides medical services by telemedicine is:  (1) informed of the relationship between the physician and patient and the respective role of any other health care provider with respect to management of the patient; and (2) notified that he or she may decline to receive medical services by telemedicine and may withdraw from such care at any time.    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     He has had the following complications since transplant: moderate T-cell mediated rejection x2 (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 Presumed T-cell mediated rejection in Dec  2023 treated with oral prednisone in the outpatient setting.    Subjective:     Last seen in Transplant Hepatology Clinic on 4/3/2024    Interval History:    - Feels overall improved.   - Experienced self-limited GI illness, as well as URI in the last couple of months. He did not experience severe symptoms, so he managed it at home.   - Has been experiencing joint aches / body pains. He is scheduled with his PCP to address these symptoms.  - Denies recent hospitalization, ED visit.  - Continues taking Tac 4/4. Remains off Cellcept, and steroids.  - Denies fevers, chills, tremors, new headaches.    Objective:     There were no vitals filed for this visit.  There is no height or weight on file to calculate BMI.    Physical Exam  Constitutional:       General: He is not in acute distress.  Eyes:      General: No scleral icterus.  Skin:     Coloration: Skin is not jaundiced.   Neurological:      General: No focal deficit present.      Mental Status: He is alert.   Psychiatric:         Thought Content: Thought content normal.         Lab Results   Component Value Date    BILITOT 4.1 (H) 04/03/2024    AST 21 04/03/2024    ALT 18 04/03/2024    ALKPHOS 56 04/03/2024    CREATININE 1.7 (H) 04/03/2024    ALBUMIN 4.4 04/03/2024     Lab Results   Component Value Date    WBC 3.0 (L) 06/20/2024    WBC 3.00 (L) 04/03/2024    HGB 11.9 06/20/2024    HGB 11.6 (L) 04/03/2024    HCT 34.9 (L) 06/20/2024    HCT 34.6 (L) 04/03/2024    HCT 24 (L) 03/14/2024    PLT 68 (L) 06/20/2024    PLT 66 (L) 04/03/2024     Lab Results   Component Value Date    TACROLIMUS 6.0 03/15/2024       Assessment/Plan:     Rosalio Mccarty is a 49M who underwent liver transplantation on 1/5/2023 for 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    1. S/P liver transplant    2. Long-term use of immunosuppressant medication    3. At risk for opportunistic infections      1. Liver Transplant.   Excellent allograft function with  normal LFTs on 6/20/24    2. Immunosuppression. History of T-cell mediated rejections correlate with prior attempts at decreasing IS. Continue Tac 4/4. Continue to hold Cellcept for now given persistent leukopenia / infectious complications.     3. Blood Pressure. Not currently on BP medications. Asked to monitor BP at home. If elevated, then should discuss antihypertensive therapy with PCP.    4. Renal. Cr 1.89 6/20/24. Encouraged hydration. Avoid NSAIDs. Plan minimize CNI as able.     5. Health Maintenance/Screening:  - Recommend age appropriate cancer screening  - Skin cancer: Recommend use of sunscreen SPF 30 or higher, hat and sunglasses while outside, dermatologist visit annually or sooner if any concerning lesions.  - Osteoporosis: Recommend bone density testing every 2-3 years if previously normal or annually if previously abnormal.    RTC in 3 months.    UNOS Patient Status  Functional Status: 80% - Normal activity with effort: some symptoms of disease  Physical Capacity: No Limitations  Working for income: yes  New diabetes onset between last follow-up to the current follow-up: No  Did patient have any acute rejection episodes during the follow-up period: No  Post transplant malignancy: No    Yaa Jackson MD  Staff Physician  Hepatology and Liver Transplant  [facility]  [facility] [place]

## 2024-07-03 NOTE — LETTER
July 3, 2024        Alan Chinchilla  [address] [place] [address] [place] [address]  Phone: [phone]  Fax: [phone]             [facility]  [address]  [facility] [address]  Phone: [phone]   Patient: Rosalio Mccarty   MR Number: [id number]   YOB: 1975   Date of Visit: 7/3/2024       Dear Dr. Alan Chinchilla    Thank you for referring Rosalio Mccarty to me for evaluation. Attached you will find relevant portions of my assessment and plan of care.    If you have questions, please do not hesitate to call me. I look forward to following Rosalio Mccarty along with you.    Sincerely,    Yaa Jackson MD    Enclosure    If you would like to receive this communication electronically, please contact [email or web address] or [phone] to request [employer] Link access.    [employer] Link is a tool which provides read-only access to select patient information with whom you have a relationship. Its easy to use and provides real time access to review your patients record including encounter summaries, notes, results, and demographic information.    If you feel you have received this communication in error or would no longer like to receive these types of communications, please e-mail [email or web address]

## 2024-07-16 DIAGNOSIS — Z94.4 S/P LIVER TRANSPLANT: ICD-10-CM

## 2024-07-16 DIAGNOSIS — Z29.89 PROPHYLACTIC IMMUNOTHERAPY: ICD-10-CM

## 2024-07-16 DIAGNOSIS — Z94.4 LIVER TRANSPLANTED: ICD-10-CM

## 2024-07-16 RX ORDER — SODIUM BICARBONATE 650 MG/1
1300 TABLET ORAL 3 TIMES DAILY
Qty: 540 TABLET | Refills: 3 | Status: SHIPPED | OUTPATIENT
Start: 2024-07-16 | End: 2025-07-16

## 2024-08-06 ENCOUNTER — TELEPHONE (OUTPATIENT)
Dept: TRANSPLANT | Facility: CLINIC | Age: 49
End: 2024-08-06
Payer: COMMERCIAL

## 2024-08-06 LAB
EXT ALBUMIN: 4.5
EXT ALKALINE PHOSPHATASE: 50
EXT ALT: 27
EXT AST: 20
EXT BASOPHIL%: 0.4
EXT BILIRUBIN TOTAL: 2.2
EXT BUN: 22
EXT CALCIUM: 9.8
EXT CHLORIDE: 106
EXT CO2: 22
EXT CREATININE: 1.95 MG/DL
EXT EOSINOPHIL%: 2.4
EXT GFR MDRD NON AF AMER: 41
EXT GLUCOSE: 221
EXT HCV QUANT: NOT DETECTED
EXT HEMATOCRIT: 33.6
EXT HEMOGLOBIN: 11.8
EXT LYMPH%: 30.5
EXT MONOCYTES%: 7.7
EXT PLATELETS: 64
EXT POTASSIUM: 4.8
EXT PROTEIN TOTAL: 6.7
EXT SEGS%: 58.6
EXT SODIUM: 138 MMOL/L
EXT TACROLIMUS LVL: 5.4
EXT VIT D 25 HYDROXY: 36.4
EXT WBC: 2.5

## 2024-10-02 ENCOUNTER — OFFICE VISIT (OUTPATIENT)
Dept: TRANSPLANT | Facility: CLINIC | Age: 49
End: 2024-10-02
Payer: COMMERCIAL

## 2024-10-02 ENCOUNTER — PATIENT MESSAGE (OUTPATIENT)
Dept: TRANSPLANT | Facility: CLINIC | Age: 49
End: 2024-10-02

## 2024-10-02 DIAGNOSIS — N18.9 CHRONIC KIDNEY DISEASE, UNSPECIFIED CKD STAGE: ICD-10-CM

## 2024-10-02 DIAGNOSIS — D86.9 SARCOIDOSIS: ICD-10-CM

## 2024-10-02 DIAGNOSIS — Z94.4 S/P LIVER TRANSPLANT: ICD-10-CM

## 2024-10-02 DIAGNOSIS — Z79.60 LONG-TERM USE OF IMMUNOSUPPRESSANT MEDICATION: ICD-10-CM

## 2024-10-02 DIAGNOSIS — Z91.89 AT RISK FOR OPPORTUNISTIC INFECTIONS: ICD-10-CM

## 2024-10-02 NOTE — PROGRESS NOTES
Transplant Hepatology  Liver Transplant Recipient Follow-up    The patient location is: [place]  The chief complaint leading to consultation is: post-OLT    Visit type: audiovisual    Face to Face time with patient: 12 minutes.  30 minutes of total time spent on the encounter, which includes face to face time and non-face to face time preparing to see the patient (eg, review of tests), Obtaining and/or reviewing separately obtained history, Documenting clinical information in the electronic or other health record, Independently interpreting results (not separately reported) and communicating results to the patient/family/caregiver, or Care coordination (not separately reported).     Each patient to whom he or she provides medical services by telemedicine is:  (1) informed of the relationship between the physician and patient and the respective role of any other health care provider with respect to management of the patient; and (2) notified that he or she may decline to receive medical services by telemedicine and may withdraw from such care at any time.    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     He has had the following complications since transplant: moderate T-cell mediated rejection x2 (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 Presumed T-cell mediated rejection in Dec 2023  treated with oral prednisone in the outpatient setting.    Subjective:     Last seen in Transplant Hepatology Clinic on 7/3/2024    Interval History:    - Feels great! Denies acute complaints. Accompanied by wife, Triston.  - Taking Tac 4/4. Adhering to immunosuppression.   - Continues to work on weight loss. Has been on Mounjaro. Plans to see PCP soon and thinks Mounjaro dosing will be increased.  - Denies recent hospitalization, ED visit.  - Traveling to [facility] for work in near future.  - Denies fevers, chills, tremors, new headaches.    Objective:     There were no vitals filed for this visit.  There is no height or weight on file to calculate BMI.    Physical Exam  Constitutional:       General: He is not in acute distress.  Eyes:      General: No scleral icterus.  Skin:     Coloration: Skin is not jaundiced.   Neurological:      General: No focal deficit present.      Mental Status: He is alert.   Psychiatric:         Thought Content: Thought content normal.         Lab Results   Component Value Date    BILITOT 4.1 (H) 04/03/2024    AST 21 04/03/2024    ALT 18 04/03/2024    ALKPHOS 56 04/03/2024    CREATININE 1.7 (H) 04/03/2024    ALBUMIN 4.4 04/03/2024     Lab Results   Component Value Date    WBC 3.3 (L) 08/30/2024    WBC 3.00 (L) 04/03/2024    HGB 13.1 08/30/2024    HGB 11.6 (L) 04/03/2024    HCT 37.5 08/30/2024    HCT 34.6 (L) 04/03/2024    HCT 24 (L) 03/14/2024    PLT 85 (L) 08/30/2024    PLT 66 (L) 04/03/2024     Lab Results   Component Value Date    TACROLIMUS 6.0 03/15/2024       Assessment/Plan:     Rosalio Mccarty is a 49M who underwent liver transplantation on 1/5/2023 for 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    1. S/P liver transplant    2. Long-term use of immunosuppressant medication    3. At risk for opportunistic infections    4. Sarcoidosis    5. Chronic kidney disease, unspecified CKD stage      1. Liver Transplant.   Excellent allograft  function with normal LFTs on 7/31/24    2. Immunosuppression. History of T-cell mediated rejections correlate with prior attempts at decreasing IS.   - Continue Tac 4/4, goal 4-6  - Holding Cellcept given persistent leukopenia / infectious complications     3. Renal. Cr 1.95 7/31/24. Encouraged hydration. Avoid NSAIDs. Plan minimize CNI as able.     5. Health Maintenance/Screening:  - Recommend age appropriate cancer screening  - Skin cancer: Recommend use of sunscreen SPF 30 or higher, hat and sunglasses while outside, dermatologist visit annually or sooner if any concerning lesions.  - Osteoporosis: Recommend bone density testing every 2-3 years if previously normal or annually if previously abnormal.    RTC in 6 months.    UNOS Patient Status  Functional Status: 80% - Normal activity with effort: some symptoms of disease  Physical Capacity: No Limitations    Yaa Jackson MD  Staff Physician  Hepatology and Liver Transplant  [facility]  [facility] [place]

## 2024-10-02 NOTE — LETTER
October 2, 2024        Alan Chinchilla  [address] [place] [address]  Phone: [phone]  Fax: [phone]             [facility]  [address]  [facility] [address]  Phone: [phone]   Patient: Rosalio Mccarty   MR Number: [id number]   YOB: 1975   Date of Visit: 10/2/2024       Dear Dr. Alan Chinchilla    Thank you for referring Rosalio Mccarty to me for evaluation. Attached you will find relevant portions of my assessment and plan of care.    If you have questions, please do not hesitate to call me. I look forward to following Rosalio Mccarty along with you.    Sincerely,    Yaa Jackson MD    Enclosure    If you would like to receive this communication electronically, please contact [email or web address] or [phone] to request [employer] Link access.    [employer] Link is a tool which provides read-only access to select patient information with whom you have a relationship. Its easy to use and provides real time access to review your patients record including encounter summaries, notes, results, and demographic information.    If you feel you have received this communication in error or would no longer like to receive these types of communications, please e-mail [email or web address]

## 2024-10-07 DIAGNOSIS — Z94.4 LIVER TRANSPLANTED: ICD-10-CM

## 2024-10-07 LAB
EXT ALBUMIN: 4.8
EXT ALKALINE PHOSPHATASE: 62
EXT ALT: 18
EXT AST: 21
EXT BASOPHIL%: 0.7
EXT BILIRUBIN TOTAL: 2.9
EXT BUN: 43
EXT CALCIUM: 9.7
EXT CHLORIDE: 105
EXT CO2: 21
EXT CREATININE: 2.15 MG/DL
EXT EOSINOPHIL%: 1.7
EXT GFR MDRD NON AF AMER: 37
EXT GLUCOSE: 298
EXT HEMATOCRIT: 38.2
EXT HEMOGLOBIN: 13.1
EXT LYMPH%: 25.8
EXT MONOCYTES%: 6.3
EXT PLATELETS: 88
EXT POTASSIUM: 4.8
EXT PROTEIN TOTAL: 7.3
EXT SEGS%: 64.5
EXT SODIUM: 136 MMOL/L
EXT TACROLIMUS LVL: 6.5
EXT WBC: 2.9

## 2024-10-07 RX ORDER — TACROLIMUS 1 MG/1
3 CAPSULE ORAL EVERY 12 HOURS
Qty: 180 CAPSULE | Refills: 11 | Status: SHIPPED | OUTPATIENT
Start: 2024-10-07

## 2024-10-07 NOTE — TELEPHONE ENCOUNTER
Portal message sent, repeat labs 10/14/24----- Message from Yaa Jackson MD sent at 10/7/2024 12:23 PM CDT -----  Labs reviewed.     Decrease Tac to 3/3 from 4/4. Labs in 1 week please. Thank you.

## 2024-10-15 ENCOUNTER — PATIENT MESSAGE (OUTPATIENT)
Dept: TRANSPLANT | Facility: CLINIC | Age: 49
End: 2024-10-15
Payer: COMMERCIAL

## 2024-10-18 ENCOUNTER — PATIENT MESSAGE (OUTPATIENT)
Dept: TRANSPLANT | Facility: CLINIC | Age: 49
End: 2024-10-18
Payer: COMMERCIAL

## 2024-10-18 DIAGNOSIS — Z94.4 LIVER TRANSPLANTED: ICD-10-CM

## 2024-10-18 LAB
EXT ALBUMIN: 5
EXT ALKALINE PHOSPHATASE: 54
EXT ALT: 17
EXT AST: 20
EXT BILIRUBIN TOTAL: 3.4
EXT BUN: 42
EXT CALCIUM: 10
EXT CHLORIDE: 106
EXT CO2: 22
EXT CREATININE: 2.3 MG/DL
EXT EGFR NO RACE VARIABLE: 33
EXT EOSINOPHIL%: 2.8
EXT GLUCOSE: 157
EXT HEMATOCRIT: 39.7
EXT HEMOGLOBIN: 13.6
EXT LYMPH%: 28.6
EXT MONOCYTES%: 6.3
EXT PLATELETS: 91
EXT POTASSIUM: 5.1
EXT PROTEIN TOTAL: 7.4
EXT SEGS%: 61.2
EXT SODIUM: 135 MMOL/L
EXT TACROLIMUS LVL: 9.1
EXT WBC: 4

## 2024-10-18 RX ORDER — TACROLIMUS 1 MG/1
2 CAPSULE ORAL EVERY 12 HOURS
Qty: 120 CAPSULE | Refills: 11 | Status: SHIPPED | OUTPATIENT
Start: 2024-10-18

## 2024-10-18 NOTE — TELEPHONE ENCOUNTER
----- Message from Yaa Jackson MD sent at 10/18/2024 12:41 PM CDT -----  Labs reviewed.    If true trough, please decrease tac to 2/2. Repeat labs in 1 week. Thank you.

## 2024-10-18 NOTE — TELEPHONE ENCOUNTER
Patient notified and instructed via [facility]:    Per :  your Prograf level was high at 9.1. if you did not take your dose before your blood was drawn that day, then you need to reduce your Prograf dose to 2mg twice a day and repeat labs next week. Thanks.

## 2024-10-29 ENCOUNTER — PATIENT MESSAGE (OUTPATIENT)
Dept: TRANSPLANT | Facility: CLINIC | Age: 49
End: 2024-10-29
Payer: COMMERCIAL

## 2024-10-30 ENCOUNTER — TELEPHONE (OUTPATIENT)
Dept: TRANSPLANT | Facility: CLINIC | Age: 49
End: 2024-10-30
Payer: COMMERCIAL

## 2024-10-30 DIAGNOSIS — Z94.4 LIVER TRANSPLANTED: ICD-10-CM

## 2024-10-30 LAB
EXT ALBUMIN: 4.9
EXT ALKALINE PHOSPHATASE: 51
EXT ALT: 51
EXT AST: 35
EXT BASOPHIL%: 0.7
EXT BILIRUBIN TOTAL: 3.1
EXT BUN: 24
EXT CALCIUM: 9.7
EXT CHLORIDE: 104
EXT CO2: 24
EXT CREATININE: 1.96 MG/DL
EXT EOSINOPHIL%: 7
EXT GFR MDRD NON AF AMER: 41
EXT GLUCOSE: 196
EXT HEMATOCRIT: 34.4
EXT HEMOGLOBIN: 12.2
EXT LYMPH%: 30
EXT MONOCYTES%: 6.3
EXT PLATELETS: 79
EXT POTASSIUM: 4.4
EXT PROTEIN TOTAL: 7.1
EXT SEGS%: 55.7
EXT SODIUM: 136 MMOL/L
EXT TACROLIMUS LVL: 5
EXT WBC: 2.9

## 2024-10-30 RX ORDER — TACROLIMUS 1 MG/1
3 CAPSULE ORAL EVERY 12 HOURS
Qty: 180 CAPSULE | Refills: 11 | Status: SHIPPED | OUTPATIENT
Start: 2024-10-30

## 2024-11-04 ENCOUNTER — TELEPHONE (OUTPATIENT)
Dept: TRANSPLANT | Facility: CLINIC | Age: 49
End: 2024-11-04
Payer: COMMERCIAL

## 2024-11-04 LAB
EXT ALBUMIN: 4.7
EXT ALKALINE PHOSPHATASE: 52
EXT ALT: 56
EXT AST: 35
EXT BASOPHIL%: 0.7
EXT BILIRUBIN TOTAL: 3.1
EXT BUN: 30
EXT CALCIUM: 9.7
EXT CHLORIDE: 105
EXT CO2: 23
EXT CREATININE: 2.24 MG/DL
EXT EOSINOPHIL%: 4.1
EXT GFR MDRD NON AF AMER: 35
EXT GLUCOSE: 164
EXT HEMATOCRIT: 39
EXT HEMOGLOBIN: 12.5
EXT LYMPH%: 37.8
EXT MONOCYTES%: 6.1
EXT PLATELETS: 87
EXT POTASSIUM: 4.2
EXT PROTEIN TOTAL: 7.3
EXT SEGS%: 51
EXT SODIUM: 138 MMOL/L
EXT TACROLIMUS LVL: 5.8
EXT WBC: 3

## 2024-11-04 NOTE — TELEPHONE ENCOUNTER
Portal message sent, repeat labs 11/11/24----- Message from Yaa Jackson MD sent at 11/4/2024  2:15 PM CST -----  Labs reviewed. No changes to tacro dosing.    Recommend repeating labs in 1 week. Please include CMV PCR with next set of labs.

## 2024-11-04 NOTE — LETTER
"   LAB ORDERS    2024- 3/30/25      ORDERING MD:   Yaa Jackson MD        Patient Name: Rosalio Mccarty               : 1975    [facility] Number: [id number]                         Please draw the following labs:     Test Frequency  Diagnosis/ICD-10 Code     CBC/DIFF/PLT every month  Z94.4 Liver Transplant       Complete Metabolic Panel every month  [facility]94.[facility] Transplant         Prograf level every month  Z94.4 Liver Transplant     CMV PCR  once  Z94.4 Liver Transplant          FAX RESULTS -[phone] "[facility] Trans[facility] Coordinator", and send the hard copy when completed. If you have any questions regarding this request or need additional information, please call [phone].    [facility] [facility]   Fax [phone]  "

## 2024-11-15 ENCOUNTER — TELEPHONE (OUTPATIENT)
Dept: TRANSPLANT | Facility: CLINIC | Age: 49
End: 2024-11-15
Payer: COMMERCIAL

## 2024-11-15 LAB
EXT ALBUMIN: 4.4
EXT ALKALINE PHOSPHATASE: 70
EXT ALT: 33
EXT AST: 21
EXT BASOPHIL%: 0.6
EXT BILIRUBIN TOTAL: 3.3
EXT BUN: 28
EXT CALCIUM: 9.7
EXT CHLORIDE: 104
EXT CO2: 23
EXT CREATININE: 1.86 MG/DL
EXT EOSINOPHIL%: 4
EXT GFR MDRD NON AF AMER: 44
EXT GLUCOSE: 184
EXT HEMATOCRIT: 35.6
EXT HEMOGLOBIN: 12
EXT LYMPH%: 26
EXT MONOCYTES%: 6
EXT PLATELETS: 84
EXT POTASSIUM: 4.8
EXT PROTEIN TOTAL: 7
EXT SEGS%: 63.1
EXT SODIUM: 137 MMOL/L
EXT TACROLIMUS LVL: 6.8
EXT WBC: 3.5

## 2024-11-15 NOTE — TELEPHONE ENCOUNTER
Portal message sent, repeat labs 1/13/25----- Message from Yaa Jackson MD sent at 11/15/2024  2:28 PM CST -----  Labs reviewed.     Tac at goal. No changes.

## 2025-01-17 ENCOUNTER — PATIENT MESSAGE (OUTPATIENT)
Dept: TRANSPLANT | Facility: CLINIC | Age: 50
End: 2025-01-17

## 2025-01-24 ENCOUNTER — TELEPHONE (OUTPATIENT)
Dept: TRANSPLANT | Facility: CLINIC | Age: 50
End: 2025-01-24

## 2025-01-24 DIAGNOSIS — Z94.4 LIVER TRANSPLANTED: ICD-10-CM

## 2025-01-24 LAB
EXT ALBUMIN: 4.7
EXT ALKALINE PHOSPHATASE: 60
EXT ALT: 15
EXT AST: 16
EXT BASOPHIL%: 0.8
EXT BILIRUBIN TOTAL: 2.5
EXT BUN: 20
EXT CALCIUM: 9.6
EXT CHLORIDE: 107
EXT CHOLESTEROL: 159
EXT CMV DNA QUANT. BY PCR: NOT DETECTED
EXT CO2: 20
EXT CREATININE: 1.9 MG/DL
EXT EOSINOPHIL%: 1.9
EXT GFR MDRD NON AF AMER: 42
EXT GLUCOSE: 246
EXT HDL: 29
EXT HEMATOCRIT: 40.4
EXT HEMOGLOBIN: 13.4
EXT LDL CHOLESTEROL: 91
EXT LYMPH%: 32.2
EXT MONOCYTES%: 7.4
EXT PLATELETS: 92
EXT POTASSIUM: 4.6
EXT PROTEIN TOTAL: 6.9
EXT SEGS%: 56.9
EXT SODIUM: 136 MMOL/L
EXT TACROLIMUS LVL: 5.6
EXT TRIGLYCERIDES: 193
EXT WBC: 3.7

## 2025-01-24 RX ORDER — TACROLIMUS 1 MG/1
3 CAPSULE ORAL EVERY 12 HOURS
Qty: 180 CAPSULE | Refills: 11 | Status: SHIPPED | OUTPATIENT
Start: 2025-01-24 | End: 2025-01-27 | Stop reason: SDUPTHER

## 2025-01-24 NOTE — TELEPHONE ENCOUNTER
Portal message sent, repeat labs 3/10/25----- Message from Yaa Jackson MD sent at 1/24/2025 10:12 AM CST -----  Labs entered in error.     No BARBARA present. Cr is stable.     No changes. Continue labs per protocol. Thank you.

## 2025-01-27 ENCOUNTER — PATIENT MESSAGE (OUTPATIENT)
Dept: TRANSPLANT | Facility: CLINIC | Age: 50
End: 2025-01-27

## 2025-01-27 DIAGNOSIS — Z94.4 LIVER TRANSPLANTED: ICD-10-CM

## 2025-01-27 RX ORDER — TACROLIMUS 1 MG/1
3 CAPSULE ORAL EVERY 12 HOURS
Qty: 180 CAPSULE | Refills: 11 | Status: SHIPPED | OUTPATIENT
Start: 2025-01-27 | End: 2025-01-28 | Stop reason: SDUPTHER

## 2025-01-28 DIAGNOSIS — Z94.4 LIVER TRANSPLANTED: ICD-10-CM

## 2025-01-28 RX ORDER — TACROLIMUS 1 MG/1
3 CAPSULE ORAL EVERY 12 HOURS
Qty: 180 CAPSULE | Refills: 11 | Status: SHIPPED | OUTPATIENT
Start: 2025-01-28

## 2025-03-12 ENCOUNTER — RESULTS FOLLOW-UP (OUTPATIENT)
Dept: TRANSPLANT | Facility: CLINIC | Age: 50
End: 2025-03-12

## 2025-03-12 LAB
EXT ALBUMIN: 4.8
EXT ALKALINE PHOSPHATASE: 68
EXT ALT: 18
EXT AST: 18
EXT BASOPHIL%: 0.8
EXT BILIRUBIN TOTAL: 3.1
EXT BUN: 37
EXT CALCIUM: 10.1
EXT CHLORIDE: 102
EXT CO2: 25
EXT CREATININE: 1.97 MG/DL
EXT EOSINOPHIL%: 5
EXT GFR MDRD NON AF AMER: 41
EXT GLUCOSE: 189
EXT HEMATOCRIT: 40.6
EXT HEMOGLOBIN: 13.5
EXT LYMPH%: 32.8
EXT MONOCYTES%: 7.1
EXT PLATELETS: 91
EXT POTASSIUM: 5
EXT PROTEIN TOTAL: 7.1
EXT SEGS%: 53.3
EXT SODIUM: 138 MMOL/L
EXT TACROLIMUS LVL: 7.1
EXT WBC: 3.8
HCV AB SER-ACNC: NEGATIVE

## 2025-03-12 NOTE — TELEPHONE ENCOUNTER
Portal message sent, repeat labs 6/23/25----- Message from Yaa Jackson MD sent at 3/12/2025 12:01 PM CDT -----  Labs reviewed. No changes.  ----- Message -----  From: Ayala Pedraza RN  Sent: 3/12/2025  10:10 AM CDT  To: Yaa Jackson MD

## 2025-06-02 ENCOUNTER — RESULTS FOLLOW-UP (OUTPATIENT)
Dept: HEPATOLOGY | Facility: CLINIC | Age: 50
End: 2025-06-02

## 2025-06-02 DIAGNOSIS — Z94.4 LIVER TRANSPLANTED: ICD-10-CM

## 2025-06-02 LAB
EXT ALBUMIN: 4.5
EXT ALKALINE PHOSPHATASE: 63
EXT ALT: 19
EXT AST: 16
EXT BASOPHIL%: 0.5
EXT BILIRUBIN TOTAL: 2
EXT BUN: 42
EXT CALCIUM: 9.9
EXT CHLORIDE: 102
EXT CO2: 24
EXT CREATININE: 2.12 MG/DL
EXT EOSINOPHIL%: 2.8
EXT GFR MDRD NON AF AMER: 37
EXT GLUCOSE: 157
EXT HEMATOCRIT: 42
EXT HEMOGLOBIN: 14.1
EXT LYMPH%: 31.6
EXT MONOCYTES%: 805
EXT PLATELETS: 89
EXT POTASSIUM: 4.3
EXT PROTEIN TOTAL: 7
EXT SEGS%: 56.4
EXT SODIUM: 138 MMOL/L
EXT TACROLIMUS LVL: 7.8
EXT WBC: 4.2

## 2025-06-04 ENCOUNTER — TELEPHONE (OUTPATIENT)
Dept: TRANSPLANT | Facility: CLINIC | Age: 50
End: 2025-06-04

## 2025-06-04 RX ORDER — TACROLIMUS 1 MG/1
2 CAPSULE ORAL EVERY 12 HOURS
Qty: 120 CAPSULE | Refills: 11 | OUTPATIENT
Start: 2025-06-04

## 2025-06-04 NOTE — TELEPHONE ENCOUNTER
Portal message sent, repeat labs 6/9/25----- Message from Yaa Jackson MD sent at 6/4/2025  8:34 AM CDT -----  Tac supratherapeutic.     Please decrease tac to 2/2 from 3/3 and repeat labs in 1 week. Thank you.  ----- Message -----  From: Ayala Pedraza RN  Sent: 6/2/2025  11:03 AM CDT  To: Yaa Jackson MD

## 2025-06-05 ENCOUNTER — OFFICE VISIT (OUTPATIENT)
Dept: TRANSPLANT | Facility: CLINIC | Age: 50
End: 2025-06-05

## 2025-06-05 ENCOUNTER — PATIENT MESSAGE (OUTPATIENT)
Dept: TRANSPLANT | Facility: CLINIC | Age: 50
End: 2025-06-05

## 2025-06-05 DIAGNOSIS — N18.9 CHRONIC KIDNEY DISEASE, UNSPECIFIED CKD STAGE: ICD-10-CM

## 2025-06-05 DIAGNOSIS — Z94.4 LIVER TRANSPLANTED: ICD-10-CM

## 2025-06-05 DIAGNOSIS — Z91.89 AT RISK FOR OPPORTUNISTIC INFECTIONS: ICD-10-CM

## 2025-06-05 DIAGNOSIS — Z79.60 LONG-TERM USE OF IMMUNOSUPPRESSANT MEDICATION: ICD-10-CM

## 2025-06-05 DIAGNOSIS — D86.9 SARCOIDOSIS: ICD-10-CM

## 2025-06-30 NOTE — NURSING
Pt AAOx4, VSS, afebrile. Cr 3.7. LR gtt @ 100 x 10H, NS d/c. Prelim urine cx GNR. Urine consistent with ATN. Bed in low/locked position, call light/personal belongings w/in reach, non-slip socks in place, pt remains free from falls, family at bedside, WCTM.    pt was playing basketball when had a fall injuring right wrist

## 2025-07-07 ENCOUNTER — PATIENT MESSAGE (OUTPATIENT)
Dept: TRANSPLANT | Facility: CLINIC | Age: 50
End: 2025-07-07
Payer: COMMERCIAL

## 2025-07-08 ENCOUNTER — PATIENT MESSAGE (OUTPATIENT)
Dept: TRANSPLANT | Facility: CLINIC | Age: 50
End: 2025-07-08
Payer: COMMERCIAL

## 2025-07-11 ENCOUNTER — RESULTS FOLLOW-UP (OUTPATIENT)
Dept: TRANSPLANT | Facility: CLINIC | Age: 50
End: 2025-07-11
Payer: COMMERCIAL

## 2025-07-11 LAB
EXT ALBUMIN: 4.6
EXT ALKALINE PHOSPHATASE: 63
EXT ALT: 29
EXT AST: 24
EXT BASOPHIL%: 0.5
EXT BILIRUBIN TOTAL: 2.8
EXT BUN: 43
EXT CALCIUM: 9.3
EXT CHLORIDE: 101
EXT CO2: 18
EXT CREATININE: 2.02 MG/DL
EXT EOSINOPHIL%: 0.2
EXT GFR MDRD NON AF AMER: 39
EXT GLUCOSE: 279
EXT HEMATOCRIT: 41.4
EXT HEMOGLOBIN: 14.2
EXT INR: 1.1
EXT LYMPH%: 7.1
EXT MONOCYTES%: 3.9
EXT PLATELETS: 76
EXT POTASSIUM: 5.2
EXT PROTEIN TOTAL: 7.3
EXT PT: 14.5
EXT SEGS%: 87.8
EXT SODIUM: 133 MMOL/L
EXT TACROLIMUS LVL: 3.8
EXT URINE BACTERIA: ABNORMAL
EXT URINE OCCULT BLOOD: ABNORMAL
EXT WBC: 4.4
INFLUENZA A: NEGATIVE
INFLUENZA B: NEGATIVE
LACTATE SERPL-SCNC: 1.3 MMOL/L
Lab: ABNORMAL
Lab: ABNORMAL
Lab: NORMAL
POC RBC, URINE: ABNORMAL HPF
PROCALCITONIN SERPL IA-MCNC: 0.76 NG/ML
RSV ANTIGEN (OHS): NEGATIVE
SARS-COV-2 RNA RESP QL NAA+PROBE: NEGATIVE
TROPONIN I: <3
URINE BILIRUBIN (POC): ABNORMAL
URINE GLUCOSE (POC): ABNORMAL
URINE LEUKOCYTES (POC): ABNORMAL
URINE NITRITE (POC): ABNORMAL
URINE PH (POC): 5.5
URINE SPECIFIC GRAVITY (POC): 1.02
WBC URINE: 0

## 2025-07-11 NOTE — TELEPHONE ENCOUNTER
Portal message sent, repeat labs 10/6/25----- Message from Yaa Jackson MD sent at 7/11/2025 12:45 PM CDT -----  Labs reviewed. No changes.  ----- Message -----  From: Ayala Pedraza RN  Sent: 7/11/2025  10:44 AM CDT  To: Yaa Jackson MD

## 2025-07-11 NOTE — TELEPHONE ENCOUNTER
----- Message from Yaa Jackson MD sent at 7/11/2025 12:45 PM CDT -----  Labs reviewed. No changes.  ----- Message -----  From: Ayala Pedraza RN  Sent: 7/11/2025  10:44 AM CDT  To: Yaa Jackson MD

## 2025-08-11 ENCOUNTER — PATIENT MESSAGE (OUTPATIENT)
Dept: TRANSPLANT | Facility: CLINIC | Age: 50
End: 2025-08-11
Payer: COMMERCIAL

## 2025-08-11 DIAGNOSIS — Z94.4 LIVER TRANSPLANTED: ICD-10-CM

## 2025-08-11 RX ORDER — TACROLIMUS 1 MG/1
3 CAPSULE ORAL EVERY 12 HOURS
Qty: 180 CAPSULE | Refills: 11 | Status: SHIPPED | OUTPATIENT
Start: 2025-08-11

## (undated) DEVICE — SUT PROLENE 5-0 36IN C-1

## (undated) DEVICE — SUT 4-0 12-30IN SILK

## (undated) DEVICE — STOPCOCK 3 WAY MED PRESSURE

## (undated) DEVICE — SUT 2-0 12-18IN SILK

## (undated) DEVICE — SUT 3-0 12-18IN SILK

## (undated) DEVICE — KIT SAHARA DRAPE DRAW/LIFT

## (undated) DEVICE — DRESSING ADH ISLAND 3.6 X 14

## (undated) DEVICE — SUT PDS BV 6-0

## (undated) DEVICE — SUT 4-0 12-18IN SILK BLACK

## (undated) DEVICE — SET DECANTER MEDICHOICE

## (undated) DEVICE — DRAIN CHEST DRY SUCTION

## (undated) DEVICE — GUN BIOPSY MAXCORE 14G 10CM

## (undated) DEVICE — BOOT AIR FLUID HEEL ADLT STD

## (undated) DEVICE — HANDSET ARGON PLUS

## (undated) DEVICE — SUT SILK 0 STRANDS 30IN BLK

## (undated) DEVICE — HEMOSTAT SURGICEL NU-KNIT 6X9

## (undated) DEVICE — DRESSING AQUACEL SACRAL 9 X 9

## (undated) DEVICE — TIP YANKAUERS BULB NO VENT

## (undated) DEVICE — STAPLER SKIN PROXIMATE WIDE

## (undated) DEVICE — SUT ETHILON 3-0 PS2 18 BLK

## (undated) DEVICE — SET DRN PNEUMPERCRDL 8.5F 15CM

## (undated) DEVICE — BLADE 4 INCH EDGE UN-INS

## (undated) DEVICE — DRAIN CHANNEL ROUND 19FR

## (undated) DEVICE — GOWN SURGICAL X-LARGE

## (undated) DEVICE — SET IV ADMIN 15DROP 3 CARESITE

## (undated) DEVICE — SOL NS 1000CC

## (undated) DEVICE — SUT PROLENE 4-0 SH BLU 36IN

## (undated) DEVICE — PAD K-THERMIA 24IN X 60IN

## (undated) DEVICE — ELECTRODE PAD DEFIB STERILE

## (undated) DEVICE — Device

## (undated) DEVICE — SET EXTENSION STERILE 30IN

## (undated) DEVICE — SEE MEDLINE ITEM 156901

## (undated) DEVICE — ELECTRODE REM PLYHSV RETURN 9

## (undated) DEVICE — SUT SILK 2-0 STRANDS 30IN

## (undated) DEVICE — KIT BIOPSY MISSION 18GX16CM

## (undated) DEVICE — TUBING NEPTUNE 2 SMOKE 10IN

## (undated) DEVICE — WIPE ESENTA BARR STNG FREE 3ML

## (undated) DEVICE — SUT SILK 3-0 STRANDS 30IN

## (undated) DEVICE — EVACUATOR WOUND BULB 100CC

## (undated) DEVICE — SUT PROLENE 6-0 BV-1 30IN

## (undated) DEVICE — TOWEL OR XRAY WHITE 17X26IN

## (undated) DEVICE — KIT PROBE COVER WITH GEL

## (undated) DEVICE — GELATIN SURGIPOWDER ABSORBABLE

## (undated) DEVICE — TRAY CATH FOL SIL URIMTR 16FR

## (undated) DEVICE — SUT PROLENE 3-0 SH DA 36 BL

## (undated) DEVICE — SUT SILK 3-0 SH 18IN BLACK

## (undated) DEVICE — SUT 1 36IN PDS II VIO MONO

## (undated) DEVICE — CATH URETHRAL RED RUBBER 18FR

## (undated) DEVICE — NDL MONOPTY BIOPSY 14GX10CM

## (undated) DEVICE — CLIP SPRING 6MM

## (undated) DEVICE — CONNECTOR TUBING STR 5 IN 1

## (undated) DEVICE — DRAPE CORETEMP FLD WRM 56X62IN